# Patient Record
Sex: FEMALE | Race: BLACK OR AFRICAN AMERICAN | Employment: UNEMPLOYED | URBAN - METROPOLITAN AREA
[De-identification: names, ages, dates, MRNs, and addresses within clinical notes are randomized per-mention and may not be internally consistent; named-entity substitution may affect disease eponyms.]

---

## 2019-11-01 ENCOUNTER — HOSPITAL ENCOUNTER (EMERGENCY)
Age: 24
Discharge: HOME | End: 2019-11-01
Attending: EMERGENCY MEDICINE
Payer: SELF-PAY

## 2019-11-01 VITALS
OXYGEN SATURATION: 97 % | HEIGHT: 69 IN | SYSTOLIC BLOOD PRESSURE: 183 MMHG | HEART RATE: 97 BPM | TEMPERATURE: 98 F | DIASTOLIC BLOOD PRESSURE: 87 MMHG | RESPIRATION RATE: 16 BRPM

## 2019-11-01 DIAGNOSIS — N30.00 ACUTE CYSTITIS WITHOUT HEMATURIA: Primary | ICD-10-CM

## 2019-11-01 LAB
BACTERIA URNS QL MICRO: NORMAL /HPF
CASTS URNS QL MICRO: 0 /LPF
CRYSTALS URNS QL MICRO: 0 /LPF
EPI CELLS #/AREA URNS HPF: NORMAL /HPF
MUCOUS THREADS URNS QL MICRO: 0 /LPF
OTHER OBSERVATIONS,UCOM: NORMAL
RBC #/AREA URNS HPF: NORMAL /HPF
WBC URNS QL MICRO: >100 /HPF

## 2019-11-01 PROCEDURE — 99284 EMERGENCY DEPT VISIT MOD MDM: CPT | Performed by: NURSE PRACTITIONER

## 2019-11-01 PROCEDURE — 81015 MICROSCOPIC EXAM OF URINE: CPT

## 2019-11-01 PROCEDURE — 81003 URINALYSIS AUTO W/O SCOPE: CPT | Performed by: NURSE PRACTITIONER

## 2019-11-01 PROCEDURE — 99283 EMERGENCY DEPT VISIT LOW MDM: CPT | Performed by: NURSE PRACTITIONER

## 2019-11-01 RX ORDER — CIPROFLOXACIN 500 MG/1
500 TABLET ORAL 2 TIMES DAILY
Qty: 14 TAB | Refills: 0 | Status: SHIPPED | OUTPATIENT
Start: 2019-11-01 | End: 2019-11-08

## 2019-11-01 NOTE — ED TRIAGE NOTES
Pt reports UTI symptoms for a week. Pt states she tried to treat herself but didn't work. Burning with urination and frequency, denies blood in urine.  NAD

## 2019-11-01 NOTE — DISCHARGE INSTRUCTIONS
Cipro as prescribed. Follow up with your primary care provider for a recheck if symptoms fail to improve or worsen. Return to the emergency department as needed.

## 2019-11-01 NOTE — ED PROVIDER NOTES
Patient presents with dysuria and urinary frequency. She states symptoms have been present for the past week. She states lower back pain. She denies fever, nausea, and vomiting. The history is provided by the patient. Bladder Infection    This is a new problem. The current episode started more than 1 week ago. The problem occurs every urination. The problem has not changed since onset. The quality of the pain is described as burning. The pain is at a severity of 9/10. The pain is mild. There has been no fever. She is sexually active. Associated symptoms include frequency and urgency. Pertinent negatives include no chills, no sweats, no nausea, no vomiting, no discharge, no hematuria, no hesitancy and no possible pregnancy. She has tried nothing for the symptoms. History reviewed. No pertinent past medical history. History reviewed. No pertinent surgical history. History reviewed. No pertinent family history.     Social History     Socioeconomic History    Marital status: SINGLE     Spouse name: Not on file    Number of children: Not on file    Years of education: Not on file    Highest education level: Not on file   Occupational History    Not on file   Social Needs    Financial resource strain: Not on file    Food insecurity:     Worry: Not on file     Inability: Not on file    Transportation needs:     Medical: Not on file     Non-medical: Not on file   Tobacco Use    Smoking status: Not on file   Substance and Sexual Activity    Alcohol use: Not on file    Drug use: Not on file    Sexual activity: Not on file   Lifestyle    Physical activity:     Days per week: Not on file     Minutes per session: Not on file    Stress: Not on file   Relationships    Social connections:     Talks on phone: Not on file     Gets together: Not on file     Attends Sikhism service: Not on file     Active member of club or organization: Not on file     Attends meetings of clubs or organizations: Not on file     Relationship status: Not on file    Intimate partner violence:     Fear of current or ex partner: Not on file     Emotionally abused: Not on file     Physically abused: Not on file     Forced sexual activity: Not on file   Other Topics Concern    Not on file   Social History Narrative    Not on file         ALLERGIES: Patient has no known allergies. Review of Systems   Constitutional: Negative for chills and fever. Gastrointestinal: Negative for abdominal pain, nausea and vomiting. Genitourinary: Positive for frequency and urgency. Negative for hematuria and hesitancy. Vitals:    11/01/19 0852   BP: 183/87   Pulse: 97   Resp: 16   Temp: 98 °F (36.7 °C)   SpO2: 97%   Height: 5' 9\" (1.753 m)            Physical Exam   Constitutional: She is oriented to person, place, and time. She appears well-developed and well-nourished. No distress. HENT:   Head: Normocephalic and atraumatic. Eyes: Conjunctivae and EOM are normal.   Neck: Normal range of motion. Neck supple. Cardiovascular: Normal rate and regular rhythm. Pulmonary/Chest: Effort normal and breath sounds normal.   Abdominal: Soft. She exhibits no distension. There is no tenderness. Musculoskeletal:        Lumbar back: She exhibits tenderness and pain. Back:    Neurological: She is alert and oriented to person, place, and time. Skin: Skin is warm and dry. She is not diaphoretic. Psychiatric: She has a normal mood and affect. Her behavior is normal.   Nursing note and vitals reviewed.      Recent Results (from the past 12 hour(s))   URINE MICROSCOPIC    Collection Time: 11/01/19  9:00 AM   Result Value Ref Range    WBC >100 0 /hpf    RBC 0-3 0 /hpf    Epithelial cells 0-3 0 /hpf    Bacteria TRACE 0 /hpf    Casts 0 0 /lpf    Crystals, urine 0 0 /LPF    Mucus 0 0 /lpf    Other observations RESULTS VERIFIED MANUALLY         MDM  Number of Diagnoses or Management Options  Acute cystitis without hematuria:   Diagnosis management comments: Urine sent for culture and GC/C culture. No treatment at this time. Prescription for cipro.         Amount and/or Complexity of Data Reviewed  Clinical lab tests: ordered and reviewed    Risk of Complications, Morbidity, and/or Mortality  Presenting problems: low  Diagnostic procedures: low  Management options: low    Patient Progress  Patient progress: stable         Procedures

## 2019-11-01 NOTE — ED NOTES
I have reviewed discharge instructions with the patient. The patient verbalized understanding. Patient left ED via Discharge Method: ambulatory to Home with self. Opportunity for questions and clarification provided. Patient given 1 scripts. To continue your aftercare when you leave the hospital, you may receive an automated call from our care team to check in on how you are doing. This is a free service and part of our promise to provide the best care and service to meet your aftercare needs.  If you have questions, or wish to unsubscribe from this service please call [phone]. Thank you for Choosing our [facility] Emergency Department.

## 2019-12-06 ENCOUNTER — APPOINTMENT (OUTPATIENT)
Dept: GENERAL RADIOLOGY | Age: 24
End: 2019-12-06
Attending: EMERGENCY MEDICINE
Payer: SELF-PAY

## 2019-12-06 ENCOUNTER — HOSPITAL ENCOUNTER (EMERGENCY)
Age: 24
Discharge: HOME | End: 2019-12-06
Attending: EMERGENCY MEDICINE
Payer: SELF-PAY

## 2019-12-06 ENCOUNTER — HOSPITAL ENCOUNTER (EMERGENCY)
Age: 24
Discharge: LEFT BEFORE DISCHARGE | End: 2019-12-06
Payer: MEDICAID

## 2019-12-06 VITALS
RESPIRATION RATE: 18 BRPM | TEMPERATURE: 98.5 F | HEART RATE: 111 BPM | WEIGHT: 293 LBS | DIASTOLIC BLOOD PRESSURE: 118 MMHG | BODY MASS INDEX: 43.4 KG/M2 | OXYGEN SATURATION: 93 % | HEIGHT: 69 IN | SYSTOLIC BLOOD PRESSURE: 194 MMHG

## 2019-12-06 VITALS
DIASTOLIC BLOOD PRESSURE: 95 MMHG | TEMPERATURE: 98.5 F | HEART RATE: 92 BPM | RESPIRATION RATE: 16 BRPM | OXYGEN SATURATION: 95 % | SYSTOLIC BLOOD PRESSURE: 179 MMHG

## 2019-12-06 DIAGNOSIS — M54.50 CHRONIC MIDLINE LOW BACK PAIN WITHOUT SCIATICA: Primary | ICD-10-CM

## 2019-12-06 DIAGNOSIS — G89.29 CHRONIC MIDLINE LOW BACK PAIN WITHOUT SCIATICA: Primary | ICD-10-CM

## 2019-12-06 DIAGNOSIS — R03.0 ELEVATED BLOOD PRESSURE READING: ICD-10-CM

## 2019-12-06 DIAGNOSIS — R06.09 DYSPNEA ON EXERTION: ICD-10-CM

## 2019-12-06 LAB
ALBUMIN SERPL-MCNC: 3.5 G/DL (ref 3.5–5)
ALBUMIN/GLOB SERPL: 0.7 {RATIO} (ref 1.2–3.5)
ALP SERPL-CCNC: 103 U/L (ref 50–136)
ALT SERPL-CCNC: 22 U/L (ref 12–65)
ANION GAP SERPL CALC-SCNC: 8 MMOL/L (ref 7–16)
AST SERPL-CCNC: 20 U/L (ref 15–37)
ATRIAL RATE: 101 BPM
BASOPHILS # BLD: 0.1 K/UL (ref 0–0.2)
BASOPHILS NFR BLD: 1 % (ref 0–2)
BILIRUB SERPL-MCNC: 0.4 MG/DL (ref 0.2–1.1)
BUN SERPL-MCNC: 10 MG/DL (ref 6–23)
CALCIUM SERPL-MCNC: 9.2 MG/DL (ref 8.3–10.4)
CALCULATED P AXIS, ECG09: 44 DEGREES
CALCULATED R AXIS, ECG10: 70 DEGREES
CALCULATED T AXIS, ECG11: 51 DEGREES
CHLORIDE SERPL-SCNC: 105 MMOL/L (ref 98–107)
CO2 SERPL-SCNC: 26 MMOL/L (ref 21–32)
CREAT SERPL-MCNC: 0.89 MG/DL (ref 0.6–1)
D DIMER PPP FEU-MCNC: 0.35 UG/ML(FEU)
DIAGNOSIS, 93000: NORMAL
DIFFERENTIAL METHOD BLD: ABNORMAL
EOSINOPHIL # BLD: 0 K/UL (ref 0–0.8)
EOSINOPHIL NFR BLD: 0 % (ref 0.5–7.8)
ERYTHROCYTE [DISTWIDTH] IN BLOOD BY AUTOMATED COUNT: 22.5 % (ref 11.9–14.6)
GLOBULIN SER CALC-MCNC: 5.1 G/DL (ref 2.3–3.5)
GLUCOSE SERPL-MCNC: 107 MG/DL (ref 65–100)
HCT VFR BLD AUTO: 37.9 % (ref 35.8–46.3)
HGB BLD-MCNC: 10.3 G/DL (ref 11.7–15.4)
IMM GRANULOCYTES # BLD AUTO: 0 K/UL (ref 0–0.5)
IMM GRANULOCYTES NFR BLD AUTO: 0 % (ref 0–5)
LYMPHOCYTES # BLD: 3.6 K/UL (ref 0.5–4.6)
LYMPHOCYTES NFR BLD: 36 % (ref 13–44)
MCH RBC QN AUTO: 17.7 PG (ref 26.1–32.9)
MCHC RBC AUTO-ENTMCNC: 27.2 G/DL (ref 31.4–35)
MCV RBC AUTO: 65.2 FL (ref 79.6–97.8)
MONOCYTES # BLD: 0.6 K/UL (ref 0.1–1.3)
MONOCYTES NFR BLD: 6 % (ref 4–12)
NEUTS SEG # BLD: 5.7 K/UL (ref 1.7–8.2)
NEUTS SEG NFR BLD: 57 % (ref 43–78)
NRBC # BLD: 0 K/UL (ref 0–0.2)
P-R INTERVAL, ECG05: 172 MS
PLATELET # BLD AUTO: 444 K/UL (ref 150–450)
PMV BLD AUTO: 10.1 FL (ref 9.4–12.3)
POTASSIUM SERPL-SCNC: 4 MMOL/L (ref 3.5–5.1)
PROT SERPL-MCNC: 8.6 G/DL (ref 6.3–8.2)
Q-T INTERVAL, ECG07: 368 MS
QRS DURATION, ECG06: 90 MS
QTC CALCULATION (BEZET), ECG08: 477 MS
RBC # BLD AUTO: 5.81 M/UL (ref 4.05–5.2)
SODIUM SERPL-SCNC: 139 MMOL/L (ref 136–145)
VENTRICULAR RATE, ECG03: 101 BPM
WBC # BLD AUTO: 10 K/UL (ref 4.3–11.1)

## 2019-12-06 PROCEDURE — 85379 FIBRIN DEGRADATION QUANT: CPT

## 2019-12-06 PROCEDURE — 99284 EMERGENCY DEPT VISIT MOD MDM: CPT | Performed by: EMERGENCY MEDICINE

## 2019-12-06 PROCEDURE — 81003 URINALYSIS AUTO W/O SCOPE: CPT | Performed by: EMERGENCY MEDICINE

## 2019-12-06 PROCEDURE — 74011250636 HC RX REV CODE- 250/636: Performed by: EMERGENCY MEDICINE

## 2019-12-06 PROCEDURE — 93005 ELECTROCARDIOGRAM TRACING: CPT | Performed by: EMERGENCY MEDICINE

## 2019-12-06 PROCEDURE — 75810000275 HC EMERGENCY DEPT VISIT NO LEVEL OF CARE: Performed by: EMERGENCY MEDICINE

## 2019-12-06 PROCEDURE — 85025 COMPLETE CBC W/AUTO DIFF WBC: CPT

## 2019-12-06 PROCEDURE — 71046 X-RAY EXAM CHEST 2 VIEWS: CPT

## 2019-12-06 PROCEDURE — 96374 THER/PROPH/DIAG INJ IV PUSH: CPT | Performed by: EMERGENCY MEDICINE

## 2019-12-06 PROCEDURE — 80053 COMPREHEN METABOLIC PANEL: CPT

## 2019-12-06 RX ORDER — SODIUM CHLORIDE 0.9 % (FLUSH) 0.9 %
5-40 SYRINGE (ML) INJECTION AS NEEDED
Status: DISCONTINUED | OUTPATIENT
Start: 2019-12-06 | End: 2019-12-06 | Stop reason: HOSPADM

## 2019-12-06 RX ORDER — LISINOPRIL 10 MG/1
10 TABLET ORAL DAILY
Qty: 30 TAB | Refills: 0 | Status: SHIPPED | OUTPATIENT
Start: 2019-12-06

## 2019-12-06 RX ORDER — KETOROLAC TROMETHAMINE 30 MG/ML
30 INJECTION, SOLUTION INTRAMUSCULAR; INTRAVENOUS
Status: COMPLETED | OUTPATIENT
Start: 2019-12-06 | End: 2019-12-06

## 2019-12-06 RX ORDER — SODIUM CHLORIDE 0.9 % (FLUSH) 0.9 %
5-40 SYRINGE (ML) INJECTION EVERY 8 HOURS
Status: DISCONTINUED | OUTPATIENT
Start: 2019-12-06 | End: 2019-12-06 | Stop reason: HOSPADM

## 2019-12-06 RX ORDER — METHOCARBAMOL 750 MG/1
750 TABLET, FILM COATED ORAL
Qty: 20 TAB | Refills: 0 | Status: SHIPPED | OUTPATIENT
Start: 2019-12-06

## 2019-12-06 RX ORDER — IBUPROFEN 800 MG/1
800 TABLET ORAL
Qty: 21 TAB | Refills: 0 | Status: SHIPPED | OUTPATIENT
Start: 2019-12-06

## 2019-12-06 RX ADMIN — KETOROLAC TROMETHAMINE 30 MG: 30 INJECTION, SOLUTION INTRAMUSCULAR at 19:23

## 2019-12-06 NOTE — ED TRIAGE NOTES
Pt having lower back pain for the last couple of months. Pt states pain is so bad she is having trouble walking and breathing makes pain worse.

## 2019-12-06 NOTE — ED PROVIDER NOTES
52-year-old black female presents emergency department complaining of low back pain for about the last year, slowly progressively getting worse. Patient describes associated intermittent leg pain especially with activity bilaterally, as well as intermittent feet going numb. Patient states over the last 3 to 4 months she is noticed increased dyspnea on exertion, and will often hurt when she takes a deep breath in her lower back. She denies any history of trauma or loss of bowel or bladder function or perineal numbness. She does describe increased urinary frequency and has a family history of diabetes as well as hypertension. The history is provided by the patient. Back Pain    This is a chronic problem. The current episode started more than 1 week ago. The problem has been gradually worsening. The problem occurs daily. Patient reports not work related injury. The pain is associated with no known injury. The pain is present in the lower back. The quality of the pain is described as sharp. The pain does not radiate. The pain is at a severity of 10/10. The symptoms are aggravated by bending, twisting and certain positions. The pain is the same all the time. Stiffness is present all day. Associated symptoms include leg pain and tingling. Pertinent negatives include no chest pain, no fever, no numbness, no weight loss, no headaches, no abdominal pain, no abdominal swelling, no bowel incontinence, no perianal numbness, no bladder incontinence, no dysuria, no pelvic pain, no paresthesias, no paresis and no weakness. She has tried bed rest for the symptoms. The treatment provided moderate relief. Risk factors include obesity. The patient's surgical history non-contributory        No past medical history on file. No past surgical history on file. No family history on file.     Social History     Socioeconomic History    Marital status: SINGLE     Spouse name: Not on file    Number of children: Not on file    Years of education: Not on file    Highest education level: Not on file   Occupational History    Not on file   Social Needs    Financial resource strain: Not on file    Food insecurity:     Worry: Not on file     Inability: Not on file    Transportation needs:     Medical: Not on file     Non-medical: Not on file   Tobacco Use    Smoking status: Not on file   Substance and Sexual Activity    Alcohol use: Not on file    Drug use: Not on file    Sexual activity: Not on file   Lifestyle    Physical activity:     Days per week: Not on file     Minutes per session: Not on file    Stress: Not on file   Relationships    Social connections:     Talks on phone: Not on file     Gets together: Not on file     Attends Episcopalian service: Not on file     Active member of club or organization: Not on file     Attends meetings of clubs or organizations: Not on file     Relationship status: Not on file    Intimate partner violence:     Fear of current or ex partner: Not on file     Emotionally abused: Not on file     Physically abused: Not on file     Forced sexual activity: Not on file   Other Topics Concern    Not on file   Social History Narrative    Not on file         ALLERGIES: Patient has no known allergies. Review of Systems   Constitutional: Negative for fever and weight loss. Cardiovascular: Negative for chest pain. Gastrointestinal: Negative for abdominal pain and bowel incontinence. Genitourinary: Positive for frequency. Negative for bladder incontinence, dysuria and pelvic pain. Musculoskeletal: Positive for back pain. Neurological: Positive for tingling. Negative for weakness, numbness, headaches and paresthesias. All other systems reviewed and are negative. Vitals:    12/06/19 1559   BP: (!) 194/118   Pulse: (!) 111   Resp: 18   Temp: 98.5 °F (36.9 °C)   SpO2: 93%            Physical Exam  Vitals signs and nursing note reviewed.    Constitutional:       General: She is not in acute distress. Appearance: She is well-developed. She is obese. HENT:      Head: Normocephalic and atraumatic. Right Ear: External ear normal.      Left Ear: External ear normal.   Eyes:      Conjunctiva/sclera: Conjunctivae normal.      Pupils: Pupils are equal, round, and reactive to light. Neck:      Musculoskeletal: Normal range of motion and neck supple. Cardiovascular:      Rate and Rhythm: Normal rate and regular rhythm. Heart sounds: Normal heart sounds. Pulmonary:      Effort: Pulmonary effort is normal.      Breath sounds: Normal breath sounds. Abdominal:      General: Bowel sounds are normal.      Palpations: Abdomen is soft. Tenderness: There is no tenderness. Musculoskeletal: Normal range of motion. General: No signs of injury. Lumbar back: She exhibits tenderness (Minimal). She exhibits normal range of motion, no spasm and normal pulse. Right lower leg: Edema (2+) present. Left lower leg: Edema (2+) present. Skin:     General: Skin is warm and dry. Capillary Refill: Capillary refill takes less than 2 seconds. Neurological:      General: No focal deficit present. Mental Status: She is alert and oriented to person, place, and time. Cranial Nerves: Cranial nerves are intact. Sensory: Sensation is intact. Motor: Motor function is intact. Coordination: Coordination is intact.  Coordination normal.      Gait: Gait normal.      Comments: Negative straight leg raise bilaterally   Psychiatric:         Mood and Affect: Mood normal.         Speech: Speech normal.          MDM  Number of Diagnoses or Management Options  Chronic midline low back pain without sciatica: new and requires workup  Dyspnea on exertion: new and requires workup  Elevated blood pressure reading: new and requires workup     Amount and/or Complexity of Data Reviewed  Clinical lab tests: ordered and reviewed  Tests in the radiology section of CPT®: ordered and reviewed  Review and summarize past medical records: yes  Independent visualization of images, tracings, or specimens: yes    Risk of Complications, Morbidity, and/or Mortality  Presenting problems: moderate  Diagnostic procedures: moderate  Management options: moderate    Patient Progress  Patient progress: stable         Procedures    The patient was observed in the ED. patient was given Toradol for discomfort and will be started on lisinopril for her hypertension. D-dimer was negative and the patient has no other risk factors other than obesity for a clot, so I do not believe she needs any further work-up for possible DVT or PE. She will be referred to the family doctor for further evaluation of her hypertension as well as any help she may get with her morbid obesity. Results Reviewed:      Recent Results (from the past 24 hour(s))   CBC WITH AUTOMATED DIFF    Collection Time: 12/06/19  6:28 PM   Result Value Ref Range    WBC 10.0 4.3 - 11.1 K/uL    RBC 5.81 (H) 4.05 - 5.2 M/uL    HGB 10.3 (L) 11.7 - 15.4 g/dL    HCT 37.9 35.8 - 46.3 %    MCV 65.2 (L) 79.6 - 97.8 FL    MCH 17.7 (L) 26.1 - 32.9 PG    MCHC 27.2 (L) 31.4 - 35.0 g/dL    RDW 22.5 (H) 11.9 - 14.6 %    PLATELET 381 522 - 146 K/uL    MPV 10.1 9.4 - 12.3 FL    ABSOLUTE NRBC 0.00 0.0 - 0.2 K/uL    DF AUTOMATED      NEUTROPHILS 57 43 - 78 %    LYMPHOCYTES 36 13 - 44 %    MONOCYTES 6 4.0 - 12.0 %    EOSINOPHILS 0 (L) 0.5 - 7.8 %    BASOPHILS 1 0.0 - 2.0 %    IMMATURE GRANULOCYTES 0 0.0 - 5.0 %    ABS. NEUTROPHILS 5.7 1.7 - 8.2 K/UL    ABS. LYMPHOCYTES 3.6 0.5 - 4.6 K/UL    ABS. MONOCYTES 0.6 0.1 - 1.3 K/UL    ABS. EOSINOPHILS 0.0 0.0 - 0.8 K/UL    ABS. BASOPHILS 0.1 0.0 - 0.2 K/UL    ABS. IMM.  GRANS. 0.0 0.0 - 0.5 K/UL   METABOLIC PANEL, COMPREHENSIVE    Collection Time: 12/06/19  6:28 PM   Result Value Ref Range    Sodium 139 136 - 145 mmol/L    Potassium 4.0 3.5 - 5.1 mmol/L    Chloride 105 98 - 107 mmol/L    CO2 26 21 - 32 mmol/L    Anion gap 8 7 - 16 mmol/L    Glucose 107 (H) 65 - 100 mg/dL    BUN 10 6 - 23 MG/DL    Creatinine 0.89 0.6 - 1.0 MG/DL    GFR est AA >60 >60 ml/min/1.73m2    GFR est non-AA >60 >60 ml/min/1.73m2    Calcium 9.2 8.3 - 10.4 MG/DL    Bilirubin, total 0.4 0.2 - 1.1 MG/DL    ALT (SGPT) 22 12 - 65 U/L    AST (SGOT) 20 15 - 37 U/L    Alk. phosphatase 103 50 - 136 U/L    Protein, total 8.6 (H) 6.3 - 8.2 g/dL    Albumin 3.5 3.5 - 5.0 g/dL    Globulin 5.1 (H) 2.3 - 3.5 g/dL    A-G Ratio 0.7 (L) 1.2 - 3.5     D DIMER    Collection Time: 12/06/19  6:28 PM   Result Value Ref Range    D DIMER 0.35 <0.56 ug/ml(FEU)   EKG, 12 LEAD, INITIAL    Collection Time: 12/06/19  6:46 PM   Result Value Ref Range    Ventricular Rate 101 BPM    Atrial Rate 101 BPM    P-R Interval 172 ms    QRS Duration 90 ms    Q-T Interval 368 ms    QTC Calculation (Bezet) 477 ms    Calculated P Axis 44 degrees    Calculated R Axis 70 degrees    Calculated T Axis 51 degrees    Diagnosis       Sinus tachycardia  Otherwise normal ECG  No previous ECGs available         I discussed the results of all labs, procedures, radiographs, and treatments with the patient and available family. Treatment plan is agreed upon and the patient is ready for discharge. All voiced understanding of the discharge plan and medication instructions or changes as appropriate. Questions about treatment in the ED were answered. All were encouraged to return should symptoms worsen or new problems develop.

## 2019-12-07 NOTE — DISCHARGE INSTRUCTIONS
Patient Education        Learning About Relief for Back Pain  What is back tension and strain? Back strain happens when you overstretch, or pull, a muscle in your back. You may hurt your back in an accident or when you exercise or lift something. Most back pain will get better with rest and time. You can take care of yourself at home to help your back heal.  What can you do first to relieve back pain? When you first feel back pain, try these steps:  · Walk. Take a short walk (10 to 20 minutes) on a level surface (no slopes, hills, or stairs) every 2 to 3 hours. Walk only distances you can manage without pain, especially leg pain. · Relax. Find a comfortable position for rest. Some people are comfortable on the floor or a medium-firm bed with a small pillow under their head and another under their knees. Some people prefer to lie on their side with a pillow between their knees. Don't stay in one position for too long. · Try heat or ice. Try using a heating pad on a low or medium setting, or take a warm shower, for 15 to 20 minutes every 2 to 3 hours. Or you can buy single-use heat wraps that last up to 8 hours. You can also try an ice pack for 10 to 15 minutes every 2 to 3 hours. You can use an ice pack or a bag of frozen vegetables wrapped in a thin towel. There is not strong evidence that either heat or ice will help, but you can try them to see if they help. You may also want to try switching between heat and cold. · Take pain medicine exactly as directed. ¨ If the doctor gave you a prescription medicine for pain, take it as prescribed. ¨ If you are not taking a prescription pain medicine, ask your doctor if you can take an over-the-counter medicine. What else can you do? · Stretch and exercise. Exercises that increase flexibility may relieve your pain and make it easier for your muscles to keep your spine in a good, neutral position. And don't forget to keep walking. · Do self-massage.  You can use self-massage to unwind after work or school or to energize yourself in the morning. You can easily massage your feet, hands, or neck. Self-massage works best if you are in comfortable clothes and are sitting or lying in a comfortable position. Use oil or lotion to massage bare skin. · Reduce stress. Back pain can lead to a vicious Sun'aq: Distress about the pain tenses the muscles in your back, which in turn causes more pain. Learn how to relax your mind and your muscles to lower your stress. Where can you learn more? Go to http://kmAlgoluxjanie.info/. Enter Z596 in the search box to learn more about \"Learning About Relief for Back Pain. \"  Current as of: March 21, 2017  Content Version: 11.5  © 6484-5702 [employer]. Care instructions adapted under license by [employer] (which disclaims liability or warranty for this information). If you have questions about a medical condition or this instruction, always ask your healthcare professional. [place] [address] any warranty or liability for your use of this information. Patient Education        Back Pain, Emergency or Urgent Symptoms: Care Instructions  Your Care Instructions    Many people have back pain at one time or another. In most cases, pain gets better with self-care that includes over-the-counter pain medicine, ice, heat, and exercises. Unless you have symptoms of a severe injury or heart attack, you may be able to give yourself a few days before you call a doctor. But some back problems are very serious. Do not ignore symptoms that need to be checked right away. Follow-up care is a key part of your treatment and safety. Be sure to make and go to all appointments, and call your doctor if you are having problems. It's also a good idea to know your test results and keep a list of the medicines you take. How can you care for yourself at home?   · Sit or lie in positions that are most comfortable and that reduce your pain. Try one of these positions when you lie down:  ? Lie on your back with your knees bent and supported by large pillows. ? Lie on the floor with your legs on the seat of a sofa or chair. ? Lie on your side with your knees and hips bent and a pillow between your legs. ? Lie on your stomach if it does not make pain worse. · Do not sit up in bed, and avoid soft couches and twisted positions. Bed rest can help relieve pain at first, but it delays healing. Avoid bed rest after the first day. · Change positions every 30 minutes. If you must sit for long periods of time, take breaks from sitting. Get up and walk around, or lie flat. · Try using a heating pad on a low or medium setting, for 15 to 20 minutes every 2 or 3 hours. Try a warm shower in place of one session with the heating pad. You can also buy single-use heat wraps that last up to 8 hours. You can also try ice or cold packs on your back for 10 to 20 minutes at a time, several times a day. (Put a thin cloth between the ice pack and your skin.) This reduces pain and makes it easier to be active and exercise. · Take pain medicines exactly as directed. ? If the doctor gave you a prescription medicine for pain, take it as prescribed. ? If you are not taking a prescription pain medicine, ask your doctor if you can take an over-the-counter medicine. When should you call for help? Call 911 anytime you think you may need emergency care. For example, call if:    · You are unable to move a leg at all.     · You have back pain with severe belly pain.     · You have symptoms of a heart attack. These may include:  ? Chest pain or pressure, or a strange feeling in the chest.  ? Sweating. ? Shortness of breath. ? Nausea or vomiting. ? Pain, pressure, or a strange feeling in the back, neck, jaw, or upper belly or in one or both shoulders or arms. ? Lightheadedness or sudden weakness. ? A fast or irregular heartbeat.   After you call 911, the  may tell you to chew 1 adult-strength or 2 to 4 low-dose aspirin. Wait for an ambulance. Do not try to drive yourself.    Call your doctor now or seek immediate medical care if:    · You have new or worse symptoms in your arms, legs, chest, belly, or buttocks. Symptoms may include:  ? Numbness or tingling. ? Weakness. ? Pain.     · You lose bladder or bowel control.     · You have back pain and:  ? You have injured your back while lifting or doing some other activity. Call if the pain is severe, has not gone away after 1 or 2 days, and you cannot do your normal daily activities. ? You have had a back injury before that needed treatment. ? Your pain has lasted longer than 4 weeks. ? You have had weight loss you cannot explain. ? You have a fever. ? You are age 48 or older. ? You have cancer now or have had it before.    Watch closely for changes in your health, and be sure to contact your doctor if you are not getting better as expected. Where can you learn more? Go to http://km-janie.info/. Enter U822 in the search box to learn more about \"Back Pain, Emergency or Urgent Symptoms: Care Instructions. \"  Current as of: June 26, 2019  Content Version: 12.2  © 2646-2571 [employer]. Care instructions adapted under license by [employer] (which disclaims liability or warranty for this information). If you have questions about a medical condition or this instruction, always ask your healthcare professional. [place] [address] any warranty or liability for your use of this information. Patient Education        Shortness of Breath: Care Instructions  Your Care Instructions  Shortness of breath has many causes. Sometimes conditions such as anxiety can lead to shortness of breath. Some people get mild shortness of breath when they exercise.  Trouble breathing also can be a symptom of a serious problem, such as asthma, lung disease, emphysema, heart problems, and pneumonia. If your shortness of breath continues, you may need tests and treatment. Watch for any changes in your breathing and other symptoms. Follow-up care is a key part of your treatment and safety. Be sure to make and go to all appointments, and call your doctor if you are having problems. It's also a good idea to know your test results and keep a list of the medicines you take. How can you care for yourself at home? · Do not smoke or allow others to smoke around you. If you need help quitting, talk to your doctor about stop-smoking programs and medicines. These can increase your chances of quitting for good. · Get plenty of rest and sleep. · Take your medicines exactly as prescribed. Call your doctor if you think you are having a problem with your medicine. · Find healthy ways to deal with stress. ? Exercise daily. ? Get plenty of sleep. ? Eat regularly and well. When should you call for help? Call 911 anytime you think you may need emergency care. For example, call if:    · You have severe shortness of breath.     · You have symptoms of a heart attack. These may include:  ? Chest pain or pressure, or a strange feeling in the chest.  ? Sweating. ? Shortness of breath. ? Nausea or vomiting. ? Pain, pressure, or a strange feeling in the back, neck, jaw, or upper belly or in one or both shoulders or arms. ? Lightheadedness or sudden weakness. ? A fast or irregular heartbeat. After you call 911, the  may tell you to chew 1 adult-strength or 2 to 4 low-dose aspirin. Wait for an ambulance. Do not try to drive yourself.    Call your doctor now or seek immediate medical care if:    · Your shortness of breath gets worse or you start to wheeze.  Wheezing is a high-pitched sound when you breathe.     · You wake up at night out of breath or have to prop your head up on several pillows to breathe.     · You are short of breath after only light activity or while at rest.    Watch closely for changes in your health, and be sure to contact your doctor if:    · You do not get better over the next 1 to 2 days. Where can you learn more? Go to http://km-janie.info/. Enter S780 in the search box to learn more about \"Shortness of Breath: Care Instructions. \"  Current as of: June 9, 2019  Content Version: 12.2  © 6275-5683 [employer]. Care instructions adapted under license by [employer] (which disclaims liability or warranty for this information). If you have questions about a medical condition or this instruction, always ask your healthcare professional. [address] any warranty or liability for your use of this information.

## 2019-12-07 NOTE — ED NOTES
I have reviewed discharge instructions with the patient. The patient verbalized understanding. Patient left ED via Discharge Method: ambulatory to Home with self. Opportunity for questions and clarification provided. Patient given 3 scripts. To continue your aftercare when you leave the hospital, you may receive an automated call from our care team to check in on how you are doing. This is a free service and part of our promise to provide the best care and service to meet your aftercare needs.  If you have questions, or wish to unsubscribe from this service please call [phone]. Thank you for Choosing our [facility] Emergency Department.

## 2020-05-06 ENCOUNTER — HOSPITAL ENCOUNTER (EMERGENCY)
Age: 25
Discharge: HOME | End: 2020-05-06
Attending: EMERGENCY MEDICINE
Payer: SELF-PAY

## 2020-05-06 VITALS
SYSTOLIC BLOOD PRESSURE: 161 MMHG | BODY MASS INDEX: 39.68 KG/M2 | TEMPERATURE: 98.2 F | HEIGHT: 72 IN | WEIGHT: 293 LBS | RESPIRATION RATE: 20 BRPM | HEART RATE: 103 BPM | DIASTOLIC BLOOD PRESSURE: 97 MMHG | OXYGEN SATURATION: 95 %

## 2020-05-06 DIAGNOSIS — B37.31 VAGINAL CANDIDIASIS: Primary | ICD-10-CM

## 2020-05-06 LAB
BACTERIA URNS QL MICRO: 0 /HPF
CASTS URNS QL MICRO: ABNORMAL /LPF
EPI CELLS #/AREA URNS HPF: ABNORMAL /HPF
HCG UR QL: NEGATIVE
RBC #/AREA URNS HPF: ABNORMAL /HPF
WBC URNS QL MICRO: >100 /HPF

## 2020-05-06 PROCEDURE — 81015 MICROSCOPIC EXAM OF URINE: CPT

## 2020-05-06 PROCEDURE — 74011250637 HC RX REV CODE- 250/637: Performed by: EMERGENCY MEDICINE

## 2020-05-06 PROCEDURE — 87491 CHLMYD TRACH DNA AMP PROBE: CPT

## 2020-05-06 PROCEDURE — 81003 URINALYSIS AUTO W/O SCOPE: CPT

## 2020-05-06 PROCEDURE — 81025 URINE PREGNANCY TEST: CPT

## 2020-05-06 PROCEDURE — 99284 EMERGENCY DEPT VISIT MOD MDM: CPT

## 2020-05-06 RX ORDER — FLUCONAZOLE 100 MG/1
200 TABLET ORAL
Status: COMPLETED | OUTPATIENT
Start: 2020-05-06 | End: 2020-05-06

## 2020-05-06 RX ORDER — FLUCONAZOLE 150 MG/1
150 TABLET ORAL
Qty: 1 TAB | Refills: 0 | Status: SHIPPED | OUTPATIENT
Start: 2020-05-06 | End: 2020-05-06

## 2020-05-06 RX ORDER — IBUPROFEN 800 MG/1
800 TABLET ORAL ONCE
Status: COMPLETED | OUTPATIENT
Start: 2020-05-06 | End: 2020-05-06

## 2020-05-06 RX ADMIN — FLUCONAZOLE 200 MG: 100 TABLET ORAL at 01:18

## 2020-05-06 RX ADMIN — IBUPROFEN 800 MG: 800 TABLET, FILM COATED ORAL at 01:18

## 2020-05-06 NOTE — ED PROVIDER NOTES
Patient is a 27-year-old female with morbid obesity and hypertension who comes to the ER [facility] complaining of some vaginal discharge and vaginal irritation for the past 2 days. She thought it was a yeast infection and used over-the-counter Monistat treatment x2 with no relief. Also has had some dysuria. She denies any sexual activity and has had no concern for any sexually transmitted infections. The history is provided by the patient. Vaginal Discharge    This is a new problem. The current episode started 2 days ago. The problem occurs constantly. The problem has not changed since onset. The discharge was white. Associated symptoms include dysuria and genital itching. Pertinent negatives include no fever, no abdominal pain, no diarrhea, no nausea, no vomiting and no frequency. Treatments tried: Monistat. The treatment provided no relief. Her past medical history does not include irregular periods or STD. No past medical history on file. No past surgical history on file. No family history on file.     Social History     Socioeconomic History    Marital status: SINGLE     Spouse name: Not on file    Number of children: Not on file    Years of education: Not on file    Highest education level: Not on file   Occupational History    Not on file   Social Needs    Financial resource strain: Not on file    Food insecurity     Worry: Not on file     Inability: Not on file    Transportation needs     Medical: Not on file     Non-medical: Not on file   Tobacco Use    Smoking status: Not on file   Substance and Sexual Activity    Alcohol use: Not on file    Drug use: Not on file    Sexual activity: Not on file   Lifestyle    Physical activity     Days per week: Not on file     Minutes per session: Not on file    Stress: Not on file   Relationships    Social connections     Talks on phone: Not on file     Gets together: Not on file     Attends Mosque service: Not on file     Active member of club or organization: Not on file     Attends meetings of clubs or organizations: Not on file     Relationship status: Not on file    Intimate partner violence     Fear of current or ex partner: Not on file     Emotionally abused: Not on file     Physically abused: Not on file     Forced sexual activity: Not on file   Other Topics Concern    Not on file   Social History Narrative    Not on file         ALLERGIES: Shellfish derived    Review of Systems   Constitutional: Negative for chills, fatigue and fever. HENT: Negative for congestion, rhinorrhea and sore throat. Eyes: Negative for pain, discharge and visual disturbance. Respiratory: Negative for cough and shortness of breath. Cardiovascular: Negative for chest pain and palpitations. Gastrointestinal: Negative for abdominal pain, diarrhea, nausea and vomiting. Endocrine: Negative for polydipsia and polyuria. Genitourinary: Positive for dysuria and vaginal discharge. Negative for frequency, urgency and vaginal bleeding. Musculoskeletal: Negative for back pain and neck pain. Skin: Negative for rash. Neurological: Negative for seizures, syncope and weakness. Hematological: Negative. Vitals:    05/06/20 0035   BP: (!) 179/94   Pulse: (!) 108   Resp: 20   Temp: 98.5 °F (36.9 °C)   SpO2: 95%   Weight: (!) 205.9 kg (454 lb)   Height: 6' (1.829 m)            Physical Exam  Vitals signs and nursing note reviewed. Exam conducted with a chaperone present. Constitutional:       Appearance: Normal appearance. She is obese. HENT:      Head: Normocephalic and atraumatic. Cardiovascular:      Rate and Rhythm: Tachycardia present. Abdominal:      Palpations: Abdomen is soft. Tenderness: There is no abdominal tenderness. Genitourinary:     Labia:         Right: Rash and tenderness present. Left: Rash and tenderness present.        Comments: Skin irritation and a yeast rash in the groin creases and on the labia  Lymphadenopathy:      Lower Body: No right inguinal adenopathy. No left inguinal adenopathy. Neurological:      Mental Status: She is alert and oriented to person, place, and time. Cranial Nerves: No cranial nerve deficit. Sensory: No sensory deficit. Motor: No weakness. MDM  Number of Diagnoses or Management Options  Diagnosis management comments: hCG negative  Urinalysis had white blood cells but no bacteria I think this is all from the vaginal candidiasis. Will treat with Diflucan. Motrin for pain. Voice dictation software was used during the making of this note. This software is not perfect and grammatical and other typographical errors may be present. This note has been proofread, but may still contain errors.   Chucky Guillen MD; 5/6/2020 @1:07 AM   ===================================================================         Amount and/or Complexity of Data Reviewed  Clinical lab tests: ordered and reviewed    Risk of Complications, Morbidity, and/or Mortality  Presenting problems: low  Diagnostic procedures: low  Management options: low    Patient Progress  Patient progress: stable         Procedures

## 2020-05-06 NOTE — PROGRESS NOTES
GC probe added on the urine. Await final culture results. No bacteria in the urine to indicate need for urine culture at this time.

## 2020-05-06 NOTE — ED NOTES
I have reviewed discharge instructions with the patient. The patient verbalized understanding. Patient left ED via Discharge Method: ambulatory to Home with self. Opportunity for questions and clarification provided. Patient given 1 scripts. Work note provided per pt request    To continue your aftercare when you leave the hospital, you may receive an automated call from our care team to check in on how you are doing. This is a free service and part of our promise to provide the best care and service to meet your aftercare needs.  If you have questions, or wish to unsubscribe from this service please call [phone]. Thank you for Choosing our [facility] Emergency Department.

## 2020-05-06 NOTE — LETTER
[address] [facility] EMERGENCY DEPT 
[address] [facility] RAY[address] Ramirez 89 
[phone] Work/School Note Date: 5/6/2020 To Whom It May concern: 
 
Kay Handler was seen and treated today in the emergency room by the following provider(s): 
Attending Provider: Thiago Oliver MD.   
 
Kay Handler may return to work on 5/7/2020. Sincerely, Nick Rodriguez RN

## 2020-05-06 NOTE — ED TRIAGE NOTES
Arrives with face mask in place. Reports vaginal discharge, urinary pain, and vaginal irritation. Onset couple days pta. Denies possible exposure to STDs. Denies recent antibiotic use. Attempted monistat without relief.

## 2020-05-12 LAB
C TRACH RRNA SPEC QL NAA+PROBE: NEGATIVE
N GONORRHOEA RRNA SPEC QL NAA+PROBE: NEGATIVE
SPECIMEN SOURCE: NORMAL

## 2020-07-07 ENCOUNTER — APPOINTMENT (OUTPATIENT)
Dept: CT IMAGING | Age: 25
End: 2020-07-07
Payer: MEDICAID

## 2020-07-07 ENCOUNTER — APPOINTMENT (OUTPATIENT)
Dept: ULTRASOUND IMAGING | Age: 25
End: 2020-07-07
Payer: MEDICAID

## 2020-07-07 ENCOUNTER — HOSPITAL ENCOUNTER (EMERGENCY)
Age: 25
Discharge: HOME | End: 2020-07-07
Attending: EMERGENCY MEDICINE
Payer: MEDICAID

## 2020-07-07 VITALS
BODY MASS INDEX: 43.4 KG/M2 | RESPIRATION RATE: 18 BRPM | TEMPERATURE: 98.3 F | OXYGEN SATURATION: 95 % | HEART RATE: 113 BPM | HEIGHT: 69 IN | WEIGHT: 293 LBS | SYSTOLIC BLOOD PRESSURE: 131 MMHG | DIASTOLIC BLOOD PRESSURE: 74 MMHG

## 2020-07-07 DIAGNOSIS — E66.01 MORBID OBESITY WITH BODY MASS INDEX (BMI) OF 60.0 TO 69.9 IN ADULT (HCC): ICD-10-CM

## 2020-07-07 DIAGNOSIS — N39.0 URINARY TRACT INFECTION WITH HEMATURIA, SITE UNSPECIFIED: Primary | ICD-10-CM

## 2020-07-07 DIAGNOSIS — R10.32 ABDOMINAL PAIN, LLQ (LEFT LOWER QUADRANT): ICD-10-CM

## 2020-07-07 DIAGNOSIS — R31.9 URINARY TRACT INFECTION WITH HEMATURIA, SITE UNSPECIFIED: Primary | ICD-10-CM

## 2020-07-07 LAB
ALBUMIN SERPL-MCNC: 2.8 G/DL (ref 3.5–5)
ALBUMIN/GLOB SERPL: 0.5 {RATIO} (ref 1.2–3.5)
ALP SERPL-CCNC: 101 U/L (ref 50–136)
ALT SERPL-CCNC: 19 U/L (ref 12–65)
ANION GAP SERPL CALC-SCNC: 8 MMOL/L (ref 7–16)
AST SERPL-CCNC: 9 U/L (ref 15–37)
BACTERIA URNS QL MICRO: ABNORMAL /HPF
BASOPHILS # BLD: 0 K/UL (ref 0–0.2)
BASOPHILS NFR BLD: 0 % (ref 0–2)
BILIRUB SERPL-MCNC: 0.9 MG/DL (ref 0.2–1.1)
BUN SERPL-MCNC: 6 MG/DL (ref 6–23)
CALCIUM SERPL-MCNC: 8.5 MG/DL (ref 8.3–10.4)
CASTS URNS QL MICRO: 0 /LPF
CHLORIDE SERPL-SCNC: 102 MMOL/L (ref 98–107)
CO2 SERPL-SCNC: 26 MMOL/L (ref 21–32)
CREAT SERPL-MCNC: 0.9 MG/DL (ref 0.6–1)
CRYSTALS URNS QL MICRO: 0 /LPF
DIFFERENTIAL METHOD BLD: ABNORMAL
EOSINOPHIL # BLD: 0 K/UL (ref 0–0.8)
EOSINOPHIL NFR BLD: 0 % (ref 0.5–7.8)
EPI CELLS #/AREA URNS HPF: ABNORMAL /HPF
ERYTHROCYTE [DISTWIDTH] IN BLOOD BY AUTOMATED COUNT: 20.6 % (ref 11.9–14.6)
GLOBULIN SER CALC-MCNC: 5.3 G/DL (ref 2.3–3.5)
GLUCOSE SERPL-MCNC: 185 MG/DL (ref 65–100)
HCG UR QL: NEGATIVE
HCT VFR BLD AUTO: 38 % (ref 35.8–46.3)
HGB BLD-MCNC: 10.3 G/DL (ref 11.7–15.4)
IMM GRANULOCYTES # BLD AUTO: 0.1 K/UL (ref 0–0.5)
IMM GRANULOCYTES NFR BLD AUTO: 1 % (ref 0–5)
LIPASE SERPL-CCNC: 35 U/L (ref 73–393)
LYMPHOCYTES # BLD: 2 K/UL (ref 0.5–4.6)
LYMPHOCYTES NFR BLD: 11 % (ref 13–44)
MAGNESIUM SERPL-MCNC: 1.7 MG/DL (ref 1.8–2.4)
MCH RBC QN AUTO: 18 PG (ref 26.1–32.9)
MCHC RBC AUTO-ENTMCNC: 27.1 G/DL (ref 31.4–35)
MCV RBC AUTO: 66.4 FL (ref 79.6–97.8)
MONOCYTES # BLD: 1.5 K/UL (ref 0.1–1.3)
MONOCYTES NFR BLD: 9 % (ref 4–12)
MUCOUS THREADS URNS QL MICRO: 0 /LPF
NEUTS SEG # BLD: 13.9 K/UL (ref 1.7–8.2)
NEUTS SEG NFR BLD: 80 % (ref 43–78)
NRBC # BLD: 0 K/UL (ref 0–0.2)
PLATELET # BLD AUTO: 315 K/UL (ref 150–450)
PMV BLD AUTO: 9.9 FL (ref 9.4–12.3)
POTASSIUM SERPL-SCNC: 3.4 MMOL/L (ref 3.5–5.1)
PROT SERPL-MCNC: 8.1 G/DL (ref 6.3–8.2)
RBC # BLD AUTO: 5.72 M/UL (ref 4.05–5.2)
RBC #/AREA URNS HPF: ABNORMAL /HPF
SODIUM SERPL-SCNC: 136 MMOL/L (ref 136–145)
WBC # BLD AUTO: 17.5 K/UL (ref 4.3–11.1)
WBC URNS QL MICRO: ABNORMAL /HPF

## 2020-07-07 PROCEDURE — 80053 COMPREHEN METABOLIC PANEL: CPT

## 2020-07-07 PROCEDURE — 81003 URINALYSIS AUTO W/O SCOPE: CPT

## 2020-07-07 PROCEDURE — 81025 URINE PREGNANCY TEST: CPT

## 2020-07-07 PROCEDURE — 71260 CT THORAX DX C+: CPT

## 2020-07-07 PROCEDURE — 74011000258 HC RX REV CODE- 258

## 2020-07-07 PROCEDURE — 74011250636 HC RX REV CODE- 250/636: Performed by: EMERGENCY MEDICINE

## 2020-07-07 PROCEDURE — 74011636320 HC RX REV CODE- 636/320

## 2020-07-07 PROCEDURE — 83735 ASSAY OF MAGNESIUM: CPT

## 2020-07-07 PROCEDURE — 81015 MICROSCOPIC EXAM OF URINE: CPT

## 2020-07-07 PROCEDURE — 96366 THER/PROPH/DIAG IV INF ADDON: CPT

## 2020-07-07 PROCEDURE — 83690 ASSAY OF LIPASE: CPT

## 2020-07-07 PROCEDURE — 74011250636 HC RX REV CODE- 250/636

## 2020-07-07 PROCEDURE — 85025 COMPLETE CBC W/AUTO DIFF WBC: CPT

## 2020-07-07 PROCEDURE — 99284 EMERGENCY DEPT VISIT MOD MDM: CPT

## 2020-07-07 PROCEDURE — 74011250637 HC RX REV CODE- 250/637: Performed by: EMERGENCY MEDICINE

## 2020-07-07 PROCEDURE — 96365 THER/PROPH/DIAG IV INF INIT: CPT

## 2020-07-07 RX ORDER — SULFAMETHOXAZOLE AND TRIMETHOPRIM 800; 160 MG/1; MG/1
1 TABLET ORAL 2 TIMES DAILY
Qty: 14 TAB | Refills: 0 | Status: SHIPPED | OUTPATIENT
Start: 2020-07-07 | End: 2020-07-14

## 2020-07-07 RX ORDER — ACETAMINOPHEN 500 MG
1000 TABLET ORAL
Status: COMPLETED | OUTPATIENT
Start: 2020-07-07 | End: 2020-07-07

## 2020-07-07 RX ORDER — DICYCLOMINE HYDROCHLORIDE 10 MG/1
10 CAPSULE ORAL 4 TIMES DAILY
Qty: 20 CAP | Refills: 0 | Status: SHIPPED | OUTPATIENT
Start: 2020-07-07 | End: 2020-07-12

## 2020-07-07 RX ORDER — SODIUM CHLORIDE 0.9 % (FLUSH) 0.9 %
10 SYRINGE (ML) INJECTION
Status: COMPLETED | OUTPATIENT
Start: 2020-07-07 | End: 2020-07-07

## 2020-07-07 RX ADMIN — SODIUM CHLORIDE 100 ML: 900 INJECTION, SOLUTION INTRAVENOUS at 04:15

## 2020-07-07 RX ADMIN — SODIUM CHLORIDE 1000 ML: 900 INJECTION, SOLUTION INTRAVENOUS at 03:33

## 2020-07-07 RX ADMIN — IOPAMIDOL 100 ML: 755 INJECTION, SOLUTION INTRAVENOUS at 04:15

## 2020-07-07 RX ADMIN — ACETAMINOPHEN 1000 MG: 500 TABLET ORAL at 03:30

## 2020-07-07 RX ADMIN — Medication 10 ML: at 04:15

## 2020-07-07 RX ADMIN — CEFTRIAXONE 1 G: 1 INJECTION, POWDER, FOR SOLUTION INTRAMUSCULAR; INTRAVENOUS at 04:31

## 2020-07-07 NOTE — DISCHARGE INSTRUCTIONS

## 2020-07-07 NOTE — ED NOTES
Pt placed on O2 NC by CY barry. Pt has no resp complaints, denied dyspnea, LS clear. MD Gibson aware of pts sats.

## 2020-07-07 NOTE — ED NOTES
I have reviewed discharge instructions with the patient. The patient verbalized understanding. Patient left ED via Discharge Method: ambulatory to Home     Opportunity for questions and clarification provided. Patient given 2 scripts. To continue your aftercare when you leave the hospital, you may receive an automated call from our care team to check in on how you are doing. This is a free service and part of our promise to provide the best care and service to meet your aftercare needs.  If you have questions, or wish to unsubscribe from this service please call [phone]. Thank you for Choosing our [address] [place] [address] Emergency Department.

## 2020-07-07 NOTE — ED PROVIDER NOTES
HPI     No past medical history on file. No past surgical history on file. No family history on file. Social History     Socioeconomic History    Marital status: SINGLE     Spouse name: Not on file    Number of children: Not on file    Years of education: Not on file    Highest education level: Not on file   Occupational History    Not on file   Social Needs    Financial resource strain: Not on file    Food insecurity     Worry: Not on file     Inability: Not on file    Transportation needs     Medical: Not on file     Non-medical: Not on file   Tobacco Use    Smoking status: Not on file   Substance and Sexual Activity    Alcohol use: Not on file    Drug use: Not on file    Sexual activity: Not on file   Lifestyle    Physical activity     Days per week: Not on file     Minutes per session: Not on file    Stress: Not on file   Relationships    Social connections     Talks on phone: Not on file     Gets together: Not on file     Attends Judaism service: Not on file     Active member of club or organization: Not on file     Attends meetings of clubs or organizations: Not on file     Relationship status: Not on file    Intimate partner violence     Fear of current or ex partner: Not on file     Emotionally abused: Not on file     Physically abused: Not on file     Forced sexual activity: Not on file   Other Topics Concern    Not on file   Social History Narrative    Not on file         ALLERGIES: Shellfish derived    Review of Systems   Constitutional: Negative. Negative for activity change. HENT: Negative. Eyes: Negative. Respiratory: Negative. Cardiovascular: Negative. Gastrointestinal: Negative. Genitourinary: Negative. Musculoskeletal: Negative. Skin: Negative. Neurological: Negative. Psychiatric/Behavioral: Negative. All other systems reviewed and are negative.       Vitals:    07/07/20 0610 07/07/20 0612 07/07/20 0630 07/07/20 0631   BP:    131/74 Pulse: (!) 115 (!) 112 (!) 112 (!) 113   Resp:    18   Temp:    98.3 °F (36.8 °C)   SpO2: (!) 86% 98% 95% 95%   Weight:       Height:                Physical Exam  Vitals signs and nursing note reviewed. Constitutional:       General: She is not in acute distress. Appearance: Normal appearance. She is well-developed. She is not ill-appearing, toxic-appearing or diaphoretic. HENT:      Head: Normocephalic and atraumatic. No right periorbital erythema or left periorbital erythema. Jaw: There is normal jaw occlusion. Salivary Glands: Right salivary gland is not diffusely enlarged. Left salivary gland is not diffusely enlarged. Right Ear: External ear normal.      Left Ear: External ear normal.      Nose: Nose normal. No congestion or rhinorrhea. Mouth/Throat:      Mouth: Mucous membranes are moist.      Pharynx: No oropharyngeal exudate or posterior oropharyngeal erythema. Eyes:      General: Lids are normal. No scleral icterus. Right eye: No discharge. Left eye: No discharge. Extraocular Movements: Extraocular movements intact. Conjunctiva/sclera: Conjunctivae normal.      Right eye: Right conjunctiva is not injected. Left eye: Left conjunctiva is not injected. Pupils: Pupils are equal, round, and reactive to light. Neck:      Musculoskeletal: Full passive range of motion without pain, normal range of motion and neck supple. Normal range of motion. No erythema, neck rigidity, injury, pain with movement or muscular tenderness. Thyroid: No thyroid mass. Vascular: No JVD. Trachea: Trachea and phonation normal.   Cardiovascular:      Rate and Rhythm: Normal rate and regular rhythm. Pulses: Normal pulses. Heart sounds: Normal heart sounds. Heart sounds not distant. No murmur. No friction rub. No gallop. Pulmonary:      Effort: Pulmonary effort is normal. No tachypnea, accessory muscle usage, respiratory distress or retractions. Breath sounds: No stridor. No decreased breath sounds, wheezing, rhonchi or rales. Chest:      Chest wall: No tenderness. Abdominal:      General: Abdomen is flat. Bowel sounds are normal. There is no distension. There are no signs of injury. Palpations: Abdomen is soft. There is no fluid wave, mass or pulsatile mass. Tenderness: There is abdominal tenderness in the right lower quadrant. There is no guarding or rebound. Negative signs include McBurney's sign. Musculoskeletal: Normal range of motion. General: No swelling, tenderness, deformity or signs of injury. Right lower leg: No edema. Left lower leg: No edema. Lymphadenopathy:      Cervical: No cervical adenopathy. Skin:     General: Skin is warm and dry. Capillary Refill: Capillary refill takes less than 2 seconds. Coloration: Skin is not jaundiced or pale. Findings: No bruising, erythema or rash. Neurological:      General: No focal deficit present. Mental Status: She is alert and oriented to person, place, and time. Mental status is at baseline. GCS: GCS eye subscore is 4. GCS verbal subscore is 5. GCS motor subscore is 6. Cranial Nerves: No dysarthria or facial asymmetry. Sensory: No sensory deficit. Motor: No weakness or tremor. Coordination: Coordination normal.   Psychiatric:         Mood and Affect: Mood normal.         Behavior: Behavior normal. Behavior is cooperative. Thought Content: Thought content normal.         Judgment: Judgment normal.          MDM  Number of Diagnoses or Management Options  Abdominal pain, LLQ (left lower quadrant): new and requires workup  Morbid obesity with body mass index (BMI) of 60.0 to 69.9 in adult [facility]): new and requires workup  Urinary tract infection with hematuria, site unspecified: new and requires workup  Diagnosis management comments: No acute abdominal findings on serial examinations.   Patient has UTI which we will treat with antibiotics follow-up closely with PCP       Amount and/or Complexity of Data Reviewed  Clinical lab tests: ordered and reviewed  Tests in the radiology section of CPT®: ordered and reviewed  Tests in the medicine section of CPT®: ordered and reviewed  Decide to obtain previous medical records or to obtain history from someone other than the patient: yes  Review and summarize past medical records: yes  Independent visualization of images, tracings, or specimens: yes           Procedures

## 2020-07-07 NOTE — ED PROVIDER NOTES
22 year female c/o 3 days of lower abdominal pain. No fever before tonight. No nausea vomiting or diarrhea. No vag discharge or pain. The history is provided by the patient. Abdominal Pain This is a new problem. The current episode started 1 to 2 hours ago. The problem has not changed since onset. The pain is associated with an unknown factor. The pain is located in the suprapubic region. The quality of the pain is dull and aching. The pain is at a severity of 10/10. The pain is severe. Pertinent negatives include no diarrhea, no nausea, no vomiting, no constipation and no dysuria. No past medical history on file. No past surgical history on file. No family history on file. Social History Socioeconomic History  Marital status: SINGLE Spouse name: Not on file  Number of children: Not on file  Years of education: Not on file  Highest education level: Not on file Occupational History  Not on file Social Needs  Financial resource strain: Not on file  Food insecurity Worry: Not on file Inability: Not on file  Transportation needs Medical: Not on file Non-medical: Not on file Tobacco Use  Smoking status: Not on file Substance and Sexual Activity  Alcohol use: Not on file  Drug use: Not on file  Sexual activity: Not on file Lifestyle  Physical activity Days per week: Not on file Minutes per session: Not on file  Stress: Not on file Relationships  Social connections Talks on phone: Not on file Gets together: Not on file Attends Christian service: Not on file Active member of club or organization: Not on file Attends meetings of clubs or organizations: Not on file Relationship status: Not on file  Intimate partner violence Fear of current or ex partner: Not on file Emotionally abused: Not on file Physically abused: Not on file Forced sexual activity: Not on file Other Topics Concern  Not on file Social History Narrative  Not on file ALLERGIES: Shellfish derived Review of Systems Constitutional: Negative. Negative for activity change. HENT: Negative. Eyes: Negative. Respiratory: Negative. Cardiovascular: Negative. Gastrointestinal: Positive for abdominal pain. Negative for constipation, diarrhea, nausea and vomiting. Genitourinary: Negative. Negative for dysuria. Musculoskeletal: Negative. Skin: Negative. Neurological: Negative. Psychiatric/Behavioral: Negative. All other systems reviewed and are negative. Vitals:  
 07/07/20 0234 BP: 131/78 Pulse: (!) 125 Resp: 18 Temp: (!) 101.2 °F (38.4 °C) SpO2: 92% Weight: (!) 205.9 kg (454 lb) Height: 5' 9\" (1.753 m) Physical Exam 
Vitals signs and nursing note reviewed. Constitutional:   
   General: She is not in acute distress. Appearance: She is well-developed. She is not diaphoretic. HENT:  
   Head: Normocephalic and atraumatic. Right Ear: External ear normal.  
   Left Ear: External ear normal.  
   Nose: Nose normal.  
   Mouth/Throat:  
   Pharynx: No oropharyngeal exudate. Eyes:  
   General: No scleral icterus. Right eye: No discharge. Left eye: No discharge. Conjunctiva/sclera: Conjunctivae normal.  
   Pupils: Pupils are equal, round, and reactive to light. Neck: Musculoskeletal: Normal range of motion and neck supple. Vascular: No JVD. Trachea: No tracheal deviation. Cardiovascular:  
   Rate and Rhythm: Normal rate and regular rhythm. Pulmonary:  
   Effort: Pulmonary effort is normal. No respiratory distress. Breath sounds: Normal breath sounds. No stridor. No wheezing. Chest:  
   Chest wall: No tenderness. Abdominal:  
   General: Bowel sounds are normal. There is no distension. Palpations: Abdomen is soft. There is no mass. Tenderness: There is abdominal tenderness in the right lower quadrant, suprapubic area and left lower quadrant. There is no guarding or rebound. Musculoskeletal: Normal range of motion. General: No tenderness. Skin: 
   General: Skin is warm and dry. Coloration: Skin is not pale. Findings: No erythema or rash. Neurological:  
   Mental Status: She is alert and oriented to person, place, and time. Cranial Nerves: No cranial nerve deficit. Psychiatric:     
   Behavior: Behavior normal.     
   Thought Content: Thought content normal.  
 
  
 
MDM Number of Diagnoses or Management Options Amount and/or Complexity of Data Reviewed Clinical lab tests: ordered and reviewed Tests in the radiology section of CPT®: ordered and reviewed Tests in the medicine section of CPT®: ordered and reviewed Procedures

## 2020-07-07 NOTE — ED TRIAGE NOTES
Arrives with face mask in place. Arrives POV, pt drove to ED however called for assistance getting out of vehicle, security parked pt car. Reports lower abdominal/pelvic pain. Onset couple days pta. Reports constant since onset. Denies n/v/d, urinary symptoms, vaginal bleeding/discharge. Denies hx of same. Denies sick exposure. Denies sore throat or cough. Reports loss of appetite. Last normal BM yesterday. Reports irregular periods. Dr Alejandrina Hagan in to triage to assess, sepsis orders cancelled per md orders.

## 2020-07-07 NOTE — LETTER
[address] [facility] EMERGENCY DEPT 
[address] [facility] 
[phone] Work/School Note Date: 7/7/2020 To Whom It May concern: 
 
Aure Shultz was seen and treated today in the emergency room by the following provider(s): 
No providers found. Aure Shultz Return to work:07/09/20 Sincerely, 
 
 
 
 
Chinedu Roberts RN

## 2021-01-01 ENCOUNTER — APPOINTMENT (OUTPATIENT)
Dept: GENERAL RADIOLOGY | Age: 26
DRG: 951 | End: 2021-01-01
Attending: INTERNAL MEDICINE
Payer: MEDICAID

## 2021-01-01 ENCOUNTER — APPOINTMENT (OUTPATIENT)
Dept: GENERAL RADIOLOGY | Age: 26
DRG: 951 | End: 2021-01-01
Attending: EMERGENCY MEDICINE
Payer: MEDICAID

## 2021-01-01 ENCOUNTER — APPOINTMENT (OUTPATIENT)
Dept: ULTRASOUND IMAGING | Age: 26
DRG: 951 | End: 2021-01-01
Attending: RADIOLOGY
Payer: MEDICAID

## 2021-01-01 ENCOUNTER — APPOINTMENT (OUTPATIENT)
Dept: CT IMAGING | Age: 26
DRG: 951 | End: 2021-01-01
Payer: MEDICAID

## 2021-01-01 ENCOUNTER — HOSPITAL ENCOUNTER (INPATIENT)
Age: 26
LOS: 33 days | DRG: 951 | End: 2021-10-17
Admitting: FAMILY MEDICINE
Payer: MEDICAID

## 2021-01-01 ENCOUNTER — APPOINTMENT (OUTPATIENT)
Dept: GENERAL RADIOLOGY | Age: 26
DRG: 951 | End: 2021-01-01
Attending: NURSE PRACTITIONER
Payer: MEDICAID

## 2021-01-01 ENCOUNTER — APPOINTMENT (OUTPATIENT)
Dept: ULTRASOUND IMAGING | Age: 26
DRG: 951 | End: 2021-01-01
Attending: NURSE PRACTITIONER
Payer: MEDICAID

## 2021-01-01 ENCOUNTER — APPOINTMENT (OUTPATIENT)
Dept: NON INVASIVE DIAGNOSTICS | Age: 26
DRG: 951 | End: 2021-01-01
Attending: INTERNAL MEDICINE
Payer: MEDICAID

## 2021-01-01 ENCOUNTER — APPOINTMENT (OUTPATIENT)
Dept: NON INVASIVE DIAGNOSTICS | Age: 26
DRG: 951 | End: 2021-01-01
Attending: PHYSICIAN ASSISTANT
Payer: MEDICAID

## 2021-01-01 ENCOUNTER — APPOINTMENT (OUTPATIENT)
Dept: ULTRASOUND IMAGING | Age: 26
DRG: 951 | End: 2021-01-01
Attending: INTERNAL MEDICINE
Payer: MEDICAID

## 2021-01-01 ENCOUNTER — APPOINTMENT (OUTPATIENT)
Dept: CT IMAGING | Age: 26
DRG: 951 | End: 2021-01-01
Attending: INTERNAL MEDICINE
Payer: MEDICAID

## 2021-01-01 ENCOUNTER — APPOINTMENT (OUTPATIENT)
Dept: NUCLEAR MEDICINE | Age: 26
DRG: 951 | End: 2021-01-01
Attending: NURSE PRACTITIONER
Payer: MEDICAID

## 2021-01-01 VITALS
BODY MASS INDEX: 43.4 KG/M2 | OXYGEN SATURATION: 67 % | WEIGHT: 293 LBS | DIASTOLIC BLOOD PRESSURE: 25 MMHG | HEIGHT: 69 IN | RESPIRATION RATE: 23 BRPM | TEMPERATURE: 99.4 F | SYSTOLIC BLOOD PRESSURE: 44 MMHG

## 2021-01-01 DIAGNOSIS — J96.01 ACUTE RESPIRATORY FAILURE WITH HYPOXIA AND HYPERCAPNIA (HCC): ICD-10-CM

## 2021-01-01 DIAGNOSIS — I67.82 SEVERE ANOXIC-ISCHEMIC ENCEPHALOPATHY (HCC): ICD-10-CM

## 2021-01-01 DIAGNOSIS — R91.8 BILATERAL PULMONARY INFILTRATES ON CHEST X-RAY: ICD-10-CM

## 2021-01-01 DIAGNOSIS — J96.02 ACUTE RESPIRATORY FAILURE WITH HYPOXIA AND HYPERCAPNIA (HCC): ICD-10-CM

## 2021-01-01 DIAGNOSIS — G93.1 SEVERE ANOXIC-ISCHEMIC ENCEPHALOPATHY (HCC): ICD-10-CM

## 2021-01-01 DIAGNOSIS — Z71.89 ACP (ADVANCE CARE PLANNING): ICD-10-CM

## 2021-01-01 DIAGNOSIS — E66.01 MORBID OBESITY (HCC): ICD-10-CM

## 2021-01-01 DIAGNOSIS — E11.9 TYPE 2 DIABETES MELLITUS WITHOUT COMPLICATION, UNSPECIFIED WHETHER LONG TERM INSULIN USE (HCC): Chronic | ICD-10-CM

## 2021-01-01 DIAGNOSIS — R09.02 HYPOXIA: ICD-10-CM

## 2021-01-01 DIAGNOSIS — Z66 DNR (DO NOT RESUSCITATE): ICD-10-CM

## 2021-01-01 DIAGNOSIS — J80 ACUTE RESPIRATORY DISTRESS SYNDROME (ARDS) DUE TO COVID-19 VIRUS (HCC): ICD-10-CM

## 2021-01-01 DIAGNOSIS — I82.4Y9 ACUTE DEEP VEIN THROMBOSIS (DVT) OF PROXIMAL VEIN OF LOWER EXTREMITY, UNSPECIFIED LATERALITY (HCC): ICD-10-CM

## 2021-01-01 DIAGNOSIS — G93.40 ENCEPHALOPATHY ACUTE: ICD-10-CM

## 2021-01-01 DIAGNOSIS — I15.9 SECONDARY HYPERTENSION: Chronic | ICD-10-CM

## 2021-01-01 DIAGNOSIS — R06.02 SHORTNESS OF BREATH: ICD-10-CM

## 2021-01-01 DIAGNOSIS — R29.818 SUSPECTED SLEEP APNEA: ICD-10-CM

## 2021-01-01 DIAGNOSIS — D69.6 THROMBOCYTOPENIA (HCC): ICD-10-CM

## 2021-01-01 DIAGNOSIS — R50.9 FEVER, UNSPECIFIED FEVER CAUSE: ICD-10-CM

## 2021-01-01 DIAGNOSIS — J12.82 PNEUMONIA DUE TO COVID-19 VIRUS: Primary | ICD-10-CM

## 2021-01-01 DIAGNOSIS — N93.9 VAGINAL BLEEDING: ICD-10-CM

## 2021-01-01 DIAGNOSIS — R53.81 PHYSICAL DEBILITY: ICD-10-CM

## 2021-01-01 DIAGNOSIS — N17.9 AKI (ACUTE KIDNEY INJURY) (HCC): ICD-10-CM

## 2021-01-01 DIAGNOSIS — U07.1 COVID-19: ICD-10-CM

## 2021-01-01 DIAGNOSIS — G93.40 ACUTE ENCEPHALOPATHY: ICD-10-CM

## 2021-01-01 DIAGNOSIS — I46.9 CARDIAC ARREST (HCC): ICD-10-CM

## 2021-01-01 DIAGNOSIS — Z51.5 ENCOUNTER FOR PALLIATIVE CARE: ICD-10-CM

## 2021-01-01 DIAGNOSIS — U07.1 PNEUMONIA DUE TO COVID-19 VIRUS: Primary | ICD-10-CM

## 2021-01-01 DIAGNOSIS — U07.1 ACUTE RESPIRATORY DISTRESS SYNDROME (ARDS) DUE TO COVID-19 VIRUS (HCC): ICD-10-CM

## 2021-01-01 DIAGNOSIS — J96.01 ACUTE RESPIRATORY FAILURE WITH HYPOXIA (HCC): ICD-10-CM

## 2021-01-01 LAB
ABO + RH BLD: NORMAL
ABO + RH BLD: NORMAL
ALBUMIN SERPL-MCNC: 2.2 G/DL (ref 3.5–5)
ALBUMIN SERPL-MCNC: 2.5 G/DL (ref 3.5–5)
ALBUMIN SERPL-MCNC: 2.6 G/DL (ref 3.5–5)
ALBUMIN SERPL-MCNC: 2.7 G/DL (ref 3.5–5)
ALBUMIN SERPL-MCNC: 2.7 G/DL (ref 3.5–5)
ALBUMIN SERPL-MCNC: 2.8 G/DL (ref 3.5–5)
ALBUMIN SERPL-MCNC: 2.8 G/DL (ref 3.5–5)
ALBUMIN SERPL-MCNC: 2.9 G/DL (ref 3.5–5)
ALBUMIN/GLOB SERPL: 0.5 {RATIO} (ref 1.2–3.5)
ALBUMIN/GLOB SERPL: 0.6 {RATIO} (ref 1.2–3.5)
ALBUMIN/GLOB SERPL: 0.7 {RATIO} (ref 1.2–3.5)
ALP SERPL-CCNC: 161 U/L (ref 50–136)
ALP SERPL-CCNC: 171 U/L (ref 50–136)
ALP SERPL-CCNC: 198 U/L (ref 50–136)
ALP SERPL-CCNC: 77 U/L (ref 50–136)
ALP SERPL-CCNC: 80 U/L (ref 50–136)
ALP SERPL-CCNC: 84 U/L (ref 50–136)
ALP SERPL-CCNC: 85 U/L (ref 50–136)
ALP SERPL-CCNC: 85 U/L (ref 50–136)
ALT SERPL-CCNC: 29 U/L (ref 12–65)
ALT SERPL-CCNC: 35 U/L (ref 12–65)
ALT SERPL-CCNC: 37 U/L (ref 12–65)
ALT SERPL-CCNC: 39 U/L (ref 12–65)
ALT SERPL-CCNC: 47 U/L (ref 12–65)
ALT SERPL-CCNC: 52 U/L (ref 12–65)
ALT SERPL-CCNC: 55 U/L (ref 12–65)
ALT SERPL-CCNC: 6120 U/L (ref 12–65)
ANION GAP SERPL CALC-SCNC: 10 MMOL/L (ref 7–16)
ANION GAP SERPL CALC-SCNC: 10 MMOL/L (ref 7–16)
ANION GAP SERPL CALC-SCNC: 11 MMOL/L (ref 7–16)
ANION GAP SERPL CALC-SCNC: 11 MMOL/L (ref 7–16)
ANION GAP SERPL CALC-SCNC: 13 MMOL/L (ref 7–16)
ANION GAP SERPL CALC-SCNC: 23 MMOL/L (ref 7–16)
ANION GAP SERPL CALC-SCNC: 24 MMOL/L (ref 7–16)
ANION GAP SERPL CALC-SCNC: 4 MMOL/L (ref 7–16)
ANION GAP SERPL CALC-SCNC: 5 MMOL/L (ref 7–16)
ANION GAP SERPL CALC-SCNC: 6 MMOL/L (ref 7–16)
ANION GAP SERPL CALC-SCNC: 7 MMOL/L (ref 7–16)
ANION GAP SERPL CALC-SCNC: 8 MMOL/L (ref 7–16)
ANION GAP SERPL CALC-SCNC: 8 MMOL/L (ref 7–16)
ANION GAP SERPL CALC-SCNC: 9 MMOL/L (ref 7–16)
APTT PPP: 167.6 SEC (ref 24.1–35.1)
APTT PPP: 28.9 SEC (ref 24.1–35.1)
APTT PPP: 58.1 SEC (ref 24.1–35.1)
APTT PPP: 60.6 SEC (ref 24.1–35.1)
APTT PPP: 93.2 SEC (ref 24.1–35.1)
ARTERIAL PATENCY WRIST A: ABNORMAL
ARTERIAL PATENCY WRIST A: POSITIVE
AST SERPL-CCNC: 10 U/L (ref 15–37)
AST SERPL-CCNC: 46 U/L (ref 15–37)
AST SERPL-CCNC: 50 U/L (ref 15–37)
AST SERPL-CCNC: 53 U/L (ref 15–37)
AST SERPL-CCNC: 54 U/L (ref 15–37)
AST SERPL-CCNC: 56 U/L (ref 15–37)
AST SERPL-CCNC: 73 U/L (ref 15–37)
AST SERPL-CCNC: 9994 U/L (ref 15–37)
ATRIAL RATE: 121 BPM
BACTERIA SPEC CULT: NORMAL
BACTERIA SPEC CULT: NORMAL
BASE DEFICIT BLD-SCNC: 1.3 MMOL/L
BASE DEFICIT BLD-SCNC: 12.8 MMOL/L
BASE DEFICIT BLD-SCNC: 16.8 MMOL/L
BASE DEFICIT BLD-SCNC: 17.2 MMOL/L
BASE DEFICIT BLD-SCNC: 17.7 MMOL/L
BASE DEFICIT BLD-SCNC: 21.6 MMOL/L
BASE EXCESS BLD CALC-SCNC: 1.9 MMOL/L
BASE EXCESS BLD CALC-SCNC: 1.9 MMOL/L
BASE EXCESS BLDV CALC-SCNC: 2.5 MMOL/L
BASOPHILS # BLD: 0 K/UL (ref 0–0.2)
BASOPHILS # BLD: 0.1 K/UL (ref 0–0.2)
BASOPHILS # BLD: 0.2 K/UL (ref 0–0.2)
BASOPHILS NFR BLD: 0 % (ref 0–2)
BASOPHILS NFR BLD: 1 % (ref 0–2)
BDY SITE: ABNORMAL
BILIRUB DIRECT SERPL-MCNC: 0.1 MG/DL
BILIRUB DIRECT SERPL-MCNC: 0.2 MG/DL
BILIRUB DIRECT SERPL-MCNC: <0.1 MG/DL
BILIRUB SERPL-MCNC: 0.3 MG/DL (ref 0.2–1.1)
BILIRUB SERPL-MCNC: 0.5 MG/DL (ref 0.2–1.1)
BILIRUB SERPL-MCNC: 0.6 MG/DL (ref 0.2–1.1)
BILIRUB SERPL-MCNC: 2.1 MG/DL (ref 0.2–1.1)
BLASTS NFR BLD MANUAL: 5 %
BLD PROD TYP BPU: NORMAL
BLOOD BANK CMNT PATIENT-IMP: NORMAL
BLOOD GROUP ANTIBODIES SERPL: NORMAL
BLOOD GROUP ANTIBODIES SERPL: NORMAL
BNP SERPL-MCNC: 204 PG/ML (ref 5–125)
BNP SERPL-MCNC: 544 PG/ML (ref 5–125)
BPU ID: NORMAL
BUN SERPL-MCNC: 10 MG/DL (ref 6–23)
BUN SERPL-MCNC: 10 MG/DL (ref 6–23)
BUN SERPL-MCNC: 12 MG/DL (ref 6–23)
BUN SERPL-MCNC: 12 MG/DL (ref 6–23)
BUN SERPL-MCNC: 13 MG/DL (ref 6–23)
BUN SERPL-MCNC: 14 MG/DL (ref 6–23)
BUN SERPL-MCNC: 14 MG/DL (ref 6–23)
BUN SERPL-MCNC: 15 MG/DL (ref 6–23)
BUN SERPL-MCNC: 17 MG/DL (ref 6–23)
BUN SERPL-MCNC: 17 MG/DL (ref 6–23)
BUN SERPL-MCNC: 18 MG/DL (ref 6–23)
BUN SERPL-MCNC: 19 MG/DL (ref 6–23)
BUN SERPL-MCNC: 20 MG/DL (ref 6–23)
BUN SERPL-MCNC: 20 MG/DL (ref 6–23)
BUN SERPL-MCNC: 25 MG/DL (ref 6–23)
BUN SERPL-MCNC: 26 MG/DL (ref 6–23)
BUN SERPL-MCNC: 29 MG/DL (ref 6–23)
BUN SERPL-MCNC: 30 MG/DL (ref 6–23)
BUN SERPL-MCNC: 30 MG/DL (ref 6–23)
BUN SERPL-MCNC: 31 MG/DL (ref 6–23)
BUN SERPL-MCNC: 34 MG/DL (ref 6–23)
BUN SERPL-MCNC: 44 MG/DL (ref 6–23)
BUN SERPL-MCNC: 47 MG/DL (ref 6–23)
BUN SERPL-MCNC: 5 MG/DL (ref 6–23)
BUN SERPL-MCNC: 54 MG/DL (ref 6–23)
BUN SERPL-MCNC: 6 MG/DL (ref 6–23)
BUN SERPL-MCNC: 8 MG/DL (ref 6–23)
BUN SERPL-MCNC: 9 MG/DL (ref 6–23)
BUN SERPL-MCNC: 9 MG/DL (ref 6–23)
CA-I BLD-MCNC: 0.87 MMOL/L (ref 1.12–1.32)
CALCIUM SERPL-MCNC: 7.3 MG/DL (ref 8.3–10.4)
CALCIUM SERPL-MCNC: 7.7 MG/DL (ref 8.3–10.4)
CALCIUM SERPL-MCNC: 7.7 MG/DL (ref 8.3–10.4)
CALCIUM SERPL-MCNC: 7.8 MG/DL (ref 8.3–10.4)
CALCIUM SERPL-MCNC: 8 MG/DL (ref 8.3–10.4)
CALCIUM SERPL-MCNC: 8.2 MG/DL (ref 8.3–10.4)
CALCIUM SERPL-MCNC: 8.3 MG/DL (ref 8.3–10.4)
CALCIUM SERPL-MCNC: 8.3 MG/DL (ref 8.3–10.4)
CALCIUM SERPL-MCNC: 8.4 MG/DL (ref 8.3–10.4)
CALCIUM SERPL-MCNC: 8.5 MG/DL (ref 8.3–10.4)
CALCIUM SERPL-MCNC: 8.6 MG/DL (ref 8.3–10.4)
CALCIUM SERPL-MCNC: 8.7 MG/DL (ref 8.3–10.4)
CALCIUM SERPL-MCNC: 8.8 MG/DL (ref 8.3–10.4)
CALCIUM SERPL-MCNC: 9 MG/DL (ref 8.3–10.4)
CALCIUM SERPL-MCNC: 9.1 MG/DL (ref 8.3–10.4)
CALCIUM SERPL-MCNC: 9.3 MG/DL (ref 8.3–10.4)
CALCIUM SERPL-MCNC: 9.4 MG/DL (ref 8.3–10.4)
CALCIUM SERPL-MCNC: 9.5 MG/DL (ref 8.3–10.4)
CALCIUM SERPL-MCNC: 9.5 MG/DL (ref 8.3–10.4)
CALCIUM SERPL-MCNC: 9.9 MG/DL (ref 8.3–10.4)
CALCIUM SERPL-MCNC: 9.9 MG/DL (ref 8.3–10.4)
CALCULATED P AXIS, ECG09: 55 DEGREES
CALCULATED R AXIS, ECG10: 143 DEGREES
CALCULATED T AXIS, ECG11: 16 DEGREES
CHLORIDE SERPL-SCNC: 100 MMOL/L (ref 98–107)
CHLORIDE SERPL-SCNC: 101 MMOL/L (ref 98–107)
CHLORIDE SERPL-SCNC: 102 MMOL/L (ref 98–107)
CHLORIDE SERPL-SCNC: 103 MMOL/L (ref 98–107)
CHLORIDE SERPL-SCNC: 104 MMOL/L (ref 98–107)
CHLORIDE SERPL-SCNC: 105 MMOL/L (ref 98–107)
CHLORIDE SERPL-SCNC: 106 MMOL/L (ref 98–107)
CHLORIDE SERPL-SCNC: 107 MMOL/L (ref 98–107)
CHLORIDE SERPL-SCNC: 98 MMOL/L (ref 98–107)
CHLORIDE SERPL-SCNC: 99 MMOL/L (ref 98–107)
CO2 BLD-SCNC: 17 MMOL/L (ref 13–23)
CO2 SERPL-SCNC: 16 MMOL/L (ref 21–32)
CO2 SERPL-SCNC: 19 MMOL/L (ref 21–32)
CO2 SERPL-SCNC: 20 MMOL/L (ref 21–32)
CO2 SERPL-SCNC: 24 MMOL/L (ref 21–32)
CO2 SERPL-SCNC: 24 MMOL/L (ref 21–32)
CO2 SERPL-SCNC: 25 MMOL/L (ref 21–32)
CO2 SERPL-SCNC: 26 MMOL/L (ref 21–32)
CO2 SERPL-SCNC: 27 MMOL/L (ref 21–32)
CO2 SERPL-SCNC: 28 MMOL/L (ref 21–32)
CO2 SERPL-SCNC: 29 MMOL/L (ref 21–32)
CO2 SERPL-SCNC: 30 MMOL/L (ref 21–32)
CO2 SERPL-SCNC: 31 MMOL/L (ref 21–32)
COVID-19 RAPID TEST, COVR: DETECTED
CREAT SERPL-MCNC: 0.49 MG/DL (ref 0.6–1)
CREAT SERPL-MCNC: 0.53 MG/DL (ref 0.6–1)
CREAT SERPL-MCNC: 0.53 MG/DL (ref 0.6–1)
CREAT SERPL-MCNC: 0.55 MG/DL (ref 0.6–1)
CREAT SERPL-MCNC: 0.55 MG/DL (ref 0.6–1)
CREAT SERPL-MCNC: 0.56 MG/DL (ref 0.6–1)
CREAT SERPL-MCNC: 0.57 MG/DL (ref 0.6–1)
CREAT SERPL-MCNC: 0.57 MG/DL (ref 0.6–1)
CREAT SERPL-MCNC: 0.58 MG/DL (ref 0.6–1)
CREAT SERPL-MCNC: 0.61 MG/DL (ref 0.6–1)
CREAT SERPL-MCNC: 0.63 MG/DL (ref 0.6–1)
CREAT SERPL-MCNC: 0.63 MG/DL (ref 0.6–1)
CREAT SERPL-MCNC: 0.64 MG/DL (ref 0.6–1)
CREAT SERPL-MCNC: 0.65 MG/DL (ref 0.6–1)
CREAT SERPL-MCNC: 0.65 MG/DL (ref 0.6–1)
CREAT SERPL-MCNC: 0.67 MG/DL (ref 0.6–1)
CREAT SERPL-MCNC: 0.69 MG/DL (ref 0.6–1)
CREAT SERPL-MCNC: 0.7 MG/DL (ref 0.6–1)
CREAT SERPL-MCNC: 0.71 MG/DL (ref 0.6–1)
CREAT SERPL-MCNC: 0.73 MG/DL (ref 0.6–1)
CREAT SERPL-MCNC: 0.73 MG/DL (ref 0.6–1)
CREAT SERPL-MCNC: 0.74 MG/DL (ref 0.6–1)
CREAT SERPL-MCNC: 0.76 MG/DL (ref 0.6–1)
CREAT SERPL-MCNC: 0.76 MG/DL (ref 0.6–1)
CREAT SERPL-MCNC: 0.78 MG/DL (ref 0.6–1)
CREAT SERPL-MCNC: 0.81 MG/DL (ref 0.6–1)
CREAT SERPL-MCNC: 0.83 MG/DL (ref 0.6–1)
CREAT SERPL-MCNC: 0.91 MG/DL (ref 0.6–1)
CREAT SERPL-MCNC: 1.2 MG/DL (ref 0.6–1)
CREAT SERPL-MCNC: 1.24 MG/DL (ref 0.6–1)
CREAT SERPL-MCNC: 1.48 MG/DL (ref 0.6–1)
CREAT SERPL-MCNC: 2.64 MG/DL (ref 0.6–1)
CREAT SERPL-MCNC: 2.98 MG/DL (ref 0.6–1)
CROSSMATCH RESULT,%XM: NORMAL
CRP SERPL HS-MCNC: 8 MG/L
CRP SERPL-MCNC: 1.4 MG/DL (ref 0–0.9)
CRP SERPL-MCNC: 10.3 MG/DL (ref 0–0.9)
CRP SERPL-MCNC: 21.5 MG/DL (ref 0–0.9)
CRP SERPL-MCNC: 9.4 MG/DL (ref 0–0.9)
D DIMER PPP FEU-MCNC: 1.98 UG/ML(FEU)
D DIMER PPP FEU-MCNC: 2.42 UG/ML(FEU)
D DIMER PPP FEU-MCNC: 9.81 UG/ML(FEU)
DIAGNOSIS, 93000: NORMAL
DIFFERENTIAL METHOD BLD: ABNORMAL
ECHO AO ASC DIAM: 2.3 CM
ECHO AO ROOT DIAM: 2.5 CM
ECHO AO ROOT DIAM: 2.9 CM
ECHO AV AREA PEAK VELOCITY: 2.63 CM2
ECHO AV AREA PEAK VELOCITY: 2.67 CM2
ECHO AV AREA VTI: 3.1 CM2
ECHO AV AREA/BSA PEAK VELOCITY: 0.9 CM2/M2
ECHO AV AREA/BSA PEAK VELOCITY: 0.9 CM2/M2
ECHO AV AREA/BSA VTI: 1.1 CM2/M2
ECHO AV MEAN GRADIENT: 5 MMHG
ECHO AV PEAK GRADIENT: 12 MMHG
ECHO AV PEAK GRADIENT: 7 MMHG
ECHO AV PEAK VELOCITY: 131 CM/S
ECHO AV PEAK VELOCITY: 175 CM/S
ECHO AV VTI: 23.2 CM
ECHO EST RA PRESSURE: 8 MMHG
ECHO LA AREA 2C: 16.4 CM2
ECHO LA AREA 4C: 18.8 CM2
ECHO LA AREA 4C: 19.1 CM2
ECHO LA MAJOR AXIS: 6.29 CM
ECHO LA MAJOR AXIS: 6.7 CM
ECHO LA MINOR AXIS: 2.17 CM
ECHO LA MINOR AXIS: 2.32 CM
ECHO LV E' LATERAL VELOCITY: 11.2 CM/S
ECHO LV E' LATERAL VELOCITY: 8.8 CM/S
ECHO LV E' SEPTAL VELOCITY: 10.3 CM/S
ECHO LV E' SEPTAL VELOCITY: 5.42 CM/S
ECHO LV EDV A2C: 124 CM3
ECHO LV EDV A2C: 74.9 CM3
ECHO LV EDV A4C: 110 CM3
ECHO LV EDV A4C: 71.7 CM3
ECHO LV ESV A2C: 17.6 CM3
ECHO LV ESV A2C: 52.5 CM3
ECHO LV ESV A4C: 21.7 CM3
ECHO LV ESV A4C: 40.8 CM3
ECHO LV INTERNAL DIMENSION DIASTOLIC: 4.75 CM (ref 3.9–5.3)
ECHO LV INTERNAL DIMENSION DIASTOLIC: 4.79 CM (ref 3.9–5.3)
ECHO LV INTERNAL DIMENSION SYSTOLIC: 2.98 CM
ECHO LV INTERNAL DIMENSION SYSTOLIC: 3.38 CM
ECHO LV IVSD: 1.22 CM (ref 0.6–0.9)
ECHO LV IVSD: 1.34 CM (ref 0.6–0.9)
ECHO LV MASS 2D: 223.6 G (ref 67–162)
ECHO LV MASS 2D: 272.4 G (ref 67–162)
ECHO LV MASS INDEX 2D: 77.1 G/M2 (ref 43–95)
ECHO LV MASS INDEX 2D: 94.2 G/M2 (ref 43–95)
ECHO LV POSTERIOR WALL DIASTOLIC: 1.22 CM (ref 0.6–0.9)
ECHO LV POSTERIOR WALL DIASTOLIC: 1.48 CM (ref 0.6–0.9)
ECHO LVOT DIAM: 1.9 CM
ECHO LVOT DIAM: 2.1 CM
ECHO LVOT PEAK GRADIENT: 6 MMHG
ECHO LVOT PEAK GRADIENT: 7 MMHG
ECHO LVOT VTI: 20.8 CM
ECHO MV A VELOCITY: 105 CM/S
ECHO MV A VELOCITY: 75.2 CM/S
ECHO MV E DECELERATION TIME (DT): 187 MS
ECHO MV E VELOCITY: 108 CM/S
ECHO MV E VELOCITY: 84.6 CM/S
ECHO MV E/A RATIO: 0.81
ECHO MV E/A RATIO: 1.44
ECHO MV E/E' LATERAL: 12.27
ECHO MV E/E' LATERAL: 7.55
ECHO MV E/E' RATIO (AVERAGED): 16.1
ECHO MV E/E' RATIO (AVERAGED): 7.88
ECHO MV E/E' SEPTAL: 19.93
ECHO MV E/E' SEPTAL: 8.21
ECHO RIGHT VENTRICULAR SYSTOLIC PRESSURE (RVSP): 50 MMHG
ECHO RV INTERNAL DIMENSION: 2.09 CM
ECHO RV TAPSE: 2.43 CM (ref 1.5–2)
ECHO TV REGURGITANT MAX VELOCITY: 323 CM/S
ECHO TV REGURGITANT PEAK GRADIENT: 42 MMHG
EOSINOPHIL # BLD: 0 K/UL (ref 0–0.8)
EOSINOPHIL # BLD: 0.1 K/UL (ref 0–0.8)
EOSINOPHIL # BLD: 0.2 K/UL (ref 0–0.8)
EOSINOPHIL # BLD: 0.2 K/UL (ref 0–0.8)
EOSINOPHIL # BLD: 0.3 K/UL (ref 0–0.8)
EOSINOPHIL # BLD: 0.4 K/UL (ref 0–0.8)
EOSINOPHIL # BLD: 0.6 K/UL (ref 0–0.8)
EOSINOPHIL # BLD: 0.7 K/UL (ref 0–0.8)
EOSINOPHIL # BLD: 0.7 K/UL (ref 0–0.8)
EOSINOPHIL NFR BLD: 0 % (ref 0.5–7.8)
EOSINOPHIL NFR BLD: 1 % (ref 0.5–7.8)
EOSINOPHIL NFR BLD: 2 % (ref 0.5–7.8)
EOSINOPHIL NFR BLD: 3 % (ref 0.5–7.8)
EOSINOPHIL NFR BLD: 4 % (ref 0.5–7.8)
EOSINOPHIL NFR BLD: 6 % (ref 0.5–7.8)
ERYTHROCYTE [DISTWIDTH] IN BLOOD BY AUTOMATED COUNT: 20.3 % (ref 11.9–14.6)
ERYTHROCYTE [DISTWIDTH] IN BLOOD BY AUTOMATED COUNT: 20.4 % (ref 11.9–14.6)
ERYTHROCYTE [DISTWIDTH] IN BLOOD BY AUTOMATED COUNT: 20.6 % (ref 11.9–14.6)
ERYTHROCYTE [DISTWIDTH] IN BLOOD BY AUTOMATED COUNT: 20.7 % (ref 11.9–14.6)
ERYTHROCYTE [DISTWIDTH] IN BLOOD BY AUTOMATED COUNT: 20.7 % (ref 11.9–14.6)
ERYTHROCYTE [DISTWIDTH] IN BLOOD BY AUTOMATED COUNT: 20.9 % (ref 11.9–14.6)
ERYTHROCYTE [DISTWIDTH] IN BLOOD BY AUTOMATED COUNT: 21 % (ref 11.9–14.6)
ERYTHROCYTE [DISTWIDTH] IN BLOOD BY AUTOMATED COUNT: 21.1 % (ref 11.9–14.6)
ERYTHROCYTE [DISTWIDTH] IN BLOOD BY AUTOMATED COUNT: 21.1 % (ref 11.9–14.6)
ERYTHROCYTE [DISTWIDTH] IN BLOOD BY AUTOMATED COUNT: 21.2 % (ref 11.9–14.6)
ERYTHROCYTE [DISTWIDTH] IN BLOOD BY AUTOMATED COUNT: 21.4 % (ref 11.9–14.6)
ERYTHROCYTE [DISTWIDTH] IN BLOOD BY AUTOMATED COUNT: 21.4 % (ref 11.9–14.6)
ERYTHROCYTE [DISTWIDTH] IN BLOOD BY AUTOMATED COUNT: 21.7 % (ref 11.9–14.6)
ERYTHROCYTE [DISTWIDTH] IN BLOOD BY AUTOMATED COUNT: 21.8 % (ref 11.9–14.6)
ERYTHROCYTE [DISTWIDTH] IN BLOOD BY AUTOMATED COUNT: 21.9 % (ref 11.9–14.6)
ERYTHROCYTE [DISTWIDTH] IN BLOOD BY AUTOMATED COUNT: 22 % (ref 11.9–14.6)
ERYTHROCYTE [DISTWIDTH] IN BLOOD BY AUTOMATED COUNT: 22.2 % (ref 11.9–14.6)
ERYTHROCYTE [DISTWIDTH] IN BLOOD BY AUTOMATED COUNT: 22.4 % (ref 11.9–14.6)
ERYTHROCYTE [DISTWIDTH] IN BLOOD BY AUTOMATED COUNT: 22.9 % (ref 11.9–14.6)
ERYTHROCYTE [DISTWIDTH] IN BLOOD BY AUTOMATED COUNT: 23 % (ref 11.9–14.6)
ERYTHROCYTE [DISTWIDTH] IN BLOOD BY AUTOMATED COUNT: 23.8 % (ref 11.9–14.6)
ERYTHROCYTE [DISTWIDTH] IN BLOOD BY AUTOMATED COUNT: 23.8 % (ref 11.9–14.6)
EST. AVERAGE GLUCOSE BLD GHB EST-MCNC: 321 MG/DL
FERRITIN SERPL-MCNC: 36 NG/ML (ref 8–388)
FIO2 ON VENT: 100 %
GAS FLOW.O2 O2 DELIVERY SYS: ABNORMAL L/MIN
GLOBULIN SER CALC-MCNC: 3.5 G/DL (ref 2.3–3.5)
GLOBULIN SER CALC-MCNC: 4.1 G/DL (ref 2.3–3.5)
GLOBULIN SER CALC-MCNC: 4.4 G/DL (ref 2.3–3.5)
GLOBULIN SER CALC-MCNC: 4.5 G/DL (ref 2.3–3.5)
GLOBULIN SER CALC-MCNC: 4.6 G/DL (ref 2.3–3.5)
GLOBULIN SER CALC-MCNC: 4.7 G/DL (ref 2.3–3.5)
GLOBULIN SER CALC-MCNC: 5.3 G/DL (ref 2.3–3.5)
GLOBULIN SER CALC-MCNC: 5.4 G/DL (ref 2.3–3.5)
GLUCOSE BLD STRIP.AUTO-MCNC: 117 MG/DL (ref 65–100)
GLUCOSE BLD STRIP.AUTO-MCNC: 124 MG/DL (ref 65–100)
GLUCOSE BLD STRIP.AUTO-MCNC: 126 MG/DL (ref 65–100)
GLUCOSE BLD STRIP.AUTO-MCNC: 126 MG/DL (ref 65–100)
GLUCOSE BLD STRIP.AUTO-MCNC: 136 MG/DL (ref 65–100)
GLUCOSE BLD STRIP.AUTO-MCNC: 138 MG/DL (ref 65–100)
GLUCOSE BLD STRIP.AUTO-MCNC: 141 MG/DL (ref 65–100)
GLUCOSE BLD STRIP.AUTO-MCNC: 141 MG/DL (ref 65–100)
GLUCOSE BLD STRIP.AUTO-MCNC: 142 MG/DL (ref 65–100)
GLUCOSE BLD STRIP.AUTO-MCNC: 142 MG/DL (ref 65–100)
GLUCOSE BLD STRIP.AUTO-MCNC: 145 MG/DL (ref 65–100)
GLUCOSE BLD STRIP.AUTO-MCNC: 148 MG/DL (ref 65–100)
GLUCOSE BLD STRIP.AUTO-MCNC: 148 MG/DL (ref 65–100)
GLUCOSE BLD STRIP.AUTO-MCNC: 151 MG/DL (ref 65–100)
GLUCOSE BLD STRIP.AUTO-MCNC: 151 MG/DL (ref 65–100)
GLUCOSE BLD STRIP.AUTO-MCNC: 154 MG/DL (ref 65–100)
GLUCOSE BLD STRIP.AUTO-MCNC: 154 MG/DL (ref 65–100)
GLUCOSE BLD STRIP.AUTO-MCNC: 155 MG/DL (ref 65–100)
GLUCOSE BLD STRIP.AUTO-MCNC: 155 MG/DL (ref 65–100)
GLUCOSE BLD STRIP.AUTO-MCNC: 158 MG/DL (ref 65–100)
GLUCOSE BLD STRIP.AUTO-MCNC: 159 MG/DL (ref 65–100)
GLUCOSE BLD STRIP.AUTO-MCNC: 159 MG/DL (ref 65–100)
GLUCOSE BLD STRIP.AUTO-MCNC: 161 MG/DL (ref 65–100)
GLUCOSE BLD STRIP.AUTO-MCNC: 163 MG/DL (ref 65–100)
GLUCOSE BLD STRIP.AUTO-MCNC: 164 MG/DL (ref 65–100)
GLUCOSE BLD STRIP.AUTO-MCNC: 164 MG/DL (ref 65–100)
GLUCOSE BLD STRIP.AUTO-MCNC: 169 MG/DL (ref 65–100)
GLUCOSE BLD STRIP.AUTO-MCNC: 172 MG/DL (ref 65–100)
GLUCOSE BLD STRIP.AUTO-MCNC: 174 MG/DL (ref 65–100)
GLUCOSE BLD STRIP.AUTO-MCNC: 174 MG/DL (ref 65–100)
GLUCOSE BLD STRIP.AUTO-MCNC: 175 MG/DL (ref 65–100)
GLUCOSE BLD STRIP.AUTO-MCNC: 178 MG/DL (ref 65–100)
GLUCOSE BLD STRIP.AUTO-MCNC: 179 MG/DL (ref 65–100)
GLUCOSE BLD STRIP.AUTO-MCNC: 179 MG/DL (ref 65–100)
GLUCOSE BLD STRIP.AUTO-MCNC: 181 MG/DL (ref 65–100)
GLUCOSE BLD STRIP.AUTO-MCNC: 182 MG/DL (ref 65–100)
GLUCOSE BLD STRIP.AUTO-MCNC: 183 MG/DL (ref 65–100)
GLUCOSE BLD STRIP.AUTO-MCNC: 184 MG/DL (ref 65–100)
GLUCOSE BLD STRIP.AUTO-MCNC: 188 MG/DL (ref 65–100)
GLUCOSE BLD STRIP.AUTO-MCNC: 189 MG/DL (ref 65–100)
GLUCOSE BLD STRIP.AUTO-MCNC: 190 MG/DL (ref 65–100)
GLUCOSE BLD STRIP.AUTO-MCNC: 190 MG/DL (ref 65–100)
GLUCOSE BLD STRIP.AUTO-MCNC: 191 MG/DL (ref 65–100)
GLUCOSE BLD STRIP.AUTO-MCNC: 192 MG/DL (ref 65–100)
GLUCOSE BLD STRIP.AUTO-MCNC: 192 MG/DL (ref 65–100)
GLUCOSE BLD STRIP.AUTO-MCNC: 193 MG/DL (ref 65–100)
GLUCOSE BLD STRIP.AUTO-MCNC: 194 MG/DL (ref 65–100)
GLUCOSE BLD STRIP.AUTO-MCNC: 195 MG/DL (ref 65–100)
GLUCOSE BLD STRIP.AUTO-MCNC: 196 MG/DL (ref 65–100)
GLUCOSE BLD STRIP.AUTO-MCNC: 198 MG/DL (ref 65–100)
GLUCOSE BLD STRIP.AUTO-MCNC: 199 MG/DL (ref 65–100)
GLUCOSE BLD STRIP.AUTO-MCNC: 200 MG/DL (ref 65–100)
GLUCOSE BLD STRIP.AUTO-MCNC: 202 MG/DL (ref 65–100)
GLUCOSE BLD STRIP.AUTO-MCNC: 203 MG/DL (ref 65–100)
GLUCOSE BLD STRIP.AUTO-MCNC: 204 MG/DL (ref 65–100)
GLUCOSE BLD STRIP.AUTO-MCNC: 205 MG/DL (ref 65–100)
GLUCOSE BLD STRIP.AUTO-MCNC: 205 MG/DL (ref 65–100)
GLUCOSE BLD STRIP.AUTO-MCNC: 206 MG/DL (ref 65–100)
GLUCOSE BLD STRIP.AUTO-MCNC: 206 MG/DL (ref 65–100)
GLUCOSE BLD STRIP.AUTO-MCNC: 207 MG/DL (ref 65–100)
GLUCOSE BLD STRIP.AUTO-MCNC: 208 MG/DL (ref 65–100)
GLUCOSE BLD STRIP.AUTO-MCNC: 212 MG/DL (ref 65–100)
GLUCOSE BLD STRIP.AUTO-MCNC: 212 MG/DL (ref 65–100)
GLUCOSE BLD STRIP.AUTO-MCNC: 214 MG/DL (ref 65–100)
GLUCOSE BLD STRIP.AUTO-MCNC: 215 MG/DL (ref 65–100)
GLUCOSE BLD STRIP.AUTO-MCNC: 216 MG/DL (ref 65–100)
GLUCOSE BLD STRIP.AUTO-MCNC: 217 MG/DL (ref 65–100)
GLUCOSE BLD STRIP.AUTO-MCNC: 218 MG/DL (ref 65–100)
GLUCOSE BLD STRIP.AUTO-MCNC: 219 MG/DL (ref 65–100)
GLUCOSE BLD STRIP.AUTO-MCNC: 220 MG/DL (ref 65–100)
GLUCOSE BLD STRIP.AUTO-MCNC: 221 MG/DL (ref 65–100)
GLUCOSE BLD STRIP.AUTO-MCNC: 221 MG/DL (ref 65–100)
GLUCOSE BLD STRIP.AUTO-MCNC: 224 MG/DL (ref 65–100)
GLUCOSE BLD STRIP.AUTO-MCNC: 226 MG/DL (ref 65–100)
GLUCOSE BLD STRIP.AUTO-MCNC: 226 MG/DL (ref 65–100)
GLUCOSE BLD STRIP.AUTO-MCNC: 228 MG/DL (ref 65–100)
GLUCOSE BLD STRIP.AUTO-MCNC: 228 MG/DL (ref 65–100)
GLUCOSE BLD STRIP.AUTO-MCNC: 232 MG/DL (ref 65–100)
GLUCOSE BLD STRIP.AUTO-MCNC: 232 MG/DL (ref 65–100)
GLUCOSE BLD STRIP.AUTO-MCNC: 234 MG/DL (ref 65–100)
GLUCOSE BLD STRIP.AUTO-MCNC: 237 MG/DL (ref 65–100)
GLUCOSE BLD STRIP.AUTO-MCNC: 238 MG/DL (ref 65–100)
GLUCOSE BLD STRIP.AUTO-MCNC: 239 MG/DL (ref 65–100)
GLUCOSE BLD STRIP.AUTO-MCNC: 245 MG/DL (ref 65–100)
GLUCOSE BLD STRIP.AUTO-MCNC: 250 MG/DL (ref 65–100)
GLUCOSE BLD STRIP.AUTO-MCNC: 255 MG/DL (ref 65–100)
GLUCOSE BLD STRIP.AUTO-MCNC: 262 MG/DL (ref 65–100)
GLUCOSE BLD STRIP.AUTO-MCNC: 262 MG/DL (ref 65–100)
GLUCOSE BLD STRIP.AUTO-MCNC: 266 MG/DL (ref 65–100)
GLUCOSE BLD STRIP.AUTO-MCNC: 266 MG/DL (ref 65–100)
GLUCOSE BLD STRIP.AUTO-MCNC: 267 MG/DL (ref 65–100)
GLUCOSE BLD STRIP.AUTO-MCNC: 268 MG/DL (ref 65–100)
GLUCOSE BLD STRIP.AUTO-MCNC: 269 MG/DL (ref 65–100)
GLUCOSE BLD STRIP.AUTO-MCNC: 269 MG/DL (ref 65–100)
GLUCOSE BLD STRIP.AUTO-MCNC: 271 MG/DL (ref 65–100)
GLUCOSE BLD STRIP.AUTO-MCNC: 273 MG/DL (ref 65–100)
GLUCOSE BLD STRIP.AUTO-MCNC: 273 MG/DL (ref 65–100)
GLUCOSE BLD STRIP.AUTO-MCNC: 275 MG/DL (ref 65–100)
GLUCOSE BLD STRIP.AUTO-MCNC: 284 MG/DL (ref 65–100)
GLUCOSE BLD STRIP.AUTO-MCNC: 285 MG/DL (ref 65–100)
GLUCOSE BLD STRIP.AUTO-MCNC: 290 MG/DL (ref 65–100)
GLUCOSE BLD STRIP.AUTO-MCNC: 291 MG/DL (ref 65–100)
GLUCOSE BLD STRIP.AUTO-MCNC: 294 MG/DL (ref 65–100)
GLUCOSE BLD STRIP.AUTO-MCNC: 301 MG/DL (ref 65–100)
GLUCOSE BLD STRIP.AUTO-MCNC: 303 MG/DL (ref 65–100)
GLUCOSE BLD STRIP.AUTO-MCNC: 303 MG/DL (ref 65–100)
GLUCOSE BLD STRIP.AUTO-MCNC: 305 MG/DL (ref 65–100)
GLUCOSE BLD STRIP.AUTO-MCNC: 306 MG/DL (ref 65–100)
GLUCOSE BLD STRIP.AUTO-MCNC: 306 MG/DL (ref 65–100)
GLUCOSE BLD STRIP.AUTO-MCNC: 312 MG/DL (ref 65–100)
GLUCOSE BLD STRIP.AUTO-MCNC: 313 MG/DL (ref 65–100)
GLUCOSE BLD STRIP.AUTO-MCNC: 314 MG/DL (ref 65–100)
GLUCOSE BLD STRIP.AUTO-MCNC: 321 MG/DL (ref 65–100)
GLUCOSE BLD STRIP.AUTO-MCNC: 321 MG/DL (ref 65–100)
GLUCOSE BLD STRIP.AUTO-MCNC: 322 MG/DL (ref 65–100)
GLUCOSE BLD STRIP.AUTO-MCNC: 326 MG/DL (ref 65–100)
GLUCOSE BLD STRIP.AUTO-MCNC: 331 MG/DL (ref 65–100)
GLUCOSE BLD STRIP.AUTO-MCNC: 335 MG/DL (ref 65–100)
GLUCOSE BLD STRIP.AUTO-MCNC: 342 MG/DL (ref 65–100)
GLUCOSE BLD STRIP.AUTO-MCNC: 368 MG/DL (ref 65–100)
GLUCOSE BLD STRIP.AUTO-MCNC: 407 MG/DL (ref 65–100)
GLUCOSE SERPL-MCNC: 106 MG/DL (ref 65–100)
GLUCOSE SERPL-MCNC: 122 MG/DL (ref 65–100)
GLUCOSE SERPL-MCNC: 125 MG/DL (ref 65–100)
GLUCOSE SERPL-MCNC: 128 MG/DL (ref 65–100)
GLUCOSE SERPL-MCNC: 130 MG/DL (ref 65–100)
GLUCOSE SERPL-MCNC: 130 MG/DL (ref 65–100)
GLUCOSE SERPL-MCNC: 132 MG/DL (ref 65–100)
GLUCOSE SERPL-MCNC: 138 MG/DL (ref 65–100)
GLUCOSE SERPL-MCNC: 138 MG/DL (ref 65–100)
GLUCOSE SERPL-MCNC: 141 MG/DL (ref 65–100)
GLUCOSE SERPL-MCNC: 143 MG/DL (ref 65–100)
GLUCOSE SERPL-MCNC: 146 MG/DL (ref 65–100)
GLUCOSE SERPL-MCNC: 151 MG/DL (ref 65–100)
GLUCOSE SERPL-MCNC: 151 MG/DL (ref 65–100)
GLUCOSE SERPL-MCNC: 169 MG/DL (ref 65–100)
GLUCOSE SERPL-MCNC: 170 MG/DL (ref 65–100)
GLUCOSE SERPL-MCNC: 172 MG/DL (ref 65–100)
GLUCOSE SERPL-MCNC: 175 MG/DL (ref 65–100)
GLUCOSE SERPL-MCNC: 177 MG/DL (ref 65–100)
GLUCOSE SERPL-MCNC: 178 MG/DL (ref 65–100)
GLUCOSE SERPL-MCNC: 178 MG/DL (ref 65–100)
GLUCOSE SERPL-MCNC: 181 MG/DL (ref 65–100)
GLUCOSE SERPL-MCNC: 186 MG/DL (ref 65–100)
GLUCOSE SERPL-MCNC: 187 MG/DL (ref 65–100)
GLUCOSE SERPL-MCNC: 189 MG/DL (ref 65–100)
GLUCOSE SERPL-MCNC: 191 MG/DL (ref 65–100)
GLUCOSE SERPL-MCNC: 201 MG/DL (ref 65–100)
GLUCOSE SERPL-MCNC: 203 MG/DL (ref 65–100)
GLUCOSE SERPL-MCNC: 210 MG/DL (ref 65–100)
GLUCOSE SERPL-MCNC: 211 MG/DL (ref 65–100)
GLUCOSE SERPL-MCNC: 215 MG/DL (ref 65–100)
GLUCOSE SERPL-MCNC: 223 MG/DL (ref 65–100)
GLUCOSE SERPL-MCNC: 273 MG/DL (ref 65–100)
GLUCOSE SERPL-MCNC: 279 MG/DL (ref 65–100)
GLUCOSE SERPL-MCNC: 302 MG/DL (ref 65–100)
HAPTOGLOB SERPL-MCNC: 238 MG/DL (ref 30–200)
HBA1C MFR BLD: 12.8 % (ref 4.2–6.3)
HCG UR QL: NEGATIVE
HCO3 BLD-SCNC: 12.4 MMOL/L (ref 22–26)
HCO3 BLD-SCNC: 14.3 MMOL/L (ref 22–26)
HCO3 BLD-SCNC: 15.7 MMOL/L (ref 22–26)
HCO3 BLD-SCNC: 16.8 MMOL/L (ref 22–26)
HCO3 BLD-SCNC: 19.7 MMOL/L (ref 22–26)
HCO3 BLD-SCNC: 24.1 MMOL/L (ref 22–26)
HCO3 BLD-SCNC: 27 MMOL/L (ref 22–26)
HCO3 BLD-SCNC: 28.5 MMOL/L (ref 22–26)
HCO3 BLDV-SCNC: 29.1 MMOL/L (ref 23–28)
HCT VFR BLD AUTO: 21.1 % (ref 35.8–46.3)
HCT VFR BLD AUTO: 24.1 % (ref 35.8–46.3)
HCT VFR BLD AUTO: 24.5 % (ref 35.8–46.3)
HCT VFR BLD AUTO: 25.1 % (ref 35.8–46.3)
HCT VFR BLD AUTO: 25.9 % (ref 35.8–46.3)
HCT VFR BLD AUTO: 26 % (ref 35.8–46.3)
HCT VFR BLD AUTO: 26.4 % (ref 35.8–46.3)
HCT VFR BLD AUTO: 26.7 % (ref 35.8–46.3)
HCT VFR BLD AUTO: 27.8 % (ref 35.8–46.3)
HCT VFR BLD AUTO: 27.9 % (ref 35.8–46.3)
HCT VFR BLD AUTO: 28.2 % (ref 35.8–46.3)
HCT VFR BLD AUTO: 28.3 % (ref 35.8–46.3)
HCT VFR BLD AUTO: 28.3 % (ref 35.8–46.3)
HCT VFR BLD AUTO: 28.9 % (ref 35.8–46.3)
HCT VFR BLD AUTO: 29.2 % (ref 35.8–46.3)
HCT VFR BLD AUTO: 29.2 % (ref 35.8–46.3)
HCT VFR BLD AUTO: 29.5 % (ref 35.8–46.3)
HCT VFR BLD AUTO: 29.9 % (ref 35.8–46.3)
HCT VFR BLD AUTO: 30 % (ref 35.8–46.3)
HCT VFR BLD AUTO: 30.1 % (ref 35.8–46.3)
HCT VFR BLD AUTO: 30.2 % (ref 35.8–46.3)
HCT VFR BLD AUTO: 30.3 % (ref 35.8–46.3)
HCT VFR BLD AUTO: 31.8 % (ref 35.8–46.3)
HCT VFR BLD AUTO: 32 % (ref 35.8–46.3)
HCT VFR BLD AUTO: 32.2 % (ref 35.8–46.3)
HCT VFR BLD AUTO: 33.2 % (ref 35.8–46.3)
HCT VFR BLD AUTO: 36 % (ref 35.8–46.3)
HCT VFR BLD AUTO: 36.5 % (ref 35.8–46.3)
HCT VFR BLD AUTO: 37.2 % (ref 35.8–46.3)
HCT VFR BLD AUTO: 38.3 % (ref 35.8–46.3)
HCT VFR BLD AUTO: 39.6 % (ref 35.8–46.3)
HCT VFR BLD AUTO: 41 % (ref 35.8–46.3)
HCT VFR BLD AUTO: 41.2 % (ref 35.8–46.3)
HCT VFR BLD AUTO: 41.8 % (ref 35.8–46.3)
HCT VFR BLD AUTO: 41.9 % (ref 35.8–46.3)
HCT VFR BLD AUTO: 42 % (ref 35.8–46.3)
HCT VFR BLD AUTO: 43.1 % (ref 35.8–46.3)
HEPARIN INDUCED PLT,XHIPA: NEGATIVE
HGB BLD-MCNC: 10.1 G/DL (ref 11.7–15.4)
HGB BLD-MCNC: 10.2 G/DL (ref 11.7–15.4)
HGB BLD-MCNC: 10.6 G/DL (ref 11.7–15.4)
HGB BLD-MCNC: 10.9 G/DL (ref 11.7–15.4)
HGB BLD-MCNC: 11.2 G/DL (ref 11.7–15.4)
HGB BLD-MCNC: 11.3 G/DL (ref 11.7–15.4)
HGB BLD-MCNC: 11.4 G/DL (ref 11.7–15.4)
HGB BLD-MCNC: 11.5 G/DL (ref 11.7–15.4)
HGB BLD-MCNC: 11.7 G/DL (ref 11.7–15.4)
HGB BLD-MCNC: 11.7 G/DL (ref 11.7–15.4)
HGB BLD-MCNC: 5.9 G/DL (ref 11.7–15.4)
HGB BLD-MCNC: 6.6 G/DL (ref 11.7–15.4)
HGB BLD-MCNC: 6.7 G/DL (ref 11.7–15.4)
HGB BLD-MCNC: 7 G/DL (ref 11.7–15.4)
HGB BLD-MCNC: 7.2 G/DL (ref 11.7–15.4)
HGB BLD-MCNC: 7.3 G/DL (ref 11.7–15.4)
HGB BLD-MCNC: 7.8 G/DL (ref 11.7–15.4)
HGB BLD-MCNC: 8 G/DL (ref 11.7–15.4)
HGB BLD-MCNC: 8 G/DL (ref 11.7–15.4)
HGB BLD-MCNC: 8.1 G/DL (ref 11.7–15.4)
HGB BLD-MCNC: 8.2 G/DL (ref 11.7–15.4)
HGB BLD-MCNC: 8.2 G/DL (ref 11.7–15.4)
HGB BLD-MCNC: 8.3 G/DL (ref 11.7–15.4)
HGB BLD-MCNC: 8.4 G/DL (ref 11.7–15.4)
HGB BLD-MCNC: 8.5 G/DL (ref 11.7–15.4)
HGB BLD-MCNC: 8.6 G/DL (ref 11.7–15.4)
HGB BLD-MCNC: 9 G/DL (ref 11.7–15.4)
HGB BLD-MCNC: 9 G/DL (ref 11.7–15.4)
HGB BLD-MCNC: 9.2 G/DL (ref 11.7–15.4)
HGB BLD-MCNC: 9.4 G/DL (ref 11.7–15.4)
HGB BLD-MCNC: 9.5 G/DL (ref 11.7–15.4)
HGB RETIC QN AUTO: 16 PG (ref 29–35)
HISTORY CHECKED?,CKHIST: NORMAL
HIT INTERPRETATION,XINTPR: NEGATIVE
HIT PROFILE,XHITT: NORMAL
IMM GRANULOCYTES # BLD AUTO: 0 K/UL (ref 0–0.5)
IMM GRANULOCYTES # BLD AUTO: 0.1 K/UL (ref 0–0.5)
IMM GRANULOCYTES # BLD AUTO: 0.2 K/UL (ref 0–0.5)
IMM GRANULOCYTES # BLD AUTO: 0.3 K/UL (ref 0–0.5)
IMM GRANULOCYTES # BLD AUTO: 0.3 K/UL (ref 0–0.5)
IMM GRANULOCYTES # BLD AUTO: 0.5 K/UL (ref 0–0.5)
IMM GRANULOCYTES # BLD AUTO: 0.5 K/UL (ref 0–0.5)
IMM GRANULOCYTES # BLD AUTO: 0.6 K/UL (ref 0–0.5)
IMM GRANULOCYTES # BLD AUTO: 0.7 K/UL (ref 0–0.5)
IMM GRANULOCYTES # BLD AUTO: 0.8 K/UL (ref 0–0.5)
IMM GRANULOCYTES # BLD AUTO: 1 K/UL (ref 0–0.5)
IMM GRANULOCYTES # BLD AUTO: 1 K/UL (ref 0–0.5)
IMM GRANULOCYTES # BLD AUTO: 1.2 K/UL (ref 0–0.5)
IMM GRANULOCYTES NFR BLD AUTO: 0 % (ref 0–5)
IMM GRANULOCYTES NFR BLD AUTO: 1 % (ref 0–5)
IMM GRANULOCYTES NFR BLD AUTO: 2 % (ref 0–5)
IMM GRANULOCYTES NFR BLD AUTO: 3 % (ref 0–5)
IMM GRANULOCYTES NFR BLD AUTO: 5 % (ref 0–5)
IMM GRANULOCYTES NFR BLD AUTO: 6 % (ref 0–5)
IMM GRANULOCYTES NFR BLD AUTO: 7 % (ref 0–5)
IMM RETICS NFR: 27.8 % (ref 3–15.9)
INR PPP: 1.2
IPAP/PIP, IPAPIP: 41
IRON SATN MFR SERPL: 4 %
IRON SERPL-MCNC: 6 UG/DL (ref 35–150)
LACTATE SERPL-SCNC: 0.9 MMOL/L (ref 0.4–2)
LACTATE SERPL-SCNC: 1.2 MMOL/L (ref 0.4–2)
LACTATE SERPL-SCNC: 26.1 MMOL/L (ref 0.4–2)
LDH SERPL L TO P-CCNC: 467 U/L (ref 100–190)
LYMPHOCYTES # BLD: 0.8 K/UL (ref 0.5–4.6)
LYMPHOCYTES # BLD: 0.9 K/UL (ref 0.5–4.6)
LYMPHOCYTES # BLD: 1.1 K/UL (ref 0.5–4.6)
LYMPHOCYTES # BLD: 1.2 K/UL (ref 0.5–4.6)
LYMPHOCYTES # BLD: 1.4 K/UL (ref 0.5–4.6)
LYMPHOCYTES # BLD: 1.4 K/UL (ref 0.5–4.6)
LYMPHOCYTES # BLD: 1.6 K/UL (ref 0.5–4.6)
LYMPHOCYTES # BLD: 1.7 K/UL (ref 0.5–4.6)
LYMPHOCYTES # BLD: 1.7 K/UL (ref 0.5–4.6)
LYMPHOCYTES # BLD: 1.9 K/UL (ref 0.5–4.6)
LYMPHOCYTES # BLD: 2 K/UL (ref 0.5–4.6)
LYMPHOCYTES # BLD: 2.4 K/UL (ref 0.5–4.6)
LYMPHOCYTES # BLD: 2.5 K/UL (ref 0.5–4.6)
LYMPHOCYTES # BLD: 2.6 K/UL (ref 0.5–4.6)
LYMPHOCYTES # BLD: 2.8 K/UL (ref 0.5–4.6)
LYMPHOCYTES # BLD: 2.9 K/UL (ref 0.5–4.6)
LYMPHOCYTES # BLD: 3 K/UL (ref 0.5–4.6)
LYMPHOCYTES # BLD: 3.1 K/UL (ref 0.5–4.6)
LYMPHOCYTES # BLD: 3.2 K/UL (ref 0.5–4.6)
LYMPHOCYTES # BLD: 3.3 K/UL (ref 0.5–4.6)
LYMPHOCYTES # BLD: 4 K/UL (ref 0.5–4.6)
LYMPHOCYTES # BLD: 4 K/UL (ref 0.5–4.6)
LYMPHOCYTES # BLD: 4.5 K/UL (ref 0.5–4.6)
LYMPHOCYTES # BLD: 7.1 K/UL (ref 0.5–4.6)
LYMPHOCYTES NFR BLD MANUAL: 18 % (ref 16–44)
LYMPHOCYTES NFR BLD: 10 % (ref 13–44)
LYMPHOCYTES NFR BLD: 11 % (ref 13–44)
LYMPHOCYTES NFR BLD: 12 % (ref 13–44)
LYMPHOCYTES NFR BLD: 12 % (ref 13–44)
LYMPHOCYTES NFR BLD: 14 % (ref 13–44)
LYMPHOCYTES NFR BLD: 15 % (ref 13–44)
LYMPHOCYTES NFR BLD: 16 % (ref 13–44)
LYMPHOCYTES NFR BLD: 16 % (ref 13–44)
LYMPHOCYTES NFR BLD: 17 % (ref 13–44)
LYMPHOCYTES NFR BLD: 18 % (ref 13–44)
LYMPHOCYTES NFR BLD: 19 % (ref 13–44)
LYMPHOCYTES NFR BLD: 19 % (ref 13–44)
LYMPHOCYTES NFR BLD: 20 % (ref 13–44)
LYMPHOCYTES NFR BLD: 21 % (ref 13–44)
LYMPHOCYTES NFR BLD: 22 % (ref 13–44)
LYMPHOCYTES NFR BLD: 22 % (ref 13–44)
LYMPHOCYTES NFR BLD: 23 % (ref 13–44)
LYMPHOCYTES NFR BLD: 23 % (ref 13–44)
LYMPHOCYTES NFR BLD: 24 % (ref 13–44)
LYMPHOCYTES NFR BLD: 25 % (ref 13–44)
LYMPHOCYTES NFR BLD: 30 % (ref 13–44)
LYMPHOCYTES NFR BLD: 42 % (ref 13–44)
MAGNESIUM SERPL-MCNC: 1.5 MG/DL (ref 1.8–2.4)
MAGNESIUM SERPL-MCNC: 1.8 MG/DL (ref 1.8–2.4)
MAGNESIUM SERPL-MCNC: 1.9 MG/DL (ref 1.8–2.4)
MAGNESIUM SERPL-MCNC: 2.1 MG/DL (ref 1.8–2.4)
MAGNESIUM SERPL-MCNC: 2.1 MG/DL (ref 1.8–2.4)
MAGNESIUM SERPL-MCNC: 2.2 MG/DL (ref 1.8–2.4)
MAGNESIUM SERPL-MCNC: 2.2 MG/DL (ref 1.8–2.4)
MAGNESIUM SERPL-MCNC: 2.3 MG/DL (ref 1.8–2.4)
MCH RBC QN AUTO: 18.8 PG (ref 26.1–32.9)
MCH RBC QN AUTO: 18.9 PG (ref 26.1–32.9)
MCH RBC QN AUTO: 19 PG (ref 26.1–32.9)
MCH RBC QN AUTO: 19 PG (ref 26.1–32.9)
MCH RBC QN AUTO: 19.1 PG (ref 26.1–32.9)
MCH RBC QN AUTO: 19.1 PG (ref 26.1–32.9)
MCH RBC QN AUTO: 19.2 PG (ref 26.1–32.9)
MCH RBC QN AUTO: 19.2 PG (ref 26.1–32.9)
MCH RBC QN AUTO: 19.3 PG (ref 26.1–32.9)
MCH RBC QN AUTO: 19.5 PG (ref 26.1–32.9)
MCH RBC QN AUTO: 19.6 PG (ref 26.1–32.9)
MCH RBC QN AUTO: 19.6 PG (ref 26.1–32.9)
MCH RBC QN AUTO: 19.7 PG (ref 26.1–32.9)
MCH RBC QN AUTO: 19.7 PG (ref 26.1–32.9)
MCH RBC QN AUTO: 19.8 PG (ref 26.1–32.9)
MCH RBC QN AUTO: 19.8 PG (ref 26.1–32.9)
MCH RBC QN AUTO: 19.9 PG (ref 26.1–32.9)
MCH RBC QN AUTO: 20.1 PG (ref 26.1–32.9)
MCH RBC QN AUTO: 20.1 PG (ref 26.1–32.9)
MCH RBC QN AUTO: 20.2 PG (ref 26.1–32.9)
MCH RBC QN AUTO: 20.3 PG (ref 26.1–32.9)
MCH RBC QN AUTO: 20.4 PG (ref 26.1–32.9)
MCH RBC QN AUTO: 21.1 PG (ref 26.1–32.9)
MCH RBC QN AUTO: 22.6 PG (ref 26.1–32.9)
MCH RBC QN AUTO: 22.6 PG (ref 26.1–32.9)
MCHC RBC AUTO-ENTMCNC: 25.5 G/DL (ref 31.4–35)
MCHC RBC AUTO-ENTMCNC: 27.1 G/DL (ref 31.4–35)
MCHC RBC AUTO-ENTMCNC: 27.2 G/DL (ref 31.4–35)
MCHC RBC AUTO-ENTMCNC: 27.3 G/DL (ref 31.4–35)
MCHC RBC AUTO-ENTMCNC: 27.4 G/DL (ref 31.4–35)
MCHC RBC AUTO-ENTMCNC: 27.5 G/DL (ref 31.4–35)
MCHC RBC AUTO-ENTMCNC: 27.7 G/DL (ref 31.4–35)
MCHC RBC AUTO-ENTMCNC: 27.9 G/DL (ref 31.4–35)
MCHC RBC AUTO-ENTMCNC: 28 G/DL (ref 31.4–35)
MCHC RBC AUTO-ENTMCNC: 28 G/DL (ref 31.4–35)
MCHC RBC AUTO-ENTMCNC: 28.1 G/DL (ref 31.4–35)
MCHC RBC AUTO-ENTMCNC: 28.3 G/DL (ref 31.4–35)
MCHC RBC AUTO-ENTMCNC: 28.4 G/DL (ref 31.4–35)
MCHC RBC AUTO-ENTMCNC: 28.4 G/DL (ref 31.4–35)
MCHC RBC AUTO-ENTMCNC: 28.6 G/DL (ref 31.4–35)
MCHC RBC AUTO-ENTMCNC: 29.2 G/DL (ref 31.4–35)
MCHC RBC AUTO-ENTMCNC: 29.2 G/DL (ref 31.4–35)
MCV RBC AUTO: 67.4 FL (ref 79.6–97.8)
MCV RBC AUTO: 68.3 FL (ref 79.6–97.8)
MCV RBC AUTO: 68.7 FL (ref 79.6–97.8)
MCV RBC AUTO: 68.8 FL (ref 79.6–97.8)
MCV RBC AUTO: 68.8 FL (ref 79.6–97.8)
MCV RBC AUTO: 68.9 FL (ref 79.6–97.8)
MCV RBC AUTO: 68.9 FL (ref 79.6–97.8)
MCV RBC AUTO: 69.1 FL (ref 79.6–97.8)
MCV RBC AUTO: 69.1 FL (ref 79.6–97.8)
MCV RBC AUTO: 69.3 FL (ref 79.6–97.8)
MCV RBC AUTO: 69.4 FL (ref 79.6–97.8)
MCV RBC AUTO: 69.6 FL (ref 79.6–97.8)
MCV RBC AUTO: 69.8 FL (ref 79.6–97.8)
MCV RBC AUTO: 70.1 FL (ref 79.6–97.8)
MCV RBC AUTO: 70.4 FL (ref 79.6–97.8)
MCV RBC AUTO: 70.9 FL (ref 79.6–97.8)
MCV RBC AUTO: 71.8 FL (ref 79.6–97.8)
MCV RBC AUTO: 71.9 FL (ref 79.6–97.8)
MCV RBC AUTO: 72 FL (ref 79.6–97.8)
MCV RBC AUTO: 72.4 FL (ref 79.6–97.8)
MCV RBC AUTO: 72.6 FL (ref 79.6–97.8)
MCV RBC AUTO: 73.2 FL (ref 79.6–97.8)
MCV RBC AUTO: 74.2 FL (ref 79.6–97.8)
MCV RBC AUTO: 77.4 FL (ref 79.6–97.8)
MCV RBC AUTO: 88.6 FL (ref 79.6–97.8)
METAMYELOCYTES NFR BLD MANUAL: 1 %
MONOCYTES # BLD: 0.2 K/UL (ref 0.1–1.3)
MONOCYTES # BLD: 0.3 K/UL (ref 0.1–1.3)
MONOCYTES # BLD: 0.3 K/UL (ref 0.1–1.3)
MONOCYTES # BLD: 0.4 K/UL (ref 0.1–1.3)
MONOCYTES # BLD: 0.5 K/UL (ref 0.1–1.3)
MONOCYTES # BLD: 0.6 K/UL (ref 0.1–1.3)
MONOCYTES # BLD: 0.7 K/UL (ref 0.1–1.3)
MONOCYTES # BLD: 0.7 K/UL (ref 0.1–1.3)
MONOCYTES # BLD: 0.8 K/UL (ref 0.1–1.3)
MONOCYTES # BLD: 1 K/UL (ref 0.1–1.3)
MONOCYTES # BLD: 1.2 K/UL (ref 0.1–1.3)
MONOCYTES # BLD: 1.3 K/UL (ref 0.1–1.3)
MONOCYTES # BLD: 1.4 K/UL (ref 0.1–1.3)
MONOCYTES # BLD: 1.5 K/UL (ref 0.1–1.3)
MONOCYTES # BLD: 1.7 K/UL (ref 0.1–1.3)
MONOCYTES # BLD: 1.9 K/UL (ref 0.1–1.3)
MONOCYTES NFR BLD MANUAL: 3 % (ref 3–9)
MONOCYTES NFR BLD: 10 % (ref 4–12)
MONOCYTES NFR BLD: 10 % (ref 4–12)
MONOCYTES NFR BLD: 11 % (ref 4–12)
MONOCYTES NFR BLD: 3 % (ref 4–12)
MONOCYTES NFR BLD: 4 % (ref 4–12)
MONOCYTES NFR BLD: 4 % (ref 4–12)
MONOCYTES NFR BLD: 5 % (ref 4–12)
MONOCYTES NFR BLD: 6 % (ref 4–12)
MONOCYTES NFR BLD: 6 % (ref 4–12)
MONOCYTES NFR BLD: 7 % (ref 4–12)
MONOCYTES NFR BLD: 8 % (ref 4–12)
MONOCYTES NFR BLD: 9 % (ref 4–12)
NEUTS BAND NFR BLD MANUAL: 1 % (ref 0–10)
NEUTS SEG # BLD: 1.4 K/UL (ref 1.7–8.2)
NEUTS SEG # BLD: 10 K/UL (ref 1.7–8.2)
NEUTS SEG # BLD: 10 K/UL (ref 1.7–8.2)
NEUTS SEG # BLD: 10.1 K/UL (ref 1.7–8.2)
NEUTS SEG # BLD: 10.2 K/UL (ref 1.7–8.2)
NEUTS SEG # BLD: 10.6 K/UL (ref 1.7–8.2)
NEUTS SEG # BLD: 10.6 K/UL (ref 1.7–8.2)
NEUTS SEG # BLD: 10.7 K/UL (ref 1.7–8.2)
NEUTS SEG # BLD: 10.9 K/UL (ref 1.7–8.2)
NEUTS SEG # BLD: 11.7 K/UL (ref 1.7–8.2)
NEUTS SEG # BLD: 12.3 K/UL (ref 1.7–8.2)
NEUTS SEG # BLD: 12.5 K/UL (ref 1.7–8.2)
NEUTS SEG # BLD: 12.8 K/UL (ref 1.7–8.2)
NEUTS SEG # BLD: 13.4 K/UL (ref 1.7–8.2)
NEUTS SEG # BLD: 14.8 K/UL (ref 1.7–8.2)
NEUTS SEG # BLD: 29.1 K/UL (ref 1.7–8.2)
NEUTS SEG # BLD: 3.1 K/UL (ref 1.7–8.2)
NEUTS SEG # BLD: 4.1 K/UL (ref 1.7–8.2)
NEUTS SEG # BLD: 4.3 K/UL (ref 1.7–8.2)
NEUTS SEG # BLD: 6.1 K/UL (ref 1.7–8.2)
NEUTS SEG # BLD: 6.6 K/UL (ref 1.7–8.2)
NEUTS SEG # BLD: 6.7 K/UL (ref 1.7–8.2)
NEUTS SEG # BLD: 6.7 K/UL (ref 1.7–8.2)
NEUTS SEG # BLD: 7.8 K/UL (ref 1.7–8.2)
NEUTS SEG # BLD: 8.8 K/UL (ref 1.7–8.2)
NEUTS SEG # BLD: 9 K/UL (ref 1.7–8.2)
NEUTS SEG # BLD: 9.4 K/UL (ref 1.7–8.2)
NEUTS SEG # BLD: 9.7 K/UL (ref 1.7–8.2)
NEUTS SEG # BLD: 9.9 K/UL (ref 1.7–8.2)
NEUTS SEG NFR BLD MANUAL: 70 % (ref 47–75)
NEUTS SEG NFR BLD: 49 % (ref 43–78)
NEUTS SEG NFR BLD: 62 % (ref 43–78)
NEUTS SEG NFR BLD: 62 % (ref 43–78)
NEUTS SEG NFR BLD: 63 % (ref 43–78)
NEUTS SEG NFR BLD: 63 % (ref 43–78)
NEUTS SEG NFR BLD: 64 % (ref 43–78)
NEUTS SEG NFR BLD: 64 % (ref 43–78)
NEUTS SEG NFR BLD: 66 % (ref 43–78)
NEUTS SEG NFR BLD: 67 % (ref 43–78)
NEUTS SEG NFR BLD: 68 % (ref 43–78)
NEUTS SEG NFR BLD: 68 % (ref 43–78)
NEUTS SEG NFR BLD: 69 % (ref 43–78)
NEUTS SEG NFR BLD: 69 % (ref 43–78)
NEUTS SEG NFR BLD: 70 % (ref 43–78)
NEUTS SEG NFR BLD: 71 % (ref 43–78)
NEUTS SEG NFR BLD: 72 % (ref 43–78)
NEUTS SEG NFR BLD: 74 % (ref 43–78)
NEUTS SEG NFR BLD: 75 % (ref 43–78)
NEUTS SEG NFR BLD: 76 % (ref 43–78)
NEUTS SEG NFR BLD: 77 % (ref 43–78)
NEUTS SEG NFR BLD: 78 % (ref 43–78)
NEUTS SEG NFR BLD: 79 % (ref 43–78)
NEUTS SEG NFR BLD: 81 % (ref 43–78)
NEUTS SEG NFR BLD: 84 % (ref 43–78)
NRBC # BLD: 0 K/UL (ref 0–0.2)
NRBC # BLD: 0.02 K/UL (ref 0–0.2)
NRBC # BLD: 0.03 K/UL (ref 0–0.2)
NRBC # BLD: 0.04 K/UL (ref 0–0.2)
NRBC # BLD: 0.05 K/UL (ref 0–0.2)
NRBC # BLD: 0.06 K/UL (ref 0–0.2)
NRBC # BLD: 0.06 K/UL (ref 0–0.2)
NRBC # BLD: 0.07 K/UL (ref 0–0.2)
NRBC # BLD: 0.07 K/UL (ref 0–0.2)
NRBC # BLD: 0.1 K/UL (ref 0–0.2)
NRBC # BLD: 0.83 K/UL (ref 0–0.2)
O2/TOTAL GAS SETTING VFR VENT: 100 %
O2/TOTAL GAS SETTING VFR VENT: 75 %
OPTICAL DENSITY READ,XHITAO: 0.06 ABS
P-R INTERVAL, ECG05: 168 MS
PATH REV BLD -IMP: NORMAL
PAW @ MEAN EXP FLOW ON VENT: 23 CMH2O
PAW @ MEAN EXP FLOW ON VENT: 24 CMH2O
PAW @ MEAN EXP FLOW ON VENT: 25 CMH2O
PCO2 BLD: 41.9 MMHG (ref 35–45)
PCO2 BLD: 43.4 MMHG (ref 35–45)
PCO2 BLD: 51.1 MMHG (ref 35–45)
PCO2 BLD: 70.8 MMHG (ref 35–45)
PCO2 BLD: 72.3 MMHG (ref 35–45)
PCO2 BLD: 75.6 MMHG (ref 35–45)
PCO2 BLD: 85.6 MMHG (ref 35–45)
PCO2 BLD: 86.2 MMHG (ref 35–45)
PCO2 BLDV: 53.8 MMHG (ref 41–51)
PEEP RESPIRATORY: 14 CMH2O
PEEP RESPIRATORY: 15 CMH2O
PEEP RESPIRATORY: 15 CM[H2O]
PEEP RESPIRATORY: 17 CMH2O
PH BLD: 6.84 [PH] (ref 7.35–7.45)
PH BLD: 6.9 [PH] (ref 7.35–7.45)
PH BLD: 6.91 [PH] (ref 7.35–7.45)
PH BLD: 6.93 [PH] (ref 7.35–7.45)
PH BLD: 6.97 [PH] (ref 7.35–7.45)
PH BLD: 7.36 [PH] (ref 7.35–7.45)
PH BLD: 7.37 [PH] (ref 7.35–7.45)
PH BLD: 7.4 [PH] (ref 7.35–7.45)
PH BLDV: 7.34 [PH] (ref 7.32–7.42)
PHOSPHATE SERPL-MCNC: 3 MG/DL (ref 2.5–4.5)
PHOSPHATE SERPL-MCNC: 3.2 MG/DL (ref 2.5–4.5)
PHOSPHATE SERPL-MCNC: 3.2 MG/DL (ref 2.5–4.5)
PHOSPHATE SERPL-MCNC: 3.5 MG/DL (ref 2.5–4.5)
PHOSPHATE SERPL-MCNC: 3.6 MG/DL (ref 2.5–4.5)
PHOSPHATE SERPL-MCNC: 3.7 MG/DL (ref 2.5–4.5)
PHOSPHATE SERPL-MCNC: 3.8 MG/DL (ref 2.5–4.5)
PHOSPHATE SERPL-MCNC: 4.2 MG/DL (ref 2.5–4.5)
PHOSPHATE SERPL-MCNC: 4.2 MG/DL (ref 2.5–4.5)
PHOSPHATE SERPL-MCNC: 4.6 MG/DL (ref 2.5–4.5)
PHOSPHATE SERPL-MCNC: 4.6 MG/DL (ref 2.5–4.5)
PHOSPHATE SERPL-MCNC: 4.9 MG/DL (ref 2.5–4.5)
PHOSPHATE SERPL-MCNC: 5.9 MG/DL (ref 2.5–4.5)
PHOSPHATE SERPL-MCNC: 6 MG/DL (ref 2.5–4.5)
PIP ISTAT,IPIP: 45
PIP ISTAT,IPIP: 47
PLATELET # BLD AUTO: 102 K/UL (ref 150–450)
PLATELET # BLD AUTO: 105 K/UL (ref 150–450)
PLATELET # BLD AUTO: 109 K/UL (ref 150–450)
PLATELET # BLD AUTO: 109 K/UL (ref 150–450)
PLATELET # BLD AUTO: 127 K/UL (ref 150–450)
PLATELET # BLD AUTO: 135 K/UL (ref 150–450)
PLATELET # BLD AUTO: 164 K/UL (ref 150–450)
PLATELET # BLD AUTO: 179 K/UL (ref 150–450)
PLATELET # BLD AUTO: 188 K/UL (ref 150–450)
PLATELET # BLD AUTO: 195 K/UL (ref 150–450)
PLATELET # BLD AUTO: 226 K/UL (ref 150–450)
PLATELET # BLD AUTO: 233 K/UL (ref 150–450)
PLATELET # BLD AUTO: 249 K/UL (ref 150–450)
PLATELET # BLD AUTO: 254 K/UL (ref 150–450)
PLATELET # BLD AUTO: 261 K/UL (ref 150–450)
PLATELET # BLD AUTO: 267 K/UL (ref 150–450)
PLATELET # BLD AUTO: 273 K/UL (ref 150–450)
PLATELET # BLD AUTO: 275 K/UL (ref 150–450)
PLATELET # BLD AUTO: 277 K/UL (ref 150–450)
PLATELET # BLD AUTO: 279 K/UL (ref 150–450)
PLATELET # BLD AUTO: 279 K/UL (ref 150–450)
PLATELET # BLD AUTO: 282 K/UL (ref 150–450)
PLATELET # BLD AUTO: 284 K/UL (ref 150–450)
PLATELET # BLD AUTO: 293 K/UL (ref 150–450)
PLATELET # BLD AUTO: 297 K/UL (ref 150–450)
PLATELET # BLD AUTO: 301 K/UL (ref 150–450)
PLATELET # BLD AUTO: 357 K/UL (ref 150–450)
PLATELET # BLD AUTO: 402 K/UL (ref 150–450)
PLATELET # BLD AUTO: 413 K/UL (ref 150–450)
PLATELET # BLD AUTO: 433 K/UL (ref 150–450)
PLATELET # BLD AUTO: 78 K/UL (ref 150–450)
PLATELET # BLD AUTO: 95 K/UL (ref 150–450)
PLATELET COMMENTS,PCOM: ABNORMAL
PLATELET COMMENTS,PCOM: ADEQUATE
PLATELET COMMENTS,PCOM: ADEQUATE
PLATELET COMMENTS,PCOM: SLIGHT
PMV BLD AUTO: 10 FL (ref 9.4–12.3)
PMV BLD AUTO: 10.3 FL (ref 9.4–12.3)
PMV BLD AUTO: 10.4 FL (ref 9.4–12.3)
PMV BLD AUTO: 10.5 FL (ref 9.4–12.3)
PMV BLD AUTO: 10.6 FL (ref 9.4–12.3)
PMV BLD AUTO: 10.6 FL (ref 9.4–12.3)
PMV BLD AUTO: 10.7 FL (ref 9.4–12.3)
PMV BLD AUTO: 10.7 FL (ref 9.4–12.3)
PMV BLD AUTO: 10.8 FL (ref 9.4–12.3)
PMV BLD AUTO: 10.8 FL (ref 9.4–12.3)
PMV BLD AUTO: 10.9 FL (ref 9.4–12.3)
PMV BLD AUTO: 11.3 FL (ref 9.4–12.3)
PMV BLD AUTO: 9.7 FL (ref 9.4–12.3)
PMV BLD AUTO: 9.8 FL (ref 9.4–12.3)
PMV BLD AUTO: 9.9 FL (ref 9.4–12.3)
PMV BLD AUTO: ABNORMAL FL (ref 9.4–12.3)
PO2 BLD: 35 MMHG (ref 75–100)
PO2 BLD: 46 MMHG (ref 75–100)
PO2 BLD: 48 MMHG (ref 75–100)
PO2 BLD: 54 MMHG (ref 75–100)
PO2 BLD: 55 MMHG (ref 75–100)
PO2 BLD: 61 MMHG (ref 75–100)
PO2 BLD: 61 MMHG (ref 75–100)
PO2 BLD: 74 MMHG (ref 75–100)
PO2 BLDV: 52 MMHG
POTASSIUM BLD-SCNC: 6.2 MMOL/L (ref 3.5–5.1)
POTASSIUM SERPL-SCNC: 3.1 MMOL/L (ref 3.5–5.1)
POTASSIUM SERPL-SCNC: 3.7 MMOL/L (ref 3.5–5.1)
POTASSIUM SERPL-SCNC: 3.8 MMOL/L (ref 3.5–5.1)
POTASSIUM SERPL-SCNC: 3.9 MMOL/L (ref 3.5–5.1)
POTASSIUM SERPL-SCNC: 4 MMOL/L (ref 3.5–5.1)
POTASSIUM SERPL-SCNC: 4 MMOL/L (ref 3.5–5.1)
POTASSIUM SERPL-SCNC: 4.1 MMOL/L (ref 3.5–5.1)
POTASSIUM SERPL-SCNC: 4.1 MMOL/L (ref 3.5–5.1)
POTASSIUM SERPL-SCNC: 4.2 MMOL/L (ref 3.5–5.1)
POTASSIUM SERPL-SCNC: 4.3 MMOL/L (ref 3.5–5.1)
POTASSIUM SERPL-SCNC: 4.4 MMOL/L (ref 3.5–5.1)
POTASSIUM SERPL-SCNC: 4.5 MMOL/L (ref 3.5–5.1)
POTASSIUM SERPL-SCNC: 4.7 MMOL/L (ref 3.5–5.1)
POTASSIUM SERPL-SCNC: 4.7 MMOL/L (ref 3.5–5.1)
POTASSIUM SERPL-SCNC: 4.8 MMOL/L (ref 3.5–5.1)
POTASSIUM SERPL-SCNC: 4.9 MMOL/L (ref 3.5–5.1)
POTASSIUM SERPL-SCNC: 5.3 MMOL/L (ref 3.5–5.1)
POTASSIUM SERPL-SCNC: 5.4 MMOL/L (ref 3.5–5.1)
POTASSIUM SERPL-SCNC: 5.9 MMOL/L (ref 3.5–5.1)
PRESSURE SUPPORT SETTING VENT: 20 CMH2O
PROCALCITONIN SERPL-MCNC: 0.06 NG/ML
PROCALCITONIN SERPL-MCNC: 0.14 NG/ML
PROMYELOCYTES NFR BLD MANUAL: 2 %
PROT SERPL-MCNC: 6 G/DL (ref 6.3–8.2)
PROT SERPL-MCNC: 6.3 G/DL (ref 6.3–8.2)
PROT SERPL-MCNC: 7.1 G/DL (ref 6.3–8.2)
PROT SERPL-MCNC: 7.2 G/DL (ref 6.3–8.2)
PROT SERPL-MCNC: 7.3 G/DL (ref 6.3–8.2)
PROT SERPL-MCNC: 7.5 G/DL (ref 6.3–8.2)
PROT SERPL-MCNC: 8.1 G/DL (ref 6.3–8.2)
PROT SERPL-MCNC: 8.2 G/DL (ref 6.3–8.2)
PROTHROMBIN TIME: 15.2 SEC (ref 12.6–14.5)
Q-T INTERVAL, ECG07: 324 MS
QRS DURATION, ECG06: 128 MS
QTC CALCULATION (BEZET), ECG08: 460 MS
RBC # BLD AUTO: 2.93 M/UL (ref 4.05–5.2)
RBC # BLD AUTO: 3.33 M/UL (ref 4.05–5.2)
RBC # BLD AUTO: 3.41 M/UL (ref 4.05–5.2)
RBC # BLD AUTO: 3.54 M/UL (ref 4.05–5.2)
RBC # BLD AUTO: 3.58 M/UL (ref 4.05–5.2)
RBC # BLD AUTO: 3.67 M/UL (ref 4.05–5.2)
RBC # BLD AUTO: 3.71 M/UL (ref 4.05–5.2)
RBC # BLD AUTO: 3.86 M/UL (ref 4.05–5.2)
RBC # BLD AUTO: 4.02 M/UL (ref 4.05–5.2)
RBC # BLD AUTO: 4.02 M/UL (ref 4.05–5.2)
RBC # BLD AUTO: 4.03 M/UL (ref 4.05–5.2)
RBC # BLD AUTO: 4.14 M/UL (ref 4.05–5.2)
RBC # BLD AUTO: 4.16 M/UL (ref 4.05–5.2)
RBC # BLD AUTO: 4.2 M/UL (ref 4.05–5.2)
RBC # BLD AUTO: 4.25 M/UL (ref 4.05–5.2)
RBC # BLD AUTO: 4.28 M/UL (ref 4.05–5.2)
RBC # BLD AUTO: 4.36 M/UL (ref 4.05–5.2)
RBC # BLD AUTO: 4.37 M/UL (ref 4.05–5.2)
RBC # BLD AUTO: 4.59 M/UL (ref 4.05–5.2)
RBC # BLD AUTO: 4.62 M/UL (ref 4.05–5.2)
RBC # BLD AUTO: 4.86 M/UL (ref 4.05–5.2)
RBC # BLD AUTO: 5.23 M/UL (ref 4.05–5.2)
RBC # BLD AUTO: 5.34 M/UL (ref 4.05–5.2)
RBC # BLD AUTO: 5.5 M/UL (ref 4.05–5.2)
RBC # BLD AUTO: 5.71 M/UL (ref 4.05–5.2)
RBC # BLD AUTO: 5.88 M/UL (ref 4.05–5.2)
RBC # BLD AUTO: 5.93 M/UL (ref 4.05–5.2)
RBC # BLD AUTO: 6.05 M/UL (ref 4.05–5.2)
RBC # BLD AUTO: 6.08 M/UL (ref 4.05–5.2)
RBC # BLD AUTO: 6.08 M/UL (ref 4.05–5.2)
RBC # BLD AUTO: 6.15 M/UL (ref 4.05–5.2)
RBC MORPH BLD: ABNORMAL
RESPIRATORY RATE: 36 (ref 5–40)
RETICS # AUTO: 0.13 M/UL (ref 0.03–0.1)
RETICS/RBC NFR AUTO: 3.7 % (ref 0.3–2)
SAO2 % BLD: 35 %
SAO2 % BLD: 58 % (ref 95–98)
SAO2 % BLD: 61.1 % (ref 95–98)
SAO2 % BLD: 63.9 % (ref 95–98)
SAO2 % BLD: 65.5 % (ref 95–98)
SAO2 % BLD: 81.6 % (ref 95–98)
SAO2 % BLD: 89.6 % (ref 95–98)
SAO2 % BLD: 94.2 % (ref 95–98)
SAO2 % BLDV: 83.6 % (ref 65–88)
SARS-COV-2, COV2: NORMAL
SERVICE CMNT-IMP: ABNORMAL
SERVICE CMNT-IMP: NORMAL
SERVICE CMNT-IMP: NORMAL
SODIUM BLD-SCNC: 137 MMOL/L (ref 136–145)
SODIUM SERPL-SCNC: 133 MMOL/L (ref 136–145)
SODIUM SERPL-SCNC: 134 MMOL/L (ref 136–145)
SODIUM SERPL-SCNC: 135 MMOL/L (ref 136–145)
SODIUM SERPL-SCNC: 136 MMOL/L (ref 136–145)
SODIUM SERPL-SCNC: 137 MMOL/L (ref 136–145)
SODIUM SERPL-SCNC: 138 MMOL/L (ref 136–145)
SODIUM SERPL-SCNC: 139 MMOL/L (ref 136–145)
SODIUM SERPL-SCNC: 140 MMOL/L (ref 136–145)
SODIUM SERPL-SCNC: 141 MMOL/L (ref 136–145)
SODIUM SERPL-SCNC: 141 MMOL/L (ref 136–145)
SODIUM SERPL-SCNC: 142 MMOL/L (ref 136–145)
SODIUM SERPL-SCNC: 142 MMOL/L (ref 136–145)
SOURCE, COVRS: ABNORMAL
SPECIMEN EXP DATE BLD: NORMAL
SPECIMEN EXP DATE BLD: NORMAL
SPECIMEN SITE: ABNORMAL
SPECIMEN TYPE: ABNORMAL
STATUS OF UNIT,%ST: NORMAL
TIBC SERPL-MCNC: 150 UG/DL (ref 250–450)
TOTAL RESP. RATE, ITRR: 16
TOTAL RESP. RATE, ITRR: 22
TRIGL SERPL-MCNC: 229 MG/DL (ref 35–150)
TRIGL SERPL-MCNC: 233 MG/DL (ref 35–150)
UFH PPP CHRO-ACNC: 0.22 IU/ML (ref 0.3–0.7)
UFH PPP CHRO-ACNC: 0.25 IU/ML (ref 0.3–0.7)
UFH PPP CHRO-ACNC: 0.37 IU/ML (ref 0.3–0.7)
UFH PPP CHRO-ACNC: 0.42 IU/ML (ref 0.3–0.7)
UFH PPP CHRO-ACNC: 0.45 IU/ML (ref 0.3–0.7)
UFH PPP CHRO-ACNC: 0.51 IU/ML (ref 0.3–0.7)
UFH PPP CHRO-ACNC: 0.52 IU/ML (ref 0.3–0.7)
UFH PPP CHRO-ACNC: 0.54 IU/ML (ref 0.3–0.7)
UFH PPP CHRO-ACNC: 0.55 IU/ML (ref 0.3–0.7)
UFH PPP CHRO-ACNC: 0.57 IU/ML (ref 0.3–0.7)
UFH PPP CHRO-ACNC: 0.66 IU/ML (ref 0.3–0.7)
UFH PPP CHRO-ACNC: 0.66 IU/ML (ref 0.3–0.7)
UFH PPP CHRO-ACNC: 0.67 IU/ML (ref 0.3–0.7)
UFH PPP CHRO-ACNC: 0.72 IU/ML (ref 0.3–0.7)
UFH PPP CHRO-ACNC: 0.77 IU/ML (ref 0.3–0.7)
UFH PPP CHRO-ACNC: 0.82 IU/ML (ref 0.3–0.7)
UFH PPP CHRO-ACNC: 0.86 IU/ML (ref 0.3–0.7)
UFH PPP CHRO-ACNC: <0.1 IU/ML (ref 0.3–0.7)
UFH PPP CHRO-ACNC: <0.1 IU/ML (ref 0.3–0.7)
UFH PPP CHRO-ACNC: >1.1 IU/ML (ref 0.3–0.7)
UNIT DIVISION, %UDIV: 0
VANCOMYCIN SERPL-MCNC: 45.9 UG/ML
VENTILATION MODE VENT: ABNORMAL
VENTRICULAR RATE, ECG03: 121 BPM
VT SETTING VENT: 480 ML
WBC # BLD AUTO: 10.8 K/UL (ref 4.3–11.1)
WBC # BLD AUTO: 10.9 K/UL (ref 4.3–11.1)
WBC # BLD AUTO: 11.6 K/UL (ref 4.3–11.1)
WBC # BLD AUTO: 12.1 K/UL (ref 4.3–11.1)
WBC # BLD AUTO: 12.3 K/UL (ref 4.3–11.1)
WBC # BLD AUTO: 13.3 K/UL (ref 4.3–11.1)
WBC # BLD AUTO: 13.3 K/UL (ref 4.3–11.1)
WBC # BLD AUTO: 13.9 K/UL (ref 4.3–11.1)
WBC # BLD AUTO: 14.2 K/UL (ref 4.3–11.1)
WBC # BLD AUTO: 14.6 K/UL (ref 4.3–11.1)
WBC # BLD AUTO: 14.7 K/UL (ref 4.3–11.1)
WBC # BLD AUTO: 14.8 K/UL (ref 4.3–11.1)
WBC # BLD AUTO: 14.8 K/UL (ref 4.3–11.1)
WBC # BLD AUTO: 15.5 K/UL (ref 4.3–11.1)
WBC # BLD AUTO: 15.9 K/UL (ref 4.3–11.1)
WBC # BLD AUTO: 15.9 K/UL (ref 4.3–11.1)
WBC # BLD AUTO: 16.2 K/UL (ref 4.3–11.1)
WBC # BLD AUTO: 16.7 K/UL (ref 4.3–11.1)
WBC # BLD AUTO: 17.6 K/UL (ref 4.3–11.1)
WBC # BLD AUTO: 17.8 K/UL (ref 4.3–11.1)
WBC # BLD AUTO: 17.8 K/UL (ref 4.3–11.1)
WBC # BLD AUTO: 19.7 K/UL (ref 4.3–11.1)
WBC # BLD AUTO: 2.9 K/UL (ref 4.3–11.1)
WBC # BLD AUTO: 20.7 K/UL (ref 4.3–11.1)
WBC # BLD AUTO: 39.3 K/UL (ref 4.3–11.1)
WBC # BLD AUTO: 4.8 K/UL (ref 4.3–11.1)
WBC # BLD AUTO: 5.3 K/UL (ref 4.3–11.1)
WBC # BLD AUTO: 6.2 K/UL (ref 4.3–11.1)
WBC # BLD AUTO: 8 K/UL (ref 4.3–11.1)
WBC # BLD AUTO: 8.9 K/UL (ref 4.3–11.1)
WBC # BLD AUTO: 9.8 K/UL (ref 4.3–11.1)
WBC MORPH BLD: ABNORMAL

## 2021-01-01 PROCEDURE — 74011250636 HC RX REV CODE- 250/636: Performed by: INTERNAL MEDICINE

## 2021-01-01 PROCEDURE — 94762 N-INVAS EAR/PLS OXIMTRY CONT: CPT

## 2021-01-01 PROCEDURE — 86022 PLATELET ANTIBODIES: CPT

## 2021-01-01 PROCEDURE — 94640 AIRWAY INHALATION TREATMENT: CPT

## 2021-01-01 PROCEDURE — 74011250637 HC RX REV CODE- 250/637: Performed by: PHYSICIAN ASSISTANT

## 2021-01-01 PROCEDURE — 74011250637 HC RX REV CODE- 250/637: Performed by: FAMILY MEDICINE

## 2021-01-01 PROCEDURE — 85025 COMPLETE CBC W/AUTO DIFF WBC: CPT

## 2021-01-01 PROCEDURE — 82962 GLUCOSE BLOOD TEST: CPT

## 2021-01-01 PROCEDURE — XW033H5 INTRODUCTION OF TOCILIZUMAB INTO PERIPHERAL VEIN, PERCUTANEOUS APPROACH, NEW TECHNOLOGY GROUP 5: ICD-10-PCS | Performed by: FAMILY MEDICINE

## 2021-01-01 PROCEDURE — 74011000250 HC RX REV CODE- 250: Performed by: EMERGENCY MEDICINE

## 2021-01-01 PROCEDURE — 99252 IP/OBS CONSLTJ NEW/EST SF 35: CPT | Performed by: OBSTETRICS & GYNECOLOGY

## 2021-01-01 PROCEDURE — 97112 NEUROMUSCULAR REEDUCATION: CPT

## 2021-01-01 PROCEDURE — 77010033678 HC OXYGEN DAILY

## 2021-01-01 PROCEDURE — 74011250637 HC RX REV CODE- 250/637: Performed by: NURSE PRACTITIONER

## 2021-01-01 PROCEDURE — 80076 HEPATIC FUNCTION PANEL: CPT

## 2021-01-01 PROCEDURE — 74011250637 HC RX REV CODE- 250/637: Performed by: STUDENT IN AN ORGANIZED HEALTH CARE EDUCATION/TRAINING PROGRAM

## 2021-01-01 PROCEDURE — 36592 COLLECT BLOOD FROM PICC: CPT

## 2021-01-01 PROCEDURE — 65270000029 HC RM PRIVATE

## 2021-01-01 PROCEDURE — 74011250636 HC RX REV CODE- 250/636: Performed by: NURSE PRACTITIONER

## 2021-01-01 PROCEDURE — 83735 ASSAY OF MAGNESIUM: CPT

## 2021-01-01 PROCEDURE — 85730 THROMBOPLASTIN TIME PARTIAL: CPT

## 2021-01-01 PROCEDURE — 74011250636 HC RX REV CODE- 250/636

## 2021-01-01 PROCEDURE — 77010033711 HC HIGH FLOW OXYGEN

## 2021-01-01 PROCEDURE — 74011636637 HC RX REV CODE- 636/637: Performed by: NURSE PRACTITIONER

## 2021-01-01 PROCEDURE — 94761 N-INVAS EAR/PLS OXIMETRY MLT: CPT

## 2021-01-01 PROCEDURE — 65620000000 HC RM CCU GENERAL

## 2021-01-01 PROCEDURE — 84100 ASSAY OF PHOSPHORUS: CPT

## 2021-01-01 PROCEDURE — 74011636637 HC RX REV CODE- 636/637: Performed by: INTERNAL MEDICINE

## 2021-01-01 PROCEDURE — 71046 X-RAY EXAM CHEST 2 VIEWS: CPT

## 2021-01-01 PROCEDURE — 74011250637 HC RX REV CODE- 250/637: Performed by: INTERNAL MEDICINE

## 2021-01-01 PROCEDURE — 74011250636 HC RX REV CODE- 250/636: Performed by: FAMILY MEDICINE

## 2021-01-01 PROCEDURE — 94660 CPAP INITIATION&MGMT: CPT

## 2021-01-01 PROCEDURE — 65610000006 HC RM INTENSIVE CARE

## 2021-01-01 PROCEDURE — 36430 TRANSFUSION BLD/BLD COMPNT: CPT

## 2021-01-01 PROCEDURE — 36415 COLL VENOUS BLD VENIPUNCTURE: CPT

## 2021-01-01 PROCEDURE — P9045 ALBUMIN (HUMAN), 5%, 250 ML: HCPCS | Performed by: INTERNAL MEDICINE

## 2021-01-01 PROCEDURE — 74011250636 HC RX REV CODE- 250/636: Performed by: EMERGENCY MEDICINE

## 2021-01-01 PROCEDURE — 36600 WITHDRAWAL OF ARTERIAL BLOOD: CPT

## 2021-01-01 PROCEDURE — 82803 BLOOD GASES ANY COMBINATION: CPT

## 2021-01-01 PROCEDURE — 97110 THERAPEUTIC EXERCISES: CPT

## 2021-01-01 PROCEDURE — 74011000250 HC RX REV CODE- 250: Performed by: INTERNAL MEDICINE

## 2021-01-01 PROCEDURE — 74011000250 HC RX REV CODE- 250: Performed by: FAMILY MEDICINE

## 2021-01-01 PROCEDURE — 80048 BASIC METABOLIC PNL TOTAL CA: CPT

## 2021-01-01 PROCEDURE — 77030013794 HC KT TRNSDUC BLD EDWD -B

## 2021-01-01 PROCEDURE — 74011636637 HC RX REV CODE- 636/637: Performed by: FAMILY MEDICINE

## 2021-01-01 PROCEDURE — 92950 HEART/LUNG RESUSCITATION CPR: CPT

## 2021-01-01 PROCEDURE — 94002 VENT MGMT INPAT INIT DAY: CPT

## 2021-01-01 PROCEDURE — 93970 EXTREMITY STUDY: CPT

## 2021-01-01 PROCEDURE — 74011636637 HC RX REV CODE- 636/637: Performed by: EMERGENCY MEDICINE

## 2021-01-01 PROCEDURE — 97530 THERAPEUTIC ACTIVITIES: CPT

## 2021-01-01 PROCEDURE — 85049 AUTOMATED PLATELET COUNT: CPT

## 2021-01-01 PROCEDURE — 86923 COMPATIBILITY TEST ELECTRIC: CPT

## 2021-01-01 PROCEDURE — C1751 CATH, INF, PER/CENT/MIDLINE: HCPCS

## 2021-01-01 PROCEDURE — 74011250636 HC RX REV CODE- 250/636: Performed by: STUDENT IN AN ORGANIZED HEALTH CARE EDUCATION/TRAINING PROGRAM

## 2021-01-01 PROCEDURE — 97535 SELF CARE MNGMENT TRAINING: CPT

## 2021-01-01 PROCEDURE — 94760 N-INVAS EAR/PLS OXIMETRY 1: CPT

## 2021-01-01 PROCEDURE — 74011000258 HC RX REV CODE- 258: Performed by: FAMILY MEDICINE

## 2021-01-01 PROCEDURE — 84132 ASSAY OF SERUM POTASSIUM: CPT

## 2021-01-01 PROCEDURE — 85520 HEPARIN ASSAY: CPT

## 2021-01-01 PROCEDURE — 80202 ASSAY OF VANCOMYCIN: CPT

## 2021-01-01 PROCEDURE — 85018 HEMOGLOBIN: CPT

## 2021-01-01 PROCEDURE — 83605 ASSAY OF LACTIC ACID: CPT

## 2021-01-01 PROCEDURE — 74011636637 HC RX REV CODE- 636/637: Performed by: HOSPITALIST

## 2021-01-01 PROCEDURE — 99232 SBSQ HOSP IP/OBS MODERATE 35: CPT | Performed by: INTERNAL MEDICINE

## 2021-01-01 PROCEDURE — 74011000636 HC RX REV CODE- 636

## 2021-01-01 PROCEDURE — 2709999900 HC NON-CHARGEABLE SUPPLY

## 2021-01-01 PROCEDURE — 99233 SBSQ HOSP IP/OBS HIGH 50: CPT | Performed by: INTERNAL MEDICINE

## 2021-01-01 PROCEDURE — 74011000258 HC RX REV CODE- 258: Performed by: INTERNAL MEDICINE

## 2021-01-01 PROCEDURE — 80053 COMPREHEN METABOLIC PANEL: CPT

## 2021-01-01 PROCEDURE — 30233N1 TRANSFUSION OF NONAUTOLOGOUS RED BLOOD CELLS INTO PERIPHERAL VEIN, PERCUTANEOUS APPROACH: ICD-10-PCS | Performed by: INTERNAL MEDICINE

## 2021-01-01 PROCEDURE — 84478 ASSAY OF TRIGLYCERIDES: CPT

## 2021-01-01 PROCEDURE — 97161 PT EVAL LOW COMPLEX 20 MIN: CPT | Performed by: PHYSICAL THERAPIST

## 2021-01-01 PROCEDURE — 86140 C-REACTIVE PROTEIN: CPT

## 2021-01-01 PROCEDURE — 5A1945Z RESPIRATORY VENTILATION, 24-96 CONSECUTIVE HOURS: ICD-10-PCS | Performed by: INTERNAL MEDICINE

## 2021-01-01 PROCEDURE — 76450000000

## 2021-01-01 PROCEDURE — 74018 RADEX ABDOMEN 1 VIEW: CPT

## 2021-01-01 PROCEDURE — 99291 CRITICAL CARE FIRST HOUR: CPT | Performed by: INTERNAL MEDICINE

## 2021-01-01 PROCEDURE — 74011000258 HC RX REV CODE- 258

## 2021-01-01 PROCEDURE — 71045 X-RAY EXAM CHEST 1 VIEW: CPT

## 2021-01-01 PROCEDURE — 99253 IP/OBS CNSLTJ NEW/EST LOW 45: CPT | Performed by: OBSTETRICS & GYNECOLOGY

## 2021-01-01 PROCEDURE — P9016 RBC LEUKOCYTES REDUCED: HCPCS

## 2021-01-01 PROCEDURE — XW033E5 INTRODUCTION OF REMDESIVIR ANTI-INFECTIVE INTO PERIPHERAL VEIN, PERCUTANEOUS APPROACH, NEW TECHNOLOGY GROUP 5: ICD-10-PCS | Performed by: FAMILY MEDICINE

## 2021-01-01 PROCEDURE — 77030021668 HC NEB PREFIL KT VYRM -A

## 2021-01-01 PROCEDURE — 94664 DEMO&/EVAL PT USE INHALER: CPT

## 2021-01-01 PROCEDURE — 97165 OT EVAL LOW COMPLEX 30 MIN: CPT

## 2021-01-01 PROCEDURE — 87040 BLOOD CULTURE FOR BACTERIA: CPT

## 2021-01-01 PROCEDURE — 85046 RETICYTE/HGB CONCENTRATE: CPT

## 2021-01-01 PROCEDURE — 82947 ASSAY GLUCOSE BLOOD QUANT: CPT

## 2021-01-01 PROCEDURE — 84145 PROCALCITONIN (PCT): CPT

## 2021-01-01 PROCEDURE — 97530 THERAPEUTIC ACTIVITIES: CPT | Performed by: PHYSICAL THERAPIST

## 2021-01-01 PROCEDURE — 92950 HEART/LUNG RESUSCITATION CPR: CPT | Performed by: INTERNAL MEDICINE

## 2021-01-01 PROCEDURE — 74011000250 HC RX REV CODE- 250: Performed by: STUDENT IN AN ORGANIZED HEALTH CARE EDUCATION/TRAINING PROGRAM

## 2021-01-01 PROCEDURE — 81025 URINE PREGNANCY TEST: CPT

## 2021-01-01 PROCEDURE — 86900 BLOOD TYPING SEROLOGIC ABO: CPT

## 2021-01-01 PROCEDURE — 36573 INSJ PICC RS&I 5 YR+: CPT | Performed by: FAMILY MEDICINE

## 2021-01-01 PROCEDURE — 87449 NOS EACH ORGANISM AG IA: CPT

## 2021-01-01 PROCEDURE — 76856 US EXAM PELVIC COMPLETE: CPT

## 2021-01-01 PROCEDURE — 83880 ASSAY OF NATRIURETIC PEPTIDE: CPT

## 2021-01-01 PROCEDURE — 36556 INSERT NON-TUNNEL CV CATH: CPT

## 2021-01-01 PROCEDURE — 02HV33Z INSERTION OF INFUSION DEVICE INTO SUPERIOR VENA CAVA, PERCUTANEOUS APPROACH: ICD-10-PCS | Performed by: EMERGENCY MEDICINE

## 2021-01-01 PROCEDURE — 74011000250 HC RX REV CODE- 250

## 2021-01-01 PROCEDURE — 85379 FIBRIN DEGRADATION QUANT: CPT

## 2021-01-01 PROCEDURE — 99498 ADVNCD CARE PLAN ADDL 30 MIN: CPT | Performed by: NURSE PRACTITIONER

## 2021-01-01 PROCEDURE — 77030040096 HC SYS PT REPOS PREVALON AIRTRAP SAGE -C

## 2021-01-01 PROCEDURE — 83540 ASSAY OF IRON: CPT

## 2021-01-01 PROCEDURE — 87635 SARS-COV-2 COVID-19 AMP PRB: CPT

## 2021-01-01 PROCEDURE — 74011000258 HC RX REV CODE- 258: Performed by: NURSE PRACTITIONER

## 2021-01-01 PROCEDURE — C8929 TTE W OR WO FOL WCON,DOPPLER: HCPCS

## 2021-01-01 PROCEDURE — A9540 TC99M MAA: HCPCS

## 2021-01-01 PROCEDURE — 03HY32Z INSERTION OF MONITORING DEVICE INTO UPPER ARTERY, PERCUTANEOUS APPROACH: ICD-10-PCS | Performed by: EMERGENCY MEDICINE

## 2021-01-01 PROCEDURE — 87086 URINE CULTURE/COLONY COUNT: CPT

## 2021-01-01 PROCEDURE — 85610 PROTHROMBIN TIME: CPT

## 2021-01-01 PROCEDURE — 85027 COMPLETE CBC AUTOMATED: CPT

## 2021-01-01 PROCEDURE — 95822 EEG COMA OR SLEEP ONLY: CPT | Performed by: PSYCHIATRY & NEUROLOGY

## 2021-01-01 PROCEDURE — 77030005402 HC CATH RAD ART LN KT TELE -B

## 2021-01-01 PROCEDURE — 0BH17EZ INSERTION OF ENDOTRACHEAL AIRWAY INTO TRACHEA, VIA NATURAL OR ARTIFICIAL OPENING: ICD-10-PCS | Performed by: INTERNAL MEDICINE

## 2021-01-01 PROCEDURE — 36620 INSERTION CATHETER ARTERY: CPT

## 2021-01-01 PROCEDURE — 97168 OT RE-EVAL EST PLAN CARE: CPT

## 2021-01-01 PROCEDURE — 93886 INTRACRANIAL COMPLETE STUDY: CPT

## 2021-01-01 PROCEDURE — 3E0436Z INTRODUCTION OF NUTRITIONAL SUBSTANCE INTO CENTRAL VEIN, PERCUTANEOUS APPROACH: ICD-10-PCS | Performed by: FAMILY MEDICINE

## 2021-01-01 PROCEDURE — 99223 1ST HOSP IP/OBS HIGH 75: CPT | Performed by: INTERNAL MEDICINE

## 2021-01-01 PROCEDURE — 82728 ASSAY OF FERRITIN: CPT

## 2021-01-01 PROCEDURE — 97164 PT RE-EVAL EST PLAN CARE: CPT | Performed by: PHYSICAL THERAPIST

## 2021-01-01 PROCEDURE — 83010 ASSAY OF HAPTOGLOBIN QUANT: CPT

## 2021-01-01 PROCEDURE — 94003 VENT MGMT INPAT SUBQ DAY: CPT

## 2021-01-01 PROCEDURE — 5A09557 ASSISTANCE WITH RESPIRATORY VENTILATION, GREATER THAN 96 CONSECUTIVE HOURS, CONTINUOUS POSITIVE AIRWAY PRESSURE: ICD-10-PCS | Performed by: INTERNAL MEDICINE

## 2021-01-01 PROCEDURE — 86141 C-REACTIVE PROTEIN HS: CPT

## 2021-01-01 PROCEDURE — 99292 CRITICAL CARE ADDL 30 MIN: CPT | Performed by: INTERNAL MEDICINE

## 2021-01-01 PROCEDURE — 71260 CT THORAX DX C+: CPT

## 2021-01-01 PROCEDURE — 93005 ELECTROCARDIOGRAM TRACING: CPT | Performed by: INTERNAL MEDICINE

## 2021-01-01 PROCEDURE — 31500 INSERT EMERGENCY AIRWAY: CPT | Performed by: INTERNAL MEDICINE

## 2021-01-01 PROCEDURE — 99497 ADVNCD CARE PLAN 30 MIN: CPT | Performed by: NURSE PRACTITIONER

## 2021-01-01 PROCEDURE — 74011000636 HC RX REV CODE- 636: Performed by: INTERNAL MEDICINE

## 2021-01-01 PROCEDURE — 83615 LACTATE (LD) (LDH) ENZYME: CPT

## 2021-01-01 PROCEDURE — B548ZZA ULTRASONOGRAPHY OF SUPERIOR VENA CAVA, GUIDANCE: ICD-10-PCS | Performed by: EMERGENCY MEDICINE

## 2021-01-01 PROCEDURE — 99222 1ST HOSP IP/OBS MODERATE 55: CPT | Performed by: NURSE PRACTITIONER

## 2021-01-01 PROCEDURE — 83036 HEMOGLOBIN GLYCOSYLATED A1C: CPT

## 2021-01-01 PROCEDURE — 99285 EMERGENCY DEPT VISIT HI MDM: CPT

## 2021-01-01 PROCEDURE — 97164 PT RE-EVAL EST PLAN CARE: CPT

## 2021-01-01 RX ORDER — NOREPINEPHRINE BITARTRATE/D5W 4MG/250ML
PLASTIC BAG, INJECTION (ML) INTRAVENOUS
Status: COMPLETED
Start: 2021-01-01 | End: 2021-01-01

## 2021-01-01 RX ORDER — INSULIN LISPRO 100 [IU]/ML
7 INJECTION, SOLUTION INTRAVENOUS; SUBCUTANEOUS
Status: DISCONTINUED | OUTPATIENT
Start: 2021-01-01 | End: 2021-01-01

## 2021-01-01 RX ORDER — HYDROCHLOROTHIAZIDE 12.5 MG/1
12.5 CAPSULE ORAL DAILY
COMMUNITY

## 2021-01-01 RX ORDER — SODIUM CHLORIDE 9 MG/ML
1000 INJECTION, SOLUTION INTRAVENOUS ONCE
Status: COMPLETED | OUTPATIENT
Start: 2021-01-01 | End: 2021-01-01

## 2021-01-01 RX ORDER — HEPARIN SODIUM 5000 [USP'U]/ML
40 INJECTION, SOLUTION INTRAVENOUS; SUBCUTANEOUS ONCE
Status: COMPLETED | OUTPATIENT
Start: 2021-01-01 | End: 2021-01-01

## 2021-01-01 RX ORDER — DEXMEDETOMIDINE HYDROCHLORIDE 4 UG/ML
.1-1.5 INJECTION, SOLUTION INTRAVENOUS
Status: DISCONTINUED | OUTPATIENT
Start: 2021-01-01 | End: 2021-01-01

## 2021-01-01 RX ORDER — INSULIN GLARGINE 100 [IU]/ML
15 INJECTION, SOLUTION SUBCUTANEOUS DAILY
Status: DISCONTINUED | OUTPATIENT
Start: 2021-01-01 | End: 2021-10-18 | Stop reason: HOSPADM

## 2021-01-01 RX ORDER — DEXTROSE 50 % IN WATER (D50W) INTRAVENOUS SYRINGE
12.5 ONCE
Status: COMPLETED | OUTPATIENT
Start: 2021-01-01 | End: 2021-01-01

## 2021-01-01 RX ORDER — PANTOPRAZOLE SODIUM 40 MG/1
40 TABLET, DELAYED RELEASE ORAL
Status: DISCONTINUED | OUTPATIENT
Start: 2021-01-01 | End: 2021-01-01

## 2021-01-01 RX ORDER — SODIUM BICARBONATE 84 MG/ML
INJECTION, SOLUTION INTRAVENOUS
Status: DISCONTINUED
Start: 2021-01-01 | End: 2021-10-18 | Stop reason: HOSPADM

## 2021-01-01 RX ORDER — NOREPINEPHRINE BITARTRATE/D5W 4MG/250ML
.5-3 PLASTIC BAG, INJECTION (ML) INTRAVENOUS
Status: DISCONTINUED | OUTPATIENT
Start: 2021-01-01 | End: 2021-01-01

## 2021-01-01 RX ORDER — HEPARIN SODIUM 5000 [USP'U]/100ML
18-36 INJECTION, SOLUTION INTRAVENOUS
Status: DISCONTINUED | OUTPATIENT
Start: 2021-01-01 | End: 2021-01-01

## 2021-01-01 RX ORDER — INSULIN GLARGINE 100 [IU]/ML
20 INJECTION, SOLUTION SUBCUTANEOUS DAILY
Status: DISCONTINUED | OUTPATIENT
Start: 2021-01-01 | End: 2021-01-01

## 2021-01-01 RX ORDER — POLYETHYLENE GLYCOL 3350 17 G/17G
17 POWDER, FOR SOLUTION ORAL DAILY PRN
Status: DISCONTINUED | OUTPATIENT
Start: 2021-01-01 | End: 2021-10-18 | Stop reason: HOSPADM

## 2021-01-01 RX ORDER — FENTANYL CITRATE-0.9 % NACL/PF 25 MCG/ML
0-200 PLASTIC BAG, INJECTION (ML) INJECTION
Status: DISCONTINUED | OUTPATIENT
Start: 2021-01-01 | End: 2021-01-01

## 2021-01-01 RX ORDER — METFORMIN HYDROCHLORIDE 1000 MG/1
1000 TABLET ORAL DAILY
COMMUNITY

## 2021-01-01 RX ORDER — FUROSEMIDE 20 MG/1
20 TABLET ORAL DAILY
COMMUNITY

## 2021-01-01 RX ORDER — INSULIN GLARGINE 100 [IU]/ML
20 INJECTION, SOLUTION SUBCUTANEOUS 2 TIMES DAILY
Status: DISCONTINUED | OUTPATIENT
Start: 2021-01-01 | End: 2021-01-01

## 2021-01-01 RX ORDER — METOPROLOL TARTRATE 25 MG/1
12.5 TABLET, FILM COATED ORAL EVERY 12 HOURS
Status: DISPENSED | OUTPATIENT
Start: 2021-01-01 | End: 2021-01-01

## 2021-01-01 RX ORDER — FUROSEMIDE 10 MG/ML
20 INJECTION INTRAMUSCULAR; INTRAVENOUS ONCE
Status: COMPLETED | OUTPATIENT
Start: 2021-01-01 | End: 2021-01-01

## 2021-01-01 RX ORDER — ENOXAPARIN SODIUM 100 MG/ML
40 INJECTION SUBCUTANEOUS EVERY 12 HOURS
Status: DISCONTINUED | OUTPATIENT
Start: 2021-01-01 | End: 2021-01-01

## 2021-01-01 RX ORDER — LORAZEPAM 2 MG/ML
2 INJECTION INTRAMUSCULAR
Status: DISCONTINUED | OUTPATIENT
Start: 2021-01-01 | End: 2021-10-18 | Stop reason: HOSPADM

## 2021-01-01 RX ORDER — BUDESONIDE AND FORMOTEROL FUMARATE DIHYDRATE 80; 4.5 UG/1; UG/1
2 AEROSOL RESPIRATORY (INHALATION)
Status: DISCONTINUED | OUTPATIENT
Start: 2021-01-01 | End: 2021-10-18 | Stop reason: HOSPADM

## 2021-01-01 RX ORDER — LORAZEPAM 2 MG/ML
0.5 INJECTION INTRAMUSCULAR
Status: DISCONTINUED | OUTPATIENT
Start: 2021-01-01 | End: 2021-10-18 | Stop reason: HOSPADM

## 2021-01-01 RX ORDER — SODIUM CHLORIDE 9 MG/ML
8 INJECTION, SOLUTION INTRAVENOUS
Status: DISCONTINUED | OUTPATIENT
Start: 2021-01-01 | End: 2021-10-18 | Stop reason: HOSPADM

## 2021-01-01 RX ORDER — ALBUTEROL SULFATE 90 UG/1
2 AEROSOL, METERED RESPIRATORY (INHALATION)
Status: DISCONTINUED | OUTPATIENT
Start: 2021-01-01 | End: 2021-01-01

## 2021-01-01 RX ORDER — LORAZEPAM 2 MG/ML
2 INJECTION INTRAMUSCULAR
Status: DISCONTINUED | OUTPATIENT
Start: 2021-01-01 | End: 2021-01-01

## 2021-01-01 RX ORDER — SODIUM CHLORIDE 9 MG/ML
250 INJECTION, SOLUTION INTRAVENOUS AS NEEDED
Status: DISCONTINUED | OUTPATIENT
Start: 2021-01-01 | End: 2021-01-01

## 2021-01-01 RX ORDER — HEPARIN SODIUM 5000 [USP'U]/ML
20 INJECTION, SOLUTION INTRAVENOUS; SUBCUTANEOUS ONCE
Status: COMPLETED | OUTPATIENT
Start: 2021-01-01 | End: 2021-01-01

## 2021-01-01 RX ORDER — SODIUM BICARBONATE 84 MG/ML
INJECTION, SOLUTION INTRAVENOUS
Status: COMPLETED
Start: 2021-01-01 | End: 2021-01-01

## 2021-01-01 RX ORDER — ATROPINE SULFATE 0.1 MG/ML
INJECTION INTRAVENOUS
Status: COMPLETED | OUTPATIENT
Start: 2021-01-01 | End: 2021-01-01

## 2021-01-01 RX ORDER — SODIUM BICARBONATE 84 MG/ML
150 INJECTION, SOLUTION INTRAVENOUS ONCE
Status: COMPLETED | OUTPATIENT
Start: 2021-01-01 | End: 2021-01-01

## 2021-01-01 RX ORDER — CISATRACURIUM BESYLATE 2 MG/ML
0.15 INJECTION, SOLUTION INTRAVENOUS ONCE
Status: COMPLETED | OUTPATIENT
Start: 2021-01-01 | End: 2021-01-01

## 2021-01-01 RX ORDER — LORAZEPAM 2 MG/ML
1 INJECTION INTRAMUSCULAR
Status: DISCONTINUED | OUTPATIENT
Start: 2021-01-01 | End: 2021-01-01

## 2021-01-01 RX ORDER — ASCORBIC ACID 500 MG
500 TABLET ORAL 2 TIMES DAILY
Status: DISCONTINUED | OUTPATIENT
Start: 2021-01-01 | End: 2021-01-01

## 2021-01-01 RX ORDER — MORPHINE SULFATE 2 MG/ML
2 INJECTION, SOLUTION INTRAMUSCULAR; INTRAVENOUS
Status: DISCONTINUED | OUTPATIENT
Start: 2021-01-01 | End: 2021-01-01

## 2021-01-01 RX ORDER — MEDROXYPROGESTERONE ACETATE 10 MG/1
20 TABLET ORAL DAILY
Status: DISCONTINUED | OUTPATIENT
Start: 2021-01-01 | End: 2021-10-18 | Stop reason: HOSPADM

## 2021-01-01 RX ORDER — ALBUMIN HUMAN 50 G/1000ML
25 SOLUTION INTRAVENOUS
Status: COMPLETED | OUTPATIENT
Start: 2021-01-01 | End: 2021-01-01

## 2021-01-01 RX ORDER — LEVALBUTEROL INHALATION SOLUTION 1.25 MG/3ML
1.25 SOLUTION RESPIRATORY (INHALATION)
Status: DISCONTINUED | OUTPATIENT
Start: 2021-01-01 | End: 2021-01-01 | Stop reason: CLARIF

## 2021-01-01 RX ORDER — FUROSEMIDE 10 MG/ML
40 INJECTION INTRAMUSCULAR; INTRAVENOUS ONCE
Status: DISCONTINUED | OUTPATIENT
Start: 2021-01-01 | End: 2021-01-01

## 2021-01-01 RX ORDER — FENTANYL CITRATE 50 UG/ML
INJECTION, SOLUTION INTRAMUSCULAR; INTRAVENOUS
Status: COMPLETED
Start: 2021-01-01 | End: 2021-01-01

## 2021-01-01 RX ORDER — INSULIN LISPRO 100 [IU]/ML
5 INJECTION, SOLUTION INTRAVENOUS; SUBCUTANEOUS
Status: DISCONTINUED | OUTPATIENT
Start: 2021-01-01 | End: 2021-01-01

## 2021-01-01 RX ORDER — LISINOPRIL AND HYDROCHLOROTHIAZIDE 12.5; 2 MG/1; MG/1
1 TABLET ORAL DAILY
Status: DISCONTINUED | OUTPATIENT
Start: 2021-01-01 | End: 2021-01-01

## 2021-01-01 RX ORDER — ONDANSETRON 2 MG/ML
4 INJECTION INTRAMUSCULAR; INTRAVENOUS
Status: DISCONTINUED | OUTPATIENT
Start: 2021-01-01 | End: 2021-10-18 | Stop reason: HOSPADM

## 2021-01-01 RX ORDER — SODIUM CHLORIDE 0.9 % (FLUSH) 0.9 %
20 SYRINGE (ML) INJECTION
Status: COMPLETED | OUTPATIENT
Start: 2021-01-01 | End: 2021-01-01

## 2021-01-01 RX ORDER — ZINC SULFATE 50(220)MG
50 CAPSULE ORAL DAILY
Status: DISCONTINUED | OUTPATIENT
Start: 2021-01-01 | End: 2021-01-01

## 2021-01-01 RX ORDER — SODIUM CHLORIDE 0.9 % (FLUSH) 0.9 %
30 SYRINGE (ML) INJECTION AS NEEDED
Status: DISCONTINUED | OUTPATIENT
Start: 2021-01-01 | End: 2021-10-18 | Stop reason: HOSPADM

## 2021-01-01 RX ORDER — FENTANYL CITRATE 50 UG/ML
100 INJECTION, SOLUTION INTRAMUSCULAR; INTRAVENOUS ONCE
Status: COMPLETED | OUTPATIENT
Start: 2021-01-01 | End: 2021-01-01

## 2021-01-01 RX ORDER — FUROSEMIDE 10 MG/ML
40 INJECTION INTRAMUSCULAR; INTRAVENOUS ONCE
Status: COMPLETED | OUTPATIENT
Start: 2021-01-01 | End: 2021-01-01

## 2021-01-01 RX ORDER — FUROSEMIDE 10 MG/ML
60 INJECTION INTRAMUSCULAR; INTRAVENOUS ONCE
Status: COMPLETED | OUTPATIENT
Start: 2021-01-01 | End: 2021-01-01

## 2021-01-01 RX ORDER — BUDESONIDE 0.5 MG/2ML
500 INHALANT ORAL
Status: DISCONTINUED | OUTPATIENT
Start: 2021-01-01 | End: 2021-10-18 | Stop reason: HOSPADM

## 2021-01-01 RX ORDER — CALCIUM CHLORIDE INJECTION 100 MG/ML
INJECTION, SOLUTION INTRAVENOUS
Status: DISCONTINUED | OUTPATIENT
Start: 2021-01-01 | End: 2021-10-18 | Stop reason: HOSPADM

## 2021-01-01 RX ORDER — SODIUM CHLORIDE 0.9 % (FLUSH) 0.9 %
10 SYRINGE (ML) INJECTION
Status: COMPLETED | OUTPATIENT
Start: 2021-01-01 | End: 2021-01-01

## 2021-01-01 RX ORDER — INSULIN LISPRO 100 [IU]/ML
INJECTION, SOLUTION INTRAVENOUS; SUBCUTANEOUS
Status: DISCONTINUED | OUTPATIENT
Start: 2021-01-01 | End: 2021-10-18 | Stop reason: HOSPADM

## 2021-01-01 RX ORDER — DEXAMETHASONE SODIUM PHOSPHATE 100 MG/10ML
10 INJECTION INTRAMUSCULAR; INTRAVENOUS EVERY 24 HOURS
Status: DISCONTINUED | OUTPATIENT
Start: 2021-01-01 | End: 2021-01-01

## 2021-01-01 RX ORDER — ONDANSETRON 4 MG/1
4 TABLET, ORALLY DISINTEGRATING ORAL
Status: DISCONTINUED | OUTPATIENT
Start: 2021-01-01 | End: 2021-10-18 | Stop reason: HOSPADM

## 2021-01-01 RX ORDER — CISATRACURIUM BESYLATE 2 MG/ML
INJECTION, SOLUTION INTRAVENOUS
Status: COMPLETED
Start: 2021-01-01 | End: 2021-01-01

## 2021-01-01 RX ORDER — CALCIUM CHLORIDE INJECTION 100 MG/ML
INJECTION, SOLUTION INTRAVENOUS
Status: COMPLETED | OUTPATIENT
Start: 2021-01-01 | End: 2021-01-01

## 2021-01-01 RX ORDER — DEXTROSE 50 % IN WATER (D50W) INTRAVENOUS SYRINGE
Status: DISCONTINUED
Start: 2021-01-01 | End: 2021-10-18 | Stop reason: HOSPADM

## 2021-01-01 RX ORDER — SODIUM CHLORIDE 0.9 % (FLUSH) 0.9 %
30 SYRINGE (ML) INJECTION EVERY 8 HOURS
Status: DISCONTINUED | OUTPATIENT
Start: 2021-01-01 | End: 2021-10-18 | Stop reason: HOSPADM

## 2021-01-01 RX ORDER — MIDAZOLAM HYDROCHLORIDE 1 MG/ML
4 INJECTION, SOLUTION INTRAMUSCULAR; INTRAVENOUS ONCE
Status: COMPLETED | OUTPATIENT
Start: 2021-01-01 | End: 2021-01-01

## 2021-01-01 RX ORDER — INSULIN LISPRO 100 [IU]/ML
INJECTION, SOLUTION INTRAVENOUS; SUBCUTANEOUS
Status: DISCONTINUED | OUTPATIENT
Start: 2021-01-01 | End: 2021-01-01

## 2021-01-01 RX ORDER — SODIUM BICARBONATE 1 MEQ/ML
150 SYRINGE (ML) INTRAVENOUS ONCE
Status: COMPLETED | OUTPATIENT
Start: 2021-01-01 | End: 2021-01-01

## 2021-01-01 RX ORDER — DEXAMETHASONE SODIUM PHOSPHATE 100 MG/10ML
6 INJECTION INTRAMUSCULAR; INTRAVENOUS EVERY 24 HOURS
Status: DISCONTINUED | OUTPATIENT
Start: 2021-01-01 | End: 2021-10-18 | Stop reason: HOSPADM

## 2021-01-01 RX ORDER — VANCOMYCIN/0.9 % SOD CHLORIDE 1.5G/250ML
1500 PLASTIC BAG, INJECTION (ML) INTRAVENOUS EVERY 8 HOURS
Status: DISCONTINUED | OUTPATIENT
Start: 2021-01-01 | End: 2021-01-01

## 2021-01-01 RX ORDER — SUCCINYLCHOLINE CHLORIDE 20 MG/ML INJECTION SOLUTION
SOLUTION
Status: DISCONTINUED
Start: 2021-01-01 | End: 2021-01-01 | Stop reason: WASHOUT

## 2021-01-01 RX ORDER — MORPHINE SULFATE 2 MG/ML
1 INJECTION, SOLUTION INTRAMUSCULAR; INTRAVENOUS
Status: DISCONTINUED | OUTPATIENT
Start: 2021-01-01 | End: 2021-01-01

## 2021-01-01 RX ORDER — AMIODARONE HYDROCHLORIDE 150 MG/3ML
INJECTION, SOLUTION INTRAVENOUS
Status: COMPLETED | OUTPATIENT
Start: 2021-01-01 | End: 2021-01-01

## 2021-01-01 RX ORDER — ACETAMINOPHEN 650 MG/1
650 SUPPOSITORY RECTAL
Status: DISCONTINUED | OUTPATIENT
Start: 2021-01-01 | End: 2021-10-18 | Stop reason: HOSPADM

## 2021-01-01 RX ORDER — INSULIN GLARGINE 100 [IU]/ML
40 INJECTION, SOLUTION SUBCUTANEOUS DAILY
Status: DISCONTINUED | OUTPATIENT
Start: 2021-01-01 | End: 2021-01-01

## 2021-01-01 RX ORDER — SODIUM BICARBONATE 1 MEQ/ML
SYRINGE (ML) INTRAVENOUS
Status: DISCONTINUED | OUTPATIENT
Start: 2021-01-01 | End: 2021-10-18 | Stop reason: HOSPADM

## 2021-01-01 RX ORDER — FENTANYL CITRATE 50 UG/ML
50 INJECTION, SOLUTION INTRAMUSCULAR; INTRAVENOUS
Status: DISCONTINUED | OUTPATIENT
Start: 2021-01-01 | End: 2021-10-18 | Stop reason: HOSPADM

## 2021-01-01 RX ORDER — PROPOFOL 10 MG/ML
INJECTION, EMULSION INTRAVENOUS
Status: COMPLETED
Start: 2021-01-01 | End: 2021-01-01

## 2021-01-01 RX ORDER — ACETAMINOPHEN 325 MG/1
650 TABLET ORAL
Status: DISCONTINUED | OUTPATIENT
Start: 2021-01-01 | End: 2021-01-01 | Stop reason: SDUPTHER

## 2021-01-01 RX ORDER — ETOMIDATE 2 MG/ML
INJECTION INTRAVENOUS
Status: COMPLETED
Start: 2021-01-01 | End: 2021-01-01

## 2021-01-01 RX ORDER — ALBUTEROL SULFATE 0.83 MG/ML
2.5 SOLUTION RESPIRATORY (INHALATION)
Status: DISCONTINUED | OUTPATIENT
Start: 2021-01-01 | End: 2021-01-01

## 2021-01-01 RX ORDER — INSULIN LISPRO 100 [IU]/ML
INJECTION, SOLUTION INTRAVENOUS; SUBCUTANEOUS EVERY 6 HOURS
Status: DISCONTINUED | OUTPATIENT
Start: 2021-01-01 | End: 2021-01-01

## 2021-01-01 RX ORDER — METOPROLOL TARTRATE 5 MG/5ML
5 INJECTION INTRAVENOUS ONCE
Status: COMPLETED | OUTPATIENT
Start: 2021-01-01 | End: 2021-01-01

## 2021-01-01 RX ORDER — HYDRALAZINE HYDROCHLORIDE 20 MG/ML
10 INJECTION INTRAMUSCULAR; INTRAVENOUS
Status: DISCONTINUED | OUTPATIENT
Start: 2021-01-01 | End: 2021-10-18 | Stop reason: HOSPADM

## 2021-01-01 RX ORDER — ENOXAPARIN SODIUM 100 MG/ML
1 INJECTION SUBCUTANEOUS EVERY 12 HOURS
Status: DISCONTINUED | OUTPATIENT
Start: 2021-01-01 | End: 2021-01-01

## 2021-01-01 RX ORDER — ALBUTEROL SULFATE 0.83 MG/ML
2.5 SOLUTION RESPIRATORY (INHALATION)
Status: DISCONTINUED | OUTPATIENT
Start: 2021-01-01 | End: 2021-10-18 | Stop reason: HOSPADM

## 2021-01-01 RX ORDER — DEXAMETHASONE SODIUM PHOSPHATE 100 MG/10ML
6 INJECTION INTRAMUSCULAR; INTRAVENOUS EVERY 24 HOURS
Status: DISCONTINUED | OUTPATIENT
Start: 2021-01-01 | End: 2021-01-01

## 2021-01-01 RX ORDER — ETOMIDATE 2 MG/ML
40 INJECTION INTRAVENOUS ONCE
Status: COMPLETED | OUTPATIENT
Start: 2021-01-01 | End: 2021-01-01

## 2021-01-01 RX ORDER — SODIUM BICARBONATE 1 MEQ/ML
SYRINGE (ML) INTRAVENOUS
Status: COMPLETED | OUTPATIENT
Start: 2021-01-01 | End: 2021-01-01

## 2021-01-01 RX ORDER — DEXTROSE 50 % IN WATER (D50W) INTRAVENOUS SYRINGE
25 ONCE
Status: COMPLETED | OUTPATIENT
Start: 2021-01-01 | End: 2021-01-01

## 2021-01-01 RX ORDER — INSULIN LISPRO 100 [IU]/ML
8 INJECTION, SOLUTION INTRAVENOUS; SUBCUTANEOUS
Status: DISCONTINUED | OUTPATIENT
Start: 2021-01-01 | End: 2021-10-18 | Stop reason: HOSPADM

## 2021-01-01 RX ORDER — EPINEPHRINE 0.1 MG/ML
INJECTION INTRACARDIAC; INTRAVENOUS
Status: DISCONTINUED | OUTPATIENT
Start: 2021-01-01 | End: 2021-10-18 | Stop reason: HOSPADM

## 2021-01-01 RX ORDER — ALBUTEROL SULFATE 90 UG/1
2 AEROSOL, METERED RESPIRATORY (INHALATION)
Status: DISCONTINUED | OUTPATIENT
Start: 2021-01-01 | End: 2021-10-18 | Stop reason: HOSPADM

## 2021-01-01 RX ORDER — LEVALBUTEROL INHALATION SOLUTION 1.25 MG/3ML
1.25 SOLUTION RESPIRATORY (INHALATION)
Status: DISCONTINUED | OUTPATIENT
Start: 2021-01-01 | End: 2021-01-01

## 2021-01-01 RX ORDER — MIDAZOLAM HYDROCHLORIDE 1 MG/ML
INJECTION, SOLUTION INTRAMUSCULAR; INTRAVENOUS
Status: COMPLETED
Start: 2021-01-01 | End: 2021-01-01

## 2021-01-01 RX ORDER — MEDROXYPROGESTERONE ACETATE 10 MG/1
10 TABLET ORAL DAILY
Status: DISCONTINUED | OUTPATIENT
Start: 2021-01-01 | End: 2021-01-01

## 2021-01-01 RX ORDER — PROPOFOL 10 MG/ML
0-50 VIAL (ML) INTRAVENOUS
Status: DISCONTINUED | OUTPATIENT
Start: 2021-01-01 | End: 2021-01-01

## 2021-01-01 RX ORDER — CALCIUM GLUCONATE 20 MG/ML
1 INJECTION, SOLUTION INTRAVENOUS ONCE
Status: COMPLETED | OUTPATIENT
Start: 2021-01-01 | End: 2021-01-01

## 2021-01-01 RX ORDER — ROSUVASTATIN CALCIUM 10 MG/1
10 TABLET, COATED ORAL
COMMUNITY

## 2021-01-01 RX ORDER — HEPARIN SODIUM 5000 [USP'U]/100ML
18-36 INJECTION, SOLUTION INTRAVENOUS
Status: DISCONTINUED | OUTPATIENT
Start: 2021-01-01 | End: 2021-10-18 | Stop reason: HOSPADM

## 2021-01-01 RX ORDER — EPINEPHRINE 0.1 MG/ML
INJECTION INTRACARDIAC; INTRAVENOUS
Status: COMPLETED | OUTPATIENT
Start: 2021-01-01 | End: 2021-01-01

## 2021-01-01 RX ORDER — SODIUM BICARBONATE 84 MG/ML
INJECTION, SOLUTION INTRAVENOUS
Status: ACTIVE
Start: 2021-01-01 | End: 2021-01-01

## 2021-01-01 RX ORDER — INSULIN GLARGINE 100 [IU]/ML
20 INJECTION, SOLUTION SUBCUTANEOUS ONCE
Status: COMPLETED | OUTPATIENT
Start: 2021-01-01 | End: 2021-01-01

## 2021-01-01 RX ORDER — DEXAMETHASONE SODIUM PHOSPHATE 100 MG/10ML
6 INJECTION INTRAMUSCULAR; INTRAVENOUS
Status: COMPLETED | OUTPATIENT
Start: 2021-01-01 | End: 2021-01-01

## 2021-01-01 RX ORDER — METOPROLOL TARTRATE 25 MG/1
25 TABLET, FILM COATED ORAL ONCE
Status: COMPLETED | OUTPATIENT
Start: 2021-01-01 | End: 2021-01-01

## 2021-01-01 RX ORDER — FUROSEMIDE 10 MG/ML
80 INJECTION INTRAMUSCULAR; INTRAVENOUS ONCE
Status: COMPLETED | OUTPATIENT
Start: 2021-01-01 | End: 2021-01-01

## 2021-01-01 RX ORDER — POTASSIUM CHLORIDE 29.8 MG/ML
40 INJECTION INTRAVENOUS ONCE
Status: COMPLETED | OUTPATIENT
Start: 2021-01-01 | End: 2021-01-01

## 2021-01-01 RX ORDER — FUROSEMIDE 10 MG/ML
20 INJECTION INTRAMUSCULAR; INTRAVENOUS ONCE
Status: DISCONTINUED | OUTPATIENT
Start: 2021-01-01 | End: 2021-01-01

## 2021-01-01 RX ORDER — INSULIN LISPRO 100 [IU]/ML
INJECTION, SOLUTION INTRAVENOUS; SUBCUTANEOUS EVERY 4 HOURS
Status: DISCONTINUED | OUTPATIENT
Start: 2021-01-01 | End: 2021-01-01

## 2021-01-01 RX ORDER — MAGNESIUM SULFATE HEPTAHYDRATE 40 MG/ML
INJECTION, SOLUTION INTRAVENOUS
Status: COMPLETED | OUTPATIENT
Start: 2021-01-01 | End: 2021-01-01

## 2021-01-01 RX ORDER — SODIUM CHLORIDE 9 MG/ML
50 INJECTION, SOLUTION INTRAVENOUS CONTINUOUS
Status: DISCONTINUED | OUTPATIENT
Start: 2021-01-01 | End: 2021-01-01

## 2021-01-01 RX ORDER — AZITHROMYCIN 250 MG/1
500 TABLET, FILM COATED ORAL DAILY
Status: DISCONTINUED | OUTPATIENT
Start: 2021-01-01 | End: 2021-01-01

## 2021-01-01 RX ORDER — HEPARIN SODIUM 5000 [USP'U]/ML
10000 INJECTION, SOLUTION INTRAVENOUS; SUBCUTANEOUS ONCE
Status: COMPLETED | OUTPATIENT
Start: 2021-01-01 | End: 2021-01-01

## 2021-01-01 RX ADMIN — CEFTRIAXONE 1 G: 1 INJECTION, POWDER, FOR SOLUTION INTRAMUSCULAR; INTRAVENOUS at 09:31

## 2021-01-01 RX ADMIN — INSULIN LISPRO 6 UNITS: 100 INJECTION, SOLUTION INTRAVENOUS; SUBCUTANEOUS at 21:23

## 2021-01-01 RX ADMIN — DEXTROSE MONOHYDRATE 25 G: 25 INJECTION, SOLUTION INTRAVENOUS at 21:31

## 2021-01-01 RX ADMIN — INSULIN LISPRO 7 UNITS: 100 INJECTION, SOLUTION INTRAVENOUS; SUBCUTANEOUS at 09:00

## 2021-01-01 RX ADMIN — MORPHINE SULFATE 1 MG: 2 INJECTION, SOLUTION INTRAMUSCULAR; INTRAVENOUS at 12:17

## 2021-01-01 RX ADMIN — INSULIN LISPRO 15 UNITS: 100 INJECTION, SOLUTION INTRAVENOUS; SUBCUTANEOUS at 18:15

## 2021-01-01 RX ADMIN — OXYCODONE HYDROCHLORIDE AND ACETAMINOPHEN 500 MG: 500 TABLET ORAL at 09:08

## 2021-01-01 RX ADMIN — ALBUTEROL SULFATE 2 PUFF: 90 AEROSOL, METERED RESPIRATORY (INHALATION) at 07:23

## 2021-01-01 RX ADMIN — AZITHROMYCIN 500 MG: 250 TABLET, FILM COATED ORAL at 11:35

## 2021-01-01 RX ADMIN — INSULIN LISPRO 5 UNITS: 100 INJECTION, SOLUTION INTRAVENOUS; SUBCUTANEOUS at 11:28

## 2021-01-01 RX ADMIN — SODIUM CHLORIDE 1.4 ML/HR: 9 INJECTION, SOLUTION INTRAVENOUS at 18:29

## 2021-01-01 RX ADMIN — SODIUM CHLORIDE 30 ML: 9 INJECTION, SOLUTION INTRAMUSCULAR; INTRAVENOUS; SUBCUTANEOUS at 05:37

## 2021-01-01 RX ADMIN — INSULIN LISPRO 3 UNITS: 100 INJECTION, SOLUTION INTRAVENOUS; SUBCUTANEOUS at 18:02

## 2021-01-01 RX ADMIN — INSULIN LISPRO 5 UNITS: 100 INJECTION, SOLUTION INTRAVENOUS; SUBCUTANEOUS at 16:43

## 2021-01-01 RX ADMIN — BUDESONIDE AND FORMOTEROL FUMARATE DIHYDRATE 2 PUFF: 80; 4.5 AEROSOL RESPIRATORY (INHALATION) at 08:01

## 2021-01-01 RX ADMIN — ALBUTEROL SULFATE 2 PUFF: 90 AEROSOL, METERED RESPIRATORY (INHALATION) at 13:03

## 2021-01-01 RX ADMIN — INSULIN GLARGINE 20 UNITS: 100 INJECTION, SOLUTION SUBCUTANEOUS at 17:19

## 2021-01-01 RX ADMIN — ENOXAPARIN SODIUM 40 MG: 40 INJECTION SUBCUTANEOUS at 11:36

## 2021-01-01 RX ADMIN — MORPHINE SULFATE 1 MG: 2 INJECTION, SOLUTION INTRAMUSCULAR; INTRAVENOUS at 16:15

## 2021-01-01 RX ADMIN — INSULIN GLARGINE 20 UNITS: 100 INJECTION, SOLUTION SUBCUTANEOUS at 09:08

## 2021-01-01 RX ADMIN — SODIUM CHLORIDE 30 ML: 9 INJECTION, SOLUTION INTRAMUSCULAR; INTRAVENOUS; SUBCUTANEOUS at 21:42

## 2021-01-01 RX ADMIN — INSULIN LISPRO 3 UNITS: 100 INJECTION, SOLUTION INTRAVENOUS; SUBCUTANEOUS at 08:36

## 2021-01-01 RX ADMIN — FUROSEMIDE 40 MG: 10 INJECTION, SOLUTION INTRAMUSCULAR; INTRAVENOUS at 13:54

## 2021-01-01 RX ADMIN — EPINEPHRINE 1 MG: 0.1 INJECTION, SOLUTION ENDOTRACHEAL; INTRACARDIAC; INTRAVENOUS at 19:50

## 2021-01-01 RX ADMIN — ALBUTEROL SULFATE 2 PUFF: 90 AEROSOL, METERED RESPIRATORY (INHALATION) at 02:50

## 2021-01-01 RX ADMIN — INSULIN LISPRO 9 UNITS: 100 INJECTION, SOLUTION INTRAVENOUS; SUBCUTANEOUS at 16:40

## 2021-01-01 RX ADMIN — DEXAMETHASONE SODIUM PHOSPHATE 6 MG: 10 INJECTION, SOLUTION INTRAMUSCULAR; INTRAVENOUS at 09:08

## 2021-01-01 RX ADMIN — LORAZEPAM 0.5 MG: 2 INJECTION INTRAMUSCULAR; INTRAVENOUS at 13:32

## 2021-01-01 RX ADMIN — INSULIN LISPRO 9 UNITS: 100 INJECTION, SOLUTION INTRAVENOUS; SUBCUTANEOUS at 12:27

## 2021-01-01 RX ADMIN — INSULIN LISPRO 3 UNITS: 100 INJECTION, SOLUTION INTRAVENOUS; SUBCUTANEOUS at 18:52

## 2021-01-01 RX ADMIN — ALBUTEROL SULFATE 2 PUFF: 90 AEROSOL, METERED RESPIRATORY (INHALATION) at 02:45

## 2021-01-01 RX ADMIN — INSULIN LISPRO 3 UNITS: 100 INJECTION, SOLUTION INTRAVENOUS; SUBCUTANEOUS at 23:39

## 2021-01-01 RX ADMIN — ALTEPLASE 1 MG: 2.2 INJECTION, POWDER, LYOPHILIZED, FOR SOLUTION INTRAVENOUS at 02:03

## 2021-01-01 RX ADMIN — SODIUM CHLORIDE 30 ML: 9 INJECTION, SOLUTION INTRAMUSCULAR; INTRAVENOUS; SUBCUTANEOUS at 22:09

## 2021-01-01 RX ADMIN — SODIUM CHLORIDE 30 ML: 9 INJECTION, SOLUTION INTRAMUSCULAR; INTRAVENOUS; SUBCUTANEOUS at 05:32

## 2021-01-01 RX ADMIN — SODIUM CHLORIDE 30 ML: 9 INJECTION, SOLUTION INTRAMUSCULAR; INTRAVENOUS; SUBCUTANEOUS at 06:31

## 2021-01-01 RX ADMIN — AZITHROMYCIN MONOHYDRATE 500 MG: 500 INJECTION, POWDER, LYOPHILIZED, FOR SOLUTION INTRAVENOUS at 08:58

## 2021-01-01 RX ADMIN — CISATRACURIUM BESYLATE 3 MCG/KG/MIN: 2 INJECTION, SOLUTION INTRAVENOUS at 00:25

## 2021-01-01 RX ADMIN — INSULIN GLARGINE 15 UNITS: 100 INJECTION, SOLUTION SUBCUTANEOUS at 09:09

## 2021-01-01 RX ADMIN — ALBUTEROL SULFATE 2 PUFF: 90 AEROSOL, METERED RESPIRATORY (INHALATION) at 15:27

## 2021-01-01 RX ADMIN — SODIUM CHLORIDE 30 ML: 9 INJECTION, SOLUTION INTRAMUSCULAR; INTRAVENOUS; SUBCUTANEOUS at 13:08

## 2021-01-01 RX ADMIN — ALBUTEROL SULFATE 2.5 MG: 2.5 SOLUTION RESPIRATORY (INHALATION) at 07:40

## 2021-01-01 RX ADMIN — ALBUTEROL SULFATE 2 PUFF: 90 AEROSOL, METERED RESPIRATORY (INHALATION) at 07:57

## 2021-01-01 RX ADMIN — ENOXAPARIN SODIUM 40 MG: 40 INJECTION SUBCUTANEOUS at 12:22

## 2021-01-01 RX ADMIN — ACETAMINOPHEN 650 MG: 325 TABLET ORAL at 01:42

## 2021-01-01 RX ADMIN — INSULIN LISPRO 6 UNITS: 100 INJECTION, SOLUTION INTRAVENOUS; SUBCUTANEOUS at 06:22

## 2021-01-01 RX ADMIN — INSULIN GLARGINE 15 UNITS: 100 INJECTION, SOLUTION SUBCUTANEOUS at 08:32

## 2021-01-01 RX ADMIN — INSULIN LISPRO 12 UNITS: 100 INJECTION, SOLUTION INTRAVENOUS; SUBCUTANEOUS at 21:58

## 2021-01-01 RX ADMIN — ENOXAPARIN SODIUM 40 MG: 40 INJECTION SUBCUTANEOUS at 12:26

## 2021-01-01 RX ADMIN — PHENYLEPHRINE HYDROCHLORIDE 300 MCG/MIN: 10 INJECTION INTRAVENOUS at 20:24

## 2021-01-01 RX ADMIN — EPINEPHRINE 1 MG: 0.1 INJECTION, SOLUTION ENDOTRACHEAL; INTRACARDIAC; INTRAVENOUS at 16:45

## 2021-01-01 RX ADMIN — ALTEPLASE 1 MG: 2.2 INJECTION, POWDER, LYOPHILIZED, FOR SOLUTION INTRAVENOUS at 16:48

## 2021-01-01 RX ADMIN — APIXABAN 10 MG: 5 TABLET, FILM COATED ORAL at 08:24

## 2021-01-01 RX ADMIN — Medication 1 EACH: at 22:28

## 2021-01-01 RX ADMIN — SODIUM CHLORIDE 30 ML: 9 INJECTION, SOLUTION INTRAMUSCULAR; INTRAVENOUS; SUBCUTANEOUS at 20:08

## 2021-01-01 RX ADMIN — ALBUTEROL SULFATE 2 PUFF: 90 AEROSOL, METERED RESPIRATORY (INHALATION) at 15:31

## 2021-01-01 RX ADMIN — INSULIN GLARGINE 15 UNITS: 100 INJECTION, SOLUTION SUBCUTANEOUS at 09:20

## 2021-01-01 RX ADMIN — CALCIUM CHLORIDE 1 G: 100 INJECTION, SOLUTION INTRAVENOUS; INTRAVENTRICULAR at 17:02

## 2021-01-01 RX ADMIN — SODIUM CHLORIDE 30 ML: 9 INJECTION, SOLUTION INTRAMUSCULAR; INTRAVENOUS; SUBCUTANEOUS at 06:26

## 2021-01-01 RX ADMIN — CEFTRIAXONE 1 G: 1 INJECTION, POWDER, FOR SOLUTION INTRAMUSCULAR; INTRAVENOUS at 08:30

## 2021-01-01 RX ADMIN — SODIUM CHLORIDE 30 ML: 9 INJECTION, SOLUTION INTRAMUSCULAR; INTRAVENOUS; SUBCUTANEOUS at 21:05

## 2021-01-01 RX ADMIN — SODIUM CHLORIDE 30 ML: 9 INJECTION, SOLUTION INTRAMUSCULAR; INTRAVENOUS; SUBCUTANEOUS at 22:36

## 2021-01-01 RX ADMIN — PANTOPRAZOLE SODIUM 40 MG: 40 TABLET, DELAYED RELEASE ORAL at 06:34

## 2021-01-01 RX ADMIN — INSULIN LISPRO 5 UNITS: 100 INJECTION, SOLUTION INTRAVENOUS; SUBCUTANEOUS at 08:31

## 2021-01-01 RX ADMIN — ALBUTEROL SULFATE 2 PUFF: 90 AEROSOL, METERED RESPIRATORY (INHALATION) at 13:16

## 2021-01-01 RX ADMIN — CEFTRIAXONE 1 G: 1 INJECTION, POWDER, FOR SOLUTION INTRAMUSCULAR; INTRAVENOUS at 08:20

## 2021-01-01 RX ADMIN — SODIUM CHLORIDE 500 ML: 900 INJECTION, SOLUTION INTRAVENOUS at 08:51

## 2021-01-01 RX ADMIN — APIXABAN 10 MG: 5 TABLET, FILM COATED ORAL at 08:21

## 2021-01-01 RX ADMIN — ONDANSETRON 4 MG: 4 TABLET, ORALLY DISINTEGRATING ORAL at 08:50

## 2021-01-01 RX ADMIN — SODIUM CHLORIDE 30 ML: 9 INJECTION, SOLUTION INTRAMUSCULAR; INTRAVENOUS; SUBCUTANEOUS at 05:06

## 2021-01-01 RX ADMIN — PIPERACILLIN SODIUM AND TAZOBACTAM SODIUM 3.38 G: 3; .375 INJECTION, POWDER, LYOPHILIZED, FOR SOLUTION INTRAVENOUS at 09:02

## 2021-01-01 RX ADMIN — EPINEPHRINE 10 MCG/MIN: 1 INJECTION INTRAMUSCULAR; INTRAVENOUS; SUBCUTANEOUS at 20:17

## 2021-01-01 RX ADMIN — LISINOPRIL AND HYDROCHLOROTHIAZIDE 1 TABLET: 12.5; 2 TABLET ORAL at 08:14

## 2021-01-01 RX ADMIN — SODIUM CHLORIDE 250 MG: 9 INJECTION, SOLUTION INTRAVENOUS at 09:31

## 2021-01-01 RX ADMIN — HEPARIN SODIUM 3950 UNITS: 5000 INJECTION INTRAVENOUS; SUBCUTANEOUS at 12:22

## 2021-01-01 RX ADMIN — INSULIN LISPRO 9 UNITS: 100 INJECTION, SOLUTION INTRAVENOUS; SUBCUTANEOUS at 16:05

## 2021-01-01 RX ADMIN — INSULIN LISPRO 6 UNITS: 100 INJECTION, SOLUTION INTRAVENOUS; SUBCUTANEOUS at 11:30

## 2021-01-01 RX ADMIN — DEXAMETHASONE SODIUM PHOSPHATE 6 MG: 10 INJECTION INTRAMUSCULAR; INTRAVENOUS at 08:00

## 2021-01-01 RX ADMIN — SODIUM BICARBONATE: 84 INJECTION, SOLUTION INTRAVENOUS at 12:12

## 2021-01-01 RX ADMIN — SODIUM CHLORIDE 30 ML: 9 INJECTION, SOLUTION INTRAMUSCULAR; INTRAVENOUS; SUBCUTANEOUS at 05:23

## 2021-01-01 RX ADMIN — SODIUM CHLORIDE 30 ML: 9 INJECTION, SOLUTION INTRAMUSCULAR; INTRAVENOUS; SUBCUTANEOUS at 22:03

## 2021-01-01 RX ADMIN — INSULIN GLARGINE 20 UNITS: 100 INJECTION, SOLUTION SUBCUTANEOUS at 08:21

## 2021-01-01 RX ADMIN — FENTANYL CITRATE 100 MCG: 50 INJECTION INTRAMUSCULAR; INTRAVENOUS at 15:34

## 2021-01-01 RX ADMIN — INSULIN LISPRO 3 UNITS: 100 INJECTION, SOLUTION INTRAVENOUS; SUBCUTANEOUS at 11:30

## 2021-01-01 RX ADMIN — SODIUM CHLORIDE 30 ML: 9 INJECTION, SOLUTION INTRAMUSCULAR; INTRAVENOUS; SUBCUTANEOUS at 22:58

## 2021-01-01 RX ADMIN — DEXAMETHASONE SODIUM PHOSPHATE 6 MG: 10 INJECTION, SOLUTION INTRAMUSCULAR; INTRAVENOUS at 08:43

## 2021-01-01 RX ADMIN — HEPARIN SODIUM 15 UNITS/KG/HR: 5000 INJECTION, SOLUTION INTRAVENOUS at 12:22

## 2021-01-01 RX ADMIN — Medication 25 MCG/KG/MIN: at 15:46

## 2021-01-01 RX ADMIN — DEXAMETHASONE SODIUM PHOSPHATE 6 MG: 10 INJECTION INTRAMUSCULAR; INTRAVENOUS at 08:43

## 2021-01-01 RX ADMIN — SODIUM CHLORIDE 30 ML: 9 INJECTION, SOLUTION INTRAMUSCULAR; INTRAVENOUS; SUBCUTANEOUS at 06:25

## 2021-01-01 RX ADMIN — NOREPINEPHRINE BITARTRATE 30 MCG/MIN: 1 INJECTION, SOLUTION INTRAVENOUS at 15:18

## 2021-01-01 RX ADMIN — SODIUM CHLORIDE 30 ML: 9 INJECTION, SOLUTION INTRAMUSCULAR; INTRAVENOUS; SUBCUTANEOUS at 21:17

## 2021-01-01 RX ADMIN — HEPARIN SODIUM 13 UNITS/KG/HR: 5000 INJECTION, SOLUTION INTRAVENOUS at 12:17

## 2021-01-01 RX ADMIN — VANCOMYCIN HYDROCHLORIDE 1500 MG: 10 INJECTION, POWDER, LYOPHILIZED, FOR SOLUTION INTRAVENOUS at 01:07

## 2021-01-01 RX ADMIN — WATER 50 NG/KG/MIN: 1 SOLUTION INTRAVENOUS at 14:39

## 2021-01-01 RX ADMIN — INSULIN LISPRO 6 UNITS: 100 INJECTION, SOLUTION INTRAVENOUS; SUBCUTANEOUS at 17:50

## 2021-01-01 RX ADMIN — SODIUM CHLORIDE 30 ML: 9 INJECTION, SOLUTION INTRAMUSCULAR; INTRAVENOUS; SUBCUTANEOUS at 15:28

## 2021-01-01 RX ADMIN — VASOPRESSIN 0.1 UNITS/MIN: 20 INJECTION INTRAVENOUS at 18:01

## 2021-01-01 RX ADMIN — ALBUTEROL SULFATE 2 PUFF: 90 AEROSOL, METERED RESPIRATORY (INHALATION) at 20:00

## 2021-01-01 RX ADMIN — INSULIN LISPRO 12 UNITS: 100 INJECTION, SOLUTION INTRAVENOUS; SUBCUTANEOUS at 12:56

## 2021-01-01 RX ADMIN — SODIUM CHLORIDE 50 ML/HR: 900 INJECTION, SOLUTION INTRAVENOUS at 11:24

## 2021-01-01 RX ADMIN — PHENYLEPHRINE HYDROCHLORIDE 300 MCG/MIN: 10 INJECTION INTRAVENOUS at 18:48

## 2021-01-01 RX ADMIN — INSULIN LISPRO 5 UNITS: 100 INJECTION, SOLUTION INTRAVENOUS; SUBCUTANEOUS at 12:58

## 2021-01-01 RX ADMIN — MEDROXYPROGESTERONE ACETATE 20 MG: 10 TABLET ORAL at 08:26

## 2021-01-01 RX ADMIN — SODIUM CHLORIDE 50 ML/HR: 900 INJECTION, SOLUTION INTRAVENOUS at 01:45

## 2021-01-01 RX ADMIN — CEFTRIAXONE 1 G: 1 INJECTION, POWDER, FOR SOLUTION INTRAMUSCULAR; INTRAVENOUS at 08:52

## 2021-01-01 RX ADMIN — SODIUM CHLORIDE 30 ML: 9 INJECTION, SOLUTION INTRAMUSCULAR; INTRAVENOUS; SUBCUTANEOUS at 13:16

## 2021-01-01 RX ADMIN — HEPARIN SODIUM 4800 UNITS: 5000 INJECTION INTRAVENOUS; SUBCUTANEOUS at 23:33

## 2021-01-01 RX ADMIN — AMIODARONE HYDROCHLORIDE 300 MG: 50 INJECTION, SOLUTION INTRAVENOUS at 16:58

## 2021-01-01 RX ADMIN — INSULIN LISPRO 5 UNITS: 100 INJECTION, SOLUTION INTRAVENOUS; SUBCUTANEOUS at 16:03

## 2021-01-01 RX ADMIN — INSULIN LISPRO 3 UNITS: 100 INJECTION, SOLUTION INTRAVENOUS; SUBCUTANEOUS at 21:33

## 2021-01-01 RX ADMIN — SODIUM CHLORIDE 30 ML: 9 INJECTION, SOLUTION INTRAMUSCULAR; INTRAVENOUS; SUBCUTANEOUS at 22:14

## 2021-01-01 RX ADMIN — WATER 50 NG/KG/MIN: 1 SOLUTION INTRAVENOUS at 18:28

## 2021-01-01 RX ADMIN — INSULIN LISPRO 8 UNITS: 100 INJECTION, SOLUTION INTRAVENOUS; SUBCUTANEOUS at 17:05

## 2021-01-01 RX ADMIN — PANTOPRAZOLE SODIUM 40 MG: 40 TABLET, DELAYED RELEASE ORAL at 05:08

## 2021-01-01 RX ADMIN — AZITHROMYCIN 500 MG: 250 TABLET, FILM COATED ORAL at 09:08

## 2021-01-01 RX ADMIN — DEXMEDETOMIDINE HYDROCHLORIDE 0.4 MCG/KG/HR: 400 INJECTION INTRAVENOUS at 18:08

## 2021-01-01 RX ADMIN — BUDESONIDE AND FORMOTEROL FUMARATE DIHYDRATE 2 PUFF: 80; 4.5 AEROSOL RESPIRATORY (INHALATION) at 20:00

## 2021-01-01 RX ADMIN — INSULIN LISPRO 3 UNITS: 100 INJECTION, SOLUTION INTRAVENOUS; SUBCUTANEOUS at 17:52

## 2021-01-01 RX ADMIN — SODIUM CHLORIDE 2 MCG/KG/MIN: 9 INJECTION, SOLUTION INTRAVENOUS at 22:33

## 2021-01-01 RX ADMIN — INSULIN LISPRO 9 UNITS: 100 INJECTION, SOLUTION INTRAVENOUS; SUBCUTANEOUS at 05:47

## 2021-01-01 RX ADMIN — REMDESIVIR 200 MG: 100 INJECTION, POWDER, LYOPHILIZED, FOR SOLUTION INTRAVENOUS at 13:13

## 2021-01-01 RX ADMIN — SODIUM BICARBONATE: 84 INJECTION, SOLUTION INTRAVENOUS at 19:00

## 2021-01-01 RX ADMIN — LORAZEPAM 2 MG: 2 INJECTION INTRAMUSCULAR; INTRAVENOUS at 08:31

## 2021-01-01 RX ADMIN — DEXMEDETOMIDINE HYDROCHLORIDE 0.4 MCG/KG/HR: 400 INJECTION INTRAVENOUS at 01:41

## 2021-01-01 RX ADMIN — Medication 30 ML: at 14:16

## 2021-01-01 RX ADMIN — INSULIN LISPRO 7 UNITS: 100 INJECTION, SOLUTION INTRAVENOUS; SUBCUTANEOUS at 12:00

## 2021-01-01 RX ADMIN — ALBUTEROL SULFATE 2 PUFF: 90 AEROSOL, METERED RESPIRATORY (INHALATION) at 20:55

## 2021-01-01 RX ADMIN — INSULIN GLARGINE 15 UNITS: 100 INJECTION, SOLUTION SUBCUTANEOUS at 08:25

## 2021-01-01 RX ADMIN — INSULIN LISPRO 2 UNITS: 100 INJECTION, SOLUTION INTRAVENOUS; SUBCUTANEOUS at 08:08

## 2021-01-01 RX ADMIN — ATROPINE SULFATE 1 MG: 0.1 INJECTION, SOLUTION INTRAVENOUS at 16:45

## 2021-01-01 RX ADMIN — ALBUTEROL SULFATE 2 PUFF: 90 AEROSOL, METERED RESPIRATORY (INHALATION) at 01:25

## 2021-01-01 RX ADMIN — PHENYLEPHRINE HYDROCHLORIDE 300 MCG/MIN: 10 INJECTION INTRAVENOUS at 01:21

## 2021-01-01 RX ADMIN — PANTOPRAZOLE SODIUM 40 MG: 40 TABLET, DELAYED RELEASE ORAL at 08:34

## 2021-01-01 RX ADMIN — LISINOPRIL AND HYDROCHLOROTHIAZIDE 1 TABLET: 12.5; 2 TABLET ORAL at 09:12

## 2021-01-01 RX ADMIN — ENOXAPARIN SODIUM 40 MG: 40 INJECTION SUBCUTANEOUS at 22:35

## 2021-01-01 RX ADMIN — SODIUM BICARBONATE 50 MEQ: 84 INJECTION, SOLUTION INTRAVENOUS at 20:00

## 2021-01-01 RX ADMIN — INSULIN GLARGINE 15 UNITS: 100 INJECTION, SOLUTION SUBCUTANEOUS at 08:06

## 2021-01-01 RX ADMIN — SODIUM CHLORIDE 30 ML: 9 INJECTION, SOLUTION INTRAMUSCULAR; INTRAVENOUS; SUBCUTANEOUS at 14:42

## 2021-01-01 RX ADMIN — SODIUM CHLORIDE 30 ML: 9 INJECTION, SOLUTION INTRAMUSCULAR; INTRAVENOUS; SUBCUTANEOUS at 13:56

## 2021-01-01 RX ADMIN — BUDESONIDE AND FORMOTEROL FUMARATE DIHYDRATE 2 PUFF: 80; 4.5 AEROSOL RESPIRATORY (INHALATION) at 07:26

## 2021-01-01 RX ADMIN — DEXMEDETOMIDINE HYDROCHLORIDE 0.4 MCG/KG/HR: 400 INJECTION INTRAVENOUS at 10:39

## 2021-01-01 RX ADMIN — INSULIN LISPRO 3 UNITS: 100 INJECTION, SOLUTION INTRAVENOUS; SUBCUTANEOUS at 11:03

## 2021-01-01 RX ADMIN — VASOPRESSIN 0.1 UNITS/MIN: 20 INJECTION INTRAVENOUS at 23:37

## 2021-01-01 RX ADMIN — OXYCODONE HYDROCHLORIDE AND ACETAMINOPHEN 500 MG: 500 TABLET ORAL at 17:17

## 2021-01-01 RX ADMIN — ALBUTEROL SULFATE 2 PUFF: 90 AEROSOL, METERED RESPIRATORY (INHALATION) at 02:40

## 2021-01-01 RX ADMIN — APIXABAN 5 MG: 5 TABLET, FILM COATED ORAL at 08:31

## 2021-01-01 RX ADMIN — ENOXAPARIN SODIUM 40 MG: 40 INJECTION SUBCUTANEOUS at 22:52

## 2021-01-01 RX ADMIN — SODIUM CHLORIDE 30 ML: 9 INJECTION, SOLUTION INTRAMUSCULAR; INTRAVENOUS; SUBCUTANEOUS at 14:00

## 2021-01-01 RX ADMIN — WATER 50 NG/KG/MIN: 1 SOLUTION INTRAVENOUS at 06:09

## 2021-01-01 RX ADMIN — SODIUM BICARBONATE 150 MEQ: 84 INJECTION, SOLUTION INTRAVENOUS at 06:30

## 2021-01-01 RX ADMIN — POTASSIUM PHOSPHATE, MONOBASIC POTASSIUM PHOSPHATE, DIBASIC: 224; 236 INJECTION, SOLUTION, CONCENTRATE INTRAVENOUS at 18:16

## 2021-01-01 RX ADMIN — LORAZEPAM 0.5 MG: 2 INJECTION INTRAMUSCULAR; INTRAVENOUS at 16:15

## 2021-01-01 RX ADMIN — EPINEPHRINE 1 MG: 0.1 INJECTION, SOLUTION ENDOTRACHEAL; INTRACARDIAC; INTRAVENOUS at 20:04

## 2021-01-01 RX ADMIN — SODIUM BICARBONATE: 84 INJECTION, SOLUTION INTRAVENOUS at 19:34

## 2021-01-01 RX ADMIN — APIXABAN 10 MG: 5 TABLET, FILM COATED ORAL at 09:08

## 2021-01-01 RX ADMIN — ALBUMIN (HUMAN) 25 G: 12.5 INJECTION, SOLUTION INTRAVENOUS at 17:15

## 2021-01-01 RX ADMIN — DEXMEDETOMIDINE HYDROCHLORIDE 0.4 MCG/KG/HR: 400 INJECTION INTRAVENOUS at 04:20

## 2021-01-01 RX ADMIN — POTASSIUM PHOSPHATE, MONOBASIC POTASSIUM PHOSPHATE, DIBASIC: 224; 236 INJECTION, SOLUTION, CONCENTRATE INTRAVENOUS at 18:02

## 2021-01-01 RX ADMIN — INSULIN LISPRO 3 UNITS: 100 INJECTION, SOLUTION INTRAVENOUS; SUBCUTANEOUS at 16:13

## 2021-01-01 RX ADMIN — SODIUM BICARBONATE 50 MEQ: 84 INJECTION, SOLUTION INTRAVENOUS at 19:53

## 2021-01-01 RX ADMIN — BUDESONIDE AND FORMOTEROL FUMARATE DIHYDRATE 2 PUFF: 80; 4.5 AEROSOL RESPIRATORY (INHALATION) at 20:55

## 2021-01-01 RX ADMIN — BUDESONIDE AND FORMOTEROL FUMARATE DIHYDRATE 2 PUFF: 80; 4.5 AEROSOL RESPIRATORY (INHALATION) at 08:48

## 2021-01-01 RX ADMIN — INSULIN LISPRO 7 UNITS: 100 INJECTION, SOLUTION INTRAVENOUS; SUBCUTANEOUS at 17:04

## 2021-01-01 RX ADMIN — ALBUTEROL SULFATE 2 PUFF: 90 AEROSOL, METERED RESPIRATORY (INHALATION) at 21:00

## 2021-01-01 RX ADMIN — CISATRACURIUM BESYLATE 3 MCG/KG/MIN: 2 INJECTION, SOLUTION INTRAVENOUS at 21:35

## 2021-01-01 RX ADMIN — SODIUM CHLORIDE 30 ML: 9 INJECTION, SOLUTION INTRAMUSCULAR; INTRAVENOUS; SUBCUTANEOUS at 21:06

## 2021-01-01 RX ADMIN — DEXAMETHASONE SODIUM PHOSPHATE 6 MG: 10 INJECTION INTRAMUSCULAR; INTRAVENOUS at 08:34

## 2021-01-01 RX ADMIN — DEXTROSE MONOHYDRATE 12.5 G: 25 INJECTION, SOLUTION INTRAVENOUS at 19:39

## 2021-01-01 RX ADMIN — INSULIN LISPRO 9 UNITS: 100 INJECTION, SOLUTION INTRAVENOUS; SUBCUTANEOUS at 17:29

## 2021-01-01 RX ADMIN — SODIUM BICARBONATE 50 MEQ: 84 INJECTION, SOLUTION INTRAVENOUS at 19:50

## 2021-01-01 RX ADMIN — INSULIN LISPRO 5 UNITS: 100 INJECTION, SOLUTION INTRAVENOUS; SUBCUTANEOUS at 08:06

## 2021-01-01 RX ADMIN — LISINOPRIL AND HYDROCHLOROTHIAZIDE 1 TABLET: 12.5; 2 TABLET ORAL at 08:20

## 2021-01-01 RX ADMIN — INSULIN LISPRO 5 UNITS: 100 INJECTION, SOLUTION INTRAVENOUS; SUBCUTANEOUS at 16:39

## 2021-01-01 RX ADMIN — DEXMEDETOMIDINE HYDROCHLORIDE 0.4 MCG/KG/HR: 400 INJECTION INTRAVENOUS at 17:20

## 2021-01-01 RX ADMIN — HEPARIN SODIUM 2400 UNITS: 5000 INJECTION INTRAVENOUS; SUBCUTANEOUS at 06:06

## 2021-01-01 RX ADMIN — INSULIN LISPRO 6 UNITS: 100 INJECTION, SOLUTION INTRAVENOUS; SUBCUTANEOUS at 02:01

## 2021-01-01 RX ADMIN — INSULIN LISPRO 12 UNITS: 100 INJECTION, SOLUTION INTRAVENOUS; SUBCUTANEOUS at 17:37

## 2021-01-01 RX ADMIN — HEPARIN SODIUM 15 UNITS/KG/HR: 5000 INJECTION, SOLUTION INTRAVENOUS at 05:27

## 2021-01-01 RX ADMIN — EPINEPHRINE 1 MG: 0.1 INJECTION, SOLUTION ENDOTRACHEAL; INTRACARDIAC; INTRAVENOUS at 19:52

## 2021-01-01 RX ADMIN — DEXMEDETOMIDINE HYDROCHLORIDE 0.4 MCG/KG/HR: 400 INJECTION INTRAVENOUS at 07:14

## 2021-01-01 RX ADMIN — INSULIN LISPRO 5 UNITS: 100 INJECTION, SOLUTION INTRAVENOUS; SUBCUTANEOUS at 11:34

## 2021-01-01 RX ADMIN — ALTEPLASE 1 MG: 2.2 INJECTION, POWDER, LYOPHILIZED, FOR SOLUTION INTRAVENOUS at 18:03

## 2021-01-01 RX ADMIN — INSULIN LISPRO 3 UNITS: 100 INJECTION, SOLUTION INTRAVENOUS; SUBCUTANEOUS at 21:42

## 2021-01-01 RX ADMIN — DEXMEDETOMIDINE HYDROCHLORIDE 0.4 MCG/KG/HR: 400 INJECTION INTRAVENOUS at 05:26

## 2021-01-01 RX ADMIN — PANTOPRAZOLE SODIUM 40 MG: 40 TABLET, DELAYED RELEASE ORAL at 14:25

## 2021-01-01 RX ADMIN — BUDESONIDE AND FORMOTEROL FUMARATE DIHYDRATE 2 PUFF: 80; 4.5 AEROSOL RESPIRATORY (INHALATION) at 07:20

## 2021-01-01 RX ADMIN — SODIUM CHLORIDE 30 ML: 9 INJECTION, SOLUTION INTRAMUSCULAR; INTRAVENOUS; SUBCUTANEOUS at 05:13

## 2021-01-01 RX ADMIN — ACETAMINOPHEN 650 MG: 325 TABLET ORAL at 16:55

## 2021-01-01 RX ADMIN — CISATRACURIUM BESYLATE 3 MCG/KG/MIN: 2 INJECTION, SOLUTION INTRAVENOUS at 03:18

## 2021-01-01 RX ADMIN — INSULIN LISPRO 6 UNITS: 100 INJECTION, SOLUTION INTRAVENOUS; SUBCUTANEOUS at 11:08

## 2021-01-01 RX ADMIN — ENOXAPARIN SODIUM 40 MG: 40 INJECTION SUBCUTANEOUS at 11:27

## 2021-01-01 RX ADMIN — MORPHINE SULFATE 1 MG: 2 INJECTION, SOLUTION INTRAMUSCULAR; INTRAVENOUS at 16:55

## 2021-01-01 RX ADMIN — ENOXAPARIN SODIUM 40 MG: 40 INJECTION SUBCUTANEOUS at 23:00

## 2021-01-01 RX ADMIN — SODIUM CHLORIDE 30 ML: 9 INJECTION, SOLUTION INTRAMUSCULAR; INTRAVENOUS; SUBCUTANEOUS at 05:33

## 2021-01-01 RX ADMIN — ALBUTEROL SULFATE 2 PUFF: 90 AEROSOL, METERED RESPIRATORY (INHALATION) at 07:26

## 2021-01-01 RX ADMIN — FUROSEMIDE 20 MG: 20 INJECTION, SOLUTION INTRAMUSCULAR; INTRAVENOUS at 08:58

## 2021-01-01 RX ADMIN — HEPARIN SODIUM 24 UNITS/KG/HR: 5000 INJECTION, SOLUTION INTRAVENOUS at 04:06

## 2021-01-01 RX ADMIN — INSULIN LISPRO 6 UNITS: 100 INJECTION, SOLUTION INTRAVENOUS; SUBCUTANEOUS at 09:11

## 2021-01-01 RX ADMIN — DEXAMETHASONE SODIUM PHOSPHATE 6 MG: 10 INJECTION INTRAMUSCULAR; INTRAVENOUS at 08:31

## 2021-01-01 RX ADMIN — BUDESONIDE 500 MCG: 0.5 SUSPENSION RESPIRATORY (INHALATION) at 07:40

## 2021-01-01 RX ADMIN — SODIUM BICARBONATE 50 MEQ: 84 INJECTION, SOLUTION INTRAVENOUS at 19:20

## 2021-01-01 RX ADMIN — ALBUTEROL SULFATE 2 PUFF: 90 AEROSOL, METERED RESPIRATORY (INHALATION) at 08:00

## 2021-01-01 RX ADMIN — INSULIN LISPRO 12 UNITS: 100 INJECTION, SOLUTION INTRAVENOUS; SUBCUTANEOUS at 23:29

## 2021-01-01 RX ADMIN — POTASSIUM PHOSPHATE, MONOBASIC POTASSIUM PHOSPHATE, DIBASIC: 224; 236 INJECTION, SOLUTION, CONCENTRATE INTRAVENOUS at 17:37

## 2021-01-01 RX ADMIN — INSULIN LISPRO 7 UNITS: 100 INJECTION, SOLUTION INTRAVENOUS; SUBCUTANEOUS at 16:27

## 2021-01-01 RX ADMIN — HEPARIN SODIUM 15 UNITS/KG/HR: 5000 INJECTION, SOLUTION INTRAVENOUS at 11:31

## 2021-01-01 RX ADMIN — METOPROLOL TARTRATE 5 MG: 5 INJECTION INTRAVENOUS at 16:27

## 2021-01-01 RX ADMIN — ALBUTEROL SULFATE 2 PUFF: 90 AEROSOL, METERED RESPIRATORY (INHALATION) at 08:10

## 2021-01-01 RX ADMIN — VANCOMYCIN HYDROCHLORIDE 2500 MG: 10 INJECTION, POWDER, LYOPHILIZED, FOR SOLUTION INTRAVENOUS at 10:48

## 2021-01-01 RX ADMIN — SODIUM CHLORIDE 30 ML: 9 INJECTION, SOLUTION INTRAMUSCULAR; INTRAVENOUS; SUBCUTANEOUS at 20:03

## 2021-01-01 RX ADMIN — INSULIN LISPRO 6 UNITS: 100 INJECTION, SOLUTION INTRAVENOUS; SUBCUTANEOUS at 09:19

## 2021-01-01 RX ADMIN — SODIUM CHLORIDE 30 ML: 9 INJECTION, SOLUTION INTRAMUSCULAR; INTRAVENOUS; SUBCUTANEOUS at 16:16

## 2021-01-01 RX ADMIN — INSULIN LISPRO 9 UNITS: 100 INJECTION, SOLUTION INTRAVENOUS; SUBCUTANEOUS at 22:07

## 2021-01-01 RX ADMIN — DEXAMETHASONE SODIUM PHOSPHATE 6 MG: 10 INJECTION INTRAMUSCULAR; INTRAVENOUS at 08:24

## 2021-01-01 RX ADMIN — ALBUTEROL SULFATE 2 PUFF: 90 AEROSOL, METERED RESPIRATORY (INHALATION) at 08:47

## 2021-01-01 RX ADMIN — LISINOPRIL AND HYDROCHLOROTHIAZIDE 1 TABLET: 12.5; 2 TABLET ORAL at 08:34

## 2021-01-01 RX ADMIN — SODIUM CHLORIDE 30 ML: 9 INJECTION, SOLUTION INTRAMUSCULAR; INTRAVENOUS; SUBCUTANEOUS at 15:23

## 2021-01-01 RX ADMIN — LISINOPRIL AND HYDROCHLOROTHIAZIDE 1 TABLET: 12.5; 2 TABLET ORAL at 11:35

## 2021-01-01 RX ADMIN — SODIUM CHLORIDE 30 ML: 9 INJECTION, SOLUTION INTRAMUSCULAR; INTRAVENOUS; SUBCUTANEOUS at 06:32

## 2021-01-01 RX ADMIN — ENOXAPARIN SODIUM 40 MG: 40 INJECTION SUBCUTANEOUS at 10:33

## 2021-01-01 RX ADMIN — PANTOPRAZOLE SODIUM 40 MG: 40 TABLET, DELAYED RELEASE ORAL at 06:37

## 2021-01-01 RX ADMIN — INSULIN LISPRO 5 UNITS: 100 INJECTION, SOLUTION INTRAVENOUS; SUBCUTANEOUS at 08:25

## 2021-01-01 RX ADMIN — BUDESONIDE 500 MCG: 0.5 SUSPENSION RESPIRATORY (INHALATION) at 20:00

## 2021-01-01 RX ADMIN — INSULIN LISPRO 5 UNITS: 100 INJECTION, SOLUTION INTRAVENOUS; SUBCUTANEOUS at 12:00

## 2021-01-01 RX ADMIN — INSULIN LISPRO 6 UNITS: 100 INJECTION, SOLUTION INTRAVENOUS; SUBCUTANEOUS at 11:31

## 2021-01-01 RX ADMIN — DEXMEDETOMIDINE HYDROCHLORIDE 0.4 MCG/KG/HR: 400 INJECTION INTRAVENOUS at 10:47

## 2021-01-01 RX ADMIN — MEDROXYPROGESTERONE ACETATE 10 MG: 10 TABLET ORAL at 08:46

## 2021-01-01 RX ADMIN — EPINEPHRINE 1 MG: 0.1 INJECTION, SOLUTION ENDOTRACHEAL; INTRACARDIAC; INTRAVENOUS at 20:01

## 2021-01-01 RX ADMIN — FUROSEMIDE 60 MG: 10 INJECTION, SOLUTION INTRAMUSCULAR; INTRAVENOUS at 00:02

## 2021-01-01 RX ADMIN — SODIUM CHLORIDE 50 ML/HR: 900 INJECTION, SOLUTION INTRAVENOUS at 21:35

## 2021-01-01 RX ADMIN — HYDRALAZINE HYDROCHLORIDE 10 MG: 20 INJECTION INTRAMUSCULAR; INTRAVENOUS at 04:55

## 2021-01-01 RX ADMIN — INSULIN LISPRO 15 UNITS: 100 INJECTION, SOLUTION INTRAVENOUS; SUBCUTANEOUS at 21:58

## 2021-01-01 RX ADMIN — ALBUTEROL SULFATE 2 PUFF: 90 AEROSOL, METERED RESPIRATORY (INHALATION) at 14:57

## 2021-01-01 RX ADMIN — INSULIN LISPRO 8 UNITS: 100 INJECTION, SOLUTION INTRAVENOUS; SUBCUTANEOUS at 21:10

## 2021-01-01 RX ADMIN — INSULIN LISPRO 15 UNITS: 100 INJECTION, SOLUTION INTRAVENOUS; SUBCUTANEOUS at 14:04

## 2021-01-01 RX ADMIN — BUDESONIDE AND FORMOTEROL FUMARATE DIHYDRATE 2 PUFF: 80; 4.5 AEROSOL RESPIRATORY (INHALATION) at 08:36

## 2021-01-01 RX ADMIN — APIXABAN 10 MG: 5 TABLET, FILM COATED ORAL at 13:39

## 2021-01-01 RX ADMIN — ALBUTEROL SULFATE 2 PUFF: 90 AEROSOL, METERED RESPIRATORY (INHALATION) at 07:46

## 2021-01-01 RX ADMIN — FAMOTIDINE 20 MG: 10 INJECTION INTRAVENOUS at 21:54

## 2021-01-01 RX ADMIN — SODIUM CHLORIDE 30 ML: 9 INJECTION, SOLUTION INTRAMUSCULAR; INTRAVENOUS; SUBCUTANEOUS at 21:41

## 2021-01-01 RX ADMIN — INSULIN LISPRO 6 UNITS: 100 INJECTION, SOLUTION INTRAVENOUS; SUBCUTANEOUS at 17:05

## 2021-01-01 RX ADMIN — SODIUM CHLORIDE 1000 ML/HR: 900 INJECTION, SOLUTION INTRAVENOUS at 22:29

## 2021-01-01 RX ADMIN — DEXMEDETOMIDINE HYDROCHLORIDE 0.2 MCG/KG/HR: 400 INJECTION INTRAVENOUS at 06:06

## 2021-01-01 RX ADMIN — Medication 50 MG: at 09:08

## 2021-01-01 RX ADMIN — ALBUTEROL SULFATE 2 PUFF: 90 AEROSOL, METERED RESPIRATORY (INHALATION) at 07:20

## 2021-01-01 RX ADMIN — ALBUTEROL SULFATE 2 PUFF: 90 AEROSOL, METERED RESPIRATORY (INHALATION) at 02:15

## 2021-01-01 RX ADMIN — CEFTRIAXONE 1 G: 1 INJECTION, POWDER, FOR SOLUTION INTRAMUSCULAR; INTRAVENOUS at 08:43

## 2021-01-01 RX ADMIN — SODIUM CHLORIDE 30 ML: 9 INJECTION, SOLUTION INTRAMUSCULAR; INTRAVENOUS; SUBCUTANEOUS at 13:13

## 2021-01-01 RX ADMIN — PANTOPRAZOLE SODIUM 40 MG: 40 TABLET, DELAYED RELEASE ORAL at 06:40

## 2021-01-01 RX ADMIN — SODIUM BICARBONATE 150 MEQ: 84 INJECTION, SOLUTION INTRAVENOUS at 19:36

## 2021-01-01 RX ADMIN — INSULIN LISPRO 6 UNITS: 100 INJECTION, SOLUTION INTRAVENOUS; SUBCUTANEOUS at 23:55

## 2021-01-01 RX ADMIN — SODIUM CHLORIDE 30 ML: 9 INJECTION, SOLUTION INTRAMUSCULAR; INTRAVENOUS; SUBCUTANEOUS at 05:12

## 2021-01-01 RX ADMIN — BUDESONIDE AND FORMOTEROL FUMARATE DIHYDRATE 2 PUFF: 80; 4.5 AEROSOL RESPIRATORY (INHALATION) at 21:10

## 2021-01-01 RX ADMIN — MORPHINE SULFATE 1 MG: 2 INJECTION, SOLUTION INTRAMUSCULAR; INTRAVENOUS at 12:27

## 2021-01-01 RX ADMIN — MEDROXYPROGESTERONE ACETATE 20 MG: 10 TABLET ORAL at 09:31

## 2021-01-01 RX ADMIN — INSULIN GLARGINE 20 UNITS: 100 INJECTION, SOLUTION SUBCUTANEOUS at 09:00

## 2021-01-01 RX ADMIN — DEXAMETHASONE SODIUM PHOSPHATE 6 MG: 10 INJECTION, SOLUTION INTRAMUSCULAR; INTRAVENOUS at 08:31

## 2021-01-01 RX ADMIN — PANTOPRAZOLE SODIUM 40 MG: 40 TABLET, DELAYED RELEASE ORAL at 08:36

## 2021-01-01 RX ADMIN — INSULIN LISPRO 8 UNITS: 100 INJECTION, SOLUTION INTRAVENOUS; SUBCUTANEOUS at 16:13

## 2021-01-01 RX ADMIN — AZITHROMYCIN 500 MG: 250 TABLET, FILM COATED ORAL at 09:12

## 2021-01-01 RX ADMIN — AMIODARONE HYDROCHLORIDE 0.5 MG/MIN: 50 INJECTION, SOLUTION INTRAVENOUS at 02:32

## 2021-01-01 RX ADMIN — SODIUM CHLORIDE 30 ML: 9 INJECTION, SOLUTION INTRAMUSCULAR; INTRAVENOUS; SUBCUTANEOUS at 05:28

## 2021-01-01 RX ADMIN — VASOPRESSIN 0.1 UNITS/MIN: 20 INJECTION INTRAVENOUS at 06:29

## 2021-01-01 RX ADMIN — HEPARIN SODIUM 13 UNITS/KG/HR: 5000 INJECTION, SOLUTION INTRAVENOUS at 21:50

## 2021-01-01 RX ADMIN — HEPARIN SODIUM 10000 UNITS: 5000 INJECTION INTRAVENOUS; SUBCUTANEOUS at 19:36

## 2021-01-01 RX ADMIN — ALBUTEROL SULFATE 2 PUFF: 90 AEROSOL, METERED RESPIRATORY (INHALATION) at 14:00

## 2021-01-01 RX ADMIN — INSULIN LISPRO 8 UNITS: 100 INJECTION, SOLUTION INTRAVENOUS; SUBCUTANEOUS at 09:18

## 2021-01-01 RX ADMIN — ALBUTEROL SULFATE 2 PUFF: 90 AEROSOL, METERED RESPIRATORY (INHALATION) at 19:38

## 2021-01-01 RX ADMIN — INSULIN LISPRO 8 UNITS: 100 INJECTION, SOLUTION INTRAVENOUS; SUBCUTANEOUS at 12:32

## 2021-01-01 RX ADMIN — POTASSIUM PHOSPHATE, MONOBASIC POTASSIUM PHOSPHATE, DIBASIC: 224; 236 INJECTION, SOLUTION, CONCENTRATE INTRAVENOUS at 17:17

## 2021-01-01 RX ADMIN — INSULIN GLARGINE 15 UNITS: 100 INJECTION, SOLUTION SUBCUTANEOUS at 08:43

## 2021-01-01 RX ADMIN — DEXAMETHASONE SODIUM PHOSPHATE 6 MG: 10 INJECTION INTRAMUSCULAR; INTRAVENOUS at 08:59

## 2021-01-01 RX ADMIN — SODIUM CHLORIDE 200 MG: 9 INJECTION, SOLUTION INTRAVENOUS at 12:00

## 2021-01-01 RX ADMIN — INSULIN LISPRO 3 UNITS: 100 INJECTION, SOLUTION INTRAVENOUS; SUBCUTANEOUS at 09:31

## 2021-01-01 RX ADMIN — CALCIUM GLUCONATE: 98 INJECTION, SOLUTION INTRAVENOUS at 18:21

## 2021-01-01 RX ADMIN — ALTEPLASE 1 MG: 2.2 INJECTION, POWDER, LYOPHILIZED, FOR SOLUTION INTRAVENOUS at 16:47

## 2021-01-01 RX ADMIN — INSULIN LISPRO 3 UNITS: 100 INJECTION, SOLUTION INTRAVENOUS; SUBCUTANEOUS at 21:16

## 2021-01-01 RX ADMIN — BUDESONIDE AND FORMOTEROL FUMARATE DIHYDRATE 2 PUFF: 80; 4.5 AEROSOL RESPIRATORY (INHALATION) at 20:10

## 2021-01-01 RX ADMIN — MORPHINE SULFATE 1 MG: 2 INJECTION, SOLUTION INTRAMUSCULAR; INTRAVENOUS at 14:04

## 2021-01-01 RX ADMIN — SODIUM CHLORIDE 30 ML: 9 INJECTION, SOLUTION INTRAMUSCULAR; INTRAVENOUS; SUBCUTANEOUS at 05:45

## 2021-01-01 RX ADMIN — ETOMIDATE 40 MG: 40 INJECTION, SOLUTION INTRAVENOUS at 15:33

## 2021-01-01 RX ADMIN — BUDESONIDE AND FORMOTEROL FUMARATE DIHYDRATE 2 PUFF: 80; 4.5 AEROSOL RESPIRATORY (INHALATION) at 20:05

## 2021-01-01 RX ADMIN — ALBUTEROL SULFATE 2 PUFF: 90 AEROSOL, METERED RESPIRATORY (INHALATION) at 07:41

## 2021-01-01 RX ADMIN — SODIUM CHLORIDE 30 ML: 9 INJECTION, SOLUTION INTRAMUSCULAR; INTRAVENOUS; SUBCUTANEOUS at 15:51

## 2021-01-01 RX ADMIN — INSULIN LISPRO 6 UNITS: 100 INJECTION, SOLUTION INTRAVENOUS; SUBCUTANEOUS at 17:00

## 2021-01-01 RX ADMIN — INSULIN GLARGINE 15 UNITS: 100 INJECTION, SOLUTION SUBCUTANEOUS at 08:01

## 2021-01-01 RX ADMIN — TOCILIZUMAB 810 MG: 180 INJECTION, SOLUTION SUBCUTANEOUS at 14:25

## 2021-01-01 RX ADMIN — EPINEPHRINE 10 MCG/MIN: 1 INJECTION INTRAMUSCULAR; INTRAVENOUS; SUBCUTANEOUS at 19:35

## 2021-01-01 RX ADMIN — ENOXAPARIN SODIUM 40 MG: 40 INJECTION SUBCUTANEOUS at 22:12

## 2021-01-01 RX ADMIN — ENOXAPARIN SODIUM 40 MG: 40 INJECTION SUBCUTANEOUS at 22:14

## 2021-01-01 RX ADMIN — INSULIN LISPRO 9 UNITS: 100 INJECTION, SOLUTION INTRAVENOUS; SUBCUTANEOUS at 20:07

## 2021-01-01 RX ADMIN — PANTOPRAZOLE SODIUM 40 MG: 40 TABLET, DELAYED RELEASE ORAL at 08:06

## 2021-01-01 RX ADMIN — DEXMEDETOMIDINE HYDROCHLORIDE 0.4 MCG/KG/HR: 400 INJECTION INTRAVENOUS at 14:46

## 2021-01-01 RX ADMIN — HEPARIN SODIUM 13 UNITS/KG/HR: 5000 INJECTION, SOLUTION INTRAVENOUS at 07:18

## 2021-01-01 RX ADMIN — HEPARIN SODIUM 18 UNITS/KG/HR: 5000 INJECTION, SOLUTION INTRAVENOUS at 19:33

## 2021-01-01 RX ADMIN — DEXAMETHASONE SODIUM PHOSPHATE 6 MG: 10 INJECTION INTRAMUSCULAR; INTRAVENOUS at 09:14

## 2021-01-01 RX ADMIN — METOPROLOL TARTRATE 12.5 MG: 25 TABLET, FILM COATED ORAL at 08:59

## 2021-01-01 RX ADMIN — INSULIN LISPRO 6 UNITS: 100 INJECTION, SOLUTION INTRAVENOUS; SUBCUTANEOUS at 21:40

## 2021-01-01 RX ADMIN — INSULIN LISPRO 6 UNITS: 100 INJECTION, SOLUTION INTRAVENOUS; SUBCUTANEOUS at 11:51

## 2021-01-01 RX ADMIN — ALBUTEROL SULFATE 2 PUFF: 90 AEROSOL, METERED RESPIRATORY (INHALATION) at 20:05

## 2021-01-01 RX ADMIN — SODIUM CHLORIDE 30 ML: 9 INJECTION, SOLUTION INTRAMUSCULAR; INTRAVENOUS; SUBCUTANEOUS at 23:06

## 2021-01-01 RX ADMIN — INSULIN LISPRO 5 UNITS: 100 INJECTION, SOLUTION INTRAVENOUS; SUBCUTANEOUS at 08:26

## 2021-01-01 RX ADMIN — SODIUM CHLORIDE 30 ML: 9 INJECTION, SOLUTION INTRAMUSCULAR; INTRAVENOUS; SUBCUTANEOUS at 22:26

## 2021-01-01 RX ADMIN — HEPARIN SODIUM 13 UNITS/KG/HR: 5000 INJECTION, SOLUTION INTRAVENOUS at 01:53

## 2021-01-01 RX ADMIN — OXYCODONE HYDROCHLORIDE AND ACETAMINOPHEN 500 MG: 500 TABLET ORAL at 21:10

## 2021-01-01 RX ADMIN — SODIUM CHLORIDE 30 ML: 9 INJECTION, SOLUTION INTRAMUSCULAR; INTRAVENOUS; SUBCUTANEOUS at 21:07

## 2021-01-01 RX ADMIN — HEPARIN SODIUM 15 UNITS/KG/HR: 5000 INJECTION, SOLUTION INTRAVENOUS at 17:25

## 2021-01-01 RX ADMIN — PIPERACILLIN SODIUM AND TAZOBACTAM SODIUM 3.38 G: 3; .375 INJECTION, POWDER, LYOPHILIZED, FOR SOLUTION INTRAVENOUS at 17:28

## 2021-01-01 RX ADMIN — SODIUM CHLORIDE 30 ML: 9 INJECTION, SOLUTION INTRAMUSCULAR; INTRAVENOUS; SUBCUTANEOUS at 21:40

## 2021-01-01 RX ADMIN — INSULIN LISPRO 3 UNITS: 100 INJECTION, SOLUTION INTRAVENOUS; SUBCUTANEOUS at 22:12

## 2021-01-01 RX ADMIN — SODIUM CHLORIDE 30 ML: 9 INJECTION, SOLUTION INTRAMUSCULAR; INTRAVENOUS; SUBCUTANEOUS at 06:07

## 2021-01-01 RX ADMIN — DEXMEDETOMIDINE HYDROCHLORIDE 0.4 MCG/KG/HR: 400 INJECTION INTRAVENOUS at 08:20

## 2021-01-01 RX ADMIN — INSULIN LISPRO 8 UNITS: 100 INJECTION, SOLUTION INTRAVENOUS; SUBCUTANEOUS at 17:08

## 2021-01-01 RX ADMIN — ALBUTEROL SULFATE 2 PUFF: 90 AEROSOL, METERED RESPIRATORY (INHALATION) at 21:10

## 2021-01-01 RX ADMIN — INSULIN LISPRO 3 UNITS: 100 INJECTION, SOLUTION INTRAVENOUS; SUBCUTANEOUS at 11:48

## 2021-01-01 RX ADMIN — ALBUTEROL SULFATE 2 PUFF: 90 AEROSOL, METERED RESPIRATORY (INHALATION) at 02:10

## 2021-01-01 RX ADMIN — INSULIN LISPRO 4 UNITS: 100 INJECTION, SOLUTION INTRAVENOUS; SUBCUTANEOUS at 11:36

## 2021-01-01 RX ADMIN — SODIUM CHLORIDE 30 ML: 9 INJECTION, SOLUTION INTRAMUSCULAR; INTRAVENOUS; SUBCUTANEOUS at 15:21

## 2021-01-01 RX ADMIN — MORPHINE SULFATE 1 MG: 2 INJECTION, SOLUTION INTRAMUSCULAR; INTRAVENOUS at 22:45

## 2021-01-01 RX ADMIN — BUDESONIDE AND FORMOTEROL FUMARATE DIHYDRATE 2 PUFF: 80; 4.5 AEROSOL RESPIRATORY (INHALATION) at 21:00

## 2021-01-01 RX ADMIN — PROPOFOL 25 MCG/KG/MIN: 10 INJECTION, EMULSION INTRAVENOUS at 15:46

## 2021-01-01 RX ADMIN — FENTANYL CITRATE 100 MCG: 50 INJECTION, SOLUTION INTRAMUSCULAR; INTRAVENOUS at 15:34

## 2021-01-01 RX ADMIN — INSULIN HUMAN 10 UNITS: 100 INJECTION, SOLUTION PARENTERAL at 21:32

## 2021-01-01 RX ADMIN — MEDROXYPROGESTERONE ACETATE 20 MG: 10 TABLET ORAL at 08:31

## 2021-01-01 RX ADMIN — ALBUTEROL SULFATE 2 PUFF: 90 AEROSOL, METERED RESPIRATORY (INHALATION) at 07:54

## 2021-01-01 RX ADMIN — ALBUTEROL SULFATE 2 PUFF: 90 AEROSOL, METERED RESPIRATORY (INHALATION) at 15:37

## 2021-01-01 RX ADMIN — INSULIN LISPRO 8 UNITS: 100 INJECTION, SOLUTION INTRAVENOUS; SUBCUTANEOUS at 17:27

## 2021-01-01 RX ADMIN — MIDAZOLAM 4 MG: 1 INJECTION INTRAMUSCULAR; INTRAVENOUS at 15:34

## 2021-01-01 RX ADMIN — CEFTRIAXONE 1 G: 1 INJECTION, POWDER, FOR SOLUTION INTRAMUSCULAR; INTRAVENOUS at 09:25

## 2021-01-01 RX ADMIN — PANTOPRAZOLE SODIUM 40 MG: 40 TABLET, DELAYED RELEASE ORAL at 09:00

## 2021-01-01 RX ADMIN — INSULIN LISPRO 8 UNITS: 100 INJECTION, SOLUTION INTRAVENOUS; SUBCUTANEOUS at 12:06

## 2021-01-01 RX ADMIN — INSULIN LISPRO 12 UNITS: 100 INJECTION, SOLUTION INTRAVENOUS; SUBCUTANEOUS at 16:30

## 2021-01-01 RX ADMIN — SODIUM CHLORIDE 100 ML: 900 INJECTION, SOLUTION INTRAVENOUS at 11:31

## 2021-01-01 RX ADMIN — INSULIN LISPRO 7 UNITS: 100 INJECTION, SOLUTION INTRAVENOUS; SUBCUTANEOUS at 08:04

## 2021-01-01 RX ADMIN — PANTOPRAZOLE SODIUM 40 MG: 40 TABLET, DELAYED RELEASE ORAL at 08:21

## 2021-01-01 RX ADMIN — ACETAMINOPHEN 650 MG: 325 TABLET ORAL at 00:04

## 2021-01-01 RX ADMIN — MEDROXYPROGESTERONE ACETATE 20 MG: 10 TABLET ORAL at 08:46

## 2021-01-01 RX ADMIN — BUDESONIDE AND FORMOTEROL FUMARATE DIHYDRATE 2 PUFF: 80; 4.5 AEROSOL RESPIRATORY (INHALATION) at 19:38

## 2021-01-01 RX ADMIN — INSULIN LISPRO 9 UNITS: 100 INJECTION, SOLUTION INTRAVENOUS; SUBCUTANEOUS at 10:00

## 2021-01-01 RX ADMIN — SODIUM CHLORIDE 30 ML: 9 INJECTION, SOLUTION INTRAMUSCULAR; INTRAVENOUS; SUBCUTANEOUS at 05:19

## 2021-01-01 RX ADMIN — INSULIN LISPRO 3 UNITS: 100 INJECTION, SOLUTION INTRAVENOUS; SUBCUTANEOUS at 08:32

## 2021-01-01 RX ADMIN — ENOXAPARIN SODIUM 40 MG: 40 INJECTION SUBCUTANEOUS at 12:42

## 2021-01-01 RX ADMIN — INSULIN LISPRO 7 UNITS: 100 INJECTION, SOLUTION INTRAVENOUS; SUBCUTANEOUS at 11:03

## 2021-01-01 RX ADMIN — NOREPINEPHRINE BITARTRATE 30 MCG/MIN: 1 INJECTION, SOLUTION INTRAVENOUS at 18:16

## 2021-01-01 RX ADMIN — ETOMIDATE 40 MG: 2 INJECTION INTRAVENOUS at 15:33

## 2021-01-01 RX ADMIN — INSULIN LISPRO 9 UNITS: 100 INJECTION, SOLUTION INTRAVENOUS; SUBCUTANEOUS at 12:10

## 2021-01-01 RX ADMIN — SODIUM CHLORIDE 30 ML: 9 INJECTION, SOLUTION INTRAMUSCULAR; INTRAVENOUS; SUBCUTANEOUS at 22:00

## 2021-01-01 RX ADMIN — NOREPINEPHRINE BITARTRATE 30 MCG/MIN: 1 INJECTION, SOLUTION INTRAVENOUS at 05:30

## 2021-01-01 RX ADMIN — POTASSIUM PHOSPHATE, MONOBASIC POTASSIUM PHOSPHATE, DIBASIC: 224; 236 INJECTION, SOLUTION, CONCENTRATE INTRAVENOUS at 17:19

## 2021-01-01 RX ADMIN — INSULIN LISPRO 6 UNITS: 100 INJECTION, SOLUTION INTRAVENOUS; SUBCUTANEOUS at 09:09

## 2021-01-01 RX ADMIN — INSULIN GLARGINE 40 UNITS: 100 INJECTION, SOLUTION SUBCUTANEOUS at 09:14

## 2021-01-01 RX ADMIN — SODIUM CHLORIDE 30 ML: 9 INJECTION, SOLUTION INTRAMUSCULAR; INTRAVENOUS; SUBCUTANEOUS at 14:47

## 2021-01-01 RX ADMIN — REMDESIVIR 100 MG: 100 INJECTION, POWDER, LYOPHILIZED, FOR SOLUTION INTRAVENOUS at 12:27

## 2021-01-01 RX ADMIN — INSULIN LISPRO 3 UNITS: 100 INJECTION, SOLUTION INTRAVENOUS; SUBCUTANEOUS at 09:02

## 2021-01-01 RX ADMIN — INSULIN LISPRO 5 UNITS: 100 INJECTION, SOLUTION INTRAVENOUS; SUBCUTANEOUS at 09:08

## 2021-01-01 RX ADMIN — Medication 10 ML: at 11:31

## 2021-01-01 RX ADMIN — CALCIUM CHLORIDE 1 G: 100 INJECTION, SOLUTION INTRAVENOUS; INTRAVENTRICULAR at 19:57

## 2021-01-01 RX ADMIN — PIPERACILLIN SODIUM AND TAZOBACTAM SODIUM 3.38 G: 3; .375 INJECTION, POWDER, LYOPHILIZED, FOR SOLUTION INTRAVENOUS at 20:44

## 2021-01-01 RX ADMIN — INSULIN GLARGINE 15 UNITS: 100 INJECTION, SOLUTION SUBCUTANEOUS at 08:26

## 2021-01-01 RX ADMIN — SODIUM CHLORIDE 30 ML: 9 INJECTION, SOLUTION INTRAMUSCULAR; INTRAVENOUS; SUBCUTANEOUS at 21:21

## 2021-01-01 RX ADMIN — HEPARIN SODIUM 15 UNITS/KG/HR: 5000 INJECTION, SOLUTION INTRAVENOUS at 19:05

## 2021-01-01 RX ADMIN — SODIUM BICARBONATE 50 MEQ: 84 INJECTION, SOLUTION INTRAVENOUS at 19:55

## 2021-01-01 RX ADMIN — ALBUTEROL SULFATE 2 PUFF: 90 AEROSOL, METERED RESPIRATORY (INHALATION) at 15:13

## 2021-01-01 RX ADMIN — AZITHROMYCIN MONOHYDRATE 500 MG: 500 INJECTION, POWDER, LYOPHILIZED, FOR SOLUTION INTRAVENOUS at 08:43

## 2021-01-01 RX ADMIN — ACETAMINOPHEN 650 MG: 325 TABLET ORAL at 09:29

## 2021-01-01 RX ADMIN — ACETAMINOPHEN 650 MG: 325 TABLET ORAL at 01:02

## 2021-01-01 RX ADMIN — BUDESONIDE AND FORMOTEROL FUMARATE DIHYDRATE 2 PUFF: 80; 4.5 AEROSOL RESPIRATORY (INHALATION) at 07:54

## 2021-01-01 RX ADMIN — INSULIN LISPRO 6 UNITS: 100 INJECTION, SOLUTION INTRAVENOUS; SUBCUTANEOUS at 21:35

## 2021-01-01 RX ADMIN — INSULIN LISPRO 5 UNITS: 100 INJECTION, SOLUTION INTRAVENOUS; SUBCUTANEOUS at 18:12

## 2021-01-01 RX ADMIN — SODIUM CHLORIDE 30 ML: 9 INJECTION, SOLUTION INTRAMUSCULAR; INTRAVENOUS; SUBCUTANEOUS at 22:07

## 2021-01-01 RX ADMIN — INSULIN LISPRO 6 UNITS: 100 INJECTION, SOLUTION INTRAVENOUS; SUBCUTANEOUS at 21:45

## 2021-01-01 RX ADMIN — ALBUTEROL SULFATE 2 PUFF: 90 AEROSOL, METERED RESPIRATORY (INHALATION) at 14:17

## 2021-01-01 RX ADMIN — INSULIN LISPRO 9 UNITS: 100 INJECTION, SOLUTION INTRAVENOUS; SUBCUTANEOUS at 21:16

## 2021-01-01 RX ADMIN — ALBUTEROL SULFATE 2 PUFF: 90 AEROSOL, METERED RESPIRATORY (INHALATION) at 02:30

## 2021-01-01 RX ADMIN — ALBUTEROL SULFATE 2.5 MG: 2.5 SOLUTION RESPIRATORY (INHALATION) at 02:00

## 2021-01-01 RX ADMIN — INSULIN LISPRO 3 UNITS: 100 INJECTION, SOLUTION INTRAVENOUS; SUBCUTANEOUS at 21:53

## 2021-01-01 RX ADMIN — AMIODARONE HYDROCHLORIDE 0.5 MG/MIN: 50 INJECTION, SOLUTION INTRAVENOUS at 18:18

## 2021-01-01 RX ADMIN — BUDESONIDE AND FORMOTEROL FUMARATE DIHYDRATE 2 PUFF: 80; 4.5 AEROSOL RESPIRATORY (INHALATION) at 08:10

## 2021-01-01 RX ADMIN — MORPHINE SULFATE 2 MG: 2 INJECTION, SOLUTION INTRAMUSCULAR; INTRAVENOUS at 00:56

## 2021-01-01 RX ADMIN — SODIUM CHLORIDE 30 ML: 9 INJECTION, SOLUTION INTRAMUSCULAR; INTRAVENOUS; SUBCUTANEOUS at 14:17

## 2021-01-01 RX ADMIN — DEXAMETHASONE SODIUM PHOSPHATE 6 MG: 10 INJECTION INTRAMUSCULAR; INTRAVENOUS at 08:15

## 2021-01-01 RX ADMIN — NOREPINEPHRINE BITARTRATE 30 MCG/MIN: 1 INJECTION, SOLUTION INTRAVENOUS at 20:25

## 2021-01-01 RX ADMIN — APIXABAN 10 MG: 5 TABLET, FILM COATED ORAL at 08:36

## 2021-01-01 RX ADMIN — POTASSIUM PHOSPHATE, MONOBASIC POTASSIUM PHOSPHATE, DIBASIC: 224; 236 INJECTION, SOLUTION, CONCENTRATE INTRAVENOUS at 17:04

## 2021-01-01 RX ADMIN — SODIUM CHLORIDE 100 ML: 900 INJECTION, SOLUTION INTRAVENOUS at 06:24

## 2021-01-01 RX ADMIN — INSULIN LISPRO 8 UNITS: 100 INJECTION, SOLUTION INTRAVENOUS; SUBCUTANEOUS at 12:05

## 2021-01-01 RX ADMIN — INSULIN LISPRO 9 UNITS: 100 INJECTION, SOLUTION INTRAVENOUS; SUBCUTANEOUS at 13:14

## 2021-01-01 RX ADMIN — ALTEPLASE 1 MG: 2.2 INJECTION, POWDER, LYOPHILIZED, FOR SOLUTION INTRAVENOUS at 18:02

## 2021-01-01 RX ADMIN — DEXMEDETOMIDINE HYDROCHLORIDE 0.4 MCG/KG/HR: 400 INJECTION INTRAVENOUS at 19:53

## 2021-01-01 RX ADMIN — HEPARIN SODIUM 18 UNITS/KG/HR: 5000 INJECTION, SOLUTION INTRAVENOUS at 20:35

## 2021-01-01 RX ADMIN — SODIUM CHLORIDE 30 ML: 9 INJECTION, SOLUTION INTRAMUSCULAR; INTRAVENOUS; SUBCUTANEOUS at 06:10

## 2021-01-01 RX ADMIN — INSULIN LISPRO 8 UNITS: 100 INJECTION, SOLUTION INTRAVENOUS; SUBCUTANEOUS at 08:27

## 2021-01-01 RX ADMIN — INSULIN LISPRO 9 UNITS: 100 INJECTION, SOLUTION INTRAVENOUS; SUBCUTANEOUS at 21:40

## 2021-01-01 RX ADMIN — LISINOPRIL AND HYDROCHLOROTHIAZIDE 1 TABLET: 12.5; 2 TABLET ORAL at 08:31

## 2021-01-01 RX ADMIN — INSULIN LISPRO 3 UNITS: 100 INJECTION, SOLUTION INTRAVENOUS; SUBCUTANEOUS at 22:01

## 2021-01-01 RX ADMIN — SODIUM CHLORIDE 30 ML: 9 INJECTION, SOLUTION INTRAMUSCULAR; INTRAVENOUS; SUBCUTANEOUS at 16:07

## 2021-01-01 RX ADMIN — PANTOPRAZOLE SODIUM 40 MG: 40 TABLET, DELAYED RELEASE ORAL at 05:28

## 2021-01-01 RX ADMIN — HEPARIN SODIUM 20 UNITS/KG/HR: 5000 INJECTION, SOLUTION INTRAVENOUS at 06:59

## 2021-01-01 RX ADMIN — PANTOPRAZOLE SODIUM 40 MG: 40 TABLET, DELAYED RELEASE ORAL at 06:28

## 2021-01-01 RX ADMIN — ALBUTEROL SULFATE 2 PUFF: 90 AEROSOL, METERED RESPIRATORY (INHALATION) at 20:10

## 2021-01-01 RX ADMIN — FAMOTIDINE 20 MG: 10 INJECTION INTRAVENOUS at 21:51

## 2021-01-01 RX ADMIN — ALBUTEROL SULFATE 2 PUFF: 90 AEROSOL, METERED RESPIRATORY (INHALATION) at 20:24

## 2021-01-01 RX ADMIN — ALBUTEROL SULFATE 2 PUFF: 90 AEROSOL, METERED RESPIRATORY (INHALATION) at 02:35

## 2021-01-01 RX ADMIN — OXYCODONE HYDROCHLORIDE AND ACETAMINOPHEN 500 MG: 500 TABLET ORAL at 11:35

## 2021-01-01 RX ADMIN — HEPARIN SODIUM 20 UNITS/KG/HR: 5000 INJECTION, SOLUTION INTRAVENOUS at 18:22

## 2021-01-01 RX ADMIN — DEXAMETHASONE SODIUM PHOSPHATE 6 MG: 10 INJECTION INTRAMUSCULAR; INTRAVENOUS at 17:24

## 2021-01-01 RX ADMIN — DEXAMETHASONE SODIUM PHOSPHATE 6 MG: 10 INJECTION INTRAMUSCULAR; INTRAVENOUS at 18:33

## 2021-01-01 RX ADMIN — SODIUM CHLORIDE 30 ML: 9 INJECTION, SOLUTION INTRAMUSCULAR; INTRAVENOUS; SUBCUTANEOUS at 05:11

## 2021-01-01 RX ADMIN — PANTOPRAZOLE SODIUM 40 MG: 40 TABLET, DELAYED RELEASE ORAL at 07:45

## 2021-01-01 RX ADMIN — OXYCODONE HYDROCHLORIDE AND ACETAMINOPHEN 500 MG: 500 TABLET ORAL at 09:12

## 2021-01-01 RX ADMIN — SODIUM CHLORIDE 30 ML: 9 INJECTION, SOLUTION INTRAMUSCULAR; INTRAVENOUS; SUBCUTANEOUS at 21:46

## 2021-01-01 RX ADMIN — SODIUM BICARBONATE: 84 INJECTION, SOLUTION INTRAVENOUS at 03:58

## 2021-01-01 RX ADMIN — CALCIUM CHLORIDE 1 G: 100 INJECTION, SOLUTION INTRAVENOUS; INTRAVENTRICULAR at 19:22

## 2021-01-01 RX ADMIN — PIPERACILLIN SODIUM AND TAZOBACTAM SODIUM 3.38 G: 3; .375 INJECTION, POWDER, LYOPHILIZED, FOR SOLUTION INTRAVENOUS at 01:03

## 2021-01-01 RX ADMIN — DEXAMETHASONE SODIUM PHOSPHATE 6 MG: 10 INJECTION INTRAMUSCULAR; INTRAVENOUS at 08:21

## 2021-01-01 RX ADMIN — INSULIN LISPRO 9 UNITS: 100 INJECTION, SOLUTION INTRAVENOUS; SUBCUTANEOUS at 11:46

## 2021-01-01 RX ADMIN — INSULIN LISPRO 3 UNITS: 100 INJECTION, SOLUTION INTRAVENOUS; SUBCUTANEOUS at 21:11

## 2021-01-01 RX ADMIN — INSULIN LISPRO 12 UNITS: 100 INJECTION, SOLUTION INTRAVENOUS; SUBCUTANEOUS at 12:58

## 2021-01-01 RX ADMIN — SODIUM CHLORIDE 30 ML: 9 INJECTION, SOLUTION INTRAMUSCULAR; INTRAVENOUS; SUBCUTANEOUS at 14:35

## 2021-01-01 RX ADMIN — HEPARIN SODIUM 16 UNITS/KG/HR: 5000 INJECTION, SOLUTION INTRAVENOUS at 03:24

## 2021-01-01 RX ADMIN — SODIUM CHLORIDE 30 ML: 9 INJECTION, SOLUTION INTRAMUSCULAR; INTRAVENOUS; SUBCUTANEOUS at 06:38

## 2021-01-01 RX ADMIN — HEPARIN SODIUM 15 UNITS/KG/HR: 5000 INJECTION, SOLUTION INTRAVENOUS at 03:44

## 2021-01-01 RX ADMIN — INSULIN LISPRO 6 UNITS: 100 INJECTION, SOLUTION INTRAVENOUS; SUBCUTANEOUS at 12:00

## 2021-01-01 RX ADMIN — PANTOPRAZOLE SODIUM 40 MG: 40 TABLET, DELAYED RELEASE ORAL at 08:43

## 2021-01-01 RX ADMIN — CALCIUM GLUCONATE: 98 INJECTION, SOLUTION INTRAVENOUS at 18:51

## 2021-01-01 RX ADMIN — INSULIN LISPRO 7 UNITS: 100 INJECTION, SOLUTION INTRAVENOUS; SUBCUTANEOUS at 16:38

## 2021-01-01 RX ADMIN — CEFTRIAXONE 1 G: 1 INJECTION, POWDER, FOR SOLUTION INTRAMUSCULAR; INTRAVENOUS at 08:24

## 2021-01-01 RX ADMIN — ATROPINE SULFATE 1 MG: 0.1 INJECTION, SOLUTION INTRAVENOUS at 19:17

## 2021-01-01 RX ADMIN — INSULIN LISPRO 6 UNITS: 100 INJECTION, SOLUTION INTRAVENOUS; SUBCUTANEOUS at 16:39

## 2021-01-01 RX ADMIN — INSULIN LISPRO 3 UNITS: 100 INJECTION, SOLUTION INTRAVENOUS; SUBCUTANEOUS at 06:22

## 2021-01-01 RX ADMIN — PANTOPRAZOLE SODIUM 40 MG: 40 TABLET, DELAYED RELEASE ORAL at 08:22

## 2021-01-01 RX ADMIN — APIXABAN 10 MG: 5 TABLET, FILM COATED ORAL at 20:48

## 2021-01-01 RX ADMIN — SODIUM BICARBONATE 50 MEQ: 84 INJECTION, SOLUTION INTRAVENOUS at 19:51

## 2021-01-01 RX ADMIN — INSULIN LISPRO 9 UNITS: 100 INJECTION, SOLUTION INTRAVENOUS; SUBCUTANEOUS at 18:10

## 2021-01-01 RX ADMIN — PANTOPRAZOLE SODIUM 40 MG: 40 TABLET, DELAYED RELEASE ORAL at 05:15

## 2021-01-01 RX ADMIN — HEPARIN SODIUM 15 UNITS/KG/HR: 5000 INJECTION, SOLUTION INTRAVENOUS at 10:27

## 2021-01-01 RX ADMIN — INSULIN LISPRO 6 UNITS: 100 INJECTION, SOLUTION INTRAVENOUS; SUBCUTANEOUS at 12:35

## 2021-01-01 RX ADMIN — Medication 40 ML: at 13:45

## 2021-01-01 RX ADMIN — HEPARIN SODIUM 15 UNITS/KG/HR: 5000 INJECTION, SOLUTION INTRAVENOUS at 11:13

## 2021-01-01 RX ADMIN — APIXABAN 5 MG: 5 TABLET, FILM COATED ORAL at 17:04

## 2021-01-01 RX ADMIN — INSULIN LISPRO 3 UNITS: 100 INJECTION, SOLUTION INTRAVENOUS; SUBCUTANEOUS at 16:43

## 2021-01-01 RX ADMIN — SODIUM CHLORIDE 30 ML: 9 INJECTION, SOLUTION INTRAMUSCULAR; INTRAVENOUS; SUBCUTANEOUS at 21:16

## 2021-01-01 RX ADMIN — SODIUM BICARBONATE 150 MEQ: 84 INJECTION, SOLUTION INTRAVENOUS at 06:55

## 2021-01-01 RX ADMIN — INSULIN LISPRO 5 UNITS: 100 INJECTION, SOLUTION INTRAVENOUS; SUBCUTANEOUS at 11:47

## 2021-01-01 RX ADMIN — INSULIN GLARGINE 15 UNITS: 100 INJECTION, SOLUTION SUBCUTANEOUS at 08:05

## 2021-01-01 RX ADMIN — INSULIN LISPRO 6 UNITS: 100 INJECTION, SOLUTION INTRAVENOUS; SUBCUTANEOUS at 08:18

## 2021-01-01 RX ADMIN — HEPARIN SODIUM 15 UNITS/KG/HR: 5000 INJECTION, SOLUTION INTRAVENOUS at 15:40

## 2021-01-01 RX ADMIN — MEDROXYPROGESTERONE ACETATE 20 MG: 10 TABLET ORAL at 08:04

## 2021-01-01 RX ADMIN — ALTEPLASE 1 MG: 2.2 INJECTION, POWDER, LYOPHILIZED, FOR SOLUTION INTRAVENOUS at 03:27

## 2021-01-01 RX ADMIN — INSULIN LISPRO 7 UNITS: 100 INJECTION, SOLUTION INTRAVENOUS; SUBCUTANEOUS at 13:07

## 2021-01-01 RX ADMIN — INSULIN LISPRO 6 UNITS: 100 INJECTION, SOLUTION INTRAVENOUS; SUBCUTANEOUS at 08:05

## 2021-01-01 RX ADMIN — SODIUM CHLORIDE 30 ML: 9 INJECTION, SOLUTION INTRAMUSCULAR; INTRAVENOUS; SUBCUTANEOUS at 22:22

## 2021-01-01 RX ADMIN — INSULIN LISPRO 6 UNITS: 100 INJECTION, SOLUTION INTRAVENOUS; SUBCUTANEOUS at 12:29

## 2021-01-01 RX ADMIN — INSULIN LISPRO 8 UNITS: 100 INJECTION, SOLUTION INTRAVENOUS; SUBCUTANEOUS at 16:43

## 2021-01-01 RX ADMIN — SODIUM CHLORIDE 30 ML: 9 INJECTION, SOLUTION INTRAMUSCULAR; INTRAVENOUS; SUBCUTANEOUS at 06:18

## 2021-01-01 RX ADMIN — INSULIN GLARGINE 15 UNITS: 100 INJECTION, SOLUTION SUBCUTANEOUS at 09:31

## 2021-01-01 RX ADMIN — INSULIN LISPRO 3 UNITS: 100 INJECTION, SOLUTION INTRAVENOUS; SUBCUTANEOUS at 22:14

## 2021-01-01 RX ADMIN — SODIUM CHLORIDE 30 ML: 9 INJECTION, SOLUTION INTRAMUSCULAR; INTRAVENOUS; SUBCUTANEOUS at 22:13

## 2021-01-01 RX ADMIN — FENTANYL CITRATE 50 MCG/HR: 50 INJECTION, SOLUTION INTRAMUSCULAR; INTRAVENOUS at 15:48

## 2021-01-01 RX ADMIN — INSULIN LISPRO 7 UNITS: 100 INJECTION, SOLUTION INTRAVENOUS; SUBCUTANEOUS at 08:44

## 2021-01-01 RX ADMIN — AZITHROMYCIN MONOHYDRATE 500 MG: 500 INJECTION, POWDER, LYOPHILIZED, FOR SOLUTION INTRAVENOUS at 08:14

## 2021-01-01 RX ADMIN — INSULIN GLARGINE 15 UNITS: 100 INJECTION, SOLUTION SUBCUTANEOUS at 08:59

## 2021-01-01 RX ADMIN — POTASSIUM PHOSPHATE, MONOBASIC POTASSIUM PHOSPHATE, DIBASIC: 224; 236 INJECTION, SOLUTION, CONCENTRATE INTRAVENOUS at 18:03

## 2021-01-01 RX ADMIN — INSULIN LISPRO 5 UNITS: 100 INJECTION, SOLUTION INTRAVENOUS; SUBCUTANEOUS at 11:30

## 2021-01-01 RX ADMIN — DEXAMETHASONE SODIUM PHOSPHATE 6 MG: 10 INJECTION INTRAMUSCULAR; INTRAVENOUS at 08:22

## 2021-01-01 RX ADMIN — SODIUM BICARBONATE 100 MEQ: 84 INJECTION, SOLUTION INTRAVENOUS at 19:26

## 2021-01-01 RX ADMIN — SODIUM CHLORIDE 30 ML: 9 INJECTION, SOLUTION INTRAMUSCULAR; INTRAVENOUS; SUBCUTANEOUS at 14:23

## 2021-01-01 RX ADMIN — ENOXAPARIN SODIUM 40 MG: 40 INJECTION SUBCUTANEOUS at 23:08

## 2021-01-01 RX ADMIN — INSULIN LISPRO 6 UNITS: 100 INJECTION, SOLUTION INTRAVENOUS; SUBCUTANEOUS at 15:36

## 2021-01-01 RX ADMIN — DEXAMETHASONE SODIUM PHOSPHATE 6 MG: 10 INJECTION INTRAMUSCULAR; INTRAVENOUS at 08:37

## 2021-01-01 RX ADMIN — SODIUM CHLORIDE 30 ML: 9 INJECTION, SOLUTION INTRAMUSCULAR; INTRAVENOUS; SUBCUTANEOUS at 06:29

## 2021-01-01 RX ADMIN — ALBUTEROL SULFATE 2 PUFF: 90 AEROSOL, METERED RESPIRATORY (INHALATION) at 15:58

## 2021-01-01 RX ADMIN — INSULIN GLARGINE 15 UNITS: 100 INJECTION, SOLUTION SUBCUTANEOUS at 10:26

## 2021-01-01 RX ADMIN — INSULIN LISPRO 7 UNITS: 100 INJECTION, SOLUTION INTRAVENOUS; SUBCUTANEOUS at 07:56

## 2021-01-01 RX ADMIN — SODIUM CHLORIDE 30 ML: 9 INJECTION, SOLUTION INTRAMUSCULAR; INTRAVENOUS; SUBCUTANEOUS at 21:04

## 2021-01-01 RX ADMIN — INSULIN LISPRO 5 UNITS: 100 INJECTION, SOLUTION INTRAVENOUS; SUBCUTANEOUS at 17:13

## 2021-01-01 RX ADMIN — APIXABAN 5 MG: 5 TABLET, FILM COATED ORAL at 17:43

## 2021-01-01 RX ADMIN — DEXAMETHASONE SODIUM PHOSPHATE 10 MG: 10 INJECTION, SOLUTION INTRAMUSCULAR; INTRAVENOUS at 08:23

## 2021-01-01 RX ADMIN — INSULIN LISPRO 6 UNITS: 100 INJECTION, SOLUTION INTRAVENOUS; SUBCUTANEOUS at 22:00

## 2021-01-01 RX ADMIN — CISATRACURIUM BESYLATE 30 MG: 2 INJECTION, SOLUTION INTRAVENOUS at 11:54

## 2021-01-01 RX ADMIN — INSULIN LISPRO 6 UNITS: 100 INJECTION, SOLUTION INTRAVENOUS; SUBCUTANEOUS at 06:08

## 2021-01-01 RX ADMIN — HEPARIN SODIUM 18 UNITS/KG/HR: 5000 INJECTION, SOLUTION INTRAVENOUS at 14:41

## 2021-01-01 RX ADMIN — INSULIN LISPRO 9 UNITS: 100 INJECTION, SOLUTION INTRAVENOUS; SUBCUTANEOUS at 16:54

## 2021-01-01 RX ADMIN — MORPHINE SULFATE 1 MG: 2 INJECTION, SOLUTION INTRAMUSCULAR; INTRAVENOUS at 00:41

## 2021-01-01 RX ADMIN — INSULIN GLARGINE 15 UNITS: 100 INJECTION, SOLUTION SUBCUTANEOUS at 09:00

## 2021-01-01 RX ADMIN — INSULIN LISPRO 9 UNITS: 100 INJECTION, SOLUTION INTRAVENOUS; SUBCUTANEOUS at 06:15

## 2021-01-01 RX ADMIN — INSULIN LISPRO 5 UNITS: 100 INJECTION, SOLUTION INTRAVENOUS; SUBCUTANEOUS at 17:50

## 2021-01-01 RX ADMIN — HEPARIN SODIUM 15 UNITS/KG/HR: 5000 INJECTION, SOLUTION INTRAVENOUS at 00:33

## 2021-01-01 RX ADMIN — INSULIN LISPRO 6 UNITS: 100 INJECTION, SOLUTION INTRAVENOUS; SUBCUTANEOUS at 12:42

## 2021-01-01 RX ADMIN — SODIUM CHLORIDE 30 ML: 9 INJECTION, SOLUTION INTRAMUSCULAR; INTRAVENOUS; SUBCUTANEOUS at 22:05

## 2021-01-01 RX ADMIN — INSULIN LISPRO 5 UNITS: 100 INJECTION, SOLUTION INTRAVENOUS; SUBCUTANEOUS at 08:35

## 2021-01-01 RX ADMIN — DEXAMETHASONE SODIUM PHOSPHATE 6 MG: 10 INJECTION INTRAMUSCULAR; INTRAVENOUS at 09:08

## 2021-01-01 RX ADMIN — MEDROXYPROGESTERONE ACETATE 20 MG: 10 TABLET ORAL at 08:02

## 2021-01-01 RX ADMIN — METOPROLOL TARTRATE 25 MG: 25 TABLET, FILM COATED ORAL at 08:26

## 2021-01-01 RX ADMIN — INSULIN LISPRO 6 UNITS: 100 INJECTION, SOLUTION INTRAVENOUS; SUBCUTANEOUS at 21:00

## 2021-01-01 RX ADMIN — Medication 50 MG: at 09:12

## 2021-01-01 RX ADMIN — HEPARIN SODIUM 15 UNITS/KG/HR: 5000 INJECTION, SOLUTION INTRAVENOUS at 07:07

## 2021-01-01 RX ADMIN — INSULIN LISPRO 8 UNITS: 100 INJECTION, SOLUTION INTRAVENOUS; SUBCUTANEOUS at 09:31

## 2021-01-01 RX ADMIN — PANTOPRAZOLE SODIUM 40 MG: 40 TABLET, DELAYED RELEASE ORAL at 06:10

## 2021-01-01 RX ADMIN — BUDESONIDE AND FORMOTEROL FUMARATE DIHYDRATE 2 PUFF: 80; 4.5 AEROSOL RESPIRATORY (INHALATION) at 07:41

## 2021-01-01 RX ADMIN — LISINOPRIL AND HYDROCHLOROTHIAZIDE 1 TABLET: 12.5; 2 TABLET ORAL at 09:08

## 2021-01-01 RX ADMIN — EPINEPHRINE 1 MG: 0.1 INJECTION, SOLUTION ENDOTRACHEAL; INTRACARDIAC; INTRAVENOUS at 19:55

## 2021-01-01 RX ADMIN — ALBUTEROL SULFATE 2 PUFF: 90 AEROSOL, METERED RESPIRATORY (INHALATION) at 02:00

## 2021-01-01 RX ADMIN — INSULIN LISPRO 8 UNITS: 100 INJECTION, SOLUTION INTRAVENOUS; SUBCUTANEOUS at 16:58

## 2021-01-01 RX ADMIN — NOREPINEPHRINE BITARTRATE 10 MCG/MIN: 1 INJECTION, SOLUTION INTRAVENOUS at 15:35

## 2021-01-01 RX ADMIN — ACETAMINOPHEN 650 MG: 325 TABLET ORAL at 11:35

## 2021-01-01 RX ADMIN — PERFLUTREN 1 ML: 6.52 INJECTION, SUSPENSION INTRAVENOUS at 08:40

## 2021-01-01 RX ADMIN — LISINOPRIL AND HYDROCHLOROTHIAZIDE 1 TABLET: 12.5; 2 TABLET ORAL at 08:43

## 2021-01-01 RX ADMIN — ALBUTEROL SULFATE 2 PUFF: 90 AEROSOL, METERED RESPIRATORY (INHALATION) at 08:36

## 2021-01-01 RX ADMIN — APIXABAN 5 MG: 5 TABLET, FILM COATED ORAL at 08:43

## 2021-01-01 RX ADMIN — SODIUM CHLORIDE 30 ML: 9 INJECTION, SOLUTION INTRAMUSCULAR; INTRAVENOUS; SUBCUTANEOUS at 16:32

## 2021-01-01 RX ADMIN — CISATRACURIUM BESYLATE 30.06 MG: 2 INJECTION, SOLUTION INTRAVENOUS at 18:39

## 2021-01-01 RX ADMIN — MORPHINE SULFATE 1 MG: 2 INJECTION, SOLUTION INTRAMUSCULAR; INTRAVENOUS at 05:39

## 2021-01-01 RX ADMIN — INSULIN GLARGINE 15 UNITS: 100 INJECTION, SOLUTION SUBCUTANEOUS at 08:27

## 2021-01-01 RX ADMIN — ACETAMINOPHEN 650 MG: 325 TABLET ORAL at 13:34

## 2021-01-01 RX ADMIN — ALBUTEROL SULFATE 2 PUFF: 90 AEROSOL, METERED RESPIRATORY (INHALATION) at 01:06

## 2021-01-01 RX ADMIN — SODIUM CHLORIDE 30 ML: 9 INJECTION, SOLUTION INTRAMUSCULAR; INTRAVENOUS; SUBCUTANEOUS at 05:07

## 2021-01-01 RX ADMIN — ALBUTEROL SULFATE 2 PUFF: 90 AEROSOL, METERED RESPIRATORY (INHALATION) at 02:20

## 2021-01-01 RX ADMIN — SODIUM BICARBONATE 50 MEQ: 84 INJECTION, SOLUTION INTRAVENOUS at 19:58

## 2021-01-01 RX ADMIN — DEXAMETHASONE SODIUM PHOSPHATE 6 MG: 10 INJECTION, SOLUTION INTRAMUSCULAR; INTRAVENOUS at 08:53

## 2021-01-01 RX ADMIN — INSULIN LISPRO 3 UNITS: 100 INJECTION, SOLUTION INTRAVENOUS; SUBCUTANEOUS at 06:06

## 2021-01-01 RX ADMIN — INSULIN LISPRO 3 UNITS: 100 INJECTION, SOLUTION INTRAVENOUS; SUBCUTANEOUS at 11:28

## 2021-01-01 RX ADMIN — DEXAMETHASONE SODIUM PHOSPHATE 6 MG: 10 INJECTION INTRAMUSCULAR; INTRAVENOUS at 08:56

## 2021-01-01 RX ADMIN — INSULIN LISPRO 3 UNITS: 100 INJECTION, SOLUTION INTRAVENOUS; SUBCUTANEOUS at 09:15

## 2021-01-01 RX ADMIN — INSULIN LISPRO 8 UNITS: 100 INJECTION, SOLUTION INTRAVENOUS; SUBCUTANEOUS at 11:30

## 2021-01-01 RX ADMIN — REMDESIVIR 100 MG: 100 INJECTION, POWDER, LYOPHILIZED, FOR SOLUTION INTRAVENOUS at 11:46

## 2021-01-01 RX ADMIN — IOPAMIDOL 100 ML: 755 INJECTION, SOLUTION INTRAVENOUS at 06:24

## 2021-01-01 RX ADMIN — SODIUM CHLORIDE 30 ML: 9 INJECTION, SOLUTION INTRAMUSCULAR; INTRAVENOUS; SUBCUTANEOUS at 15:10

## 2021-01-01 RX ADMIN — INSULIN GLARGINE 15 UNITS: 100 INJECTION, SOLUTION SUBCUTANEOUS at 08:36

## 2021-01-01 RX ADMIN — SODIUM CHLORIDE 30 ML: 9 INJECTION, SOLUTION INTRAMUSCULAR; INTRAVENOUS; SUBCUTANEOUS at 13:03

## 2021-01-01 RX ADMIN — ALBUTEROL SULFATE 2 PUFF: 90 AEROSOL, METERED RESPIRATORY (INHALATION) at 20:19

## 2021-01-01 RX ADMIN — IOPAMIDOL 100 ML: 755 INJECTION, SOLUTION INTRAVENOUS at 11:30

## 2021-01-01 RX ADMIN — INSULIN LISPRO 3 UNITS: 100 INJECTION, SOLUTION INTRAVENOUS; SUBCUTANEOUS at 08:43

## 2021-01-01 RX ADMIN — SODIUM CHLORIDE 30 ML: 9 INJECTION, SOLUTION INTRAMUSCULAR; INTRAVENOUS; SUBCUTANEOUS at 22:33

## 2021-01-01 RX ADMIN — FAMOTIDINE 20 MG: 10 INJECTION INTRAVENOUS at 10:10

## 2021-01-01 RX ADMIN — INSULIN LISPRO 6 UNITS: 100 INJECTION, SOLUTION INTRAVENOUS; SUBCUTANEOUS at 22:31

## 2021-01-01 RX ADMIN — SOYBEAN OIL 250 ML: 20 INJECTION, SOLUTION INTRAVENOUS at 18:16

## 2021-01-01 RX ADMIN — VASOPRESSIN 0.1 UNITS/MIN: 20 INJECTION INTRAVENOUS at 20:12

## 2021-01-01 RX ADMIN — PHENYLEPHRINE HYDROCHLORIDE 300 MCG/MIN: 10 INJECTION INTRAVENOUS at 06:24

## 2021-01-01 RX ADMIN — INSULIN LISPRO 6 UNITS: 100 INJECTION, SOLUTION INTRAVENOUS; SUBCUTANEOUS at 08:06

## 2021-01-01 RX ADMIN — MORPHINE SULFATE 1 MG: 2 INJECTION, SOLUTION INTRAMUSCULAR; INTRAVENOUS at 13:15

## 2021-01-01 RX ADMIN — DEXAMETHASONE SODIUM PHOSPHATE 6 MG: 10 INJECTION INTRAMUSCULAR; INTRAVENOUS at 08:36

## 2021-01-01 RX ADMIN — LISINOPRIL AND HYDROCHLOROTHIAZIDE 1 TABLET: 12.5; 2 TABLET ORAL at 09:15

## 2021-01-01 RX ADMIN — INSULIN LISPRO 3 UNITS: 100 INJECTION, SOLUTION INTRAVENOUS; SUBCUTANEOUS at 08:59

## 2021-01-01 RX ADMIN — INSULIN LISPRO 12 UNITS: 100 INJECTION, SOLUTION INTRAVENOUS; SUBCUTANEOUS at 21:40

## 2021-01-01 RX ADMIN — SODIUM CHLORIDE 30 ML: 9 INJECTION, SOLUTION INTRAMUSCULAR; INTRAVENOUS; SUBCUTANEOUS at 21:08

## 2021-01-01 RX ADMIN — INSULIN LISPRO 9 UNITS: 100 INJECTION, SOLUTION INTRAVENOUS; SUBCUTANEOUS at 12:05

## 2021-01-01 RX ADMIN — ACETAMINOPHEN 650 MG: 325 TABLET ORAL at 20:40

## 2021-01-01 RX ADMIN — INSULIN LISPRO 6 UNITS: 100 INJECTION, SOLUTION INTRAVENOUS; SUBCUTANEOUS at 12:05

## 2021-01-01 RX ADMIN — SODIUM CHLORIDE 30 ML: 9 INJECTION, SOLUTION INTRAMUSCULAR; INTRAVENOUS; SUBCUTANEOUS at 05:00

## 2021-01-01 RX ADMIN — HEPARIN SODIUM 15 UNITS/KG/HR: 5000 INJECTION, SOLUTION INTRAVENOUS at 10:34

## 2021-01-01 RX ADMIN — AMIODARONE HYDROCHLORIDE 1 MG/MIN: 50 INJECTION, SOLUTION INTRAVENOUS at 19:15

## 2021-01-01 RX ADMIN — INSULIN LISPRO 5 UNITS: 100 INJECTION, SOLUTION INTRAVENOUS; SUBCUTANEOUS at 12:57

## 2021-01-01 RX ADMIN — PHENYLEPHRINE HYDROCHLORIDE 300 MCG/MIN: 10 INJECTION INTRAVENOUS at 17:00

## 2021-01-01 RX ADMIN — SODIUM CHLORIDE 30 ML: 9 INJECTION, SOLUTION INTRAMUSCULAR; INTRAVENOUS; SUBCUTANEOUS at 05:49

## 2021-01-01 RX ADMIN — DEXAMETHASONE SODIUM PHOSPHATE 6 MG: 10 INJECTION INTRAMUSCULAR; INTRAVENOUS at 08:46

## 2021-01-01 RX ADMIN — INSULIN LISPRO 6 UNITS: 100 INJECTION, SOLUTION INTRAVENOUS; SUBCUTANEOUS at 17:26

## 2021-01-01 RX ADMIN — INSULIN LISPRO 12 UNITS: 100 INJECTION, SOLUTION INTRAVENOUS; SUBCUTANEOUS at 16:03

## 2021-01-01 RX ADMIN — INSULIN LISPRO 12 UNITS: 100 INJECTION, SOLUTION INTRAVENOUS; SUBCUTANEOUS at 18:00

## 2021-01-01 RX ADMIN — DEXMEDETOMIDINE HYDROCHLORIDE 0.4 MCG/KG/HR: 400 INJECTION INTRAVENOUS at 03:50

## 2021-01-01 RX ADMIN — INSULIN LISPRO 12 UNITS: 100 INJECTION, SOLUTION INTRAVENOUS; SUBCUTANEOUS at 18:03

## 2021-01-01 RX ADMIN — HYDRALAZINE HYDROCHLORIDE 10 MG: 20 INJECTION INTRAMUSCULAR; INTRAVENOUS at 00:35

## 2021-01-01 RX ADMIN — SODIUM CHLORIDE 30 ML: 9 INJECTION, SOLUTION INTRAMUSCULAR; INTRAVENOUS; SUBCUTANEOUS at 13:45

## 2021-01-01 RX ADMIN — DEXAMETHASONE SODIUM PHOSPHATE 6 MG: 10 INJECTION INTRAMUSCULAR; INTRAVENOUS at 08:06

## 2021-01-01 RX ADMIN — PANTOPRAZOLE SODIUM 40 MG: 40 TABLET, DELAYED RELEASE ORAL at 05:17

## 2021-01-01 RX ADMIN — INSULIN LISPRO 5 UNITS: 100 INJECTION, SOLUTION INTRAVENOUS; SUBCUTANEOUS at 11:09

## 2021-01-01 RX ADMIN — INSULIN LISPRO 3 UNITS: 100 INJECTION, SOLUTION INTRAVENOUS; SUBCUTANEOUS at 16:26

## 2021-01-01 RX ADMIN — INSULIN LISPRO 12 UNITS: 100 INJECTION, SOLUTION INTRAVENOUS; SUBCUTANEOUS at 17:14

## 2021-01-01 RX ADMIN — MINERAL OIL, PETROLATUM: 425; 568 OINTMENT OPHTHALMIC at 17:44

## 2021-01-01 RX ADMIN — BUDESONIDE AND FORMOTEROL FUMARATE DIHYDRATE 2 PUFF: 80; 4.5 AEROSOL RESPIRATORY (INHALATION) at 08:47

## 2021-01-01 RX ADMIN — ALTEPLASE 1 MG: 2.2 INJECTION, POWDER, LYOPHILIZED, FOR SOLUTION INTRAVENOUS at 18:00

## 2021-01-01 RX ADMIN — FUROSEMIDE 80 MG: 10 INJECTION, SOLUTION INTRAMUSCULAR; INTRAVENOUS at 10:10

## 2021-01-01 RX ADMIN — Medication 50 MG: at 11:35

## 2021-01-01 RX ADMIN — DEXAMETHASONE SODIUM PHOSPHATE 6 MG: 10 INJECTION INTRAMUSCULAR; INTRAVENOUS at 16:28

## 2021-01-01 RX ADMIN — INSULIN LISPRO 3 UNITS: 100 INJECTION, SOLUTION INTRAVENOUS; SUBCUTANEOUS at 12:22

## 2021-01-01 RX ADMIN — BUDESONIDE AND FORMOTEROL FUMARATE DIHYDRATE 2 PUFF: 80; 4.5 AEROSOL RESPIRATORY (INHALATION) at 21:28

## 2021-01-01 RX ADMIN — ENOXAPARIN SODIUM 40 MG: 40 INJECTION SUBCUTANEOUS at 11:46

## 2021-01-01 RX ADMIN — PANTOPRAZOLE SODIUM 40 MG: 40 TABLET, DELAYED RELEASE ORAL at 06:33

## 2021-01-01 RX ADMIN — SODIUM CHLORIDE 30 ML: 9 INJECTION, SOLUTION INTRAMUSCULAR; INTRAVENOUS; SUBCUTANEOUS at 05:18

## 2021-01-01 RX ADMIN — INSULIN LISPRO 6 UNITS: 100 INJECTION, SOLUTION INTRAVENOUS; SUBCUTANEOUS at 16:15

## 2021-01-01 RX ADMIN — INSULIN LISPRO 5 UNITS: 100 INJECTION, SOLUTION INTRAVENOUS; SUBCUTANEOUS at 12:10

## 2021-01-01 RX ADMIN — INSULIN GLARGINE 20 UNITS: 100 INJECTION, SOLUTION SUBCUTANEOUS at 17:00

## 2021-01-01 RX ADMIN — INSULIN LISPRO 5 UNITS: 100 INJECTION, SOLUTION INTRAVENOUS; SUBCUTANEOUS at 17:29

## 2021-01-01 RX ADMIN — HEPARIN SODIUM 15 UNITS/KG/HR: 5000 INJECTION, SOLUTION INTRAVENOUS at 06:01

## 2021-01-01 RX ADMIN — LORAZEPAM 0.5 MG: 2 INJECTION INTRAMUSCULAR; INTRAVENOUS at 02:47

## 2021-01-01 RX ADMIN — FUROSEMIDE 20 MG: 10 INJECTION, SOLUTION INTRAMUSCULAR; INTRAVENOUS at 16:13

## 2021-01-01 RX ADMIN — SODIUM CHLORIDE 30 ML: 9 INJECTION, SOLUTION INTRAMUSCULAR; INTRAVENOUS; SUBCUTANEOUS at 15:09

## 2021-01-01 RX ADMIN — ENOXAPARIN SODIUM 40 MG: 40 INJECTION SUBCUTANEOUS at 22:58

## 2021-01-01 RX ADMIN — APIXABAN 10 MG: 5 TABLET, FILM COATED ORAL at 21:23

## 2021-01-01 RX ADMIN — PANTOPRAZOLE SODIUM 40 MG: 40 TABLET, DELAYED RELEASE ORAL at 05:31

## 2021-01-01 RX ADMIN — APIXABAN 10 MG: 5 TABLET, FILM COATED ORAL at 20:57

## 2021-01-01 RX ADMIN — HEPARIN SODIUM 15 UNITS/KG/HR: 5000 INJECTION, SOLUTION INTRAVENOUS at 07:06

## 2021-01-01 RX ADMIN — DEXAMETHASONE SODIUM PHOSPHATE 6 MG: 10 INJECTION, SOLUTION INTRAMUSCULAR; INTRAVENOUS at 09:13

## 2021-01-01 RX ADMIN — SODIUM CHLORIDE 30 ML: 9 INJECTION, SOLUTION INTRAMUSCULAR; INTRAVENOUS; SUBCUTANEOUS at 05:14

## 2021-01-01 RX ADMIN — PERFLUTREN 1 ML: 6.52 INJECTION, SUSPENSION INTRAVENOUS at 14:06

## 2021-01-01 RX ADMIN — VASOPRESSIN 0.1 UNITS/MIN: 20 INJECTION INTRAVENOUS at 13:42

## 2021-01-01 RX ADMIN — INSULIN LISPRO 9 UNITS: 100 INJECTION, SOLUTION INTRAVENOUS; SUBCUTANEOUS at 17:04

## 2021-01-01 RX ADMIN — PHENYLEPHRINE HYDROCHLORIDE 300 MCG/MIN: 10 INJECTION INTRAVENOUS at 17:24

## 2021-01-01 RX ADMIN — ALBUTEROL SULFATE 2 PUFF: 90 AEROSOL, METERED RESPIRATORY (INHALATION) at 14:24

## 2021-01-01 RX ADMIN — CEFTRIAXONE 1 G: 1 INJECTION, POWDER, FOR SOLUTION INTRAMUSCULAR; INTRAVENOUS at 09:14

## 2021-01-01 RX ADMIN — INSULIN LISPRO 3 UNITS: 100 INJECTION, SOLUTION INTRAVENOUS; SUBCUTANEOUS at 12:00

## 2021-01-01 RX ADMIN — INSULIN HUMAN 15 UNITS: 100 INJECTION, SOLUTION PARENTERAL at 19:40

## 2021-01-01 RX ADMIN — ALBUTEROL SULFATE 2 PUFF: 90 AEROSOL, METERED RESPIRATORY (INHALATION) at 02:31

## 2021-01-01 RX ADMIN — HEPARIN SODIUM 15 UNITS/KG/HR: 5000 INJECTION, SOLUTION INTRAVENOUS at 01:51

## 2021-01-01 RX ADMIN — INSULIN LISPRO 6 UNITS: 100 INJECTION, SOLUTION INTRAVENOUS; SUBCUTANEOUS at 22:26

## 2021-01-01 RX ADMIN — ALBUTEROL SULFATE 2 PUFF: 90 AEROSOL, METERED RESPIRATORY (INHALATION) at 07:42

## 2021-01-01 RX ADMIN — EPINEPHRINE 1 MG: 0.1 INJECTION, SOLUTION ENDOTRACHEAL; INTRACARDIAC; INTRAVENOUS at 19:58

## 2021-01-01 RX ADMIN — HEPARIN SODIUM 13 UNITS/KG/HR: 5000 INJECTION, SOLUTION INTRAVENOUS at 15:56

## 2021-01-01 RX ADMIN — Medication 10 ML: at 06:24

## 2021-01-01 RX ADMIN — EPINEPHRINE 3 MCG/MIN: 1 INJECTION INTRAMUSCULAR; INTRAVENOUS; SUBCUTANEOUS at 17:05

## 2021-01-01 RX ADMIN — CISATRACURIUM BESYLATE 3 MCG/KG/MIN: 2 INJECTION, SOLUTION INTRAVENOUS at 18:44

## 2021-01-01 RX ADMIN — APIXABAN 10 MG: 5 TABLET, FILM COATED ORAL at 12:10

## 2021-01-01 RX ADMIN — INSULIN LISPRO 6 UNITS: 100 INJECTION, SOLUTION INTRAVENOUS; SUBCUTANEOUS at 16:43

## 2021-01-01 RX ADMIN — INSULIN LISPRO 3 UNITS: 100 INJECTION, SOLUTION INTRAVENOUS; SUBCUTANEOUS at 10:51

## 2021-01-01 RX ADMIN — ALBUTEROL SULFATE 2 PUFF: 90 AEROSOL, METERED RESPIRATORY (INHALATION) at 20:31

## 2021-01-01 RX ADMIN — POTASSIUM CHLORIDE 40 MEQ: 400 INJECTION, SOLUTION INTRAVENOUS at 10:28

## 2021-01-01 RX ADMIN — DEXAMETHASONE SODIUM PHOSPHATE 6 MG: 10 INJECTION INTRAMUSCULAR; INTRAVENOUS at 09:30

## 2021-01-01 RX ADMIN — LORAZEPAM 0.5 MG: 2 INJECTION INTRAMUSCULAR; INTRAVENOUS at 12:50

## 2021-01-01 RX ADMIN — DEXMEDETOMIDINE HYDROCHLORIDE 0.4 MCG/KG/HR: 400 INJECTION INTRAVENOUS at 12:28

## 2021-01-01 RX ADMIN — INSULIN LISPRO 3 UNITS: 100 INJECTION, SOLUTION INTRAVENOUS; SUBCUTANEOUS at 11:12

## 2021-01-01 RX ADMIN — INSULIN LISPRO 5 UNITS: 100 INJECTION, SOLUTION INTRAVENOUS; SUBCUTANEOUS at 17:07

## 2021-01-01 RX ADMIN — INSULIN LISPRO 3 UNITS: 100 INJECTION, SOLUTION INTRAVENOUS; SUBCUTANEOUS at 12:33

## 2021-01-01 RX ADMIN — PANTOPRAZOLE SODIUM 40 MG: 40 TABLET, DELAYED RELEASE ORAL at 05:45

## 2021-01-01 RX ADMIN — INSULIN LISPRO 5 UNITS: 100 INJECTION, SOLUTION INTRAVENOUS; SUBCUTANEOUS at 08:58

## 2021-01-01 RX ADMIN — HEPARIN SODIUM 15 UNITS/KG/HR: 5000 INJECTION, SOLUTION INTRAVENOUS at 20:07

## 2021-01-01 RX ADMIN — FAMOTIDINE 20 MG: 10 INJECTION INTRAVENOUS at 09:01

## 2021-01-01 RX ADMIN — MORPHINE SULFATE 2 MG: 2 INJECTION, SOLUTION INTRAMUSCULAR; INTRAVENOUS at 08:49

## 2021-01-01 RX ADMIN — ACETAMINOPHEN 650 MG: 325 TABLET ORAL at 23:19

## 2021-01-01 RX ADMIN — ALBUTEROL SULFATE 2 PUFF: 90 AEROSOL, METERED RESPIRATORY (INHALATION) at 08:01

## 2021-01-01 RX ADMIN — ENOXAPARIN SODIUM 40 MG: 40 INJECTION SUBCUTANEOUS at 11:55

## 2021-01-01 RX ADMIN — INSULIN LISPRO 3 UNITS: 100 INJECTION, SOLUTION INTRAVENOUS; SUBCUTANEOUS at 21:13

## 2021-01-01 RX ADMIN — MEDROXYPROGESTERONE ACETATE 20 MG: 10 TABLET ORAL at 10:49

## 2021-01-01 RX ADMIN — INSULIN LISPRO 3 UNITS: 100 INJECTION, SOLUTION INTRAVENOUS; SUBCUTANEOUS at 22:06

## 2021-01-01 RX ADMIN — ALBUTEROL SULFATE 2 PUFF: 90 AEROSOL, METERED RESPIRATORY (INHALATION) at 08:12

## 2021-01-01 RX ADMIN — MIDAZOLAM HYDROCHLORIDE 4 MG: 1 INJECTION, SOLUTION INTRAMUSCULAR; INTRAVENOUS at 15:34

## 2021-01-01 RX ADMIN — PIPERACILLIN SODIUM AND TAZOBACTAM SODIUM 3.38 G: 3; .375 INJECTION, POWDER, LYOPHILIZED, FOR SOLUTION INTRAVENOUS at 10:10

## 2021-01-01 RX ADMIN — INSULIN LISPRO 9 UNITS: 100 INJECTION, SOLUTION INTRAVENOUS; SUBCUTANEOUS at 21:20

## 2021-01-01 RX ADMIN — HEPARIN SODIUM 15 UNITS/KG/HR: 5000 INJECTION, SOLUTION INTRAVENOUS at 20:20

## 2021-01-01 RX ADMIN — SODIUM BICARBONATE 50 MEQ: 84 INJECTION, SOLUTION INTRAVENOUS at 19:19

## 2021-01-01 RX ADMIN — SODIUM CHLORIDE 30 ML: 9 INJECTION, SOLUTION INTRAMUSCULAR; INTRAVENOUS; SUBCUTANEOUS at 13:19

## 2021-01-01 RX ADMIN — PANTOPRAZOLE SODIUM 40 MG: 40 TABLET, DELAYED RELEASE ORAL at 07:49

## 2021-01-01 RX ADMIN — INSULIN LISPRO 5 UNITS: 100 INJECTION, SOLUTION INTRAVENOUS; SUBCUTANEOUS at 16:07

## 2021-01-01 RX ADMIN — INSULIN LISPRO 6 UNITS: 100 INJECTION, SOLUTION INTRAVENOUS; SUBCUTANEOUS at 12:57

## 2021-01-01 RX ADMIN — ACETAMINOPHEN 650 MG: 325 TABLET ORAL at 18:02

## 2021-01-01 RX ADMIN — INSULIN LISPRO 8 UNITS: 100 INJECTION, SOLUTION INTRAVENOUS; SUBCUTANEOUS at 10:52

## 2021-01-01 RX ADMIN — SODIUM CHLORIDE 30 ML: 9 INJECTION, SOLUTION INTRAMUSCULAR; INTRAVENOUS; SUBCUTANEOUS at 06:22

## 2021-01-01 RX ADMIN — INSULIN LISPRO 5 UNITS: 100 INJECTION, SOLUTION INTRAVENOUS; SUBCUTANEOUS at 17:00

## 2021-01-01 RX ADMIN — CEFTRIAXONE 1 G: 1 INJECTION, POWDER, FOR SOLUTION INTRAMUSCULAR; INTRAVENOUS at 08:58

## 2021-01-01 RX ADMIN — INSULIN LISPRO 12 UNITS: 100 INJECTION, SOLUTION INTRAVENOUS; SUBCUTANEOUS at 03:20

## 2021-01-01 RX ADMIN — INSULIN LISPRO 9 UNITS: 100 INJECTION, SOLUTION INTRAVENOUS; SUBCUTANEOUS at 21:51

## 2021-01-01 RX ADMIN — PHENYLEPHRINE HYDROCHLORIDE 300 MCG/MIN: 10 INJECTION INTRAVENOUS at 11:51

## 2021-01-01 RX ADMIN — SODIUM CHLORIDE 30 ML: 9 INJECTION, SOLUTION INTRAMUSCULAR; INTRAVENOUS; SUBCUTANEOUS at 14:16

## 2021-01-01 RX ADMIN — SODIUM CHLORIDE 30 ML: 9 INJECTION, SOLUTION INTRAMUSCULAR; INTRAVENOUS; SUBCUTANEOUS at 06:06

## 2021-01-01 RX ADMIN — DEXMEDETOMIDINE HYDROCHLORIDE 0.3 MCG/KG/HR: 400 INJECTION INTRAVENOUS at 12:56

## 2021-01-01 RX ADMIN — APIXABAN 10 MG: 5 TABLET, FILM COATED ORAL at 20:13

## 2021-01-01 RX ADMIN — SODIUM CHLORIDE 250 ML: 900 INJECTION, SOLUTION INTRAVENOUS at 16:47

## 2021-01-01 RX ADMIN — MAGNESIUM SULFATE IN WATER 2 G: 40 INJECTION, SOLUTION INTRAVENOUS at 16:58

## 2021-01-01 RX ADMIN — ALBUTEROL SULFATE 2 PUFF: 90 AEROSOL, METERED RESPIRATORY (INHALATION) at 21:28

## 2021-01-01 RX ADMIN — APIXABAN 10 MG: 5 TABLET, FILM COATED ORAL at 21:16

## 2021-01-01 RX ADMIN — SODIUM CHLORIDE 30 ML: 9 INJECTION, SOLUTION INTRAMUSCULAR; INTRAVENOUS; SUBCUTANEOUS at 13:29

## 2021-01-01 RX ADMIN — SODIUM CHLORIDE 30 ML: 9 INJECTION, SOLUTION INTRAMUSCULAR; INTRAVENOUS; SUBCUTANEOUS at 21:54

## 2021-01-01 RX ADMIN — INSULIN LISPRO 6 UNITS: 100 INJECTION, SOLUTION INTRAVENOUS; SUBCUTANEOUS at 17:19

## 2021-01-01 RX ADMIN — INSULIN LISPRO 7 UNITS: 100 INJECTION, SOLUTION INTRAVENOUS; SUBCUTANEOUS at 11:31

## 2021-01-01 RX ADMIN — INSULIN LISPRO 6 UNITS: 100 INJECTION, SOLUTION INTRAVENOUS; SUBCUTANEOUS at 11:35

## 2021-01-01 RX ADMIN — SODIUM CHLORIDE 30 ML: 9 INJECTION, SOLUTION INTRAMUSCULAR; INTRAVENOUS; SUBCUTANEOUS at 06:12

## 2021-01-01 RX ADMIN — DEXAMETHASONE SODIUM PHOSPHATE 6 MG: 10 INJECTION INTRAMUSCULAR; INTRAVENOUS at 08:20

## 2021-01-01 RX ADMIN — INSULIN GLARGINE 15 UNITS: 100 INJECTION, SOLUTION SUBCUTANEOUS at 08:19

## 2021-01-01 RX ADMIN — DEXMEDETOMIDINE HYDROCHLORIDE 0.4 MCG/KG/HR: 400 INJECTION INTRAVENOUS at 23:15

## 2021-01-01 RX ADMIN — LISINOPRIL AND HYDROCHLOROTHIAZIDE 1 TABLET: 12.5; 2 TABLET ORAL at 08:22

## 2021-01-01 RX ADMIN — SODIUM CHLORIDE 500 ML: 900 INJECTION, SOLUTION INTRAVENOUS at 16:06

## 2021-01-01 RX ADMIN — CALCIUM GLUCONATE 1000 MG: 20 INJECTION, SOLUTION INTRAVENOUS at 23:04

## 2021-01-01 RX ADMIN — SODIUM BICARBONATE 50 MEQ: 84 INJECTION, SOLUTION INTRAVENOUS at 16:50

## 2021-01-01 RX ADMIN — SODIUM CHLORIDE 30 ML: 9 INJECTION, SOLUTION INTRAMUSCULAR; INTRAVENOUS; SUBCUTANEOUS at 21:26

## 2021-01-01 RX ADMIN — ENOXAPARIN SODIUM 40 MG: 40 INJECTION SUBCUTANEOUS at 10:10

## 2021-01-01 RX ADMIN — DEXAMETHASONE SODIUM PHOSPHATE 6 MG: 10 INJECTION, SOLUTION INTRAMUSCULAR; INTRAVENOUS at 08:20

## 2021-01-01 RX ADMIN — WATER 50 NG/KG/MIN: 1 SOLUTION INTRAVENOUS at 21:55

## 2021-01-01 RX ADMIN — INSULIN LISPRO 3 UNITS: 100 INJECTION, SOLUTION INTRAVENOUS; SUBCUTANEOUS at 08:21

## 2021-01-01 RX ADMIN — INSULIN LISPRO 9 UNITS: 100 INJECTION, SOLUTION INTRAVENOUS; SUBCUTANEOUS at 14:00

## 2021-01-01 RX ADMIN — INSULIN GLARGINE 40 UNITS: 100 INJECTION, SOLUTION SUBCUTANEOUS at 13:03

## 2021-01-01 RX ADMIN — SODIUM CHLORIDE 30 ML: 9 INJECTION, SOLUTION INTRAMUSCULAR; INTRAVENOUS; SUBCUTANEOUS at 13:44

## 2021-01-01 RX ADMIN — CEFTRIAXONE 1 G: 1 INJECTION, POWDER, FOR SOLUTION INTRAMUSCULAR; INTRAVENOUS at 09:08

## 2021-01-01 RX ADMIN — SODIUM CHLORIDE 2 MCG/KG/MIN: 9 INJECTION, SOLUTION INTRAVENOUS at 09:25

## 2021-01-01 RX ADMIN — AZITHROMYCIN MONOHYDRATE 500 MG: 500 INJECTION, POWDER, LYOPHILIZED, FOR SOLUTION INTRAVENOUS at 08:23

## 2021-01-01 RX ADMIN — INSULIN LISPRO 3 UNITS: 100 INJECTION, SOLUTION INTRAVENOUS; SUBCUTANEOUS at 23:00

## 2021-01-01 RX ADMIN — INSULIN LISPRO 12 UNITS: 100 INJECTION, SOLUTION INTRAVENOUS; SUBCUTANEOUS at 17:06

## 2021-09-14 PROBLEM — E11.9 DM W/O COMPLICATION TYPE II (HCC): Status: ACTIVE | Noted: 2021-01-01

## 2021-09-14 PROBLEM — J96.00 ACUTE RESPIRATORY FAILURE (HCC): Status: ACTIVE | Noted: 2021-01-01

## 2021-09-14 PROBLEM — U07.1 COVID-19: Status: ACTIVE | Noted: 2021-01-01

## 2021-09-14 PROBLEM — I10 HTN (HYPERTENSION): Status: ACTIVE | Noted: 2021-01-01

## 2021-09-14 NOTE — ACP (ADVANCE CARE PLANNING)
Advance Care Planning     General Advance Care Planning (ACP) Conversation      Date of Conversation: 9/14/2021  Conducted with: Patient with Decision Making Capacity    Healthcare Decision Maker:     Primary Decision Maker: Zena Campoverde - Other Relative - [phone]    Secondary Decision Maker: Aguila Davies - Mother - [phone]  Click here to complete [address] including selection of the Healthcare Decision Maker Relationship (ie \"Primary\")      Today we documented desired Decision Maker(s), who is (are) NOT Legal [facility]. ACP documents are required for decision maker authority.     Content/Action Overview:   Has NO ACP documents/care preferences - requested patient complete ACP documents  Reviewed DNR/DNI and patient elects Full Code (Attempt Resuscitation)  Topics discussed: ventilation preferences and resuscitation preferences       Length of Voluntary ACP Conversation in minutes:  <16 minutes (Non-Billable)    Amber Quinteros RN

## 2021-09-14 NOTE — PROGRESS NOTES
BSR received from [facility] patient up ad anshul in bathroom RR with NAD noted at this time

## 2021-09-14 NOTE — PROGRESS NOTES
MSN, CM:  Spoke with patient this AM about discharge planning. Patient lives alone in a 3rd floor apartment with no elevator which is 30 steps for entrance. Patient's grandparents \"Ginger and Landon\" live locally. Patient is independent with all ADL's and requires no equipment for ambulation. Patient denies any home oxygen, HH or rehab in the past.  Patient currently draws unemployment and has been since July. Patient confirms PCP is Dr. Irene Olivia and she drives herself to all appointments. Patient appears to have no discharge needs at this time. Case Management will continue to follow for any discharge needs that may arise. Care Management Interventions  PCP Verified by CM: Yes (Dr. Irene Olivia)  Mode of Transport at Discharge:  Other (see comment) (family to transport)  Health Maintenance Reviewed: Yes  Support Systems: Other Family Member(s)  Confirm Follow Up Transport: Family  Freedom of Choice List was Provided with Basic Dialogue that Supports the Patient's Individualized Plan of Care/Goals, Treatment Preferences and Shares the Quality Data Associated with the Providers?: Yes  Discharge Location  Discharge Placement: Home

## 2021-09-14 NOTE — ED TRIAGE NOTES
Pt to ED via [facility] for shortness of breath. Pt'sfamily members at home have covid, pt has not yet been tested. Pt was 72% on RA, pt placed on 6L NC and went up to 96%. Pt got 1g rocephin and 4mg zofran and 600cc of normal saline. Pt A&Ox4.

## 2021-09-14 NOTE — ED NOTES
TRANSFER - OUT REPORT:    Verbal report given to Elvira (name) on Stella Ignacio  being transferred to 8th floor (unit) for routine progression of care       Report consisted of patients Situation, Background, Assessment and   Recommendations(SBAR). Information from the following report(s) SBAR, ED Summary and MAR was reviewed with the receiving nurse. Lines:   Peripheral IV 09/14/21 Left Antecubital (Active)       Peripheral IV 09/14/21 Right Antecubital (Active)       Peripheral IV 09/14/21 Left Hand (Active)        Opportunity for questions and clarification was provided.       Patient transported with:   [employer]

## 2021-09-14 NOTE — ACP (ADVANCE CARE PLANNING)
Advance Care Planning   Advance Care Planning Inpatient Note  Gudelia Olivera [facility] Care Department    Today's Date: 9/14/2021  Unit: [facility] 8 INTERMEDIATE CARE UNIT    Received request from IDT member. Upon review of chart and communication with care team, patient does not have a HCPOA.  talked to unit staff and did not enter patient's room. Goals of ACP Conversation:  Discuss Advance Care planning documents    Health Care Decision Makers:    PER CONVERSATION WITH IDT MEMBER:    Primary Decision Maker: Agus Hollie - Other Relative - [phone]    Secondary Decision Maker: Alto Anson - Mother - [phone]      Summary:  Provided HCPOA form to unit staff along with instructions for reaching the . Chaplains remain available for assistance. Advance Care Planning Documents (Patient Wishes) on file:  Healthcare Power of /Advance Directive appointment of Health care agent     Assessment:    None at this time. Interventions:  Provided HCPOA form to unit staff along with instructions for reaching the . Chaplains remain available for assistance.      Care Preferences Communicated:  No    Outcomes/Plan:  ACP Discussion Postponed    Amaury Garcia [facility] on 9/14/2021 at 3:53 PM

## 2021-09-14 NOTE — PROGRESS NOTES
Patient up ad anshul in bathroom at this time. Respirations present. On 5 L NC. No signs of distress. AxO x4. No needs expressed. Bed low and locked. Call light within reach. Bedside report given to oncoming RNMaegan.

## 2021-09-14 NOTE — ED PROVIDER NOTES
71-year-old female cough cold flulike symptoms fever and chills subjective. Onset of symptoms Sunday, 12 September. Patient is unvaccinated. Patient has family members who have tested positive recently for COVID-19. EMS found her to have a sat of 74% on scene but came up quickly with supplemental oxygen. Patient has a BMI greater than 50 weighs 454 pounds    The history is provided by the patient and the EMS personnel. Shortness of Breath  This is a new problem. The problem occurs continuously. The current episode started 2 days ago. The problem has been gradually worsening. Associated symptoms include a fever, cough, sputum production and orthopnea. It is unknown what precipitated the problem. She has tried nothing for the symptoms. Associated medical issues do not include recent surgery. Associated medical issues comments: Morbid obesity. No past medical history on file. No past surgical history on file. No family history on file. Social History     Socioeconomic History    Marital status: SINGLE     Spouse name: Not on file    Number of children: Not on file    Years of education: Not on file    Highest education level: Not on file   Occupational History    Not on file   Tobacco Use    Smoking status: Not on file   Substance and Sexual Activity    Alcohol use: Not on file    Drug use: Not on file    Sexual activity: Not on file   Other Topics Concern    Not on file   Social History Narrative    Not on file     Social Determinants of Health     Financial Resource Strain:     Difficulty of Paying Living Expenses:    Food Insecurity:     Worried About Running Out of Food in the Last Year:     [address] in the Last Year:    Transportation Needs:     Lack of Transportation (Medical):      Lack of Transportation (Non-Medical):    Physical Activity:     Days of Exercise per Week:     Minutes of Exercise per Session:    Stress:     Feeling of Stress :    Social Connections:  Frequency of Communication with Friends and Family:     Frequency of Social Gatherings with Friends and Family:     Attends Mormonism Services:     Active Member of Clubs or Organizations:     Attends Club or Organization Meetings:     Marital Status:    Intimate Partner Violence:     Fear of Current or Ex-Partner:     Emotionally Abused:     Physically Abused:     Sexually Abused: ALLERGIES: Shellfish derived    Review of Systems   Constitutional: Positive for activity change, chills, fatigue and fever. HENT: Positive for congestion. Eyes: Negative. Respiratory: Positive for cough, sputum production and shortness of breath. Cardiovascular: Positive for orthopnea. Gastrointestinal: Negative. Genitourinary: Negative. Musculoskeletal: Negative. Skin: Negative. Neurological: Negative. Psychiatric/Behavioral: Negative. All other systems reviewed and are negative. There were no vitals filed for this visit. Physical Exam  Vitals and nursing note reviewed. Constitutional:       General: She is not in acute distress. Appearance: She is well-developed. She is obese. She is ill-appearing and toxic-appearing. HENT:      Head: Normocephalic and atraumatic. Right Ear: External ear normal.      Left Ear: External ear normal.      Nose: Nose normal.   Eyes:      General: No scleral icterus. Right eye: No discharge. Left eye: No discharge. Conjunctiva/sclera: Conjunctivae normal.      Pupils: Pupils are equal, round, and reactive to light. Cardiovascular:      Rate and Rhythm: Regular rhythm. Pulmonary:      Effort: Accessory muscle usage present. No respiratory distress. Breath sounds: No stridor. Examination of the left-upper field reveals rhonchi. Examination of the right-lower field reveals decreased breath sounds. Examination of the left-lower field reveals decreased breath sounds.  Decreased breath sounds, wheezing and rhonchi present. No rales. Abdominal:      General: Bowel sounds are normal. There is no distension. Palpations: Abdomen is soft. Tenderness: There is no abdominal tenderness. Musculoskeletal:         General: Normal range of motion. Cervical back: Normal range of motion. Skin:     General: Skin is warm and dry. Findings: No rash. Neurological:      Mental Status: She is alert and oriented to person, place, and time. Motor: No abnormal muscle tone. Coordination: Coordination normal.   Psychiatric:         Behavior: Behavior normal.          MDM  Number of Diagnoses or Management Options  Hypoxia: new and requires workup  Pneumonia due to COVID-19 virus: new and requires workup  Diagnosis management comments: Patient was hypoxic she has morbid obesity/adiposity stage III (adiposity permagna)    She is unvaccinated and has family members that are Covid positive. Her CT of chest shows multiple areas of pneumonia indicative  atypical viral pneumonia. CT could not rule out PE will have hospitalist admit and decide on heparinization.        Amount and/or Complexity of Data Reviewed  Clinical lab tests: ordered and reviewed  Tests in the radiology section of CPT®: ordered and reviewed  Tests in the medicine section of CPT®: ordered and reviewed  Decide to obtain previous medical records or to obtain history from someone other than the patient: yes  Review and summarize past medical records: yes  Discuss the patient with other providers: yes  Independent visualization of images, tracings, or specimens: yes    Risk of Complications, Morbidity, and/or Mortality  Presenting problems: high  Diagnostic procedures: high  Management options: high    Patient Progress  Patient progress: stable         Procedures

## 2021-09-14 NOTE — PROGRESS NOTES
Patient received from the ER via stretcher. Patient ambulated with no gait disturbances. Patient is now resting in bed. Respirations present. On 5 L NC. No signs of distress. AxO x4. No needs expressed. S1 and S2 noted. Radial and pedal pulses palpable bilaterally. Lung sounds diminished. Bowel sounds active. Patient states last BM was yesterday, 9/13/2021. Dual skin assessment completed with Maryellen Serna RN. No skin abnormalities noted. No pressure injuries noted. Patient provided with bariatric bed. Bed low and locked. Call light within reach. Will continue to monitor.

## 2021-09-14 NOTE — H&P
Hospitalist Admission History and Physical     NAME:  Samantha Vickers   Age:  23 y.o.  :   1995   MRN:   [id number]  PCP: None  Consulting MD:  Treatment Team: Attending Provider: Federica Campbell DO; Primary Nurse: Mar Guerrero RN    Chief Complaint   Patient presents with    Shortness of Breath         HPI:   Patient is a 23 y.o. female who presented to the ED for cc fevers and chills. Symptoms started . Has came in contact with family members who had Ricardo. Was found 74% on RA. She is 454 pounds. Unvaccinated. Hx of DM type II, HTN, severe morbid obesity. CRP 10.3. No hx of surgery  Family hx of DM     Vitals - satting well on 6L NC    CT PE protocol showed no PE. Viral pneumonia along with peripheral consolidations in the basal segment of RUL and RLL seen. Social History     Social History Narrative    Not on file        Social History     Tobacco Use    Smoking status: Not on file   Substance Use Topics    Alcohol use: Not on file        Social History     Substance and Sexual Activity   Drug Use Not on file         Allergies   Allergen Reactions    Shellfish Derived Anaphylaxis       Prior to Admission medications    Medication Sig Start Date End Date Taking? Authorizing Provider   methocarbamol (ROBAXIN-750) 750 mg tablet Take 1 Tab by mouth four (4) times daily as needed (for spasm/pain). 19   Kathi Holman MD   ibuprofen (MOTRIN) 800 mg tablet Take 1 Tab by mouth every eight (8) hours as needed for Pain. 19   Kathi Holman MD   lisinopril (PRINIVIL) 10 mg tablet Take 1 Tab by mouth daily. 19   Kathi Holman MD           Review of Systems    Constitutional: tired, weak  Eyes:  no change in visual acuity, no photophobia  Ears, nose, mouth, throat, and face: no  Odynphagia, dysphagia, no thrush or exudate, negative for chronic sinus congestion, recurrent headaches  Respiratory: SOB even at rest.   Cardiovascular: chest wall pain from coughing. Gastrointestinal: negative for abdominal pain, no hematemesis, hematochezia or BRBPR  Genitourinary: no urgency, frequency, or dysuria, no nocturia  Integument/breast: negative for skin rash or skin lesions  Hematologic/lymphatic: negative for known bleeding disorder  Musculoskeletal:negative for joint pain or joint tenderness  Neurological: generalized weakness. Behavioral/Psych: negative for depression or chronic anxiety,   Endocrine: negative for polydyspia, polyuria or intolerance to heat or cold  Allergic/Immunologic: negative for chronic allergic rhinitis, or known connective tissue disorder      Objective:     Patient Vitals for the past 24 hrs:   Temp Pulse Resp BP SpO2   09/14/21 0820 -- 84 14 -- 99 %   09/14/21 0807 -- 92 16 -- 97 %   09/14/21 0800 -- 90 -- (!) 148/103 97 %   09/14/21 0535 -- 100 20 (!) 105/54 100 %   09/14/21 0530 100.2 °F (37.9 °C) (!) 107 22 122/79 99 %        No intake/output data recorded. No intake/output data recorded. Data Review:   Recent Results (from the past 24 hour(s))   CBC WITH AUTOMATED DIFF    Collection Time: 09/14/21  5:35 AM   Result Value Ref Range    WBC 5.3 4.3 - 11.1 K/uL    RBC 5.34 (H) 4.05 - 5.2 M/uL    HGB 10.2 (L) 11.7 - 15.4 g/dL    HCT 36.0 35.8 - 46.3 %    MCV 67.4 (L) 79.6 - 97.8 FL    MCH 19.1 (L) 26.1 - 32.9 PG    MCHC 28.3 (L) 31.4 - 35.0 g/dL    RDW 20.4 (H) 11.9 - 14.6 %    PLATELET 831 417 - 439 K/uL    MPV 9.7 9.4 - 12.3 FL    ABSOLUTE NRBC 0.00 0.0 - 0.2 K/uL    DF AUTOMATED      NEUTROPHILS 77 43 - 78 %    LYMPHOCYTES 16 13 - 44 %    MONOCYTES 7 4.0 - 12.0 %    EOSINOPHILS 0 (L) 0.5 - 7.8 %    BASOPHILS 0 0.0 - 2.0 %    IMMATURE GRANULOCYTES 1 0.0 - 5.0 %    ABS. NEUTROPHILS 4.1 1.7 - 8.2 K/UL    ABS. LYMPHOCYTES 0.8 0.5 - 4.6 K/UL    ABS. MONOCYTES 0.4 0.1 - 1.3 K/UL    ABS. EOSINOPHILS 0.0 0.0 - 0.8 K/UL    ABS. BASOPHILS 0.0 0.0 - 0.2 K/UL    ABS. IMM.  GRANS. 0.0 0.0 - 0.5 K/UL   METABOLIC PANEL, COMPREHENSIVE    Collection Time: 09/14/21  5:35 AM   Result Value Ref Range    Sodium 136 136 - 145 mmol/L    Potassium 3.8 3.5 - 5.1 mmol/L    Chloride 102 98 - 107 mmol/L    CO2 25 21 - 32 mmol/L    Anion gap 9 7 - 16 mmol/L    Glucose 211 (H) 65 - 100 mg/dL    BUN 8 6 - 23 MG/DL    Creatinine 0.83 0.6 - 1.0 MG/DL    GFR est AA >60 >60 ml/min/1.73m2    GFR est non-AA >60 >60 ml/min/1.73m2    Calcium 8.2 (L) 8.3 - 10.4 MG/DL    Bilirubin, total 0.3 0.2 - 1.1 MG/DL    ALT (SGPT) 37 12 - 65 U/L    AST (SGOT) 56 (H) 15 - 37 U/L    Alk. phosphatase 85 50 - 136 U/L    Protein, total 7.2 6.3 - 8.2 g/dL    Albumin 2.7 (L) 3.5 - 5.0 g/dL    Globulin 4.5 (H) 2.3 - 3.5 g/dL    A-G Ratio 0.6 (L) 1.2 - 3.5     C REACTIVE PROTEIN, QT    Collection Time: 09/14/21  5:35 AM   Result Value Ref Range    C-Reactive protein 10.3 (H) 0.0 - 0.9 mg/dL   COVID-19 RAPID TEST    Collection Time: 09/14/21  5:35 AM   Result Value Ref Range    Specimen source NASAL      COVID-19 rapid test Detected (AA) NOTD     SARS-COV-2    Collection Time: 09/14/21  5:35 AM   Result Value Ref Range    SARS-CoV-2 Please find results under separate order     PROCALCITONIN    Collection Time: 09/14/21  7:57 AM   Result Value Ref Range    Procalcitonin 0.06 ng/mL   LACTIC ACID    Collection Time: 09/14/21  7:57 AM   Result Value Ref Range    Lactic acid 0.9 0.4 - 2.0 MMOL/L       Physical Exam:     General:  Alert, cooperative, tired appearing. Eyes:  Conjunctivae/corneas clear. Ears:  Normal TMs and external ear canals both ears. Nose: Wearing mask   Mouth/Throat: Wearing mask   Neck:  no JVD. Back:   deferred   Lungs:   Limited breathe sounds throughout    Heart:  Regular rate and rhythm, S1, S2 normal   Abdomen:   Soft, non-tender. Bowel sounds normal. Morbidly obese. Extremities: Extremities normal, atraumatic, no cyanosis or edema. Pulses: 2+ and symmetric all extremities.    Skin: Skin color, texture, turgor normal. No rashes or lesions   Lymph nodes: Cervical, supraclavicular, and axillary nodes normal.   Neurologic: CNII-XII intact. Limited ROM in bed. Assessment and Plan     Principal Problem:    COVID-19 (9/14/2021)    Active Problems:    Acute respiratory failure ([address] (9/14/2021)      DM w/o complication type II ([address] (9/14/2021)      HTN (hypertension) (9/14/2021)    Acute respiratory failure secondary to COVID and possible CAP - albuterol q 6hr, decadron, vitamin C, Zinc, start remdesivir. She is aware of potential side effects from remdesivir such as ALLAN and liver damage. Also start Ceftriaxone, azithromycin. Order sputum culture. If oxygenations worsens and requires >10LNC in next 48 hours, will give actemra. She is agreeable to this treatment. DM type II - SS. Check A1C.      HTN- ACE/HCTZ    DVT prophylaxis - Lovenox    Signed By: Chris Huerta DO   September 14, 2021

## 2021-09-14 NOTE — PROGRESS NOTES
Advance Care Planning   Advance Care Planning Inpatient Note  [facility] Department    Today's Date: 9/14/2021  Unit: [facility] 8 INTERMEDIATE CARE UNIT    Received request from IDT member. Upon review of chart and communication with care team, patient does not have a HCPOA.  talked to unit staff and did not enter patient's room. Goals of ACP Conversation:  Discuss Advance Care planning documents    Health Care Decision Makers:    PER CONVERSATION WITH IDT MEMBER:    Primary Decision Maker: Leah Idjanis - Other Relative - [phone]    Secondary Decision Maker: Dina Wilsonr - Mother - [phone]      Summary:  Provided HCPOA form to unit staff along with instructions for reaching the . Chaplains remain available for assistance. Advance Care Planning Documents (Patient Wishes) on file:  Healthcare Power of /Advance Directive appointment of Health care agent     Assessment:    None at this time. Interventions:  Provided HCPOA form to unit staff along with instructions for reaching the . Chaplains remain available for assistance.      Care Preferences Communicated:  No    Outcomes/Plan:  ACP Discussion Postponed    [facility] on 9/14/2021 at 3:53 PM

## 2021-09-15 NOTE — PROGRESS NOTES
Report rec'd from offgoing nurse. Care resumed. Pt sitting up in bed. No s/s of distress.    Ice water per pt request

## 2021-09-15 NOTE — PROGRESS NOTES
[facility] Hospitalist Progress Note     Name:  Radha Catherine  Age:26 y.o. Sex:female   :  1995    MRN:  [id number]     Admit Date:  2021    Reason for Admission:  Acute respiratory failure ([facility] [address] [J96.00]    Assessment & Plan     Acute respiratory failure secondary to COVID and possible CAP - albuterol q 6hr, decadron, vitamin C, Zinc, start remdesivir. She is aware of potential side effects from remdesivir such as ALLAN and liver damage. Also start Ceftriaxone, azithromycin. Order sputum culture. If oxygenations worsens and requires >10LNC in next 48 hours, will give actemra. She is agreeable to this treatment. 9/15/2021  FiO2 45%, flow at 6 L/min     DM type II - SS. Check A1C.      HTN- ACE/HCTZ     BMI 68.08. [facility]            [facility] Course/Subjective:   Patient is a 23 y.o. female who presented to the ED for cc fevers and chills. Symptoms started . Has came in contact with family members who had Ricardo. Was found 74% on RA. She is 454 pounds. Unvaccinated. Hx of DM type II, HTN, severe morbid obesity. CRP 10.3.      No hx of surgery  Family hx of DM      Vitals - satting well on 6L NC     CT PE protocol showed no PE. Viral pneumonia along with peripheral consolidations in the basal segment of RUL and RLL seen. Subjective/24 hr Events (09/15/21): Says breathing okay still mild short of breath    Review of Systems: 14 point review of systems is otherwise negative with the exception of the elements mentioned above.     Objective:     Patient Vitals for the past 24 hrs:   Temp Pulse Resp BP SpO2   09/15/21 1400 98.4 °F (36.9 °C) (!) 105 18 122/84 90 %   09/15/21 1111 97.8 °F (36.6 °C) (!) 109 18 125/87 93 %   09/15/21 0810 98 °F (36.7 °C) (!) 104 18 120/76 95 %   09/15/21 0723 -- -- -- -- 95 %   09/15/21 0314 98.7 °F (37.1 °C) 85 19 128/68 95 %   09/15/21 0240 -- -- -- -- 95 %   212 98.4 °F (36.9 °C) 87 20 (!) 146/94 95 %   21 -- -- -- -- 95 % 09/14/21 1919 97.4 °F (36.3 °C) 91 20 130/82 95 %   09/14/21 1726 -- -- -- -- 94 %   09/14/21 1506 99.7 °F (37.6 °C) 90 22 125/81 96 %     Oxygen Therapy  O2 Sat (%): 90 % (09/15/21 1400)  Pulse via Oximetry: 90 beats per minute (09/15/21 0723)  O2 Device: Nasal cannula (09/15/21 0723)  O2 Flow Rate (L/min): 6 l/min (09/15/21 0723)  FIO2 (%): 44 % (09/15/21 0240)    Body mass index is 68.08 kg/m². Physical Exam:   General:     alert, awake, mild respiratory distress . On FiO2 44%  HENT:   normocephalic, atraumatic. Oropharynx clear with moist mucous membranes and normal hard/soft palate  Eyes:   anicteric sclerae, moist conjunctiva, PERRL, EOMI  Neck:    supple, non-tender. Trachea midline. Lungs:   Bilateral coarse breath sounds   cardiac:   RRR, Normal S1 and S2. No S3, S4 or murmurs. Abdomen:   Soft, non distended, nontender, +BS, no guarding/rebound. Obese  Extremities:   Warm, dry. No edema , pedal pulses present, no digital cyanosis  Skin:   Warn, dry, normnal turgor and texture; no rash, ulcers or nodules appreciated  Neuro:   AAOx3.  No gross focal neurological deficit,  Cranial nerves II-XII grossly intact  Psychiatric:  No anxiety, calm, cooperative      Data Review:  I have reviewed all labs, meds, and studies from the last 24 hours:    Labs:  Recent Results (from the past 24 hour(s))   GLUCOSE, POC    Collection Time: 09/14/21  4:40 PM   Result Value Ref Range    Glucose (POC) 303 (H) 65 - 100 mg/dL    Performed by ToneyRitaPCT    GLUCOSE, POC    Collection Time: 09/14/21  8:35 PM   Result Value Ref Range    Glucose (POC) 322 (H) 65 - 100 mg/dL    Performed by [facility]    METABOLIC PANEL, COMPREHENSIVE    Collection Time: 09/15/21  7:08 AM   Result Value Ref Range    Sodium 138 136 - 145 mmol/L    Potassium 4.3 3.5 - 5.1 mmol/L    Chloride 101 98 - 107 mmol/L    CO2 27 21 - 32 mmol/L    Anion gap 10 7 - 16 mmol/L    Glucose 223 (H) 65 - 100 mg/dL    BUN 9 6 - 23 MG/DL    Creatinine 0.73 0.6 - 1.0 MG/DL    GFR est AA >60 >60 ml/min/1.73m2    GFR est non-AA >60 >60 ml/min/1.73m2    Calcium 8.7 8.3 - 10.4 MG/DL    Bilirubin, total 0.3 0.2 - 1.1 MG/DL    ALT (SGPT) 39 12 - 65 U/L    AST (SGOT) 50 (H) 15 - 37 U/L    Alk. phosphatase 85 50 - 136 U/L    Protein, total 8.1 6.3 - 8.2 g/dL    Albumin 2.8 (L) 3.5 - 5.0 g/dL    Globulin 5.3 (H) 2.3 - 3.5 g/dL    A-G Ratio 0.5 (L) 1.2 - 3.5     GLUCOSE, POC    Collection Time: 09/15/21 11:36 AM   Result Value Ref Range    Glucose (POC) 269 (H) 65 - 100 mg/dL    Performed by Annel        All Micro Results     Procedure Component Value Units Date/Time    CULTURE, BLOOD [[id number]] Collected: 09/14/21 0757    Order Status: Completed Specimen: Blood Updated: 09/15/21 0746     Special Requests: --        RIGHT  Antecubital       Culture result: NO GROWTH AFTER 23 HOURS       CULTURE, BLOOD [[id number]] Collected: 09/14/21 0759    Order Status: Completed Specimen: Blood Updated: 09/15/21 0746     Special Requests: --        LEFT  HAND       Culture result: NO GROWTH AFTER 23 HOURS       CULTURE, RESPIRATORY/SPUTUM/BRONCH Jennye Coombe STAIN [[id number]]     Order Status: Sent Specimen: Sputum     COVID-19 RAPID TEST [[id number]]  (Abnormal) Collected: 09/14/21 0535    Order Status: Completed Specimen: Nasopharyngeal Updated: 09/14/21 0558     Specimen source NASAL        COVID-19 rapid test Detected        Comment:      The specimen is POSITIVE for SARS-CoV-2, the novel coronavirus associated with COVID-19. This test has been authorized by the FDA under an Emergency Use Authorization (EUA) for use by authorized laboratories.         Fact sheet for Healthcare Providers: ConventionUpdate.co.nz  Fact sheet for Patients: ConventionUpdate.co.nz       Methodology: Isothermal Nucleic Acid Amplification  RESULTS VERIFIED, PHONED TO AND READ BACK BY   FERDINAND BENOIT AT 0174 ON 9/14/21 ACL               EKG Results     None          Other Studies:  No results found.     Current Meds:   Current Facility-Administered Medications   Medication Dose Route Frequency    insulin glargine (LANTUS) injection 20 Units  20 Units SubCUTAneous DAILY    dexamethasone (DECADRON) 10 mg/mL injection 6 mg  6 mg IntraVENous Q24H    remdesivir 100 mg in 0.9% sodium chloride 250 mL IVPB  100 mg IntraVENous Q24H    albuterol (PROVENTIL HFA, VENTOLIN HFA, PROAIR HFA) inhaler 2 Puff  2 Puff Inhalation Q6H RT    ascorbic acid (vitamin C) (VITAMIN C) tablet 500 mg  500 mg Oral BID    zinc sulfate (ZINCATE) 50 mg zinc (220 mg) capsule 50 mg  50 mg Oral DAILY    insulin lispro (HUMALOG) injection   SubCUTAneous AC&HS    lisinopril-hydroCHLOROthiazide (PRINZIDE, ZESTORETIC) 20-12.5 mg per tablet 1 Tablet  1 Tablet Oral DAILY    acetaminophen (TYLENOL) tablet 650 mg  650 mg Oral Q6H PRN    Or    acetaminophen (TYLENOL) suppository 650 mg  650 mg Rectal Q6H PRN    polyethylene glycol (MIRALAX) packet 17 g  17 g Oral DAILY PRN    ondansetron (ZOFRAN ODT) tablet 4 mg  4 mg Oral Q8H PRN    Or    ondansetron (ZOFRAN) injection 4 mg  4 mg IntraVENous Q6H PRN    enoxaparin (LOVENOX) injection 40 mg  40 mg SubCUTAneous Q12H    azithromycin (ZITHROMAX) tablet 500 mg  500 mg Oral DAILY    cefTRIAXone (ROCEPHIN) 1 g in 0.9% sodium chloride (MBP/ADV) 50 mL MBP  1 g IntraVENous Q24H       Problem List:  Hospital Problems as of 9/15/2021 Never Reviewed        Codes Class Noted - Resolved POA    Acute respiratory failure ([facility] [address] ICD-10-CM: J96.00  ICD-9-CM: 518.81  9/14/2021 - Present Unknown        * (Principal) COVID-19 ICD-10-CM: U07.1  ICD-9-CM: 079.89  9/14/2021 - Present Unknown        DM w/o complication type II (HCC) ICD-10-CM: E11.9  ICD-9-CM: 250.00  9/14/2021 - Present Unknown        HTN (hypertension) ICD-10-CM: I10  ICD-9-CM: 401.9  9/14/2021 - Present Unknown                   Signed By: Tatyana Rees MD   [facility] Hospitalist Service    September 15, 2021  2:21 PM

## 2021-09-15 NOTE — PROGRESS NOTES
MSN, CM:  Patient currently on 3L NC this AM.  Patient has received Decadron and Remdesivir. Patient c/o chest pain this AM.  Patient does not have any discharge needs at this time. Case Management will continue to follow.

## 2021-09-15 NOTE — DISCHARGE INSTRUCTIONS
DISCHARGE SUMMARY from Nurse    PATIENT INSTRUCTIONS:    After general anesthesia or intravenous sedation, for 24 hours or while taking prescription Narcotics:  · Limit your activities  · Do not drive and operate hazardous machinery  · Do not make important personal or business decisions  · Do  not drink alcoholic beverages  · If you have not urinated within 8 hours after discharge, please contact your surgeon on call. What to do at Home:  Recommended activity: Activity as tolerated, Oxygen @    If you experience any of the following symptoms worsening cough or wheezing, shortness of breath or fatigue not relieved with rest, unrelieved pain, nausea, vomiting, or diarrhea please follow up with MD.    *  Please give a list of your current medications to your Primary Care Provider. *  Please update this list whenever your medications are discontinued, doses are      changed, or new medications (including over-the-counter products) are added. *  Please carry medication information at all times in case of emergency situations. These are general instructions for a healthy lifestyle:    No smoking/ No tobacco products/ Avoid exposure to second hand smoke  Surgeon General's Warning:  Quitting smoking now greatly reduces serious risk to your health. Obesity, smoking, and sedentary lifestyle greatly increases your risk for illness    A healthy diet, regular physical exercise & weight monitoring are important for maintaining a healthy lifestyle    You may be retaining fluid if you have a history of heart failure or if you experience any of the following symptoms:  Weight gain of 3 pounds or more overnight or 5 pounds in a week, increased swelling in our hands or feet or shortness of breath while lying flat in bed. Please call your doctor as soon as you notice any of these symptoms; do not wait until your next office visit. The discharge information has been reviewed with the patient.   The patient verbalized understanding. Discharge medications reviewed with the patient and appropriate educational materials and side effects teaching were provided. Advance Care Planning  People with COVID-19 may have no symptoms, mild symptoms, such as fever, cough, and shortness of breath or they may have more severe illness, developing severe and fatal pneumonia. As a result, Advance Care Planning with attention to naming a health care decision maker (someone you trust to make healthcare decisions for you if you could not speak for yourself) and sharing other health care preferences is important BEFORE a possible health crisis. Please contact your Primary Care Provider to discuss Advance Care Planning. Preventing the Spread of Coronavirus Disease 2019 in Homes and Residential Communities  For the most recent information go to [employer].    Prevention steps for People with confirmed or suspected COVID-19 (including persons under investigation) who do not need to be hospitalized  and   People with confirmed COVID-19 who were hospitalized and determined to be medically stable to go home    Your healthcare provider and public health staff will evaluate whether you can be cared for at home. If it is determined that you do not need to be hospitalized and can be isolated at home, you will be monitored by staff from your local or state health department. You should follow the prevention steps below until a healthcare provider or local or state health department says you can return to your normal activities. Stay home except to get medical care  People who are mildly ill with COVID-19 are able to isolate at home during their illness. You should restrict activities outside your home, except for getting medical care. Do not go to work, school, or public areas. Avoid using public transportation, ride-sharing, or taxis.   Separate yourself from other people and animals in your home  People: As much as possible, you should stay in a specific room and away from other people in your home. Also, you should use a separate bathroom, if available. Animals: You should restrict contact with pets and other animals while you are sick with COVID-19, just like you would around other people. Although there have not been reports of pets or other animals becoming sick with COVID-19, it is still recommended that people sick with COVID-19 limit contact with animals until more information is known about the virus. When possible, have another member of your household care for your animals while you are sick. If you are sick with COVID-19, avoid contact with your pet, including petting, snuggling, being kissed or licked, and sharing food. If you must care for your pet or be around animals while you are sick, wash your hands before and after you interact with pets and wear a facemask. Call ahead before visiting your doctor  If you have a medical appointment, call the healthcare provider and tell them that you have or may have COVID-19. This will help the healthcare providers office take steps to keep other people from getting infected or exposed. Wear a facemask  You should wear a facemask when you are around other people (e.g., sharing a room or vehicle) or pets and before you enter a healthcare providers office. If you are not able to wear a facemask (for example, because it causes trouble breathing), then people who live with you should not stay in the same room with you, or they should wear a facemask if they enter your room. Cover your coughs and sneezes  Cover your mouth and nose with a tissue when you cough or sneeze. Throw used tissues in a lined trash can. Immediately wash your hands with soap and water for at least 20 seconds or, if soap and water are not available, clean your hands with an alcohol-based hand  that contains at least 60% alcohol.   Clean your hands often  Wash your hands often with soap and water for at least 20 seconds, especially after blowing your nose, coughing, or sneezing; going to the bathroom; and before eating or preparing food. If soap and water are not readily available, use an alcohol-based hand  with at least 60% alcohol, covering all surfaces of your hands and rubbing them together until they feel dry. Soap and water are the best option if hands are visibly dirty. Avoid touching your eyes, nose, and mouth with unwashed hands. Avoid sharing personal household items  You should not share dishes, drinking glasses, cups, eating utensils, towels, or bedding with other people or pets in your home. After using these items, they should be washed thoroughly with soap and water. Clean all high-touch surfaces everyday  High touch surfaces include counters, tabletops, doorknobs, bathroom fixtures, toilets, phones, keyboards, tablets, and bedside tables. Also, clean any surfaces that may have blood, stool, or body fluids on them. Use a household cleaning spray or wipe, according to the label instructions. Labels contain instructions for safe and effective use of the cleaning product including precautions you should take when applying the product, such as wearing gloves and making sure you have good ventilation during use of the product. Monitor your symptoms  Seek prompt medical attention if your illness is worsening (e.g., difficulty breathing). Before seeking care, call your healthcare provider and tell them that you have, or are being evaluated for, COVID-19. Put on a facemask before you enter the facility. These steps will help the healthcare providers office to keep other people in the office or waiting room from getting infected or exposed. Ask your healthcare provider to call the local or state health department.  Persons who are placed under active monitoring or facilitated self-monitoring should follow instructions provided by their local health department or occupational health professionals, as appropriate. When working with your local health department check their available hours. If you have a medical emergency and need to call 911, notify the dispatch personnel that you have, or are being evaluated for COVID-19. If possible, put on a facemask before emergency medical services arrive. Discontinuing home isolation  Patients with confirmed COVID-19 should remain under home isolation precautions until the risk of secondary transmission to others is thought to be low. The decision to discontinue home isolation precautions should be made on a case-by-case basis, in consultation with healthcare providers and [facility] and local health departments. Patient Education        Pneumonia: Care Instructions  Overview     Pneumonia is an infection of the lungs. Most cases are caused by infections from bacteria or viruses. Pneumonia may be mild or very severe. If it is caused by bacteria, you will be treated with antibiotics. It may take a few weeks to a few months to recover fully from pneumonia, depending on how sick you were and whether your overall health is good. Follow-up care is a key part of your treatment and safety. Be sure to make and go to all appointments, and call your doctor if you are having problems. It's also a good idea to know your test results and keep a list of the medicines you take. How can you care for yourself at home? · Take your antibiotics exactly as directed. Do not stop taking the medicine just because you are feeling better. You need to take the full course of antibiotics. · Take your medicines exactly as prescribed. Call your doctor if you think you are having a problem with your medicine. · Get plenty of rest and sleep. You may feel weak and tired for a while, but your energy level will improve with time.   · To prevent dehydration, drink plenty of fluids, enough so that your urine is light yellow or clear like water. Choose water and other caffeine-free clear liquids until you feel better. If you have kidney, heart, or liver disease and have to limit fluids, talk with your doctor before you increase the amount of fluids you drink. · Take care of your cough so you can rest. A cough that brings up mucus from your lungs is common with pneumonia. It is one way your body gets rid of the infection. But if coughing keeps you from resting or causes severe fatigue and chest-wall pain, talk to your doctor. Your doctor may suggest that you take a medicine to reduce the cough. · Use a vaporizer or humidifier to add moisture to your bedroom. Follow the directions for cleaning the machine. · Do not smoke or allow others to smoke around you. Smoke will make your cough last longer. If you need help quitting, talk to your doctor about stop-smoking programs and medicines. These can increase your chances of quitting for good. · Take an over-the-counter pain medicine, such as acetaminophen (Tylenol), ibuprofen (Advil, Motrin), or naproxen (Aleve). Read and follow all instructions on the label. · Do not take two or more pain medicines at the same time unless the doctor told you to. Many pain medicines have acetaminophen, which is Tylenol. Too much acetaminophen (Tylenol) can be harmful. · If you were given a spirometer to measure how well your lungs are working, use it as instructed. This can help your doctor tell how your recovery is going. · To prevent pneumonia in the future, talk to your doctor about getting a flu vaccine (once a year) and a pneumococcal vaccine (one time only for most people). When should you call for help? Call 911 anytime you think you may need emergency care. For example, call if:    · You have severe trouble breathing.    Call your doctor now or seek immediate medical care if:    · You cough up dark brown or bloody mucus (sputum).     · You have new or worse trouble breathing.     · You are dizzy or lightheaded, or you feel like you may faint. Watch closely for changes in your health, and be sure to contact your doctor if:    · You have a new or higher fever.     · You are coughing more deeply or more often.     · You are not getting better after 2 days (48 hours).     · You do not get better as expected. Where can you learn more? Go to http://www.castro.com/  Enter D336 in the search box to learn more about \"Pneumonia: Care Instructions. \"  Current as of: October 26, 2020               Content Version: 12.8  © 2006-2021 [employer]. Care instructions adapted under license by [employer] (which disclaims liability or warranty for this information). If you have questions about a medical condition or this instruction, always ask your healthcare professional. [address] any warranty or liability for your use of this information. Patient Education        Learning About Hypoxemia  What is hypoxemia? Hypoxemia means that you don't have enough oxygen in your blood. It's a result of diseases that affect your heart or lungs. These include heart failure, COPD, and pulmonary fibrosis (scarring of the lungs). Being at high altitudes can also lead to hypoxemia. What happens when you have hypoxemia? Oxygen gets into your blood through your lungs. Your blood carries the oxygen to all parts of your body. When you have too little oxygen in your blood, your body doesn't get enough of it. With too little oxygen, your heart and other parts of your body don't work very well. What are the symptoms? In addition to the symptoms of whatever is causing your hypoxemia, you may:  · Get tired quickly. · Be short of breath when you are active. · Feel like your heart is pounding or racing. · Feel weak or dizzy. · Become confused. How is hypoxemia treated? Your doctor will do tests to find out how much oxygen is in your blood.  He or she will look for the cause of your hypoxemia and treat that problem. For example, if you have heart failure, you may need medicines that help your heart pump better. · If your hypoxemia is not severe, your doctor may give you oxygen through a mask or nasal cannula (say \"Stefani\"). A cannula is a thin tube with two openings that fit just inside your nose. · If your hypoxemia is severe, you may have a breathing tube put into your windpipe. The breathing tube is attached to a machine that pushes air into your lungs. This machine is called a ventilator. · If you have a long-term problem with hypoxemia, your doctor may recommend that you use oxygen regularly. Some people need it all the time. Others need it from time to time throughout the day or overnight. Your doctor will tell you how much oxygen you need and how often to use it. Follow-up care is a key part of your treatment and safety. Be sure to make and go to all appointments, and call your doctor if you are having problems. It's also a good idea to know your test results and keep a list of the medicines you take. Where can you learn more? Go to http://www.gray.com/  Enter M375 in the search box to learn more about \"Learning About Hypoxemia. \"  Current as of: October 26, 2020               Content Version: 12.8  © 2006-2021 [employer]. Care instructions adapted under license by [employer] (which disclaims liability or warranty for this information). If you have questions about a medical condition or this instruction, always ask your healthcare professional. [address] any warranty or liability for your use of this information.          ___________________________________________________________________________________________________________________________________

## 2021-09-15 NOTE — PROGRESS NOTES
Physician Progress Note      Gene Molina  [facility] #:                  [id number]  :                       1995  ADMIT DATE:       2021 5:26 AM  DISCH DATE:  RESPONDING  PROVIDER #:        Ida Kline MD          QUERY TEXT:    Patient admitted with BMI 68.08. If possible, please document in progress notes and discharge summary if you are evaluating and /or treating any of the following: The medical record reflects the following:  Risk Factors: DM 2  Clinical Indicators: BMI 68.08  Treatment: stated weight on admission  Options provided:  -- Obesity  -- Morbid obesity  -- Severe obesity  -- Overweight  -- BMI not clinically significant  -- Other - I will add my own diagnosis  -- Disagree - Not applicable / Not valid  -- Disagree - Clinically unable to determine / Unknown  -- Refer to Clinical Documentation Reviewer    PROVIDER RESPONSE TEXT:    This patient has severe obesity.     Query created by: Ann Mccoy on 9/15/2021 10:39 AM      Electronically signed by:  Ida Kline MD 9/15/2021 12:51 PM

## 2021-09-16 PROBLEM — E11.9 DM W/O COMPLICATION TYPE II (HCC): Chronic | Status: ACTIVE | Noted: 2021-01-01

## 2021-09-16 PROBLEM — J96.01 ACUTE RESPIRATORY FAILURE WITH HYPOXIA (HCC): Status: ACTIVE | Noted: 2021-01-01

## 2021-09-16 PROBLEM — I10 HTN (HYPERTENSION): Chronic | Status: ACTIVE | Noted: 2021-01-01

## 2021-09-16 PROBLEM — J96.00 ACUTE RESPIRATORY FAILURE (HCC): Status: RESOLVED | Noted: 2021-01-01 | Resolved: 2021-01-01

## 2021-09-16 NOTE — PROGRESS NOTES
Pt stating that if current treatment is not effective that she wants to go home. She says that she will wait until tomorrow to see if there is any changes and if not she will leave. Family on facetime and they were updated on pt critical condition.         Pt will not be transferred to ICU per Dr. Kim Villanueva

## 2021-09-16 NOTE — PROGRESS NOTES
PICC Placement Note    PRE-PROCEDURE VERIFICATION  Correct Procedure: yes. Time out completed with assistant Cherri Dumont rn and all persons present in agreement with time out. Correct Site:  yes  Temperature: Temp: 98.9 °F (37.2 °C), Temperature Source: Temp Source: Oral  Recent Labs     09/16/21  0618   BUN 10   CREA 0.81      WBC 6.2     Allergies: Shellfish derived  Education materials for [facility] Care given to patient or family. PROCEDURE DETAIL  A triple lumen PICC line was started for vascular access. The following documentation is in addition to the PICC properties in the lines/airways flowsheet :  Lot #: [id number]  xylocaine used: yes  Mid-Arm Circumference: 57 (cm)  Internal Catheter Length: 43 (cm)  Internal Catheter Total Length: 46 (cm)  Vein Selection for PICC:right cephalic  Central Line Bundle followed yes  Complication Related to Insertion: none  Both the insertion guidewire and ECG guidewire were removed intact all ports have positive blood return and were flush well with normal saline. The location of the tip of the PICC is verified using ECG technology. The tip is in the SVC per ECG reading. See image below.                  Line is okay to use: yes

## 2021-09-16 NOTE — PROGRESS NOTES
Assessed patient. SpO2 on 10 LHFNC was 84%. Patient slight increased WOB. Patient states she does not see a change in her SOB. Increased patient to 15 LHFNC, and SpO2 increased to 88%. Placed patient on Airvo 50L/70%, SpO2 increased to 94% on those settings. Patient is tolerating change well. Will continue to monitor. RN notified.

## 2021-09-16 NOTE — PROGRESS NOTES
Patient has significant WOB while on BiPAP. ABG performed. Dr. Obie Prakash at bedside, along with primary RN, and Respiratory supervisor, Dara Son. Dr. Obie Prakash has informed patient, and her aunt and mom, who are her emergency contacts, that she needs to be intubated. Patient states she would like to go home. Patient has agreed to stay, but states she does not want to be intubated, but would like compressions, and medications in the event her heart stops. Patient remains on BiPAP at documented settings. Will continue to monitor. 09/16/21 1709   Oxygen Therapy   O2 Sat (%) 91 %   Pulse via Oximetry 111 beats per minute   O2 Device BIPAP   FIO2 (%) 100 %   Respiratory   Respiratory (WDL) X   Breath Sounds Bilateral Diminished   CPAP/BIPAP   Mask Type and Size Full face; Large   Skin Condition intact   PIP Observed 15 cm H20   IPAP (cm H2O) 13 cm H2O   EPAP (cm H2O) 8 cm H2O   Inspiratory Time (sec) 1 seconds   Vt Spont (ml) 771 ml   Ve Observed (l/min) 31.9 l/min   Backup Rate 16   Total RR (Spontaneous) 41 breaths per minute   Insp Rise Time (sec) 3   Leak (Estimated) 1 L/min   Pt's Home Machine No   Biomedical Check Performed Yes   Settings Verified Yes   Alarm Settings   High Pressure 30   Low Pressure 5   Apnea 20   Low Ve 2   High Rate 50   Low Rate 5   Pulmonary Toilet   Pulmonary Toilet H. O.B elevated

## 2021-09-16 NOTE — PROGRESS NOTES
Pt is resting in bed at this time. Pt is alert and oriented times 4. Pt is on 6L NC. Pt has no s/sx of acute distress at this time. Pt has no requests at this time. Pt has safety measures in place. Pt has call light with reach and is encouraged to call for assistance if needed. Will continue to monitor.

## 2021-09-16 NOTE — PROGRESS NOTES
Pt is resting in bed at this time. Pt is on 10L HFNC. Pt has no s/sx of acute distress at this time. Pt has safety measures in place. Pt has call light within reach. Will prepare report for oncoming nurse.

## 2021-09-16 NOTE — PROGRESS NOTES
Pt respiratory status worsening throughout shift. Pt placed on airvo with no improvement. Pt currently on 100% bipap and sats maintaining 87-90%. Pt informed by pulmonology that she is at a high risk of being intubated. Pt states \"I do not want to be intubated. I do not want to be unconscious with a tube down my throat.''  Pt verbalizes understanding of what will happen if she refuses to be intubated by stating \"I know I will did but at least if I can go home and be around my family. \"  Pt says she still wants compressions and ACLS meds but firmly refuses intubation. Providers aware.

## 2021-09-16 NOTE — CONSULTS
CONSULT NOTE    Misbah Jadek    9/16/2021    Date of Admission:  9/14/2021    The patient's chart is reviewed and the patient is discussed with the staff. Subjective:     Patient is a 23 y.o. unvaccinated  female seen and evaluated at the request of Dr. Sheron Powers. She was admitted on 9/14 with a 2 day history of dyspnea. She tested + for COVID. She was started on Decadron and Remdesivir. She was initially on 6 liters of oxygen but has required bipap today with 100% FIO2 and her resting saturation is currently 91% and this drops into the 70s with any exertion. She was given Actemra today. She is morbidly obese weighing an estimated 455 lbs and she has underlying DM and hypertension. Review of Systems  A comprehensive review of systems was negative except for: Constitutional: positive for none  Eyes: positive for none  Ears, nose, mouth, throat, and face: positive for none  Respiratory: positive for dyspnea  Cardiovascular: positive for none  Gastrointestinal: positive for none  Genitourinary: positive for none  Integument/breast: positive for none  Hematologic/lymphatic: positive for none  Musculoskeletal: positive for none  Neurological: positive for none  Behvioral/Psych: positive for obesity  Endocrine: positive for diabetes  Allergic/Immunologic: positive for none    Patient Active Problem List   Diagnosis Code    Acute respiratory failure (HCC) J96.00    COVID-19 H28.5    DM w/o complication type II (HCC) E11.9    HTN (hypertension) I10         Prior to Admission Medications   Prescriptions Last Dose Informant Patient Reported? Taking?   furosemide (LASIX) 20 mg tablet 9/13/2021 at Unknown time  Yes Yes   Sig: Take 20 mg by mouth daily. hydroCHLOROthiazide (MICROZIDE) 12.5 mg capsule 9/13/2021 at Unknown time  Yes Yes   Sig: Take 12.5 mg by mouth daily.    ibuprofen (MOTRIN) 800 mg tablet Not Taking at Unknown time  No No   Sig: Take 1 Tab by mouth every eight (8) hours as needed for Pain. Patient not taking: Reported on 9/14/2021   lisinopril (PRINIVIL) 10 mg tablet 9/13/2021 at Unknown time  No Yes   Sig: Take 1 Tab by mouth daily. metFORMIN (GLUCOPHAGE) 1,000 mg tablet 9/13/2021 at Unknown time  Yes Yes   Sig: Take 1,000 mg by mouth daily. methocarbamol (ROBAXIN-750) 750 mg tablet 9/13/2021 at Unknown time  No Yes   Sig: Take 1 Tab by mouth four (4) times daily as needed (for spasm/pain). rosuvastatin (Crestor) 10 mg tablet 9/13/2021 at Unknown time  Yes Yes   Sig: Take 10 mg by mouth nightly. Facility-Administered Medications: None       No past medical history on file. Active Ambulatory Problems     Diagnosis Date Noted    No Active Ambulatory Problems     Resolved Ambulatory Problems     Diagnosis Date Noted    No Resolved Ambulatory Problems     No Additional Past Medical History     No past surgical history on file. Social History     Socioeconomic History    Marital status: SINGLE     Spouse name: Not on file    Number of children: Not on file    Years of education: Not on file    Highest education level: Not on file   Occupational History    Not on file   Tobacco Use    Smoking status: Not on file   Substance and Sexual Activity    Alcohol use: Not on file    Drug use: Not on file    Sexual activity: Not on file   Other Topics Concern    Not on file   Social History Narrative    Not on file     Social Determinants of Health     Financial Resource Strain:     Difficulty of Paying Living Expenses:    Food Insecurity:     Worried About Running Out of Food in the Last Year:     [address] in the Last Year:    Transportation Needs:     Lack of Transportation (Medical):      Lack of Transportation (Non-Medical):    Physical Activity:     Days of Exercise per Week:     Minutes of Exercise per Session:    Stress:     Feeling of Stress :    Social Connections:     Frequency of Communication with Friends and Family:     Frequency of Social Gatherings with Friends and Family:     Attends Judaism Services:     Active Member of Clubs or Organizations:     Attends Club or Organization Meetings:     Marital Status:    Intimate Partner Violence:     Fear of Current or Ex-Partner:     Emotionally Abused:     Physically Abused:     Sexually Abused:      No family history on file.   Allergies   Allergen Reactions    Shellfish Derived Anaphylaxis       Current Facility-Administered Medications   Medication Dose Route Frequency    morphine injection 1 mg  1 mg IntraVENous Q4H PRN    tocilizumab (ACTEMRA) 810 mg in 0.9% sodium chloride 100 mL infusion  810 mg IntraVENous ONCE    pantoprazole (PROTONIX) tablet 40 mg  40 mg Oral ACB    insulin glargine (LANTUS) injection 20 Units  20 Units SubCUTAneous DAILY    dexamethasone (DECADRON) 10 mg/mL injection 6 mg  6 mg IntraVENous Q24H    remdesivir 100 mg in 0.9% sodium chloride 250 mL IVPB  100 mg IntraVENous Q24H    albuterol (PROVENTIL HFA, VENTOLIN HFA, PROAIR HFA) inhaler 2 Puff  2 Puff Inhalation Q6H RT    ascorbic acid (vitamin C) (VITAMIN C) tablet 500 mg  500 mg Oral BID    zinc sulfate (ZINCATE) 50 mg zinc (220 mg) capsule 50 mg  50 mg Oral DAILY    insulin lispro (HUMALOG) injection   SubCUTAneous AC&HS    lisinopril-hydroCHLOROthiazide (PRINZIDE, ZESTORETIC) 20-12.5 mg per tablet 1 Tablet  1 Tablet Oral DAILY    acetaminophen (TYLENOL) tablet 650 mg  650 mg Oral Q6H PRN    Or    acetaminophen (TYLENOL) suppository 650 mg  650 mg Rectal Q6H PRN    polyethylene glycol (MIRALAX) packet 17 g  17 g Oral DAILY PRN    ondansetron (ZOFRAN ODT) tablet 4 mg  4 mg Oral Q8H PRN    Or    ondansetron (ZOFRAN) injection 4 mg  4 mg IntraVENous Q6H PRN    enoxaparin (LOVENOX) injection 40 mg  40 mg SubCUTAneous Q12H    azithromycin (ZITHROMAX) tablet 500 mg  500 mg Oral DAILY    cefTRIAXone (ROCEPHIN) 1 g in 0.9% sodium chloride (MBP/ADV) 50 mL MBP  1 g IntraVENous Q24H Objective:     Vitals:    09/16/21 1104 09/16/21 1303 09/16/21 1520 09/16/21 1535   BP: 101/66  120/72    Pulse: (!) 114  (!) 111    Resp: 18  18    Temp: 99 °F (37.2 °C)  98.9 °F (37.2 °C)    SpO2: 91% 91% (!) 85% 91%   Weight:       Height:           PHYSICAL EXAM     Constitutional:  the patient is obese, well developed and in no acute distress  HEENT:  Sclera clear, pupils equal, oral mucosa moist  Lungs: clear but overall decreased. Wearing bipap with 100% FIO2. Sat 91% at rest  Cardiovascular:  RRR without M,G,R  Abd/GI: soft and non-tender; with positive bowel sounds. Ext: warm without cyanosis. There is no lower leg edema. Skin:  no jaundice or rashes, no wounds   Neuro: no gross neuro deficits. Alert and oriented  Musculoskeletal: moves all four extremities. No deformities. Psychiatric: calm. Does not appear anxious or depressed  Chest X-ray:  Pending        Recent Labs     09/16/21  0618 09/14/21  0535   WBC 6.2 5.3   HGB 11.2* 10.2*   HCT 41.0 36.0    357     Recent Labs     09/16/21  0618 09/15/21  0708 09/14/21  0535    138 136   K 3.7 4.3 3.8   CL 99 101 102   * 223* 211*   CO2 28 27 25   BUN 10 9 8   CREA 0.81 0.73 0.83   CA 8.5 8.7 8.2*   ALB 2.8* 2.8* 2.7*       Assessment:  (Medical Decision Making)     Hospital Problems  Never Reviewed        Codes Class Noted POA    Acute respiratory failure ([facility] ICD-10-CM: J96.00  ICD-9-CM: 518.81  9/14/2021 Unknown        * (Principal) COVID-19 ICD-10-CM: U07.1  ICD-9-CM: 079.89  9/14/2021 Unknown        DM w/o complication type II ([facility] ICD-10-CM: E11.9  ICD-9-CM: 250.00  9/14/2021 Unknown        HTN (hypertension) ICD-10-CM: I10  ICD-9-CM: 401.9  9/14/2021 Unknown              Plan:  (Medical Decision Making)   1. Will need to transfer to ICU for bipap and possible intubation  2. Update CXR. 3. Continue Decadron and Remdesivir - day 3. Got Actemra today. CRP 9.4  4. Place campbell to limit activity  5.  Encouraged her to turn side to side - she can't prone due to mask  6. Prn morphine for dyspnea. May need something for anxiety  7. SSI for hyperglycemia - on oral med at home. Lantus has been added. BS in the 300s  8. Blood pressure controlled - on home meds - ACE, hctz  9. Place PICC line - limited peripheral IV access  10. Day 3 Rocephin/Zithromax in case of associated bacterial pneumonia  11. Called and updated her aunt - Erin Napoles - [phone]    Melanie Hong NP      More than 50% of time documented was spent face-to-face contact with the patient and in the care of the patient on the floor/unit where the patient is located      Lungs: b/l coarse sounds and dyspnea on 100% BIPAP and RR of 40's  Heart S1 and S2 audible, no murmers or rubs appreciated  Other     Clearly need to be intubated and she does NOT want to. She wants to go home. Told her she would not survive off BIPAP  She has her mother on Facetime and I spoke with her mother and also told her patient in respiratory distress and clearly needs to be intubated and SHE refuses as well. Told patient will mostly likely DIE without Vent support since cannot maintain a RR over 40 for long. Told nurse to document as well. Respiratory was with me getting ABG earlier and patient reported did not want to be on a Vent. Will maker her DNR and tell hospitalist.   Will tell ICU coordinator as well, since DNR may not goto ICU given limited resources. Condition is critical.     I have spoken with and examined the patient. I have reviewed the history, examination, assessment, and plan and agree with the above. Naila Chavez MD      This note was signed electronically. Errors are unfortunately her likely due to dictation software.

## 2021-09-16 NOTE — PROGRESS NOTES
[facility] Hospitalist Progress Note     Name:  Dea Hernandez  Age:26 y.o. Sex:female   :  1995    MRN:  [id number]     Admit Date:  2021    Reason for Admission:  Acute respiratory failure ([facility] [J96.00]    Assessment & Plan     Acute respiratory failure secondary to COVID and possible CAP - albuterol q 6hr, decadron, vitamin C, Zinc, start remdesivir. She is aware of potential side effects from remdesivir such as ALLAN and liver damage. Also start Ceftriaxone, azithromycin. Order sputum culture. If oxygenations worsens and requires >10LNC in next 48 hours, will give actemra. She is agreeable to this treatment. 9/15/2021  FiO2 45%, flow at 6 L/min  2021  Patient presently on 70% FiO2, later on today did had to go up to like 100%. Discussed with patient regarding Actemra. Patient willing to have Actemra. Also spoke to family member. As needed morphine     DM type II - SS. Check A1C.      HTN- ACE/HCTZ     BMI 68.08. DVT prophylaxis - [facility] Course/Subjective:   Patient is a 23 y.o. female who presented to the ED for cc fevers and chills. Symptoms started . Has came in contact with family members who had Ricardo. Was found 74% on RA. She is 454 pounds. Unvaccinated. Hx of DM type II, HTN, severe morbid obesity. CRP 10.3.      No hx of surgery  Family hx of DM      Vitals - satting well on 6L NC     CT PE protocol showed no PE. Viral pneumonia along with peripheral consolidations in the basal segment of RUL and RLL seen. Subjective/24 hr Events (21):  9/15/2021  Says breathing okay still mild short of breath    2021  Patient oxygen requirement increased to around 70% FiO2 this morning  Complaining of shortness of breath, mild chest pain on taking a deep breath. Review of Systems: 14 point review of systems is otherwise negative with the exception of the elements mentioned above.     Objective:     Patient Vitals for the past 24 hrs:   Temp Pulse Resp BP SpO2   09/16/21 1000 -- -- -- -- 90 %   09/16/21 0839 99.4 °F (37.4 °C) (!) 111 18 127/71 (!) 89 %   09/16/21 0742 -- -- -- -- 94 %   09/16/21 0735 -- -- -- -- (!) 84 %   09/16/21 0324 99.7 °F (37.6 °C) (!) 101 18 129/83 92 %   09/16/21 0250 -- -- -- -- 90 %   09/15/21 2321 99.8 °F (37.7 °C) (!) 104 19 114/75 90 %   09/15/21 2019 -- -- -- -- 90 %   09/15/21 1947 99.7 °F (37.6 °C) (!) 104 18 132/85 92 %   09/15/21 1527 -- -- -- -- 90 %   09/15/21 1400 98.4 °F (36.9 °C) (!) 105 18 122/84 90 %   09/15/21 1111 97.8 °F (36.6 °C) (!) 109 18 125/87 93 %     Oxygen Therapy  O2 Sat (%): 90 % (09/16/21 1000)  Pulse via Oximetry: 108 beats per minute (09/16/21 1000)  O2 Device: Heated; Hi flow nasal cannula;Humidifier (09/16/21 1000)  O2 Flow Rate (L/min): 60 l/min (09/16/21 1000)  O2 Temperature: 87.8 °F (31 °C) (09/16/21 1000)  FIO2 (%): 100 % (09/16/21 1000)    Body mass index is 67.28 kg/m². Physical Exam:   General:     alert, awake, mild respiratory distress . On FiO2 70%, later on today went up to 100%  HENT:   normocephalic, atraumatic. Oropharynx clear with moist mucous membranes and normal hard/soft palate  Eyes:   anicteric sclerae, moist conjunctiva, PERRL, EOMI  Neck:    supple, non-tender. Trachea midline. Lungs:   Bilateral coarse breath sounds   cardiac:   RRR, Normal S1 and S2. No S3, S4 or murmurs. Abdomen:   Soft, non distended, nontender, +BS, no guarding/rebound. Obese  Extremities:   Warm, dry. No edema , pedal pulses present, no digital cyanosis  Skin:   Warn, dry, normnal turgor and texture; no rash, ulcers or nodules appreciated  Neuro:   AAOx3.  No gross focal neurological deficit,  Cranial nerves II-XII grossly intact  Psychiatric:  No anxiety, calm, cooperative      Data Review:  I have reviewed all labs, meds, and studies from the last 24 hours:    Labs:  Recent Results (from the past 24 hour(s))   GLUCOSE, POC    Collection Time: 09/15/21 11:36 AM   Result Value Ref Range    Glucose (POC) 269 (H) 65 - 100 mg/dL    Performed by [employer]    GLUCOSE, POC    Collection Time: 09/15/21  4:17 PM   Result Value Ref Range    Glucose (POC) 326 (H) 65 - 100 mg/dL    Performed by [email or web address]    GLUCOSE, POC    Collection Time: 09/15/21  9:01 PM   Result Value Ref Range    Glucose (POC) 314 (H) 65 - 100 mg/dL    Performed by [employer]    C REACTIVE PROTEIN, QT    Collection Time: 09/16/21  6:18 AM   Result Value Ref Range    C-Reactive protein 9.4 (H) 0.0 - 0.9 mg/dL   CBC WITH AUTOMATED DIFF    Collection Time: 09/16/21  6:18 AM   Result Value Ref Range    WBC 6.2 4.3 - 11.1 K/uL    RBC 5.93 (H) 4.05 - 5.2 M/uL    HGB 11.2 (L) 11.7 - 15.4 g/dL    HCT 41.0 35.8 - 46.3 %    MCV 69.1 (L) 79.6 - 97.8 FL    MCH 18.9 (L) 26.1 - 32.9 PG    MCHC 27.3 (L) 31.4 - 35.0 g/dL    RDW 21.2 (H) 11.9 - 14.6 %    PLATELET 359 201 - 933 K/uL    MPV 10.6 9.4 - 12.3 FL    ABSOLUTE NRBC 0.00 0.0 - 0.2 K/uL    DF AUTOMATED      NEUTROPHILS 69 43 - 78 %    LYMPHOCYTES 23 13 - 44 %    MONOCYTES 8 4.0 - 12.0 %    EOSINOPHILS 0 (L) 0.5 - 7.8 %    BASOPHILS 0 0.0 - 2.0 %    IMMATURE GRANULOCYTES 1 0.0 - 5.0 %    ABS. NEUTROPHILS 4.3 1.7 - 8.2 K/UL    ABS. LYMPHOCYTES 1.4 0.5 - 4.6 K/UL    ABS. MONOCYTES 0.5 0.1 - 1.3 K/UL    ABS. EOSINOPHILS 0.0 0.0 - 0.8 K/UL    ABS. BASOPHILS 0.0 0.0 - 0.2 K/UL    ABS. IMM. GRANS. 0.0 0.0 - 0.5 K/UL   METABOLIC PANEL, COMPREHENSIVE    Collection Time: 09/16/21  6:18 AM   Result Value Ref Range    Sodium 136 136 - 145 mmol/L    Potassium 3.7 3.5 - 5.1 mmol/L    Chloride 99 98 - 107 mmol/L    CO2 28 21 - 32 mmol/L    Anion gap 9 7 - 16 mmol/L    Glucose 178 (H) 65 - 100 mg/dL    BUN 10 6 - 23 MG/DL    Creatinine 0.81 0.6 - 1.0 MG/DL    GFR est AA >60 >60 ml/min/1.73m2    GFR est non-AA >60 >60 ml/min/1.73m2    Calcium 8.5 8.3 - 10.4 MG/DL    Bilirubin, total 0.3 0.2 - 1.1 MG/DL    ALT (SGPT) 35 12 - 65 U/L    AST (SGOT) 46 (H) 15 - 37 U/L    Alk.  phosphatase 80 50 - 136 U/L Protein, total 8.2 6.3 - 8.2 g/dL    Albumin 2.8 (L) 3.5 - 5.0 g/dL    Globulin 5.4 (H) 2.3 - 3.5 g/dL    A-G Ratio 0.5 (L) 1.2 - 3.5     GLUCOSE, POC    Collection Time: 09/16/21  8:10 AM   Result Value Ref Range    Glucose (POC) 204 (H) 65 - 100 mg/dL    Performed by Annel        All Micro Results     Procedure Component Value Units Date/Time    CULTURE, BLOOD [[id number]] Collected: 09/14/21 0759    Order Status: Completed Specimen: Blood Updated: 09/16/21 0749     Special Requests: --        LEFT  HAND       Culture result: NO GROWTH 2 DAYS       CULTURE, BLOOD [[id number]] Collected: 09/14/21 0757    Order Status: Completed Specimen: Blood Updated: 09/16/21 0749     Special Requests: --        RIGHT  Antecubital       Culture result: NO GROWTH 2 DAYS       CULTURE, RESPIRATORY/SPUTUM/BRONCH [place] STAIN [[id number]]     Order Status: Sent Specimen: Sputum     COVID-19 RAPID TEST [[id number]]  (Abnormal) Collected: 09/14/21 0535    Order Status: Completed Specimen: Nasopharyngeal Updated: 09/14/21 0558     Specimen source NASAL        COVID-19 rapid test Detected        Comment:      The specimen is POSITIVE for SARS-CoV-2, the novel coronavirus associated with COVID-19. This test has been authorized by the FDA under an Emergency Use Authorization (EUA) for use by authorized laboratories. Fact sheet for Healthcare Providers: ConventionUpdate.co.nz  Fact sheet for Patients: ConventionUpdate.co.nz       Methodology: Isothermal Nucleic Acid Amplification  RESULTS VERIFIED, PHONED TO AND READ BACK BY   FERDINAND BENOIT AT 4164 ON 9/14/21 ACL               EKG Results     None          Other Studies:  No results found.     Current Meds:   Current Facility-Administered Medications   Medication Dose Route Frequency    morphine injection 1 mg  1 mg IntraVENous Q4H PRN    insulin glargine (LANTUS) injection 20 Units  20 Units SubCUTAneous DAILY    dexamethasone (DECADRON) 10 mg/mL injection 6 mg  6 mg IntraVENous Q24H    remdesivir 100 mg in 0.9% sodium chloride 250 mL IVPB  100 mg IntraVENous Q24H    albuterol (PROVENTIL HFA, VENTOLIN HFA, PROAIR HFA) inhaler 2 Puff  2 Puff Inhalation Q6H RT    ascorbic acid (vitamin C) (VITAMIN C) tablet 500 mg  500 mg Oral BID    zinc sulfate (ZINCATE) 50 mg zinc (220 mg) capsule 50 mg  50 mg Oral DAILY    insulin lispro (HUMALOG) injection   SubCUTAneous AC&HS    lisinopril-hydroCHLOROthiazide (PRINZIDE, ZESTORETIC) 20-12.5 mg per tablet 1 Tablet  1 Tablet Oral DAILY    acetaminophen (TYLENOL) tablet 650 mg  650 mg Oral Q6H PRN    Or    acetaminophen (TYLENOL) suppository 650 mg  650 mg Rectal Q6H PRN    polyethylene glycol (MIRALAX) packet 17 g  17 g Oral DAILY PRN    ondansetron (ZOFRAN ODT) tablet 4 mg  4 mg Oral Q8H PRN    Or    ondansetron (ZOFRAN) injection 4 mg  4 mg IntraVENous Q6H PRN    enoxaparin (LOVENOX) injection 40 mg  40 mg SubCUTAneous Q12H    azithromycin (ZITHROMAX) tablet 500 mg  500 mg Oral DAILY    cefTRIAXone (ROCEPHIN) 1 g in 0.9% sodium chloride (MBP/ADV) 50 mL MBP  1 g IntraVENous Q24H       Problem List:  Hospital Problems as of 9/16/2021 Never Reviewed        Codes Class Noted - Resolved POA    Acute respiratory failure ([facility] ICD-10-CM: J96.00  ICD-9-CM: 518.81  9/14/2021 - Present Unknown        * (Principal) COVID-19 ICD-10-CM: U07.1  ICD-9-CM: 079.89  9/14/2021 - Present Unknown        DM w/o complication type II (HCC) ICD-10-CM: E11.9  ICD-9-CM: 250.00  9/14/2021 - Present Unknown        HTN (hypertension) ICD-10-CM: I10  ICD-9-CM: 401.9  9/14/2021 - Present Unknown                   Signed By: Oli Deleon MD   [employer] [facility]    September 16, 2021  10:48 am

## 2021-09-16 NOTE — PROGRESS NOTES
Called to patient's room due to patient desatting. Patient SpO2=78% on 60L/100% Airvo. Placed NRB mask on top, and SpO2 increased to 85%. Patient has increased WOB, and states she just returned to bed from using bedside commode. Instructed patient to ask for assistance before getting up so that the [facility] could be pulled closer. Patient states understanding. Patient states she did not remove her oxygen to use commode. Spoke with RN, patient and patient's mom about placing patient on BiPAP to which they all agree. Patient is placed on BiPAP at documented settings. Will continue to monitor. 09/16/21 1535   Oxygen Therapy   O2 Sat (%) 91 %   Pulse via Oximetry 103 beats per minute   O2 Device BIPAP   FIO2 (%) 100 %   Respiratory   Respiratory (WDL) X   Breath Sounds Bilateral Diminished   CPAP/BIPAP   CPAP/BIPAP Start/Stop On   Device Mode BIPAP   $$ Bipap Daily Yes   Mask Type and Size Full face; Large   Skin Condition intact   PIP Observed 15 cm H20   IPAP (cm H2O) 13 cm H2O   EPAP (cm H2O) 8 cm H2O   Inspiratory Time (sec) 1 seconds   Vt Spont (ml) 897 ml   Ve Observed (l/min) 25.5 l/min   Backup Rate 16   Total RR (Spontaneous) 28 breaths per minute   Insp Rise Time (sec) 3   Leak (Estimated) 10 L/min   Pt's Home Machine No   Biomedical Check Performed Yes   Settings Verified Yes   Alarm Settings   High Pressure 30   Low Pressure 5   Apnea 20   Low Ve 2   High Rate 50   Low Rate 5   Pulmonary Toilet   Pulmonary Toilet H. O.B elevated

## 2021-09-16 NOTE — PROGRESS NOTES
Upon assessment pt alert and oriented times 4. Pt is on BiPAP at 100% at this time sating 91% on continuous pulse ox. Pt just had PICC line placed in right upper arm and dressing had come off. Dr. Sherine Rod notified via perfect serve and stated to redress dressing and have PICC team eval in the morning. Dr. Sherine Rod also gave verbal order to place campbell. Pt has safety measures in place. Pt has call light within reach and is encouraged to call for assistance if needed.

## 2021-09-17 NOTE — PROGRESS NOTES
[facility]ist Progress Note     Name:  Balta Latif  Age:26 y.o. Sex:female   :  1995    MRN:  [id number]     Admit Date:  2021    Reason for Admission:  Acute respiratory failure ([facility] [J96.00]    Assessment & Plan     Acute respiratory failure secondary to COVID and possible CAP - albuterol q 6hr, decadron, vitamin C, Zinc, start remdesivir. She is aware of potential side effects from remdesivir such as ALLAN and liver damage. Also start Ceftriaxone, azithromycin. Order sputum culture. If oxygenations worsens and requires >10LNC in next 48 hours, will give actemra. She is agreeable to this treatment. 9/15/2021  FiO2 45%, flow at 6 L/min  2021  Patient presently on 70% FiO2, later on today did had to go up to like 100%. Discussed with patient regarding Actemra. Patient willing to have Actemra. Also spoke to family member. As needed morphine  2021  Presently on BiPAP, 100% FiO2 with oxygen saturations at times in mid 80s.     DM type II - SS. Check A1C.   2021  Mild uncontrolled diabetes mellitus, change Lantus to 20 units twice daily. Continue sliding scale insulin     HTN- ACE/HCTZ     BMI 68.08. DVT prophylaxis - Lovenox    Patient was DNR, with the family discussion was made full code, later on today when mother came from [place], discussed with family(pt and mother) again regarding CODE STATUS, CODE STATUS was changed to DNR. Present CODE STATUS DNR. Hospital Course/Subjective:   Patient is a 23 y.o. female who presented to the ED for cc fevers and chills. Symptoms started . Has came in contact with family members who had Louisa Jordy. Was found 74% on RA. She is 454 pounds. Unvaccinated. Hx of DM type II, HTN, severe morbid obesity. CRP 10.3.      No hx of surgery  Family hx of DM      Vitals - satting well on 6L NC     CT PE protocol showed no PE. Viral pneumonia along with peripheral consolidations in the basal segment of RUL and RLL seen. Subjective/24 hr Events (09/17/21):  9/15/2021  Says breathing okay still mild short of breath    9/16/2021  Patient oxygen requirement increased to around 70% FiO2 this morning  Complaining of shortness of breath, mild chest pain on taking a deep breath. 9/17/2021  Says her breathing okay does not feel much short of breath, complaining of mask being uncomfortable at times  Patient requiring BiPAP, 100% FiO2, oxygen saturations at times mid 80s. Lengthy discussion regarding goals of care with the patient/grandmother/mother. Review of Systems: 14 point review of systems is otherwise negative with the exception of the elements mentioned above. Objective:     Patient Vitals for the past 24 hrs:   Temp Pulse Resp BP SpO2   09/17/21 1222 99.1 °F (37.3 °C) (!) 109 22 (!) 156/101 (!) 84 %   09/17/21 1204 -- -- -- -- 90 %   09/17/21 0811 98.2 °F (36.8 °C) 98 20 (!) 138/91 (!) 89 %   09/17/21 0723 -- -- -- -- 91 %   09/17/21 0527 -- -- -- -- (!) 89 %   09/17/21 0329 -- -- -- -- (!) 89 %   09/17/21 0326 -- -- -- -- (!) 85 %   09/17/21 0319 -- -- -- -- (!) 88 %   09/17/21 0257 98.1 °F (36.7 °C) 94 20 127/80 (!) 89 %   09/17/21 0207 -- -- -- -- 92 %   09/17/21 0128 -- -- -- -- (!) 87 %   09/16/21 2241 98.5 °F (36.9 °C) 92 20 127/88 90 %   09/16/21 2050 98.7 °F (37.1 °C) 95 19 135/78 91 %   09/16/21 1959 -- -- -- -- 90 %   09/16/21 1955 -- -- -- -- (!) 89 %   09/16/21 1709 -- -- -- -- 91 %   09/16/21 1535 -- -- -- -- 91 %   09/16/21 1520 98.9 °F (37.2 °C) (!) 111 18 120/72 (!) 85 %     Oxygen Therapy  O2 Sat (%): (!) 84 % (09/17/21 1222)  Pulse via Oximetry: 84 beats per minute (09/17/21 1204)  O2 Device: BIPAP (09/17/21 0723)  O2 Flow Rate (L/min): 60 l/min (09/16/21 1303)  O2 Temperature: 87.8 °F (31 °C) (09/16/21 1303)  FIO2 (%): 100 % (09/17/21 0326)    Body mass index is 67.28 kg/m². Physical Exam:   General:     alert, awake, mild respiratory distress .   On BiPAP, FiO2 100%   HENT:   normocephalic, atraumatic. Oropharynx clear with moist mucous membranes and normal hard/soft palate  Eyes:   anicteric sclerae, moist conjunctiva, PERRL, EOMI  Neck:    supple, non-tender. Trachea midline. Lungs:   Bilateral coarse breath sounds   cardiac:   RRR, Normal S1 and S2. No S3, S4 or murmurs. Abdomen:   Soft, non distended, nontender, +BS, no guarding/rebound. Obese  Extremities:   Warm, dry. No edema , pedal pulses present, no digital cyanosis  Skin:   Warn, dry, normnal turgor and texture; no rash, ulcers or nodules appreciated  Neuro:   AAOx3. No gross focal neurological deficit,  Cranial nerves II-XII grossly intact  Psychiatric:  No anxiety, calm, cooperative      Data Review:  I have reviewed all labs, meds, and studies from the last 24 hours:    Labs:  Recent Results (from the past 24 hour(s))   GLUCOSE, POC    Collection Time: 09/16/21  4:34 PM   Result Value Ref Range    Glucose (POC) 306 (H) 65 - 100 mg/dL    Performed by Annel    BLOOD GAS, ARTERIAL POC    Collection Time: 09/16/21  5:10 PM   Result Value Ref Range    Device: BIPAP MASK      FIO2 (POC) 100 %    pH (POC) 7.36 7.35 - 7.45      pCO2 (POC) 51.1 (H) 35 - 45 MMHG    pO2 (POC) 61 (L) 75 - 100 MMHG    HCO3 (POC) 28.5 (H) 22 - 26 MMOL/L    sO2 (POC) 89.6 (L) 95 - 98 %    Base excess (POC) 1.9 mmol/L    Allens test (POC) Positive      Total resp.  rate 16      Site RIGHT BRACHIAL      Specimen type (POC) ARTERIAL      Performed by Mily     Respiratory comment: 13/8    GLUCOSE, POC    Collection Time: 09/16/21  8:54 PM   Result Value Ref Range    Glucose (POC) 271 (H) 65 - 100 mg/dL    Performed by Ry    CBC WITH AUTOMATED DIFF    Collection Time: 09/17/21  7:32 AM   Result Value Ref Range    WBC 2.9 (L) 4.3 - 11.1 K/uL    RBC 5.71 (H) 4.05 - 5.2 M/uL    HGB 10.9 (L) 11.7 - 15.4 g/dL    HCT 39.6 35.8 - 46.3 %    MCV 69.4 (L) 79.6 - 97.8 FL    MCH 19.1 (L) 26.1 - 32.9 PG    MCHC 27.5 (L) 31.4 - 35.0 g/dL RDW 21.1 (H) 11.9 - 14.6 %    PLATELET 223 224 - 095 K/uL    MPV 10.4 9.4 - 12.3 FL    ABSOLUTE NRBC 0.00 0.0 - 0.2 K/uL    DF AUTOMATED      NEUTROPHILS 49 43 - 78 %    LYMPHOCYTES 42 13 - 44 %    MONOCYTES 8 4.0 - 12.0 %    EOSINOPHILS 0 (L) 0.5 - 7.8 %    BASOPHILS 1 0.0 - 2.0 %    IMMATURE GRANULOCYTES 0 0.0 - 5.0 %    ABS. NEUTROPHILS 1.4 (L) 1.7 - 8.2 K/UL    ABS. LYMPHOCYTES 1.2 0.5 - 4.6 K/UL    ABS. MONOCYTES 0.2 0.1 - 1.3 K/UL    ABS. EOSINOPHILS 0.0 0.0 - 0.8 K/UL    ABS. BASOPHILS 0.0 0.0 - 0.2 K/UL    ABS. IMM. GRANS. 0.0 0.0 - 0.5 K/UL   METABOLIC PANEL, COMPREHENSIVE    Collection Time: 09/17/21  7:32 AM   Result Value Ref Range    Sodium 135 (L) 136 - 145 mmol/L    Potassium 4.4 3.5 - 5.1 mmol/L    Chloride 100 98 - 107 mmol/L    CO2 30 21 - 32 mmol/L    Anion gap 5 (L) 7 - 16 mmol/L    Glucose 169 (H) 65 - 100 mg/dL    BUN 9 6 - 23 MG/DL    Creatinine 0.53 (L) 0.6 - 1.0 MG/DL    GFR est AA >60 >60 ml/min/1.73m2    GFR est non-AA >60 >60 ml/min/1.73m2    Calcium 8.5 8.3 - 10.4 MG/DL    Bilirubin, total 0.3 0.2 - 1.1 MG/DL    ALT (SGPT) 29 12 - 65 U/L    AST (SGOT) 53 (H) 15 - 37 U/L    Alk.  phosphatase 77 50 - 136 U/L    Protein, total 7.3 6.3 - 8.2 g/dL    Albumin 2.6 (L) 3.5 - 5.0 g/dL    Globulin 4.7 (H) 2.3 - 3.5 g/dL    A-G Ratio 0.6 (L) 1.2 - 3.5     GLUCOSE, POC    Collection Time: 09/17/21  7:50 AM   Result Value Ref Range    Glucose (POC) 192 (H) 65 - 100 mg/dL    Performed by Itzel    GLUCOSE, POC    Collection Time: 09/17/21 11:43 AM   Result Value Ref Range    Glucose (POC) 214 (H) 65 - 100 mg/dL    Performed by Larissa        All Micro Results     Procedure Component Value Units Date/Time    CULTURE, BLOOD [[id number]] Collected: 09/14/21 0753    Order Status: Completed Specimen: Blood Updated: 09/17/21 1045     Special Requests: --        RIGHT  Antecubital       Culture result: NO GROWTH 3 DAYS       CULTURE, BLOOD [[id number]] Collected: 09/14/21 0759    Order Status: Completed Specimen: Blood Updated: 09/17/21 1045     Special Requests: --        LEFT  HAND       Culture result: NO GROWTH 3 DAYS       CULTURE, RESPIRATORY/SPUTUM/BRONCH Favian Romeo [[id number]] Collected: 09/14/21 0915    Order Status: Canceled Specimen: Sputum     COVID-19 RAPID TEST [[id number]]  (Abnormal) Collected: 09/14/21 0535    Order Status: Completed Specimen: Nasopharyngeal Updated: 09/14/21 0558     Specimen source NASAL        COVID-19 rapid test Detected        Comment:      The specimen is POSITIVE for SARS-CoV-2, the novel coronavirus associated with COVID-19. This test has been authorized by the FDA under an Emergency Use Authorization (EUA) for use by authorized laboratories. Fact sheet for Healthcare Providers: ConventionShoutitoutdate.co.nz  Fact sheet for Patients: Bannodate.co.nz       Methodology: Isothermal Nucleic Acid Amplification  RESULTS VERIFIED, PHONED TO AND READ BACK BY   FERDINAND BENOIT AT 0557 ON 9/14/21 ACL               EKG Results     None          Other Studies:  XR CHEST SNGL V    Result Date: 9/16/2021   Portable view of the chest COMPARISON: September 14 CLINICAL HISTORY: Covid, hypoxia. FINDINGS: Extensive bilateral airspace densities, progressed when compared to prior exam. No pneumothorax. Heart is enlarged. There is a right-sided PICC with the tip in the area of right atrium. 1. Extensive bilateral airspace densities, progressed compared to prior exam. Differential diagnosis includes pneumonia and pulmonary edema.       Current Meds:   Current Facility-Administered Medications   Medication Dose Route Frequency    pantoprazole (PROTONIX) tablet 40 mg  40 mg Oral ACB    morphine injection 2 mg  2 mg IntraVENous Q4H PRN    sodium chloride (NS) flush 30 mL  30 mL InterCATHeter Q8H    sodium chloride (NS) flush 30 mL  30 mL InterCATHeter PRN    insulin glargine (LANTUS) injection 20 Units  20 Units SubCUTAneous DAILY  dexamethasone (DECADRON) 10 mg/mL injection 6 mg  6 mg IntraVENous Q24H    albuterol (PROVENTIL HFA, VENTOLIN HFA, PROAIR HFA) inhaler 2 Puff  2 Puff Inhalation Q6H RT    ascorbic acid (vitamin C) (VITAMIN C) tablet 500 mg  500 mg Oral BID    zinc sulfate (ZINCATE) 50 mg zinc (220 mg) capsule 50 mg  50 mg Oral DAILY    insulin lispro (HUMALOG) injection   SubCUTAneous AC&HS    lisinopril-hydroCHLOROthiazide (PRINZIDE, ZESTORETIC) 20-12.5 mg per tablet 1 Tablet  1 Tablet Oral DAILY    acetaminophen (TYLENOL) tablet 650 mg  650 mg Oral Q6H PRN    Or    acetaminophen (TYLENOL) suppository 650 mg  650 mg Rectal Q6H PRN    polyethylene glycol (MIRALAX) packet 17 g  17 g Oral DAILY PRN    ondansetron (ZOFRAN ODT) tablet 4 mg  4 mg Oral Q8H PRN    Or    ondansetron (ZOFRAN) injection 4 mg  4 mg IntraVENous Q6H PRN    enoxaparin (LOVENOX) injection 40 mg  40 mg SubCUTAneous Q12H    azithromycin (ZITHROMAX) tablet 500 mg  500 mg Oral DAILY    cefTRIAXone (ROCEPHIN) 1 g in 0.9% sodium chloride (MBP/ADV) 50 mL MBP  1 g IntraVENous Q24H       Problem List:  Hospital Problems as of 9/17/2021 Never Reviewed        Codes Class Noted - Resolved POA    Acute respiratory failure with hypoxia (HCC) ICD-10-CM: J96.01  ICD-9-CM: 518.81  9/16/2021 - Present Yes        * (Principal) COVID-19 ICD-10-CM: U07.1  ICD-9-CM: 079.89  9/14/2021 - Present Yes        DM w/o complication type II (HCC) (Chronic) ICD-10-CM: E11.9  ICD-9-CM: 250.00  9/14/2021 - Present Yes        HTN (hypertension) (Chronic) ICD-10-CM: I10  ICD-9-CM: 401.9  9/14/2021 - Present Yes        RESOLVED: Acute respiratory failure ([address] ICD-10-CM: J96.00  ICD-9-CM: 518.81  9/14/2021 - 9/16/2021 Yes                   Signed By: Debborah Krabbe, MD   [facility] Hospitalist Service    September 17, 2021

## 2021-09-17 NOTE — PROGRESS NOTES
ICU made aware of need for pt to be transferred. Pulmonology NP at bedside and requests that mom be at bedside before any decision be made.       Mom in route from [facility]

## 2021-09-17 NOTE — PROGRESS NOTES
Met with patient, mother,  and Dr. Brian Downing. Patient is clear that she does not want to be intubated or resuscitated (ie CPR/defibrillation). She wants to be able to communicate with her family and not be on life support/sedated. Talked about going home with hospice and inability to provide bipap or oxygen support > 10 liters. Multiple family members are hospitalized with COVID and I don't know that patient's mother could manage home care with hospice even if arranged for. Patient requesting breaks from bipap. Will accept lower sat and have RT try to alternate airvo with nonrebreather and CPAP. Will continue to reassess her needs and adjust plan of care accordingly.      Hakeem Langley NP

## 2021-09-17 NOTE — PROGRESS NOTES
MSN, CM:  Patient currently on 100% BiPap. This AM Deloris Ryan NP with pulmonary notified this CM patient needed hospice. [facility] notified and asked if they could take patient home on NC. Patient is uninsured but [facility] accepted anyway. [facility] also notified this CM they could provide 18L NC. This CM and Dr. Seble Salas spoke with patient and family which did not agree with Hospice. Patient stated she did not want to die. Patient and family explained Home Hospice vs intubation. Patient and family very adamant about no intubation. This CM spoke with patient about fears of intubation. Patient's mother came from [facility]. When mother got here this CM, Dr. Seble Salas, and Troy Severino NP (pulmonary) all had a goal discussion in room with patient and mother. Patient and mother still agree to no intubation. Patient did decide to switch from Bipap to Airvo just for breaks until Monday. Patient will make a decision on Monday regarding hospice. [facility] notified and are on standby for now. Case Management will continue to follow.

## 2021-09-17 NOTE — PROGRESS NOTES
[facility]  At the heart of better care     Advance Care Planning   Admit Date:  2021  5:26 AM   Name:  Balta Latif   Age:  23 y.o. Sex:  female  :  1995   MRN:  [id number]   Room:  802/    Balta Latif is able to make her own decisions: YES    Names of POA/surrogate decision maker when patient is altered:  Brooke    Had a lengthy discussion with pt , pts mother and grand mother. Pt is hypoxic on 100% oxygen on bipap. Family understands that pt needs intubation. Family says they are on there way to see the pt. Pt and family at this point wants to be full code but only intubate if not able to breath and do everything including intubation if pt heart stops/ codes. The same conveyed to nursing staff and case management. Patient / surrogate decision-maker directed:  FULL CODE    Time spent: 37 minutes in direct discussion (face to face and/or over phone).     Signed:  David Session, MD

## 2021-09-17 NOTE — PROGRESS NOTES
Bedside shift report received from [address] [place] [address]. Patient in bed resting. Respirations even and unlabored. On BiPAP 100%. No needs expressed at this time. Safety measures in place. Call light in reach. No signs of acute distress at this time. Will continue to monitor.

## 2021-09-17 NOTE — PROGRESS NOTES
Pt is resting in bed at this time. Pt is on BiPAP at 100%. Pt has safety measures in place. Pt has call light within reach.  Report given to [facility]

## 2021-09-17 NOTE — PROGRESS NOTES
Discussion with pt and pts mother, in presence of Robi Figures [facility] Pulmonary and Arlyn Riddle,case management. After the discussion,as per pt and pts mother decision,  Pt code status changed to DNR. Will cont pt on Bipap/Airvo, family understanding that the oxygen saturations still low on 100% fio2 and pt doesnot want to be intubated. Both pt and mother understand that there is a high risk of deterioration. Hoping to see any improvement in oxygenation.

## 2021-09-17 NOTE — PROGRESS NOTES
Pt is requesting to take off her mask at this time \"to sleep\". Pt is on BiPAP at 100% and sustains an O2 sat of 85-90%. It was explained to pt by this RN and Charge nurse that if she takes the mask off she could die. Pt response was \"why cant I just have the tube put back in my nose\". This RN and charge nurse explained that the nasal canula was not providing enough oxygen and that is why she is on the BiPAP, and that she could die without the mask. Pt stated that she understands but just wants to go home. Patient is willing to stay tonight, but continues to express her dislike for the BiPAP mask and desire to go home, even if it is with BiPAP. Encouraged patient to discuss her concerns and wishes with the physician in the morning.

## 2021-09-17 NOTE — PROGRESS NOTES
Mariah Fitch  Admission Date: 9/14/2021         Daily Progress Note: 9/17/2021    The patient's chart is reviewed and the patient is discussed with the staff. Background: 24 [facility] admitted with acute respiratory failure due to [address]. PMH includes DM, HTN, and morbid obesity. She was admitted on 9/14 with a 2 day history of dyspnea. She was positive for COVID. She was started on decadron and remdesivir. She initially required 6L NC but has since required BiPAP FiO2 @ 100% and her resting saturation was 91% but drops to the 70s with exertion. She received Actemra 9/16. She made it very clear to several providers that she does not want intubation and her mother confirmed this as well. Subjective:     Currently seen on BiPAP 100% with O2 sat 84%. She states she wants to go home. Per notes she originally was okay with compressions and medications. We had a long discussion that if she were to cardiac arrest due to her oxygen levels being low the chance of survival is minimal. We discussed that if she comes of the BiPAP her O2 levels will be dangerously low. We discussed home with hospice and she is agreeable. Dr. Qasim Javed then spoke to patient and grandmother and states she is not ready to die but still confirms no intubation. Grandmother is on her way in to discuss with patient. She denies any pain, SOB or N/V.     Current Facility-Administered Medications   Medication Dose Route Frequency    pantoprazole (PROTONIX) tablet 40 mg  40 mg Oral ACB    morphine injection 2 mg  2 mg IntraVENous Q4H PRN    sodium chloride (NS) flush 30 mL  30 mL InterCATHeter Q8H    sodium chloride (NS) flush 30 mL  30 mL InterCATHeter PRN    insulin glargine (LANTUS) injection 20 Units  20 Units SubCUTAneous DAILY    dexamethasone (DECADRON) 10 mg/mL injection 6 mg  6 mg IntraVENous Q24H    remdesivir 100 mg in 0.9% sodium chloride 250 mL IVPB  100 mg IntraVENous Q24H    albuterol (PROVENTIL HFA, VENTOLIN HFA, PROAIR HFA) inhaler 2 Puff  2 Puff Inhalation Q6H RT    ascorbic acid (vitamin C) (VITAMIN C) tablet 500 mg  500 mg Oral BID    zinc sulfate (ZINCATE) 50 mg zinc (220 mg) capsule 50 mg  50 mg Oral DAILY    insulin lispro (HUMALOG) injection   SubCUTAneous AC&HS    lisinopril-hydroCHLOROthiazide (PRINZIDE, ZESTORETIC) 20-12.5 mg per tablet 1 Tablet  1 Tablet Oral DAILY    acetaminophen (TYLENOL) tablet 650 mg  650 mg Oral Q6H PRN    Or    acetaminophen (TYLENOL) suppository 650 mg  650 mg Rectal Q6H PRN    polyethylene glycol (MIRALAX) packet 17 g  17 g Oral DAILY PRN    ondansetron (ZOFRAN ODT) tablet 4 mg  4 mg Oral Q8H PRN    Or    ondansetron (ZOFRAN) injection 4 mg  4 mg IntraVENous Q6H PRN    enoxaparin (LOVENOX) injection 40 mg  40 mg SubCUTAneous Q12H    azithromycin (ZITHROMAX) tablet 500 mg  500 mg Oral DAILY    cefTRIAXone (ROCEPHIN) 1 g in 0.9% sodium chloride (MBP/ADV) 50 mL MBP  1 g IntraVENous Q24H     Review of Systems  Constitutional: negative for fever, chills, sweats  Cardiovascular: negative for chest pain, palpitations, syncope, edema  Gastrointestinal:  negative for dysphagia, reflux, vomiting, diarrhea, abdominal pain, or melena  Neurologic:  negative for focal weakness, numbness, headache  Objective:     Vitals:    09/17/21 0329 09/17/21 0527 09/17/21 0723 09/17/21 0811   BP:    (!) 138/91   Pulse:    98   Resp:    20   Temp:    98.2 °F (36.8 °C)   SpO2: (!) 89% (!) 89% 91% (!) 89%   Weight:       Height:           Intake/Output Summary (Last 24 hours) at 9/17/2021 6234  Last data filed at 9/17/2021 0603  Gross per 24 hour   Intake 980 ml   Output --   Net 980 ml     Physical Exam:   Constitution:  the patient is well developed and in no acute distress  HEENT:  Sclera clear, pupils equal, oral mucosa moist  Respiratory: diminished throughout on BiPAP 100%  Cardiovascular:  RRR without M,G,R  Gastrointestinal: soft and non-tender; with positive bowel sounds. Musculoskeletal: warm without cyanosis. There is trace lower extremity edema. Skin:  no jaundice or rashes, no wounds   Neurologic: no gross neuro deficits     Psychiatric:  alert and oriented x 4    CXR: 9/16      CT Chest 9/14       LAB:  Recent Labs     09/17/21  0732 09/16/21  0618   WBC 2.9* 6.2   HGB 10.9* 11.2*   HCT 39.6 41.0    433     Recent Labs     09/17/21  0732 09/16/21  0618 09/15/21  0708   * 136 138   K 4.4 3.7 4.3    99 101   CO2 30 28 27   * 178* 223*   BUN 9 10 9   CREA 0.53* 0.81 0.73   CA 8.5 8.5 8.7   ALB 2.6* 2.8* 2.8*   AST 53* 46* 50*   ALT 29 35 39   AP 77 80 85     Recent Labs     09/16/21  0618   CRP 9.4*     Recent Labs     09/16/21  1710   PHI 7.36   PCO2I 51.1*   PO2I 61*   HCO3I 28.5*     No results for input(s): SDES, CULT in the last 72 hours. Assessment and Plan:  (Medical Decision Making)   Principal Problem:    COVID-19 (9/14/2021)    Confirmed DNI status. Continues on BiPAP 100% with O2 sat in low 80s. She originally agreed to going home with hospice but with further discussion with Hospitalist she states she does not want to be intubated but is not ready to die. Proning was encouraged. Continue decadron. Continue BiPAP. If patient decides she wants to go home, hospice is an option and CM is involved. Otherwise, continue current treatment. Active Problems:    DM w/o complication type II ([address] (9/14/2021)    190-300s, on SSI and lantus      HTN (hypertension) (9/14/2021)    Controlled       Acute respiratory failure with hypoxia ([address] (9/16/2021)    Remains on BiPAP 100% with O2 sat in low 80s and tachypneic. On rocephin and azithromcyin in case of bacterial component. More than 50% of the time documented was spent in face-to-face contact with the patient and in the care of the patient on the floor/unit where the patient is located. Tessie Jackson, AYAKA     Lungs: few crackles. No wheezing.    Heart S1 and S2 audible, no murmers or rubs appreciated  Other     Still wants to be DNR. Mother here and will continue to move side to side since cannot prone. Too unstable to go home. Will f/u peripherally this weekend. Call if needed. I have spoken with and examined the patient. I have reviewed the history, examination, assessment, and plan and agree with the above. Caren Arreola MD      This note was signed electronically. Errors are unfortunately her likely due to dictation software.

## 2021-09-18 PROBLEM — R29.818 SUSPECTED SLEEP APNEA: Status: ACTIVE | Noted: 2021-01-01

## 2021-09-18 PROBLEM — J80 ACUTE RESPIRATORY DISTRESS SYNDROME (ARDS) DUE TO COVID-19 VIRUS (HCC): Status: ACTIVE | Noted: 2021-01-01

## 2021-09-18 PROBLEM — E66.01 MORBID OBESITY (HCC): Status: ACTIVE | Noted: 2021-01-01

## 2021-09-18 NOTE — PROGRESS NOTES
[facility] Hospitalist Progress Note     Name:  Grisel lAvarez  Age:26 y.o. Sex:female   :  1995    MRN:  [id number]     Admit Date:  2021    Reason for Admission:  Acute respiratory failure ([address] [J96.00]    Assessment & Plan     Acute respiratory failure secondary to COVID and possible CAP - albuterol q 6hr, decadron, vitamin C, Zinc, start remdesivir. She is aware of potential side effects from remdesivir such as ALLAN and liver damage. Also start Ceftriaxone, azithromycin. Order sputum culture. If oxygenations worsens and requires >10LNC in next 48 hours, will give actemra. She is agreeable to this treatment. 9/15/2021  FiO2 45%, flow at 6 L/min  2021  Patient presently on 70% FiO2, later on today did had to go up to like 100%. Discussed with patient regarding Actemra. Patient willing to have Actemra. Also spoke to family member. As needed morphine  2021  Presently on BiPAP, 100% FiO2 with oxygen saturations at times in mid 80s. 2021  On BiPAP, 100% FiO2, oxygen saturation 70s to 80s     DM type II - SS. Check A1C.   2021  Mild uncontrolled diabetes mellitus, change Lantus to 20 units twice daily. Continue sliding scale insulin     HTN- ACE/HCTZ     BMI 68.08. DVT prophylaxis - Lovenox    Present CODE STATUS DNR. Guarded prognosis        Hospital Course/Subjective:   Patient is a 23 y.o. female who presented to the ED for cc fevers and chills. Symptoms started . Has came in contact with family members who had Ricardo. Was found 74% on RA. She is 454 pounds. Unvaccinated. Hx of DM type II, HTN, severe morbid obesity. CRP 10.3.      No hx of surgery  Family hx of DM      Vitals - satting well on 6L NC     CT PE protocol showed no PE. Viral pneumonia along with peripheral consolidations in the basal segment of RUL and RLL seen.          Subjective/24 hr Events (21):  9/15/2021  Says breathing okay still mild short of breath    2021  Patient oxygen requirement increased to around 70% FiO2 this morning  Complaining of shortness of breath, mild chest pain on taking a deep breath. 9/17/2021  Says her breathing okay does not feel much short of breath, complaining of mask being uncomfortable at times  Patient requiring BiPAP, 100% FiO2, oxygen saturations at times mid 80s. Lengthy discussion regarding goals of care with the patient/grandmother/mother. 9/18/2021  Patient sleeping, arousable, interactive appropriately, on BiPAP with 100% FiO2, oxygen saturation 70s to 80s  Later on saw the patient with her mother at the bedside, grandmother on phone and during the discussion, Dr. Nicole Tracy also came by to examine the patient and speak to the family. As per discussion for now still continue on BiPAP, family which includes patient mother and grandmother understanding that patient oxygen saturation still low, not good for the body, risk of gradual deterioration with present oxygen saturation. Patient still DNR. Does not want to be intubated/resuscitated, mother grandmother in agreement with the patient decision. Review of Systems: 14 point review of systems is otherwise negative with the exception of the elements mentioned above.     Objective:     Patient Vitals for the past 24 hrs:   Temp Pulse Resp BP SpO2   09/18/21 1147 99.4 °F (37.4 °C) (!) 111 23 (!) 143/96 (!) 80 %   09/18/21 1125 -- -- -- -- (!) 86 %   09/18/21 0847 97.9 °F (36.6 °C) (!) 107 23 119/79 (!) 77 %   09/18/21 0702 -- -- -- -- (!) 81 %   09/18/21 0507 -- -- -- -- (!) 76 %   09/18/21 0311 -- -- -- -- (!) 83 %   09/18/21 0247 -- -- -- -- (!) 81 %   09/18/21 0245 98 °F (36.7 °C) 98 24 (!) 140/96 (!) 74 %   09/18/21 0146 -- -- -- -- (!) 76 %   09/18/21 0000 -- -- -- -- (!) 86 %   09/17/21 2303 -- -- -- -- (!) 81 %   09/17/21 2242 98.1 °F (36.7 °C) 98 22 (!) 135/90 (!) 83 %   09/17/21 2050 -- -- -- -- (!) 83 %   09/17/21 1945 98 °F (36.7 °C) (!) 103 22 137/88 (!) 84 %   09/17/21 1621 -- -- -- -- (!) 89 %   09/17/21 1527 98.1 °F (36.7 °C) (!) 103 20 (!) 135/95 (!) 87 %     Oxygen Therapy  O2 Sat (%): (!) 80 % (09/18/21 1147)  Pulse via Oximetry: 105 beats per minute (09/18/21 1125)  O2 Device: BIPAP (09/18/21 1125)  Skin Assessment: Clean, dry, & intact (09/18/21 0702)  O2 Flow Rate (L/min): 60 l/min (09/16/21 1303)  O2 Temperature: 87.8 °F (31 °C) (09/16/21 1303)  FIO2 (%): 100 % (09/18/21 1125)    Body mass index is 67.28 kg/m². Physical Exam:   General:     Sleeping, arousable, when asked if she was short of breath says breathing okay. Clinically does appear in mild respiratory distress. on BiPAP, FiO2 100%   HENT:   normocephalic, atraumatic. Oropharynx clear with moist mucous membranes and normal hard/soft palate  Eyes:   anicteric sclerae, moist conjunctiva, PERRL, EOMI  Neck:    supple, non-tender. Trachea midline. Lungs:   Bilateral coarse breath sounds   cardiac:   RRR, Normal S1 and S2. No S3, S4 or murmurs. Abdomen:   Soft, non distended, nontender, +BS, no guarding/rebound. Obese  Extremities:   Warm, dry. No edema , pedal pulses present, no digital cyanosis  Skin:   Warn, dry, normnal turgor and texture; no rash, ulcers or nodules appreciated  Neuro:   AAOx3.  No gross focal neurological deficit,  Cranial nerves II-XII grossly intact  Psychiatric:  No anxiety, calm, cooperative      Data Review:  I have reviewed all labs, meds, and studies from the last 24 hours:    Labs:  Recent Results (from the past 24 hour(s))   GLUCOSE, POC    Collection Time: 09/17/21  4:34 PM   Result Value Ref Range    Glucose (POC) 232 (H) 65 - 100 mg/dL    Performed by KarinINGE    GLUCOSE, POC    Collection Time: 09/17/21  8:57 PM   Result Value Ref Range    Glucose (POC) 169 (H) 65 - 100 mg/dL    Performed by Ry    CBC WITH AUTOMATED DIFF    Collection Time: 09/18/21  4:55 AM   Result Value Ref Range    WBC 4.8 4.3 - 11.1 K/uL    RBC 6.05 (H) 4.05 - 5.2 M/uL    HGB 11.4 (L) 11.7 - 15.4 g/dL HCT 41.9 35.8 - 46.3 %    MCV 69.3 (L) 79.6 - 97.8 FL    MCH 18.8 (L) 26.1 - 32.9 PG    MCHC 27.2 (L) 31.4 - 35.0 g/dL    RDW 21.1 (H) 11.9 - 14.6 %    PLATELET 385 287 - 336 K/uL    MPV 9.9 9.4 - 12.3 FL    ABSOLUTE NRBC 0.00 0.0 - 0.2 K/uL    DF AUTOMATED      NEUTROPHILS 64 43 - 78 %    LYMPHOCYTES 30 13 - 44 %    MONOCYTES 6 4.0 - 12.0 %    EOSINOPHILS 0 (L) 0.5 - 7.8 %    BASOPHILS 0 0.0 - 2.0 %    IMMATURE GRANULOCYTES 1 0.0 - 5.0 %    ABS. NEUTROPHILS 3.1 1.7 - 8.2 K/UL    ABS. LYMPHOCYTES 1.4 0.5 - 4.6 K/UL    ABS. MONOCYTES 0.3 0.1 - 1.3 K/UL    ABS. EOSINOPHILS 0.0 0.0 - 0.8 K/UL    ABS. BASOPHILS 0.0 0.0 - 0.2 K/UL    ABS. IMM.  GRANS. 0.0 0.0 - 0.5 K/UL   METABOLIC PANEL, BASIC    Collection Time: 09/18/21  4:55 AM   Result Value Ref Range    Sodium 138 136 - 145 mmol/L    Potassium 4.2 3.5 - 5.1 mmol/L    Chloride 100 98 - 107 mmol/L    CO2 31 21 - 32 mmol/L    Anion gap 7 7 - 16 mmol/L    Glucose 132 (H) 65 - 100 mg/dL    BUN 12 6 - 23 MG/DL    Creatinine 0.61 0.6 - 1.0 MG/DL    GFR est AA >60 >60 ml/min/1.73m2    GFR est non-AA >60 >60 ml/min/1.73m2    Calcium 8.7 8.3 - 10.4 MG/DL   GLUCOSE, POC    Collection Time: 09/18/21  7:21 AM   Result Value Ref Range    Glucose (POC) 138 (H) 65 - 100 mg/dL    Performed by Nhan Hernandez    GLUCOSE, POC    Collection Time: 09/18/21 11:09 AM   Result Value Ref Range    Glucose (POC) 208 (H) 65 - 100 mg/dL    Performed by Sera(AS)        All Micro Results     Procedure Component Value Units Date/Time    CULTURE, BLOOD [[id number]] Collected: 09/14/21 0757    Order Status: Completed Specimen: Blood Updated: 09/18/21 0608     Special Requests: --        RIGHT  Antecubital       Culture result: NO GROWTH 4 DAYS       CULTURE, BLOOD [[id number]] Collected: 09/14/21 0759    Order Status: Completed Specimen: Blood Updated: 09/18/21 0608     Special Requests: --        LEFT  HAND       Culture result: NO GROWTH 4 DAYS       CULTURE, RESPIRATORY/SPUTUM/BRONCH Zoltan Guerrero STAIN [[id number]] Collected: 09/14/21 0915    Order Status: Canceled Specimen: Sputum     COVID-19 RAPID TEST [[id number]]  (Abnormal) Collected: 09/14/21 0535    Order Status: Completed Specimen: Nasopharyngeal Updated: 09/14/21 0558     Specimen source NASAL        COVID-19 rapid test Detected        Comment:      The specimen is POSITIVE for SARS-CoV-2, the novel coronavirus associated with COVID-19. This test has been authorized by the FDA under an Emergency Use Authorization (EUA) for use by authorized laboratories. Fact sheet for Healthcare Providers: [employer]  Fact sheet for Patients: [employer]       Methodology: Isothermal Nucleic Acid Amplification  RESULTS VERIFIED, PHONED TO AND READ BACK BY   FERDINAND BENOIT AT 2017 ON 9/14/21 ACL               EKG Results     None          Other Studies:  No results found.     Current Meds:   Current Facility-Administered Medications   Medication Dose Route Frequency    morphine injection 1 mg  1 mg IntraVENous Q4H PRN    LORazepam (ATIVAN) injection 0.5 mg  0.5 mg IntraVENous Q6H PRN    phenol throat spray (CHLORASEPTIC) 1 Spray  1 Spray Oral PRN    insulin glargine (LANTUS) injection 20 Units  20 Units SubCUTAneous BID    pantoprazole (PROTONIX) tablet 40 mg  40 mg Oral ACB    sodium chloride (NS) flush 30 mL  30 mL InterCATHeter Q8H    sodium chloride (NS) flush 30 mL  30 mL InterCATHeter PRN    dexamethasone (DECADRON) 10 mg/mL injection 6 mg  6 mg IntraVENous Q24H    albuterol (PROVENTIL HFA, VENTOLIN HFA, PROAIR HFA) inhaler 2 Puff  2 Puff Inhalation Q6H RT    ascorbic acid (vitamin C) (VITAMIN C) tablet 500 mg  500 mg Oral BID    zinc sulfate (ZINCATE) 50 mg zinc (220 mg) capsule 50 mg  50 mg Oral DAILY    insulin lispro (HUMALOG) injection   SubCUTAneous AC&HS    lisinopril-hydroCHLOROthiazide (PRINZIDE, ZESTORETIC) 20-12.5 mg per tablet 1 Tablet  1 Tablet Oral DAILY    acetaminophen (TYLENOL) tablet 650 mg  650 mg Oral Q6H PRN    Or    acetaminophen (TYLENOL) suppository 650 mg  650 mg Rectal Q6H PRN    polyethylene glycol (MIRALAX) packet 17 g  17 g Oral DAILY PRN    ondansetron (ZOFRAN ODT) tablet 4 mg  4 mg Oral Q8H PRN    Or    ondansetron (ZOFRAN) injection 4 mg  4 mg IntraVENous Q6H PRN    enoxaparin (LOVENOX) injection 40 mg  40 mg SubCUTAneous Q12H    azithromycin (ZITHROMAX) tablet 500 mg  500 mg Oral DAILY    cefTRIAXone (ROCEPHIN) 1 g in 0.9% sodium chloride (MBP/ADV) 50 mL MBP  1 g IntraVENous Q24H       Problem List:  Hospital Problems as of 9/18/2021 Never Reviewed        Codes Class Noted - Resolved POA    Morbid obesity ([facility] ICD-10-CM: E66.01  ICD-9-CM: 278.01  9/18/2021 - Present Unknown        Suspected sleep apnea ICD-10-CM: R29.818  ICD-9-CM: 781.99  9/18/2021 - Present Unknown        Acute respiratory failure with hypoxia (HCC) ICD-10-CM: J96.01  ICD-9-CM: 518.81  9/16/2021 - Present Yes        * (Principal) Acute respiratory distress syndrome (ARDS) due to COVID-19 virus [facility]) ICD-10-CM: U07.1, J80  ICD-9-CM: 518.82, 079.89  9/14/2021 - Present Unknown        DM w/o complication type II (HCC) (Chronic) ICD-10-CM: E11.9  ICD-9-CM: 250.00  9/14/2021 - Present Yes        HTN (hypertension) (Chronic) ICD-10-CM: I10  ICD-9-CM: 401.9  9/14/2021 - Present Yes        RESOLVED: Acute respiratory failure ([facility] ICD-10-CM: J96.00  ICD-9-CM: 518.81  9/14/2021 - 9/16/2021 Yes                   Signed By: Travis Bal MD   [email or web address]    September 18, 2021

## 2021-09-18 NOTE — PROGRESS NOTES
Bedside report received from [facility] [place]. Patient bed resting. On 100% BiPAP. No needs expressed at this time. Call light in reach. And patient instructed to call for assistance. Safety measures in place. No signs of acute distress at this time. Will continue to monitor.

## 2021-09-18 NOTE — PROGRESS NOTES
Patient in bed resting. Respirations even and unlabored. On BiPAP 100% with sats in mid to low 80's. No needs expressed at this time. Call light in reach. Safety measures in place. No signs of acute distress at this time. Preparing to give bedside shift report to oncoming RN.

## 2021-09-18 NOTE — PROGRESS NOTES
Sylvia Keys  Admission Date: 9/14/2021         Daily Progress Note: 9/18/2021    The patient's chart is reviewed and the patient is discussed with the staff. Background: 22 yo [facility] female admitted with acute respiratory failure due to [address]. PMH includes DM, HTN, and morbid obesity. She was admitted on 9/14 with a 2 day history of dyspnea. She was positive for COVID. She was started on decadron and remdesivir. She initially required 6L NC but has since required BiPAP FiO2 @ 100% and her resting saturation was 91% but drops to the 70s with exertion. She received Actemra 9/16. She made it very clear to several providers that she does not want intubation and her mother confirmed this as well. Subjective:     -Spoke to nurse, patient still remains on BiPAP and still wants to be DNR. Mother is with her at this time. Saturations have been coming down to 82%. Intermittently there went down to 70s. In the room right now, with the patient and her mother there along with PMD.  Discussed her care. Indicated that every now that would require medication such as morphine and Ativan to decrease air hunger/anxiety. Respiratory in the past was up to 46s and currently down to the 20s. Patient actually is interactive. Fingers look cyanotic, but the patient has blue hair and has been rubbing her fingers in her hair. Which she even showed us.     Current Facility-Administered Medications   Medication Dose Route Frequency    morphine injection 1 mg  1 mg IntraVENous Q4H PRN    LORazepam (ATIVAN) injection 0.5 mg  0.5 mg IntraVENous Q6H PRN    phenol throat spray (CHLORASEPTIC) 1 Spray  1 Spray Oral PRN    insulin glargine (LANTUS) injection 20 Units  20 Units SubCUTAneous BID    pantoprazole (PROTONIX) tablet 40 mg  40 mg Oral ACB    sodium chloride (NS) flush 30 mL  30 mL InterCATHeter Q8H    sodium chloride (NS) flush 30 mL  30 mL InterCATHeter PRN    dexamethasone (DECADRON) 10 mg/mL injection 6 mg  6 mg IntraVENous Q24H    albuterol (PROVENTIL HFA, VENTOLIN HFA, PROAIR HFA) inhaler 2 Puff  2 Puff Inhalation Q6H RT    ascorbic acid (vitamin C) (VITAMIN C) tablet 500 mg  500 mg Oral BID    zinc sulfate (ZINCATE) 50 mg zinc (220 mg) capsule 50 mg  50 mg Oral DAILY    insulin lispro (HUMALOG) injection   SubCUTAneous AC&HS    lisinopril-hydroCHLOROthiazide (PRINZIDE, ZESTORETIC) 20-12.5 mg per tablet 1 Tablet  1 Tablet Oral DAILY    acetaminophen (TYLENOL) tablet 650 mg  650 mg Oral Q6H PRN    Or    acetaminophen (TYLENOL) suppository 650 mg  650 mg Rectal Q6H PRN    polyethylene glycol (MIRALAX) packet 17 g  17 g Oral DAILY PRN    ondansetron (ZOFRAN ODT) tablet 4 mg  4 mg Oral Q8H PRN    Or    ondansetron (ZOFRAN) injection 4 mg  4 mg IntraVENous Q6H PRN    enoxaparin (LOVENOX) injection 40 mg  40 mg SubCUTAneous Q12H    azithromycin (ZITHROMAX) tablet 500 mg  500 mg Oral DAILY    cefTRIAXone (ROCEPHIN) 1 g in 0.9% sodium chloride (MBP/ADV) 50 mL MBP  1 g IntraVENous Q24H     Review of Systems  Constitutional: negative for fever, chills, sweats  Cardiovascular: negative for chest pain, palpitations, syncope, edema  Gastrointestinal:  negative for dysphagia, reflux, vomiting, diarrhea, abdominal pain, or melena  Neurologic:  negative for focal weakness, numbness, headache  Objective:     Vitals:    09/18/21 0702 09/18/21 0847 09/18/21 1125 09/18/21 1147   BP:  119/79  (!) 143/96   Pulse:  (!) 107  (!) 111   Resp:  23  23   Temp:  97.9 °F (36.6 °C)  99.4 °F (37.4 °C)   SpO2: (!) 81% (!) 77% (!) 86% (!) 80%   Weight:       Height:           Intake/Output Summary (Last 24 hours) at 9/18/2021 1315  Last data filed at 9/18/2021 0848  Gross per 24 hour   Intake 0 ml   Output 1700 ml   Net -1700 ml     Physical Exam:   Constitution:  the patient is well developed and in no acute distress on BiPAP at 22/18.   HEENT:  Sclera clear, pupils equal, oral mucosa moist  Respiratory: diminished throughout on BiPAP 100%  Cardiovascular:  RRR without M,G,R  Gastrointestinal: soft and non-tender; with positive bowel sounds. Musculoskeletal: warm without cyanosis. There is trace lower extremity edema. Skin:  no jaundice or rashes, no wounds   Neurologic: no gross neuro deficits     Psychiatric: Patient is a little bit more lethargic than prior days, but still following commands and even answering questions. CXR: 9/16      CT Chest 9/14       LAB:  Recent Labs     09/18/21  0455 09/17/21  0732 09/16/21  0618   WBC 4.8 2.9* 6.2   HGB 11.4* 10.9* 11.2*   HCT 41.9 39.6 41.0    402 433     Recent Labs     09/18/21 0455 09/17/21  0732 09/16/21 0618    135* 136   K 4.2 4.4 3.7    100 99   CO2 31 30 28   * 169* 178*   BUN 12 9 10   CREA 0.61 0.53* 0.81   CA 8.7 8.5 8.5   ALB  --  2.6* 2.8*   AST  --  53* 46*   ALT  --  29 35   AP  --  77 80     Recent Labs     09/16/21 0618   CRP 9.4*     Recent Labs     09/16/21  1710   PHI 7.36   PCO2I 51.1*   PO2I 61*   HCO3I 28.5*     No results for input(s): SDES, CULT in the last 72 hours. Assessment and Plan:  (Medical Decision Making)   Principal Problem:    COVID-19 (9/14/2021) with ARDS. -Patient is still DNI. Currently on BiPAP with higher settings. Saturations fluctuating from 80-86, the patient is still interactive. Did go over need for medication such as morphine and Ativan to help with air hunger/anxiety with the patient's mother and her grandmother. They all understand. She is too unstable to go home with hospice. I did answer all her questions. She also showed me she was not cyanotic and this was due to hair dye in her fingers. Active Problems:    DM w/o complication type II ([address] (9/14/2021)  --Range noted. Follow-up with PMD      HTN (hypertension) (9/14/2021)    Controlled       Acute respiratory failure with hypoxia ([facility] (9/16/2021)  -Continue BiPAP.   -Continue antibiotics for possible coinfection    Anxiety/dyspnea  -Multifactorial related to the above issues. Suspect sleep apnea  -In the future will consider polysomnogram if agreeable    Morbid obesity  -We will need to lose weight in the future    All questions answered for the patient, grandmother and case discussed with hospitalist and nursing      More than 50% of the time documented was spent in face-to-face contact with the patient and in the care of the patient on the floor/unit where the patient is located.     Víctor Schneider MD

## 2021-09-18 NOTE — PROGRESS NOTES
Patient on 100% Bipap with sats at 83%. Patient continues to refuse intubation. Mother in room visiting. MD to speak with mom again shortly. Call light within reach and patient instructed to call if assistance is needed. Will continue to follow.

## 2021-09-18 NOTE — PROGRESS NOTES
Hospitalist at bedside, pt cleared to floor   Patient resting in bed with no complaints at this time. Patient is alert and orientated with no distress noted. IV intact and patent with no s/s of infection noted. Respirations even and slightly labored with heart rate regular. Patient unable to ambulate independently without assistance r/t severe dyspnea. Patient on 100% Bipap with sat at  78%. Patient questioned again this AM about intubation and again patient  declines. Bed in low locked position with call light within reach. Patient instructed to call if assistance is needed. Will continue to monitor. Patient given ativan for agitation and  Morphine for pain. Patient grimacing and moaning. Meds given per order.

## 2021-09-18 NOTE — PROGRESS NOTES
Patient continues on 100% Bipap sats at 87%. Mother and aunt have visited today. Patient more comfortable today with ativan and morphine given throughout the day. Patient alert and orientated. Nutrition consulted. Patient with no complaints and denies any pain at this time. Call light within reach and patient instructed to call if assistance is needed. Report to be given to oncoming RN. Patient has stated her aunt Johanna Hansen is primary decision maker if she is unable to make decisions for herself.   Shahla Barba [phone]

## 2021-09-19 NOTE — PROGRESS NOTES
Patient /97. MD notified. Orders received for PRN hydralazine 10 mg IV. Rechecked again per MD. /105. Patient given PRN hydralazine. Will continue to monitor.

## 2021-09-19 NOTE — PROGRESS NOTES
Beside shift report received from [facility]  Patient lying in bed  Respirations even and unlabored on Bipap 100%  O2 sat 88% via continuous pulse ox  No signs of distress  No needs expressed at this time  Safety measures in place

## 2021-09-19 NOTE — PROGRESS NOTES
[facility] Hospitalist Progress Note     Name:  Diana Wells  Age:26 y.o. Sex:female   :  1995    MRN:  [id number]     Admit Date:  2021    Reason for Admission:  Acute respiratory failure ([address] [J96.00]    Assessment & Plan     Acute respiratory failure secondary to COVID and possible CAP - albuterol q 6hr, decadron, vitamin C, Zinc, start remdesivir. She is aware of potential side effects from remdesivir such as ALLAN and liver damage. Also start Ceftriaxone, azithromycin. Order sputum culture. If oxygenations worsens and requires >10LNC in next 48 hours, will give actemra. She is agreeable to this treatment. 9/15/2021  FiO2 45%, flow at 6 L/min  2021  Patient presently on 70% FiO2, later on today did had to go up to like 100%. Discussed with patient regarding Actemra. Patient willing to have Actemra. Also spoke to family member. As needed morphine  2021  Presently on BiPAP, 100% FiO2 with oxygen saturations at times in mid 80s. 2021  On BiPAP, 100% FiO2, oxygen saturation 70s to 80s  2021  On BiPAP 100% FiO2, oxygen saturation mostly in high 80s.     DM type II - SS. Check A1C.   2021  Mild uncontrolled diabetes mellitus, change Lantus to 20 units twice daily. Continue sliding scale insulin     HTN- ACE/HCTZ     BMI 68.08. DVT prophylaxis - Lovenox    Present CODE STATUS DNR. Guarded prognosis        Hospital Course/Subjective:   Patient is a 23 y.o. female who presented to the ED for cc fevers and chills. Symptoms started . Has came in contact with family members who had Ricardo. Was found 74% on RA. She is 454 pounds. Unvaccinated. Hx of DM type II, HTN, severe morbid obesity. CRP 10.3.      No hx of surgery  Family hx of DM      Vitals - satting well on 6L NC     CT PE protocol showed no PE. Viral pneumonia along with peripheral consolidations in the basal segment of RUL and RLL seen.          Subjective/24 hr Events (21):  9/15/2021  Says breathing okay still mild short of breath    9/16/2021  Patient oxygen requirement increased to around 70% FiO2 this morning  Complaining of shortness of breath, mild chest pain on taking a deep breath. 9/17/2021  Says her breathing okay does not feel much short of breath, complaining of mask being uncomfortable at times  Patient requiring BiPAP, 100% FiO2, oxygen saturations at times mid 80s. Lengthy discussion regarding goals of care with the patient/grandmother/mother. 9/18/2021  Patient sleeping, arousable, interactive appropriately, on BiPAP with 100% FiO2, oxygen saturation 70s to 80s  Later on saw the patient with her mother at the bedside, grandmother on phone and during the discussion, Dr. Mine Rain also came by to examine the patient and speak to the family. As per discussion for now still continue on BiPAP, family which includes patient mother and grandmother understanding that patient oxygen saturation still low, not good for the body, risk of gradual deterioration with present oxygen saturation. Patient still DNR. Does not want to be intubated/resuscitated, mother grandmother in agreement with the patient decision. 9/19/2021  Patient awake alert able to verbalize appropriately, mild respiratory distress on BiPAP  Spoke to patient again about her CODE STATUS explaining that we are respecting her wishes, patient still is DNR. Review of Systems: 14 point review of systems is otherwise negative with the exception of the elements mentioned above.     Objective:     Patient Vitals for the past 24 hrs:   Temp Pulse Resp BP SpO2   09/19/21 1201 99 °F (37.2 °C) 99 20 (!) 126/90 (!) 87 %   09/19/21 0802 99 °F (37.2 °C) (!) 101 20 108/78 --   09/19/21 0759 -- -- -- -- 90 %   09/19/21 0644 -- -- -- (!) 129/93 --   09/19/21 0452 -- -- -- (!) 143/105 --   09/19/21 0421 -- -- -- (!) 143/97 --   09/19/21 0331 98.5 °F (36.9 °C) (!) 103 20 (!) 149/108 (!) 88 %   09/19/21 0316 -- -- -- -- 90 %   09/19/21 0231 -- -- -- -- (!) 87 %   09/18/21 2349 -- -- -- -- (!) 78 %   09/18/21 2329 98.2 °F (36.8 °C) (!) 101 20 (!) 121/90 (!) 84 %   09/18/21 2031 -- -- -- -- (!) 89 %   09/18/21 1953 98.5 °F (36.9 °C) (!) 107 22 (!) 156/104 (!) 88 %   09/18/21 1913 -- -- -- -- (!) 88 %   09/18/21 1454 99.4 °F (37.4 °C) (!) 114 23 (!) 147/93 (!) 79 %     Oxygen Therapy  O2 Sat (%): (!) 87 % (09/19/21 1201)  Pulse via Oximetry: 92 beats per minute (09/19/21 0759)  O2 Device: BIPAP (09/19/21 0759)  Skin Assessment: Clean, dry, & intact (09/18/21 0702)  O2 Flow Rate (L/min): 60 l/min (09/16/21 1303)  O2 Temperature: 87.8 °F (31 °C) (09/16/21 1303)  FIO2 (%): 100 % (09/19/21 0759)    Body mass index is 67.28 kg/m². Physical Exam:   General:     Awake alert, says breathing better, in mild respiratory distress. on BiPAP, FiO2 100%   HENT:   normocephalic, atraumatic. Oropharynx clear with moist mucous membranes and normal hard/soft palate  Eyes:   anicteric sclerae, moist conjunctiva, PERRL, EOMI  Neck:    supple, non-tender. Trachea midline. Lungs:   Bilateral coarse breath sounds   cardiac:   RRR, Normal S1 and S2. No S3, S4 or murmurs. Abdomen:   Soft, non distended, nontender, +BS, no guarding/rebound. Obese  Extremities:   Warm, dry. No edema , pedal pulses present, no digital cyanosis  Skin:   Warn, dry, normnal turgor and texture; no rash, ulcers or nodules appreciated  Neuro:   AAOx3.  No gross focal neurological deficit,  Cranial nerves II-XII grossly intact  Psychiatric:  No anxiety, calm, cooperative      Data Review:  I have reviewed all labs, meds, and studies from the last 24 hours:    Labs:  Recent Results (from the past 24 hour(s))   GLUCOSE, POC    Collection Time: 09/18/21  4:33 PM   Result Value Ref Range    Glucose (POC) 239 (H) 65 - 100 mg/dL    Performed by Tania    GLUCOSE, POC    Collection Time: 09/18/21  9:19 PM   Result Value Ref Range    Glucose (POC) 203 (H) 65 - 100 mg/dL    Performed by [facility] METABOLIC PANEL, BASIC    Collection Time: 09/19/21  4:39 AM   Result Value Ref Range    Sodium 137 136 - 145 mmol/L    Potassium 4.4 3.5 - 5.1 mmol/L    Chloride 101 98 - 107 mmol/L    CO2 29 21 - 32 mmol/L    Anion gap 7 7 - 16 mmol/L    Glucose 146 (H) 65 - 100 mg/dL    BUN 14 6 - 23 MG/DL    Creatinine 0.63 0.6 - 1.0 MG/DL    GFR est AA >60 >60 ml/min/1.73m2    GFR est non-AA >60 >60 ml/min/1.73m2    Calcium 8.8 8.3 - 10.4 MG/DL   GLUCOSE, POC    Collection Time: 09/19/21  7:24 AM   Result Value Ref Range    Glucose (POC) 155 (H) 65 - 100 mg/dL    Performed by Verdis Modest    GLUCOSE, POC    Collection Time: 09/19/21 11:22 AM   Result Value Ref Range    Glucose (POC) 179 (H) 65 - 100 mg/dL    Performed by Verdis Modest        All Micro Results     Procedure Component Value Units Date/Time    CULTURE, BLOOD [[id number]] Collected: 09/14/21 0759    Order Status: Completed Specimen: Blood Updated: 09/19/21 0637     Special Requests: --        LEFT  HAND       Culture result: NO GROWTH 5 DAYS       CULTURE, BLOOD [[id number]] Collected: 09/14/21 0757    Order Status: Completed Specimen: Blood Updated: 09/19/21 0637     Special Requests: --        RIGHT  Antecubital       Culture result: NO GROWTH 5 DAYS       CULTURE, RESPIRATORY/SPUTUM/BRONCH Clarisse Cordial STAIN [[id number]] Collected: 09/14/21 0915    Order Status: Canceled Specimen: Sputum     COVID-19 RAPID TEST [[id number]]  (Abnormal) Collected: 09/14/21 0535    Order Status: Completed Specimen: Nasopharyngeal Updated: 09/14/21 0558     Specimen source NASAL        COVID-19 rapid test Detected        Comment:      The specimen is POSITIVE for SARS-CoV-2, the novel coronavirus associated with COVID-19. This test has been authorized by the FDA under an Emergency Use Authorization (EUA) for use by authorized laboratories.         Fact sheet for Healthcare Providers: ConventionUpdate.co.nz  Fact sheet for Patients: [facility].       Methodology: Isothermal Nucleic Acid Amplification  RESULTS VERIFIED, PHONED TO AND READ BACK BY   FERDINAND BENOIT AT 4484 ON 9/14/21 ACL               EKG Results     None          Other Studies:  No results found.     Current Meds:   Current Facility-Administered Medications   Medication Dose Route Frequency    hydrALAZINE (APRESOLINE) 20 mg/mL injection 10 mg  10 mg IntraVENous Q6H PRN    morphine injection 1 mg  1 mg IntraVENous Q4H PRN    LORazepam (ATIVAN) injection 0.5 mg  0.5 mg IntraVENous Q6H PRN    phenol throat spray (CHLORASEPTIC) 1 Spray  1 Spray Oral PRN    insulin glargine (LANTUS) injection 20 Units  20 Units SubCUTAneous BID    pantoprazole (PROTONIX) tablet 40 mg  40 mg Oral ACB    sodium chloride (NS) flush 30 mL  30 mL InterCATHeter Q8H    sodium chloride (NS) flush 30 mL  30 mL InterCATHeter PRN    dexamethasone (DECADRON) 10 mg/mL injection 6 mg  6 mg IntraVENous Q24H    albuterol (PROVENTIL HFA, VENTOLIN HFA, PROAIR HFA) inhaler 2 Puff  2 Puff Inhalation Q6H RT    ascorbic acid (vitamin C) (VITAMIN C) tablet 500 mg  500 mg Oral BID    zinc sulfate (ZINCATE) 50 mg zinc (220 mg) capsule 50 mg  50 mg Oral DAILY    insulin lispro (HUMALOG) injection   SubCUTAneous AC&HS    lisinopril-hydroCHLOROthiazide (PRINZIDE, ZESTORETIC) 20-12.5 mg per tablet 1 Tablet  1 Tablet Oral DAILY    acetaminophen (TYLENOL) tablet 650 mg  650 mg Oral Q6H PRN    Or    acetaminophen (TYLENOL) suppository 650 mg  650 mg Rectal Q6H PRN    polyethylene glycol (MIRALAX) packet 17 g  17 g Oral DAILY PRN    ondansetron (ZOFRAN ODT) tablet 4 mg  4 mg Oral Q8H PRN    Or    ondansetron (ZOFRAN) injection 4 mg  4 mg IntraVENous Q6H PRN    enoxaparin (LOVENOX) injection 40 mg  40 mg SubCUTAneous Q12H    azithromycin (ZITHROMAX) tablet 500 mg  500 mg Oral DAILY    cefTRIAXone (ROCEPHIN) 1 g in 0.9% sodium chloride (MBP/ADV) 50 mL MBP  1 g IntraVENous Q24H       Problem List:  Hospital Problems as of 9/19/2021 Never Reviewed        Codes Class Noted - Resolved POA    Morbid obesity ([facility] [address] ICD-10-CM: E66.01  ICD-9-CM: 278.01  9/18/2021 - Present Unknown        Suspected sleep apnea ICD-10-CM: R29.818  ICD-9-CM: 781.99  9/18/2021 - Present Unknown        Acute respiratory failure with hypoxia [facility]) ICD-10-CM: J96.01  ICD-9-CM: 518.81  9/16/2021 - Present Yes        * (Principal) Acute respiratory distress syndrome (ARDS) due to COVID-19 virus [facility]) ICD-10-CM: U07.1, J80  ICD-9-CM: 518.82, 079.89  9/14/2021 - Present Unknown        DM w/o complication type II (HCC) (Chronic) ICD-10-CM: E11.9  ICD-9-CM: 250.00  9/14/2021 - Present Yes        HTN (hypertension) (Chronic) ICD-10-CM: I10  ICD-9-CM: 401.9  9/14/2021 - Present Yes        RESOLVED: Acute respiratory failure ([facility] [address] ICD-10-CM: J96.00  ICD-9-CM: 518.81  9/14/2021 - 9/16/2021 Yes                   Signed By: Shaila Benitez MD   [employer] [facility]    September 19, 2021

## 2021-09-19 NOTE — PROGRESS NOTES
Patient in bed resting. Respirations even and unlabored. On BiPAP 100%. No needs expressed at this time. Call light in reach. Safety measures in place. No signs of acute distress at this time. Preparing to give bedside shift report to oncoming RN.

## 2021-09-20 NOTE — PROGRESS NOTES
MSN, CM:  Patient still requiring 100% Bipap. Patient's discharge plan is unclear at this time r/t oxygen demand. Patient will be reevaluate when oxygen demand is decrease. Case Management will continue to follow.

## 2021-09-20 NOTE — PROGRESS NOTES
Received report from [place], [facility]. Patient awake resting in bed. Respirations present. On bipap. On continuous pulse oximetry. O2 sat 90 % at this time. No signs of distress. AxO x4. No needs expressed. Bed low and locked. Call light within reach. Will continue to monitor.

## 2021-09-20 NOTE — PROGRESS NOTES
ACUTE OT GOALS:  (Developed with and agreed upon by patient and/or caregiver.)  1) Patient will complete lower body bathing and dressing with MOD I and adaptive equipment as needed. 2) Patient will complete toileting with SUPERVISION. 3) Patient will complete functional transfers with SUPERVISION and adaptive equipment as needed. 4) Patient will tolerate at least 25 minutes of OT activity with 1-2 rest breaks while maintaining O2 sats >90%. 5) Patient will verbalize at least 3 energy conservation technique to utilize during ADL/IADL. Timeframe: 7 visits     OCCUPATIONAL THERAPY ASSESSMENT: Initial Assessment and Daily Note OT Treatment Day # 1    Sylvia Keys is a 23 y.o. female   PRIMARY DIAGNOSIS: Acute respiratory distress syndrome (ARDS) due to COVID-19 virus ([facility] [address]  Acute respiratory failure ([facility] [address] [J96.00]       Reason for Referral:    ICD-10: Treatment Diagnosis: Generalized Muscle Weakness (M62.81)  Other lack of cordination (R27.8)  Difficulty in walking, Not elsewhere classified (R26.2)  INPATIENT: Payor: /   ASSESSMENT:     REHAB RECOMMENDATIONS:   Recommendation to date pending progress:  [address] [place] [address] Therapy pending respiratory status   Equipment:    To Be Determined     PRIOR LEVEL OF FUNCTION:  (Prior to Hospitalization)  INITIAL/CURRENT LEVEL OF FUNCTION:  (Based on today's evaluation)   Bathing:   Independent  Dressing:   Independent  Feeding/Grooming:   Independent  Toileting:   Independent  Functional Mobility:   Independent Bathing:   Minimal Assistance  Dressing:   Contact Guard Assistance  Feeding/Grooming:   Contact Guard Assistance  Toileting:   Minimal Assistance  Functional Mobility:   Minimal Assistance     ASSESSMENT:  Ms. Tirso Estrada presented to the hospital with fever, chills, and SOB due to the COVID 19 virus. At baseline, pt is independent for all ADLs and IADLs. Today, pt was received supine in bed on 100% BIPAP with O2 sats in the low 90s.  Family present at bedside. Completed bed mobility CGA. CGA for LB dressing and grooming tasks sitting EOB. Sit>stand Ondina. Side steps EOB Ondina. O2 sats noted to drop to low-mid 80s with minimal activity. Pt able to recover with increased time and pursed lip breathing. RT present at end of session in attempt to wean pt to airvo. Bridgette Mcdonald currently demonstrates overall deficits in strength, balance, activity tolerance, and ADL performance. Patient would benefit from skilled OT services at this time in order to address functional deficits and OT goals stated above. SUBJECTIVE:   Ms. Luisito Betancur states, \"I'm good. \"    SOCIAL HISTORY/LIVING ENVIRONMENT: lives alone in an apartment on the 3rd level (no elevator access), walk in shower, independent for all ADLs and IADLs, no DME  Home Environment: Independent living  One/Two Story Residence: Other (Comment) (one story apartment; lives on 3rd floor)  Living Alone: Yes  Support Systems: Other Family Member(s)    OBJECTIVE:     PAIN: VITAL SIGNS: LINES/DRAINS:   Pre Treatment: Pain Screen  Pain Scale 1: Numeric (0 - 10)  Pain Intensity 1: 0  Post Treatment: no change  Vital Signs  O2 Sat (%): 92 %  O2 Device: BIPAP Continuous Pulse Oximetry and Pelayo Catheter  O2 Device: BIPAP     GROSS EVALUATION:  BUEs Within Functional Limits Abnormal/ Functional Abnormal/ Non-Functional (see comments) Not Tested Comments:   AROM [x] [] [] []    PROM [] [] [] [x]    Strength [] [x] [] [] generalized weakness    Balance [] [x] [] [] Fair+ sitting balance, fair standing room    Posture [x] [] [] []    Sensation [x] [] [] []    Coordination [x] [] [] []    Tone [] [] [] [x]    Edema [] [] [] [x]    Activity Tolerance [] [x] [] [] Poor activity tolerance due to respiratory status    [] [] [] []      COGNITION/  PERCEPTION: Intact Impaired   (see comments) Comments:   Orientation [x] []    Vision [x] []    Hearing [x] []    Judgment/ Insight [x] []    Attention [x] []    Memory [x] [] Command Following [x] []    Emotional Regulation [x] []     [] []      ACTIVITIES OF DAILY LIVING: I Mod I S SBA CGA Min Mod Max Total NT Comments   BASIC ADLs:              Bathing/ Showering [] [] [] [] [] [] [] [] [] [x]    Toileting [] [] [] [] [] [] [] [] [] [x]    Dressing [] [] [] [] [x] [] [] [] [] [] Donned socks    Feeding [] [] [] [] [] [] [] [] [] [x]    Grooming [] [] [] [] [x] [] [] [] [] [] Sitting EOB   Personal Device Care [] [] [] [] [] [] [] [] [] [x]    Functional Mobility [] [] [] [] [] [x] [] [] [] [] Sit>stand, side steps EOB   I=Independent, Mod I=Modified Independent, S=Supervision, SBA=Standby Assistance, CGA=Contact Guard Assistance,   Min=Minimal Assistance, Mod=Moderate Assistance, Max=Maximal Assistance, Total=Total Assistance, NT=Not Tested    MOBILITY: I Mod I S SBA CGA Min Mod Max Total  NT x2 Comments:   Supine to sit [] [] [] [] [x] [] [] [] [] [] []    Sit to supine [] [] [] [] [x] [] [] [] [] [] []    Sit to stand [] [] [] [] [] [x] [] [] [] [] []    Bed to chair [] [] [] [] [] [] [] [] [] [x] []    I=Independent, Mod I=Modified Independent, S=Supervision, SBA=Standby Assistance, CGA=Contact Guard Assistance,   Min=Minimal Assistance, Mod=Moderate Assistance, Max=Maximal Assistance, Total=Total Assistance, NT=Not Tested    [address] [facility] [address] [address] AM-PAC 6 Clicks   Daily Activity Inpatient Short Form        How much help from another person does the patient currently need. .. Total A Lot A Little None   1. Putting on and taking off regular lower body clothing? [] 1   [] 2   [x] 3   [] 4   2. Bathing (including washing, rinsing, drying)? [] 1   [] 2   [x] 3   [] 4   3. Toileting, which includes using toilet, bedpan or urinal?   [] 1   [] 2   [x] 3   [] 4   4. Putting on and taking off regular upper body clothing? [] 1   [] 2   [x] 3   [] 4   5. Taking care of personal grooming such as brushing teeth? [] 1   [] 2   [x] 3   [] 4   6. Eating meals?    [] 1   [] 2   [] 3 [x] 4   © 2007, Trustees of [facility], under license to [employer]. All rights reserved     Score:  Initial: 19 completed on 9/20/21 Most Recent: X (Date: -- )   Interpretation of Tool:  Represents activities that are increasingly more difficult (i.e. Bed mobility, Transfers, Gait). PLAN:   FREQUENCY/DURATION: OT Plan of Care: 3 times/week for duration of hospital stay or until stated goals are met, whichever comes first.    PROBLEM LIST:   (Skilled intervention is medically necessary to address:)  1. Decreased ADL/Functional Activities  2. Decreased Activity Tolerance  3. Decreased Balance  4. Decreased Strength  5. Decreased Transfer Abilities   INTERVENTIONS PLANNED:   (Benefits and precautions of occupational therapy have been discussed with the patient.)  1. Self Care Training  2. Therapeutic Activity  3. Therapeutic Exercise/HEP  4. Neuromuscular Re-education  5. Education     TREATMENT:     EVALUATION: Low Complexity : (Untimed Charge)    TREATMENT:   ($$ Self Care/Home Management: 8-22 mins$$ Neuromuscular Re-Education: 8-22 mins   )  Co-Treatment PT/OT necessary due to patient's decreased overall endurance/tolerance levels, as well as need for high level skilled assistance to complete functional transfers/mobility and functional tasks  Self Care (10 Minutes): Self care including Lower Body Dressing and Grooming to increase independence and decrease level of assistance required. Neuromuscular Re-education (10 Minutes): Neuromuscular Re-education included Balance Training, Coordination training, Postural training, Sitting balance training and Standing balance training to improve Balance, Coordination, Functional Mobility and Postural Control.     TREATMENT GRID:  N/A    AFTER TREATMENT POSITION/PRECAUTIONS:  Chair, Needs within reach, RN notified and Visitors at bedside    INTERDISCIPLINARY COLLABORATION:  RN/PCT, PT/PTA and OT/LANGLEY    TOTAL TREATMENT DURATION:  OT Patient Time In/Time Out  Time In: 1510  Time Out: [address], OT

## 2021-09-20 NOTE — PROGRESS NOTES
[facility] Hospitalist Progress Note     Name:  Diana Wells  Age:26 y.o. Sex:female   :  1995    MRN:  [id number]     Admit Date:  2021    Reason for Admission:  Acute respiratory failure ([address] [J96.00]    Assessment & Plan     Acute respiratory failure secondary to COVID and possible CAP - albuterol q 6hr, decadron, vitamin C, Zinc, start remdesivir. She is aware of potential side effects from remdesivir such as ALLAN and liver damage. Also start Ceftriaxone, azithromycin. Order sputum culture. If oxygenations worsens and requires >10LNC in next 48 hours, will give actemra. She is agreeable to this treatment. 9/15/2021  FiO2 45%, flow at 6 L/min  2021  Patient presently on 70% FiO2, later on today did had to go up to like 100%. Discussed with patient regarding Actemra. Patient willing to have Actemra. Also spoke to family member. As needed morphine  2021  Presently on BiPAP, 100% FiO2 with oxygen saturations at times in mid 80s. 2021  On BiPAP, 100% FiO2, oxygen saturation 70s to 80s  2021  On BiPAP 100% FiO2, oxygen saturation mostly in high 80s.  2021  On BiPAP 10% FiO2 oxygen saturation in 90%     DM type II - SS. Check A1C.   2021  Mild uncontrolled diabetes mellitus, change Lantus to 20 units twice daily. Continue sliding scale insulin  2021  Held Lantus continue sliding scale,    Poor nutrition, unable to eat secondary patient being on BiPAP. Spoke to the nutritionist, ordered TPN.       HTN-p.o. medication held secondary patient on BiPAP.     BMI 68.08. DVT prophylaxis - Lovenox    Present CODE STATUS DNR. Guarded prognosis        Hospital Course/Subjective:   Patient is a 23 y.o. female who presented to the ED for cc fevers and chills. Symptoms started . Has came in contact with family members who had Ricardo. Was found 74% on RA. She is 454 pounds. Unvaccinated. Hx of DM type II, HTN, severe morbid obesity.  CRP 10.3.      No hx of surgery  Family hx of DM      Vitals - satting well on 6L NC     CT PE protocol showed no PE. Viral pneumonia along with peripheral consolidations in the basal segment of RUL and RLL seen. Subjective/24 hr Events (09/20/21):  9/15/2021  Says breathing okay still mild short of breath    9/16/2021  Patient oxygen requirement increased to around 70% FiO2 this morning  Complaining of shortness of breath, mild chest pain on taking a deep breath. 9/17/2021  Says her breathing okay does not feel much short of breath, complaining of mask being uncomfortable at times  Patient requiring BiPAP, 100% FiO2, oxygen saturations at times mid 80s. Lengthy discussion regarding goals of care with the patient/grandmother/mother. 9/18/2021  Patient sleeping, arousable, interactive appropriately, on BiPAP with 100% FiO2, oxygen saturation 70s to 80s  Later on saw the patient with her mother at the bedside, grandmother on phone and during the discussion, Dr. Won Mena also came by to examine the patient and speak to the family. As per discussion for now still continue on BiPAP, family which includes patient mother and grandmother understanding that patient oxygen saturation still low, not good for the body, risk of gradual deterioration with present oxygen saturation. Patient still DNR. Does not want to be intubated/resuscitated, mother grandmother in agreement with the patient decision. 9/19/2021  Patient awake alert able to verbalize appropriately, mild respiratory distress on BiPAP  Spoke to patient again about her CODE STATUS explaining that we are respecting her wishes, patient still is DNR.    9/20/2021  Patient awake alert says breathing better, on BiPAP 100% FiO2, oxygen saturation in 90s    Later on today I spoke to the patient complains of Ms. Shabana Emmanuel, whom patient says will be making decisions for her if she cannot. Had a lengthy discussion about her CODE STATUS.   For now patient CODE STATUS is DNR but she automatically will become a full code if her pulse ox  saturation less than 50% or if she has cardiac arrest    Review of Systems: 14 point review of systems is otherwise negative with the exception of the elements mentioned above. Objective:     Patient Vitals for the past 24 hrs:   Temp Pulse Resp BP SpO2   09/20/21 1457 -- -- -- -- 90 %   09/20/21 1345 -- -- -- -- 93 %   09/20/21 1200 98.1 °F (36.7 °C) (!) 108 20 132/88 90 %   09/20/21 0845 97.6 °F (36.4 °C) (!) 101 20 119/83 96 %   09/20/21 0731 -- -- -- -- 92 %   09/20/21 0345 98.6 °F (37 °C) 97 20 127/88 91 %   09/20/21 0210 -- -- -- -- 94 %   09/19/21 2345 97.8 °F (36.6 °C) 90 19 126/89 93 %   09/19/21 2024 -- -- -- -- (!) 89 %   09/19/21 1910 98.4 °F (36.9 °C) 72 19 129/87 92 %   09/19/21 1731 -- -- -- -- 100 %     Oxygen Therapy  O2 Sat (%): 90 % (09/20/21 1457)  Pulse via Oximetry: 108 beats per minute (09/20/21 1457)  O2 Device: BIPAP (09/20/21 1457)  Skin Assessment: Clean, dry, & intact (09/18/21 0702)  O2 Flow Rate (L/min): 60 l/min (09/19/21 1538)  O2 Temperature: 87.8 °F (31 °C) (09/16/21 1303)  FIO2 (%): 100 % (09/20/21 1345)    Body mass index is 67.28 kg/m². Physical Exam:   General:     Awake alert, says breathing better, in mild respiratory distress. on BiPAP, FiO2 100%   HENT:   normocephalic, atraumatic. Oropharynx clear with moist mucous membranes and normal hard/soft palate  Eyes:   anicteric sclerae, moist conjunctiva, PERRL, EOMI  Neck:    supple, non-tender. Trachea midline. Lungs:   Bilateral coarse breath sounds   cardiac:   RRR, Normal S1 and S2. No S3, S4 or murmurs. Abdomen:   Soft, non distended, nontender, +BS, no guarding/rebound. Obese  Extremities:   Warm, dry. No edema , pedal pulses present, no digital cyanosis  Skin:   Warn, dry, normnal turgor and texture; no rash, ulcers or nodules appreciated  Neuro:   AAOx3.  No gross focal neurological deficit,  Cranial nerves II-XII grossly intact  Psychiatric:  No anxiety, calm, cooperative      Data Review:  I have reviewed all labs, meds, and studies from the last 24 hours:    Labs:  Recent Results (from the past 24 hour(s))   CBC WITH AUTOMATED DIFF    Collection Time: 09/19/21  4:00 PM   Result Value Ref Range    WBC 8.0 4.3 - 11.1 K/uL    RBC 6.08 (H) 4.05 - 5.2 M/uL    HGB 11.7 11.7 - 15.4 g/dL    HCT 41.8 35.8 - 46.3 %    MCV 68.8 (L) 79.6 - 97.8 FL    MCH 19.2 (L) 26.1 - 32.9 PG    MCHC 28.0 (L) 31.4 - 35.0 g/dL    RDW 21.2 (H) 11.9 - 14.6 %    PLATELET 691 602 - 870 K/uL    MPV 10.0 9.4 - 12.3 FL    ABSOLUTE NRBC 0.04 0.0 - 0.2 K/uL    DF AUTOMATED      NEUTROPHILS 84 (H) 43 - 78 %    LYMPHOCYTES 11 (L) 13 - 44 %    MONOCYTES 3 (L) 4.0 - 12.0 %    EOSINOPHILS 0 (L) 0.5 - 7.8 %    BASOPHILS 0 0.0 - 2.0 %    IMMATURE GRANULOCYTES 1 0.0 - 5.0 %    ABS. NEUTROPHILS 6.7 1.7 - 8.2 K/UL    ABS. LYMPHOCYTES 0.9 0.5 - 4.6 K/UL    ABS. MONOCYTES 0.3 0.1 - 1.3 K/UL    ABS. EOSINOPHILS 0.0 0.0 - 0.8 K/UL    ABS. BASOPHILS 0.0 0.0 - 0.2 K/UL    ABS. IMM.  GRANS. 0.1 0.0 - 0.5 K/UL   GLUCOSE, POC    Collection Time: 09/19/21  4:03 PM   Result Value Ref Range    Glucose (POC) 262 (H) 65 - 100 mg/dL    Performed by Esperanza Esteves 61, POC    Collection Time: 09/19/21  9:09 PM   Result Value Ref Range    Glucose (POC) 158 (H) 65 - 100 mg/dL    Performed by Sharmila    GLUCOSE, POC    Collection Time: 09/20/21  7:48 AM   Result Value Ref Range    Glucose (POC) 155 (H) 65 - 100 mg/dL    Performed by Alma Felix    GLUCOSE, POC    Collection Time: 09/20/21 11:51 AM   Result Value Ref Range    Glucose (POC) 198 (H) 65 - 100 mg/dL    Performed by Alma Felix        All Micro Results     Procedure Component Value Units Date/Time    CULTURE, BLOOD [[id number]] Collected: 09/14/21 0759    Order Status: Completed Specimen: Blood Updated: 09/19/21 0637     Special Requests: --        LEFT  HAND       Culture result: NO GROWTH 5 DAYS       CULTURE, BLOOD [[id number]] Collected: 09/14/21 8485    Order Status: Completed Specimen: Blood Updated: 09/19/21 0637     Special Requests: --        RIGHT  Antecubital       Culture result: NO GROWTH 5 DAYS       CULTURE, RESPIRATORY/SPUTUM/BRONCH Meghana Velez [[id number]] Collected: 09/14/21 0915    Order Status: Canceled Specimen: Sputum     COVID-19 RAPID TEST [[id number]]  (Abnormal) Collected: 09/14/21 0535    Order Status: Completed Specimen: Nasopharyngeal Updated: 09/14/21 0558     Specimen source NASAL        COVID-19 rapid test Detected        Comment:      The specimen is POSITIVE for SARS-CoV-2, the novel coronavirus associated with COVID-19. This test has been authorized by the FDA under an Emergency Use Authorization (EUA) for use by authorized laboratories. Fact sheet for Healthcare Providers: Renetta.mirza  Fact sheet for Patients: Renetta.mirza       Methodology: Isothermal Nucleic Acid Amplification  RESULTS VERIFIED, PHONED TO AND READ BACK BY   FERDINAND BENOIT AT 4146 ON 9/14/21 ACL               EKG Results     None          Other Studies:  No results found.     Current Meds:   Current Facility-Administered Medications   Medication Dose Route Frequency    azithromycin (ZITHROMAX) 500 mg in 0.9% sodium chloride 250 mL (VIAL-MATE)  500 mg IntraVENous Q24H    [START ON 9/21/2021] dexamethasone (DECADRON) 10 mg/mL injection 10 mg  10 mg IntraVENous Q24H    TPN ADULT - dextrose 5% amino acid 4.25%   IntraVENous QPM    hydrALAZINE (APRESOLINE) 20 mg/mL injection 10 mg  10 mg IntraVENous Q6H PRN    morphine injection 1 mg  1 mg IntraVENous Q4H PRN    LORazepam (ATIVAN) injection 0.5 mg  0.5 mg IntraVENous Q6H PRN    phenol throat spray (CHLORASEPTIC) 1 Spray  1 Spray Oral PRN    [Held by provider] insulin glargine (LANTUS) injection 20 Units  20 Units SubCUTAneous BID    pantoprazole (PROTONIX) tablet 40 mg  40 mg Oral ACB    sodium chloride (NS) flush 30 mL  30 mL InterCATHeter Q8H    sodium chloride (NS) flush 30 mL  30 mL InterCATHeter PRN    albuterol (PROVENTIL HFA, VENTOLIN HFA, PROAIR HFA) inhaler 2 Puff  2 Puff Inhalation Q6H RT    ascorbic acid (vitamin C) (VITAMIN C) tablet 500 mg  500 mg Oral BID    zinc sulfate (ZINCATE) 50 mg zinc (220 mg) capsule 50 mg  50 mg Oral DAILY    insulin lispro (HUMALOG) injection   SubCUTAneous AC&HS    lisinopril-hydroCHLOROthiazide (PRINZIDE, ZESTORETIC) 20-12.5 mg per tablet 1 Tablet  1 Tablet Oral DAILY    acetaminophen (TYLENOL) tablet 650 mg  650 mg Oral Q6H PRN    Or    acetaminophen (TYLENOL) suppository 650 mg  650 mg Rectal Q6H PRN    polyethylene glycol (MIRALAX) packet 17 g  17 g Oral DAILY PRN    ondansetron (ZOFRAN ODT) tablet 4 mg  4 mg Oral Q8H PRN    Or    ondansetron (ZOFRAN) injection 4 mg  4 mg IntraVENous Q6H PRN    enoxaparin (LOVENOX) injection 40 mg  40 mg SubCUTAneous Q12H    cefTRIAXone (ROCEPHIN) 1 g in 0.9% sodium chloride (MBP/ADV) 50 mL MBP  1 g IntraVENous Q24H       Problem List:  Hospital Problems as of 9/20/2021 Never Reviewed        Codes Class Noted - Resolved POA    Morbid obesity ([facility] [address] ICD-10-CM: E66.01  ICD-9-CM: 278.01  9/18/2021 - Present Unknown        Suspected sleep apnea ICD-10-CM: R29.818  ICD-9-CM: 781.99  9/18/2021 - Present Unknown        Acute respiratory failure with hypoxia (HCC) ICD-10-CM: J96.01  ICD-9-CM: 518.81  9/16/2021 - Present Yes        * (Principal) Acute respiratory distress syndrome (ARDS) due to COVID-19 virus [facility]) ICD-10-CM: U07.1, J80  ICD-9-CM: 404.54, 079.89  9/14/2021 - Present Unknown        DM w/o complication type II (HCC) (Chronic) ICD-10-CM: E11.9  ICD-9-CM: 250.00  9/14/2021 - Present Yes        HTN (hypertension) (Chronic) ICD-10-CM: I10  ICD-9-CM: 401.9  9/14/2021 - Present Yes        RESOLVED: Acute respiratory failure ([address] ICD-10-CM: J96.00  ICD-9-CM: 518.81  9/14/2021 - 9/16/2021 Yes                   Signed By: Nasir Culp MD   [facility]    September 20, 2021

## 2021-09-20 NOTE — PROGRESS NOTES
Patient nurse asked for patient to be placed on airvo. Patient was placed on airvo and oxygenation dropped to 79%. Patient unable to maintain oxygenation on airvo. Patient placed back on bipap oxygenation now 94%. Nurse notified.

## 2021-09-20 NOTE — PROGRESS NOTES
Talat Romero  Admission Date: 9/14/2021         Daily Progress Note: 9/20/2021    The patient's chart is reviewed and the patient is discussed with the staff. Background: 22 yo [place] American female admitted with acute respiratory failure due to Yamel Man. She was admitted on 9/14 with a 2 day history of dyspnea. She was positive for COVID. She was started on decadron and remdesivir. She initially required 6L NC but has since required BiPAP FiO2 @ 100% and her resting saturation was 91% but drops to the 70s with exertion. She received Actemra 9/16. She made it very clear to several providers that she does not want intubation and her mother confirmed this as well. PMH includes DM, HTN, and morbid obesity (444 pounds). Subjective:     On BiPAP all weekend and today. Oxygen saturations ranging 89-93% on 100% FiO2. MOrphine and ativan were added last week. Watching TV, looks comfortable currently.       Current Facility-Administered Medications   Medication Dose Route Frequency    hydrALAZINE (APRESOLINE) 20 mg/mL injection 10 mg  10 mg IntraVENous Q6H PRN    morphine injection 1 mg  1 mg IntraVENous Q4H PRN    LORazepam (ATIVAN) injection 0.5 mg  0.5 mg IntraVENous Q6H PRN    phenol throat spray (CHLORASEPTIC) 1 Spray  1 Spray Oral PRN    insulin glargine (LANTUS) injection 20 Units  20 Units SubCUTAneous BID    pantoprazole (PROTONIX) tablet 40 mg  40 mg Oral ACB    sodium chloride (NS) flush 30 mL  30 mL InterCATHeter Q8H    sodium chloride (NS) flush 30 mL  30 mL InterCATHeter PRN    dexamethasone (DECADRON) 10 mg/mL injection 6 mg  6 mg IntraVENous Q24H    albuterol (PROVENTIL HFA, VENTOLIN HFA, PROAIR HFA) inhaler 2 Puff  2 Puff Inhalation Q6H RT    ascorbic acid (vitamin C) (VITAMIN C) tablet 500 mg  500 mg Oral BID    zinc sulfate (ZINCATE) 50 mg zinc (220 mg) capsule 50 mg  50 mg Oral DAILY    insulin lispro (HUMALOG) injection   SubCUTAneous AC&HS    lisinopril-hydroCHLOROthiazide (PRINZIDE, ZESTORETIC) 20-12.5 mg per tablet 1 Tablet  1 Tablet Oral DAILY    acetaminophen (TYLENOL) tablet 650 mg  650 mg Oral Q6H PRN    Or    acetaminophen (TYLENOL) suppository 650 mg  650 mg Rectal Q6H PRN    polyethylene glycol (MIRALAX) packet 17 g  17 g Oral DAILY PRN    ondansetron (ZOFRAN ODT) tablet 4 mg  4 mg Oral Q8H PRN    Or    ondansetron (ZOFRAN) injection 4 mg  4 mg IntraVENous Q6H PRN    enoxaparin (LOVENOX) injection 40 mg  40 mg SubCUTAneous Q12H    azithromycin (ZITHROMAX) tablet 500 mg  500 mg Oral DAILY    cefTRIAXone (ROCEPHIN) 1 g in 0.9% sodium chloride (MBP/ADV) 50 mL MBP  1 g IntraVENous Q24H     Review of Systems  Constitutional: negative for fever, chills, sweats  Cardiovascular: negative for chest pain, palpitations, syncope, edema  Gastrointestinal:  negative for dysphagia, reflux, vomiting, diarrhea, abdominal pain, or melena  Neurologic:  negative for focal weakness, numbness, headache    Objective:     Vitals:    09/19/21 2024 09/19/21 2345 09/20/21 0210 09/20/21 0345   BP:  126/89  127/88   Pulse:  90  97   Resp:  19  20   Temp:  97.8 °F (36.6 °C)  98.6 °F (37 °C)   SpO2: (!) 89% 93% 94% 91%   Weight:       Height:           Intake/Output Summary (Last 24 hours) at 9/20/2021 0724  Last data filed at 9/19/2021 2345  Gross per 24 hour   Intake 100 ml   Output 800 ml   Net -700 ml     Physical Exam:   Constitution:  the patient is morbidly obese on BiPAP at 22/18. HEENT:  Sclera clear, pupils equal, BiPAP  Respiratory: diminished throughout on BiPAP 100%  Cardiovascular:  RRR without M,G,R  Gastrointestinal: soft and non-tender; with positive bowel sounds. Musculoskeletal: warm without cyanosis. There is trace lower extremity edema.   Skin:  no jaundice or rashes, no wounds   Neurologic: no gross neuro deficits     Psychiatric: A & O    CXR: 9/16      CT Chest 9/14       LAB:  Recent Labs     09/19/21  1600 09/18/21  0455 09/17/21  0732   WBC 8.0 4.8 2.9*   HGB 11.7 11.4* 10.9*   HCT 41.8 41.9 39.6    413 402     Recent Labs     09/19/21  0439 09/18/21  0455 09/17/21  0732    138 135*   K 4.4 4.2 4.4    100 100   CO2 29 31 30   * 132* 169*   BUN 14 12 9   CREA 0.63 0.61 0.53*   CA 8.8 8.7 8.5   ALB  --   --  2.6*   AST  --   --  53*   ALT  --   --  29   AP  --   --  77       Assessment and Plan:  (Medical Decision Making)       COVID-19 (9/14/2021) with ARDS. -Patient is still DNR. Currently on BiPAP with higher settings, 100% FiO2  --decadron 6 mg IV D 6, increase to 10 mg daily  -- remdesivir- 9/14-9/16  -- toci- 9/16      DM w/o complication type II ([facility] [address] (9/14/2021)  --Range noted. Follow-up with PMD. lantus on hold- may to adjust       Acute respiratory failure with hypoxia ([address] (9/16/2021)  -with covid 19 PNA  --rocephin and azithromycin initiated for superimposed infection    Anxiety/dyspnea  -Multifactorial related to the above issues. Suspect sleep apnea  --with likely OHS/resrtictive lung disease, with super morbid obesity    Morbid obesity  --risk factor for poor covid outcome    Prophylaxis: protonix,  lovenox     More than 50% of the time documented was spent in face-to-face contact with the patient and in the care of the patient on the floor/unit where the patient is located. Darell Solorzano NP     I have spoken with and examined the patient. I agree with the above assessment and plan as documented. Gen: Morbidly obese and in no acute distress  Lungs:  Decreased BS but clear  Heart:  Distant RRR with no Murmur/Rubs/Gallops  Abd: obese  Ext: tr edema    Pt currently on BIPAP in room. Has not eaten for past week. Sat running 94%. Got up to [facility] and tolerated activity. Need to try to mobilize to limit atx and worsening hypoxemia. DNR noted. Try to get PT to help mobilize.      Vilma Jean MD

## 2021-09-20 NOTE — PROGRESS NOTES
This RN spoke to RT, [place], and request RT to transfer patient from [facility] to [facility] at this time per MD.

## 2021-09-20 NOTE — PROGRESS NOTES
ACUTE PHYSICAL THERAPY GOALS:  (Developed with and agreed upon by patient and/or caregiver. )    LTG:  (1.)Ms. Julieta Soto will move from supine to sit and sit to supine , scoot up and down and roll side to side in bed with INDEPENDENT within 7 treatment day(s). (2.)Ms. Julieta Soto will transfer from bed to chair and chair to bed with INDEPENDENT using the least restrictive device within 7 treatment day(s). (3.)Ms. Julieta Soto will ambulate with INDEPENDENT for 150 feet with the least restrictive device within 7 treatment day(s). (4.)Ms. Julieta Soto will tolerate at least 23 min of dynamic standing activity to assist standing ADLs with the least restrictive device within 7 treatment days. (5.)Ms. Julieta Soto will climb at least 12 steps with MODIFIED INDEPENDENCE with the least restrictive device within 7 treatment days.     ________________________________________________________________________________________________        PHYSICAL THERAPY ASSESSMENT: Initial Assessment, Daily Note and PM PT Treatment Day # 1      Sinai Muhammad is a 23 y.o. female   PRIMARY DIAGNOSIS: Acute respiratory distress syndrome (ARDS) due to COVID-19 virus ([facility] [address]  Acute respiratory failure ([facility] [address] [J96.00]       Reason for Referral:    ICD-10: Treatment Diagnosis: Generalized Muscle Weakness (M62.81)  Other lack of cordination (R27.8)  Difficulty in walking, Not elsewhere classified (R26.2)  Other abnormalities of gait and mobility (R26.89)  Dizziness and Giddiness (R42)  Unspecified Lack of Coordination (R27.9)  INPATIENT: Payor: /     ASSESSMENT:     REHAB RECOMMENDATIONS:   Recommendation to date pending progress:  [address] [place] [address]  Equipment:    None     PRIOR LEVEL OF FUNCTION:  (Prior to Hospitalization) INITIAL/CURRENT LEVEL OF FUNCTION:  (Most Recently Demonstrated)   Bed Mobility:   Independent  Sit to Stand:   Independent  Transfers:   Independent  Gait/Mobility:   Independent Bed Mobility:   Contact Guard Assistance  Sit to Stand:   Minimal Assistance  Transfers:   Minimal Assistance  Gait/Mobility:   Minimal Assistance     ASSESSMENT:  Ms. Marisol Cho presents in sitting in the middle of the bed, on BiPAP, mother present in room. She is A&Ox4. Upon entering, Pnt is agreeable to PT treatment. she reports no pain at rest. Pnt turned w/ CGA, sitting EOB with fair+ sitting balance control. Sit > stand with min A is performed 3 times using min A. Gait x 3-4 ft alongside bed with min A, cues for step length. Gait is noted to be slow. Stand > sit with min A, followed by positioning for comfort. At end of session pt supine in bed with all needs within reach, alarm activated for safety, RN notified. Overall, pt presents as functioning below her baseline, with deficits in mobility including transfers, gait, balance, and activity tolerance. Pt will continue to benefit from skilled therapy services to address stated deficits to promote return to highest level of function, independence, and safety. Will continue to follow.          SUBJECTIVE:   Ms. Marisol Cho states, \"I work from [employer]"    SOCIAL HISTORY/LIVING ENVIRONMENT: lives on 3rd story apt with no elevator, independent at baseline  Home Environment: Independent living  One/Two Story Residence: Other (Comment) (one story apartment; lives on 3rd floor)  Living Alone: Yes  Support Systems: Other Family Member(s)  OBJECTIVE:     PAIN: VITAL SIGNS: LINES/DRAINS:   Pre Treatment: Pain Screen  Pain Scale 1: Numeric (0 - 10)  Pain Intensity 1: 0  Post Treatment: 0   IV  O2 Device: BIPAP     GROSS EVALUATION:   Within Functional Limits Abnormal/ Functional Abnormal/ Non-Functional (see comments) Not Tested Comments:   AROM [] [x] [] [] Decreased due to adiposity    PROM [] [x] [] []    Strength [] [x] [] []    Balance [] [x] [] []    Posture [] [x] [] []    Sensation [x] [] [] []    Coordination [x] [] [] []    Tone [x] [] [] []    Edema [x] [] [] []    Activity Tolerance [] [] [x] [] De-saturates w/ mobility    [] [] [] []      COGNITION/  PERCEPTION: Intact Impaired   (see comments) Comments:   Orientation [x] []    Vision [x] []    Hearing [x] []    Command Following [x] []    Safety Awareness [x] []     [] []      MOBILITY: I Mod I S SBA CGA Min Mod Max Total  NT x2 Comments:   Bed Mobility    Rolling [] [] [] [] [x] [] [] [] [] [] []    Supine to Sit [] [] [] [] [] [] [] [] [] [x] []    Scooting [] [] [] [] [x] [] [] [] [] [] []    Sit to Supine [] [] [] [] [x] [] [] [] [] [] []    Transfers    Sit to Stand [] [] [] [] [] [x] [] [] [] [] []    Bed to Chair [] [] [] [] [] [] [] [] [] [x] []    Stand to Sit [] [] [] [] [] [x] [] [] [] [] []    I=Independent, Mod I=Modified Independent, S=Supervision, SBA=Standby Assistance, CGA=Contact Guard Assistance,   Min=Minimal Assistance, Mod=Moderate Assistance, Max=Maximal Assistance, Total=Total Assistance, NT=Not Tested  GAIT: I Mod I S SBA CGA Min Mod Max Total  NT x2 Comments:   Level of Assistance [] [] [] [] [] [x] [] [] [] [] []    Distance n/a    DME N/A    Gait Quality n/a    Weightbearing Status N/A     I=Independent, Mod I=Modified Independent, S=Supervision, SBA=Standby Assistance, CGA=Contact Guard Assistance,   Min=Minimal Assistance, Mod=Moderate Assistance, Max=Maximal Assistance, Total=Total Assistance, NT=Not Tested    [facility] [address] Mobility Inpatient Short Form       How much difficulty does the patient currently have. .. Unable A Lot A Little None   1. Turning over in bed (including adjusting bedclothes, sheets and blankets)? [] 1   [] 2   [x] 3   [] 4   2. Sitting down on and standing up from a chair with arms ( e.g., wheelchair, bedside commode, etc.)   [] 1   [] 2   [x] 3   [] 4   3. Moving from lying on back to sitting on the side of the bed? [] 1   [] 2   [x] 3   [] 4   How much help from another person does the patient currently need. .. Total A Lot A Little None   4.   Moving to and from a bed to a chair (including a wheelchair)? [] 1   [] 2   [x] 3   [] 4   5. Need to walk in hospital room? [] 1   [x] 2   [] 3   [] 4   6. Climbing 3-5 steps with a railing? [] 1   [x] 2   [] 3   [] 4   © 2007, Trustees of [employer], under license to [employer]. All rights reserved     Score:  Initial: 16 Most Recent: X (Date: -- )    Interpretation of Tool:  Represents activities that are increasingly more difficult (i.e. Bed mobility, Transfers, Gait). PLAN:   FREQUENCY/DURATION: PT Plan of Care: 3 times/week for duration of hospital stay or until stated goals are met, whichever comes first.    PROBLEM LIST:   (Skilled intervention is medically necessary to address:)  1. Decreased ADL/Functional Activities  2. Decreased Activity Tolerance  3. Decreased AROM/PROM  4. Decreased Balance  5. Decreased Coordination  6. Decreased Gait Ability  7. Decreased Strength  8. Decreased Transfer Abilities   INTERVENTIONS PLANNED:   (Benefits and precautions of physical therapy have been discussed with the patient.)  1. Therapeutic Activity  2. Therapeutic Exercise/HEP  3. Neuromuscular Re-education  4. Gait Training  5. Manual Therapy  6. Education     TREATMENT:     EVALUATION: Low Complexity : (Untimed Charge)    TREATMENT:   ($$ Therapeutic Activity: 23-37 mins    )  Co-Treatment PT/OT necessary due to patient's decreased overall endurance/tolerance levels, as well as need for high level skilled assistance to complete functional transfers/mobility and functional tasks  Therapeutic Activity (23 Minutes): Therapeutic activity included Rolling, Sit to Supine, Scooting, Transfer Training, Ambulation on level ground, Sitting balance  and Standing balance to improve functional Mobility, Strength, ROM and Activity tolerance.     TREATMENT GRID:  N/A    AFTER TREATMENT POSITION/PRECAUTIONS:  Bed, Needs within reach, RN notified and Visitors at bedside    INTERDISCIPLINARY COLLABORATION:  RN/PCT, PT/PTA, OT/LANGLEY and RT    TOTAL TREATMENT DURATION:  PT Patient Time In/Time Out  Time In: 1510  Time Out: [id number]

## 2021-09-20 NOTE — PROGRESS NOTES
Patient is on bipap at this time. Medications due at this time: vitamin C 500 mg PO, lisinopril-hydrochlorothiazide 20-12.5 mg PO, zinc 50 mg, and lantus 20 units subcutaneous.  at this time. Dr. Francisco Lopez notified of the above. MD states if patient is unable to come off bipap to hold PO medications. Patient is unable to be transferred from [facility] to [facility]. PO medicaitons held at this time. Dr. Francisco Lopez placed orders to hold lantus at this time.

## 2021-09-20 NOTE — PROGRESS NOTES
This RN spoke to RT, [place], and request RT to transfer patient from [facility] to [facility] at this time per MD.

## 2021-09-20 NOTE — PROGRESS NOTES
Patient awake resting in bed. Respirations present. On bipap at this time. On continuous pulse oximetry. O2 sat 92 % at this time. Respirations present. No signs of distress. AxO 4. No needs expressed. Bed low and locked. Call light within reach. Preparing to give bedside report to oncoming RN.

## 2021-09-20 NOTE — PROGRESS NOTES
[facility]  At the heart of better care     Advance Care Planning   Admit Date:  2021  5:26 AM   Name:  Kaylee Bolden   Age:  23 y.o. Sex:  female  :  1995   MRN:  [id number]   Room:  2/    Kaylee Bolden is able to make her own decisions: YES    Names of POA/surrogate decision maker when patient is altered:  Breana Alejo        Patient / surrogate decision-maker directed:  Spoke to pt. Pt said Anderson Chan will make decision for her if she cannot make. I told the pt that normally its her mother who would be making decisions if she is unable to. Pt said it will be Ms. Aida melgar who will be making the decision. For now pt awake, alert , on Bipap. As per pt , in presence of [address] DNR BUT AUTOMATICALLY BECOMES FULL CODE IF HER PULSE OXIMETRY LESS THEN 50% OR IF PT GOES INTO CARDIAC ARREST. Explained pt and family that any oxygen less then 90% not good for body and why would they wait until less then 50 % to decide intubating. Pt and Ms. Alberto Gomez said they understand about low oxygen not being good but still doesnot want pt to be intubated unless her oxygen is less then 50%. Again for now DNR BUT AUTOMATICALLY FULL CODE IF PT IN CARDIAC ARREST OR PULSE OXIMETRY LESS THEN 50%. Time spent: 25 minutes in direct discussion (face to face and/or over phone).     Signed:  Jose Daniel Warner MD

## 2021-09-20 NOTE — PROGRESS NOTES
Nutrition Note    Parenteral Nutrition:  Start TPN  1L/d of 5%DEX/ 4.25%AA at 45 ml/hr with no lipids  Lytes/L:   Sodium 30 meq (30 meq NaCl), Potassium 20 meq (0 meq KCl, 20 meq KPO4), 8 meq Mg, 4.5 meq Calcium  Other additives: MTE, MVI, 100 mg thiamin    Electronically signed by Kirstie Lorenzo RD on 9/20/2021 at 1:32 PM    Contact: [phone]

## 2021-09-20 NOTE — PROGRESS NOTES
Patient's mother requests to speak to patient relations. This RN called patient relations and left a message with patient's mother's name and number, Danisha Brooks [phone]. Number provided by patient's mother.

## 2021-09-20 NOTE — CONSULTS
Comprehensive Nutrition Assessment  This assessment was completed remotely     Type and Reason for Visit: Initial, Consult  TPN Management (Hospitalist)  Poor intake/appetite 5 or more days (RN generated)    Nutrition Recommendations/Plan:   Change to NPO. Parenteral Nutrition:  Start TPN   5%DEX/ 4.25%AA 1 L   To provide: 340 kcal/d (23% of needs), 42.5 grams of protein/d (26% of needs), 50 grams of CHO/d and 1080 ml of total volume/d. Lytes/L:   Sodium 30 meq (30 meq NaCl), Potassium 20 meq (0 meq KCl, 20 meq KPO4), 8 meq Mg, 4.5 meq Calcium  Other additives: MTE, MVI, 100 mg thiamin (day 1/7)  Vitamin and Mineral Supplement Therapy:  Electrolyte management replacement protocol active. Labs:    BMP daily, Phos and Mg in AM and MWF. Trig in AM. Continue POC Glucoses/SSI. Malnutrition Assessment:  Malnutrition Status: At risk for malnutrition (specify) (minimal po intake since 9/16 d/t increased O2 needs/need for BIPAP)  Nutrition Assessment:   Nutrition History: 9/20:Unable to obtain      Nutrition Background: H/O: DM type II, HTN, severe MO. Presented with cough, cold flu like symptoms fever and chills   Onset of symptoms 9/12. Admitted d/t hypoxic respiratory faikure d/t COVID  Daily Update:  Pt has required BIPAP since 9/16 and O2 sats have been ranging 89-93% on 100% FiO2. Intubation has been recommended but pt declines. Attempted conversion to [facility] today was unsuccessful. PO intake has been minimal d/t worsening respiratory status. Ability to tolerate FT placement is unlikely. Therefore TPN to be started for nutrition support. Abdominal Status (last documented): Obese abdomen with Active  bowel sounds. Last BM 09/20/21.   Pertinent Medications: Zithromax, rocephin, Lantus ( 20 units bid, has been held since 9/18), SSI (12 units yesterday and 6 units thus far today), po protonix ( has not been able to take since 9/16), decadron  Pertinent Labs:   Lab Results   Component Value Date/Time    Sodium 137 09/19/2021 04:39 AM    Potassium 4.4 09/19/2021 04:39 AM    Chloride 101 09/19/2021 04:39 AM    CO2 29 09/19/2021 04:39 AM    Anion gap 7 09/19/2021 04:39 AM    Glucose 146 (H) 09/19/2021 04:39 AM    BUN 14 09/19/2021 04:39 AM    Creatinine 0.63 09/19/2021 04:39 AM    Calcium 8.8 09/19/2021 04:39 AM    Albumin 2.6 (L) 09/17/2021 07:32 AM    Magnesium 1.7 (L) 07/07/2020 02:41 AM     Lab Results   Component Value Date/Time    Glucose 146 (H) 09/19/2021 04:39 AM    Glucose (POC) 269 (H) 09/20/2021 04:06 PM    Glucose (POC) 198 (H) 09/20/2021 11:51 AM    Glucose (POC) 155 (H) 09/20/2021 07:48 AM    Glucose (POC) 158 (H) 09/19/2021 09:09 PM    Glucose (POC) 262 (H) 09/19/2021 04:03 PM    Glucose (POC) 179 (H) 09/19/2021 11:22 AM   HgA1c 12.8 (9-14)    Lab remarks: Today's labs noted to be collected at 1325 but results remain \"in process\"  Will be at some risk for refeeding so will start with very low dextrose and low volume TPN.  Expect will need insulin added to TPN if Lantus continued to be held  Would hold lipids for up to another week given MO and critical illness        Nutrition Related Findings:   NFPE deferred d/t remote assessment and isolation status      Current Nutrition Therapies:  ADULT DIET Regular; 3 carb choices (45 gm/meal)  TPN ADULT - dextrose 5% amino acid 4.25%    Current Intake:   Average Meal Intake: 1-25% (For 14 recorded meals since admission) Average Supplement Intake: None ordered      Anthropometric Measures:  Height: 5' 9\" (175.3 cm)  Current Body Wt: 206.6 kg (455 lb 7.5 oz) (9/16), Weight source: Standing scale  BMI: 67.2, Obese class 3 (BMI 40.0 or greater)  Admission Body Weight: 461 lb 10.3 oz (standing scale, Note pt stated admission weight of 350#)  Ideal Body Weight (lbs) (Calculated): 145 lbs (66 kg),    Usual Body Wt: 205.9 kg (454 lb) (per EMR 2019 to 2020),         Edema: No data recorded   Estimated Daily Nutrient Needs:  Energy (kcal/day): 7391-7686 (Kcal/kg (22-25), Weight Used: Ideal (65.9 kg))  Protein (g/day): >165 (>2,5 grams/kg) Weight Used: (Ideal (65.9 kg))  Fluid (ml/day):   ( (Minimize))    Nutrition Diagnosis:   · Inadequate oral intake related to impaired respiratory function as evidenced by  (intake <25% of estimated neds since admission)    Nutrition Interventions:   Food and/or Nutrient Delivery: Modify current diet, Start parenteral nutrition     Coordination of Nutrition Care: Continue to monitor while inpatient      Goals: Active Goal: PN intake to meet >50% of estimated needs within 3 days    Nutrition Monitoring and Evaluation:      Food/Nutrient Intake Outcomes: Parenteral nutrition intake/tolerance  Physical Signs/Symptoms Outcomes: Fluid status or edema, Biochemical data, Weight, Other (specify) (respiratory status)    Discharge Planning:     Too soon to determine    Urmila Harp RD, LD, [facility]    Disaster Mode Active

## 2021-09-20 NOTE — PROGRESS NOTES
Reviewed notes for spiritual concerns    Noted:    Patient very clear in her wishes    Have spent time with her mother who is here [facility]    She is concerned over her daughters health and decisions    Have followed up with staff and  to be aligned with the care process      Will continue to assess how we can best serve this family              \" Prayers for our staff for safety and compassion \"

## 2021-09-21 NOTE — PROGRESS NOTES
Comprehensive Nutrition Assessment    Type and Reason for Visit: Reassess  TPN Management (Hospitalist)    Nutrition Recommendations/Plan:   Parenteral Nutrition:  Change TPN   14%DEX/ 8%AA at 1 L  To provide: 802 kcal/d (55% of needs), 81 grams of protein/d (49% of needs), 141 grams of CHO/d and 1000 ml of total volume/d. Lytes/L:   Sodium 30 meq (30 meq NaCl), Potassium 30 meq (10 meq KCl, 20 meq KPO4), 8 meq Mg, 4.5 meq Calcium  Other additives: MTE, MVI  Vitamin and Mineral Supplement Therapy:  Electrolyte management replacement protocol active. Labs:    BMP daily, Phos and Mg MWF. Repeat triglyceride   POC Glucoses/SSI active. Malnutrition Assessment:  Malnutrition Status: At risk for malnutrition (specify) (minimal po intake since 9/16 d/t increased O2 needs/need for BIPAP)    Nutrition Assessment:   Nutrition History: 9/20:Unable to obtain      Nutrition Background: H/O: DM type II, HTN, severe MO. Presented with cough, cold flu like symptoms fever and chills   Onset of symptoms 9/12. Admitted d/t hypoxic respiratory faikure d/t COVID  Daily Update:  Pt discussed with RN due to isolation precautions. Pt is now full code and being transferred to ICU. RN reports pt remains on BiPAP at time of visit. Pt is now NPO. Abdominal Status (last documented): Obese, Intact abdomen with Active  bowel sounds. Last BM 09/20/21.   Pertinent Medications: Vitamin C, Azithromycin, Decadron, Lantus (held), SSI (21 units 9/20, 8 units thus far today), Protonix (held)   Pertinent Labs:     Lab Results   Component Value Date/Time     09/21/2021 05:09 AM    K 4.2 09/21/2021 05:09 AM     09/21/2021 05:09 AM    CO2 30 09/21/2021 05:09 AM    AGAP 7 09/21/2021 05:09 AM     (H) 09/21/2021 05:09 AM    BUN 13 09/21/2021 05:09 AM    CREA 0.56 (L) 09/21/2021 05:09 AM    GFRAA >60 09/21/2021 05:09 AM    GFRNA >60 09/21/2021 05:09 AM    CA 8.4 09/21/2021 05:09 AM    MG 2.1 09/21/2021 05:09 AM    PHOS 3.8 09/21/2021 05:09 AM     Labs notable for sodium WNL, K dropped from 4.7 to 4.2, increasing in TPN this evening, Mg and phos WNL. Nutrition Related Findings:   NFPE deferred d/t remote assessment and isolation status. 9/16: Triple lumen PICC placed      Current Nutrition Therapies:  DIET NPO Sips of Water with Meds  Current Parenteral Nutrition Orders:  · Type and Formula: 5%DEX/4.25%AA   · Lipids: None  · Duration: Continuous  · Rate/Volume: 45ml/hr (1L)  · Current PN Order Provides: 340 kcal/d (23% of needs), 42.5 grams of protein/d (26% of needs), 50 grams of CHO/d and 1080 ml of total volume/d    Current Intake:   Average Meal Intake: NPO Average Supplement Intake: NPO      Anthropometric Measures:  Height: 5' 9\" (175.3 cm)  Current Body Wt: 206.6 kg (455 lb 7.5 oz) (9/16), Weight source: Standing scale  BMI: 67.2, Obese class 3 (BMI 40.0 or greater)  Admission Body Weight: 461 lb 10.3 oz (standing scale, Note pt stated admission weight of 350#)  Ideal Body Weight (lbs) (Calculated): 145 lbs (66 kg),    Usual Body Wt: 205.9 kg (454 lb) (per EMR 2019 to 2020), Percent weight change: 0.3          Edema: No data recorded   Estimated Daily Nutrient Needs:  Energy (kcal/day): 3980-7080 (Kcal/kg (22-25), Weight Used: Ideal (65.9 kg))  Protein (g/day): >165 (>2,5 grams/kg) Weight Used: (Ideal (65.9 kg))  Fluid (ml/day):   ( (Minimize))    Nutrition Diagnosis:   · Inadequate oral intake related to impaired respiratory function as evidenced by NPO or clear liquid status due to medical condition, nutrition support-parenteral nutrition    Nutrition Interventions:   Food and/or Nutrient Delivery: Continue NPO, Modify parenteral nutrition     Coordination of Nutrition Care: Continue to monitor while inpatient  Plan of Care discussed with Jah Sam RN    Goals:   Previous Goal Met: Progressing toward goal(s)  Active Goal: TPN at goal rate within 3 days.     Nutrition Monitoring and Evaluation:      Food/Nutrient Intake Outcomes: Parenteral nutrition intake/tolerance  Physical Signs/Symptoms Outcomes: Biochemical data, GI status, Fluid status or edema    Discharge Planning:     Too soon to determine    Electronically signed by Lorenso Favre MS, RDN, LD 9/21/2021 at 1:39 PM  Contact: [phone]    Disaster Mode Active

## 2021-09-21 NOTE — PROGRESS NOTES
TRANSFER - OUT REPORT:    Verbal report given to ICU RN on Tana Stallings  being transferred to ICU room 327 for routine progression of care       Report consisted of patients Situation, Background, Assessment and   Recommendations(SBAR). Information from the following report(s) SBAR, Kardex, Intake/Output, MAR, Recent Results, Med Rec Status and Alarm Parameters  was reviewed with the receiving nurse. Lines:   PICC Triple Lumen 50/03/89 Right;Cephalic (Active)   Central Line Being Utilized Yes 09/21/21 0400   Criteria for Appropriate Use Limited/no vessel suitable for conventional peripheral access 09/21/21 0400   Site Assessment Clean, dry, & intact 09/21/21 0400   Phlebitis Assessment 0 09/21/21 0400   Infiltration Assessment 0 09/21/21 0400   Arm Circumference (cm) 57 cm 09/16/21 1901   Date of Last Dressing Change 09/16/21 09/21/21 0400   Dressing Status Clean, dry, & intact 09/21/21 0400   External Catheter Length (cm) 3 centimeters 09/16/21 1901   Dressing Type Disk with Chlorhexadine gluconate (CHG) 09/21/21 0400   Action Taken Catheter repositioned 09/16/21 2050   Hub Color/Line Status Patent 09/21/21 0400   Positive Blood Return (Site #1) Yes 09/21/21 0400   Hub Color/Line Status Patent 09/21/21 0400   Positive Blood Return (Site #2) Yes 09/21/21 0400   Hub Color/Line Status Patent 09/21/21 0400   Positive Blood Return (Site #3) Yes 09/21/21 0400   Alcohol Cap Used No 09/18/21 0729       Peripheral IV 09/14/21 Left Antecubital (Active)   Site Assessment Clean, dry, & intact 09/21/21 0400   Phlebitis Assessment 0 09/21/21 0400   Infiltration Assessment 0 09/21/21 0400   Dressing Status Clean, dry, & intact 09/21/21 0400   Dressing Type Transparent 09/21/21 0400   Hub Color/Line Status Capped 09/21/21 0400        Opportunity for questions and clarification was provided.       Patient transported with:   O2 @ 100% BIPAP liters  Registered Nurse

## 2021-09-21 NOTE — PROGRESS NOTES
[employer] Hospitalist Service  At the heart of better care     Advance Care Planning   Admit Date:  2021  5:26 AM   Name:  Kaylee Bolden   Age:  23 y.o. Sex:  female  :  1995   MRN:  [id number]   Room:  [facility]    Kaylee Bolden is able to make her own decisions:YES    Names of POA/surrogate decision maker when patient is altered:  MOTHER    Other members present in the meeting:   AUNT ,GRAND MOTHER- ON PHONE  CASE MANAGEMENT, [address] [place] [address]    Patient / surrogate decision-maker directed: Full code    Time spent: 25 minutes in direct discussion (face to face and/or over phone).     Signed:  Jose Daniel Warner MD

## 2021-09-21 NOTE — PROGRESS NOTES
Patient is presently in ICU, BiPAP, 100% FiO2. Hospital service will sign off.   Further management as per intensivist.

## 2021-09-21 NOTE — PROGRESS NOTES
Patient's MEWS a 6. VS as follows: BP: 97/61, P: 130, O: 94%, T: 98.9, RR: 24. MD notified. Orders received for 1L NS Bolus. Will continue to monitor.

## 2021-09-21 NOTE — PROGRESS NOTES
TRANSFER - IN REPORT:    Verbal report received from Rocky (name) on Juan Gamboa  being received from [address] [place] [address] (unit) for change in patient condition(increased resp distress)      Report consisted of patients Situation, Background, Assessment and   Recommendations(SBAR). Information from the following report(s) SBAR, Kardex, ED Summary, Intake/Output, MAR, Recent Results, Cardiac Rhythm sinus rhythm and Quality Measures was reviewed with the receiving nurse. Opportunity for questions and clarification was provided. Assessment completed upon patients arrival to unit and care assumed.

## 2021-09-21 NOTE — PROGRESS NOTES
[facility] Hospitalist Progress Note     Name:  Natalio Patiño  Age:26 y.o. Sex:female   :  1995    MRN:  [id number]     Admit Date:  2021    Reason for Admission:  Acute respiratory failure ([facility] [facility] [J96.00]    Assessment & Plan     Acute respiratory failure secondary to COVID and possible CAP - albuterol q 6hr, decadron, vitamin C, Zinc, start remdesivir. She is aware of potential side effects from remdesivir such as ALLAN and liver damage. Also start Ceftriaxone, azithromycin. Order sputum culture. If oxygenations worsens and requires >10LNC in next 48 hours, will give actemra. She is agreeable to this treatment. 9/15/2021  FiO2 45%, flow at 6 L/min  2021  Patient presently on 70% FiO2, later on today did had to go up to like 100%. Discussed with patient regarding Actemra. Patient willing to have Actemra. Also spoke to family member. As needed morphine  2021  Presently on BiPAP, 100% FiO2 with oxygen saturations at times in mid 80s. 2021  On BiPAP, 100% FiO2, oxygen saturation 70s to 80s  2021  On BiPAP 100% FiO2, oxygen saturation mostly in high 80s.  2021  On BiPAP 100% FiO2 oxygen saturation in 90%  2021  Patient on BiPAP, current percent FiO2, oxygen saturation greater than 90s. Patient was started on TPN since 2021       DM type II - SS. Check A1C.   2021  Mild uncontrolled diabetes mellitus, change Lantus to 20 units twice daily. Continue sliding scale insulin  2021  Held Lantus continue sliding scale,    HTN-p.o. medication held secondary patient on BiPAP.     BMI 68.08. DVT prophylaxis - Lovenox    Present CODE STATUS DNR. Guarded prognosis        Hospital Course/Subjective:   Patient is a 23 y.o. female who presented to the ED for cc fevers and chills. Symptoms started . Has came in contact with family members who had Ricardo. Was found 74% on RA. She is 454 pounds. Unvaccinated. Hx of DM type II, HTN, severe morbid obesity. CRP 10.3.    No hx of surgery  Family hx of DM      Vitals - satting well on 6L NC     CT PE protocol showed no PE. Viral pneumonia along with peripheral consolidations in the basal segment of RUL and RLL seen. Subjective/24 hr Events (09/21/21):  9/15/2021  Says breathing okay still mild short of breath    9/16/2021  Patient oxygen requirement increased to around 70% FiO2 this morning  Complaining of shortness of breath, mild chest pain on taking a deep breath. 9/17/2021  Says her breathing okay does not feel much short of breath, complaining of mask being uncomfortable at times  Patient requiring BiPAP, 100% FiO2, oxygen saturations at times mid 80s. Lengthy discussion regarding goals of care with the patient/grandmother/mother. 9/18/2021  Patient sleeping, arousable, interactive appropriately, on BiPAP with 100% FiO2, oxygen saturation 70s to 80s  Later on saw the patient with her mother at the bedside, grandmother on phone and during the discussion, Dr. Vanessa Tate also came by to examine the patient and speak to the family. As per discussion for now still continue on BiPAP, family which includes patient mother and grandmother understanding that patient oxygen saturation still low, not good for the body, risk of gradual deterioration with present oxygen saturation. Patient still DNR. Does not want to be intubated/resuscitated, mother grandmother in agreement with the patient decision. 9/19/2021  Patient awake alert able to verbalize appropriately, mild respiratory distress on BiPAP  Spoke to patient again about her CODE STATUS explaining that we are respecting her wishes, patient still is DNR.    9/20/2021  Patient awake alert says breathing better, on BiPAP 100% FiO2, oxygen saturation in 90s    Later on today I spoke to the patient complains of MsRadha Brooksariella Paty, whom patient says will be making decisions for her if she cannot. Had a lengthy discussion about her CODE STATUS.   For now patient CODE STATUS is DNR but she automatically will become a full code if her pulse ox  saturation less than 50% or if she has cardiac arrest    9/21/2021  Patient awake alert oriented x3  Oxygen saturation 100% FiO2, on BiPAP greater than 90%  Says breathing better    Later on today had a family discussion with mother, father at bedside, aunt and grandmother on phone, apart from me case management Ms. Jesus Manuelseansonu, palliative care MsRadha Quintanilla, nursing supervisor Ms. Radha Best were present during the discussion. Patient CODE STATUS was changed to full code, patient was transferred to ICU. Review of Systems: 14 point review of systems is otherwise negative with the exception of the elements mentioned above. Objective:     Patient Vitals for the past 24 hrs:   Temp Pulse Resp BP SpO2   09/21/21 1511 -- -- -- -- (!) 89 %   09/21/21 1145 98.8 °F (37.1 °C) (!) 102 20 137/89 93 %   09/21/21 0803 -- -- -- -- 98 %   09/21/21 0802 -- -- -- -- 98 %   09/21/21 0743 98.2 °F (36.8 °C) (!) 104 20 124/88 93 %   09/21/21 0329 98.7 °F (37.1 °C) 100 21 (!) 129/96 93 %   09/21/21 0234 -- -- -- -- 95 %   09/20/21 2346 98.3 °F (36.8 °C) 94 21 (!) 139/96 96 %   09/20/21 2024 98.9 °F (37.2 °C) (!) 130 24 97/61 94 %   09/20/21 1947 -- -- -- -- 93 %   09/20/21 1600 98.5 °F (36.9 °C) (!) 107 20 132/88 94 %     Oxygen Therapy  O2 Sat (%): (!) 89 % (09/21/21 1511)  Pulse via Oximetry: 111 beats per minute (09/21/21 1511)  O2 Device: BIPAP (09/21/21 1511)  Skin Assessment: Clean, dry, & intact (09/18/21 0702)  O2 Flow Rate (L/min): 60 l/min (09/19/21 1538)  O2 Temperature: 87.8 °F (31 °C) (09/16/21 1303)  FIO2 (%): 100 % (09/21/21 1511)    Body mass index is 67.28 kg/m². Physical Exam:   General:     Awake alert, says breathing better, in mild respiratory distress. on BiPAP, FiO2 100%   HENT:   normocephalic, atraumatic.  Oropharynx clear with moist mucous membranes and normal hard/soft palate  Eyes:   anicteric sclerae, moist conjunctiva, PERRL, EOMI  Neck:    supple, non-tender. Trachea midline. Lungs:   Bilateral coarse breath sounds   cardiac:   RRR, Normal S1 and S2. No S3, S4 or murmurs. Abdomen:   Soft, non distended, nontender, +BS, no guarding/rebound. Obese  Extremities:   Warm, dry. No edema , pedal pulses present, no digital cyanosis  Skin:   Warn, dry, normnal turgor and texture; no rash, ulcers or nodules appreciated  Neuro:   AAOx3. No gross focal neurological deficit,  Cranial nerves II-XII grossly intact  Psychiatric:  No anxiety, calm, cooperative  Right arm PICC line. Data Review:  I have reviewed all labs, meds, and studies from the last 24 hours:    Labs:  Recent Results (from the past 24 hour(s))   GLUCOSE, POC    Collection Time: 09/20/21  4:06 PM   Result Value Ref Range    Glucose (POC) 269 (H) 65 - 100 mg/dL    Performed by [facility]land    GLUCOSE, POC    Collection Time: 09/20/21  9:12 PM   Result Value Ref Range    Glucose (POC) 202 (H) 65 - 100 mg/dL    Performed by [facility]    CBC WITH AUTOMATED DIFF    Collection Time: 09/21/21  5:09 AM   Result Value Ref Range    WBC 11.6 (H) 4.3 - 11.1 K/uL    RBC 6.08 (H) 4.05 - 5.2 M/uL    HGB 11.5 (L) 11.7 - 15.4 g/dL    HCT 42.0 35.8 - 46.3 %    MCV 69.1 (L) 79.6 - 97.8 FL    MCH 18.9 (L) 26.1 - 32.9 PG    MCHC 27.4 (L) 31.4 - 35.0 g/dL    RDW 21.4 (H) 11.9 - 14.6 %    PLATELET 528 936 - 410 K/uL    MPV Unable to calculate. Recommend adding IPF. 9.4 - 12.3 FL    ABSOLUTE NRBC 0.03 0.0 - 0.2 K/uL    NEUTROPHILS 78 43 - 78 %    LYMPHOCYTES 16 13 - 44 %    MONOCYTES 4 4.0 - 12.0 %    EOSINOPHILS 1 0.5 - 7.8 %    BASOPHILS 0 0.0 - 2.0 %    IMMATURE GRANULOCYTES 1 0.0 - 5.0 %    ABS. NEUTROPHILS 9.0 (H) 1.7 - 8.2 K/UL    ABS. LYMPHOCYTES 1.9 0.5 - 4.6 K/UL    ABS. MONOCYTES 0.5 0.1 - 1.3 K/UL    ABS. EOSINOPHILS 0.1 0.0 - 0.8 K/UL    ABS. BASOPHILS 0.0 0.0 - 0.2 K/UL    ABS. IMM.  GRANS. 0.1 0.0 - 0.5 K/UL    RBC COMMENTS SLIGHT  ANISOCYTOSIS + POIKILOCYTOSIS        RBC COMMENTS RARE  POLYCHROMASIA        PLATELET COMMENTS ADEQUATE      DF AUTOMATED     PHOSPHORUS    Collection Time: 09/21/21  5:09 AM   Result Value Ref Range    Phosphorus 3.8 2.5 - 4.5 MG/DL   METABOLIC PANEL, BASIC    Collection Time: 09/21/21  5:09 AM   Result Value Ref Range    Sodium 141 136 - 145 mmol/L    Potassium 4.2 3.5 - 5.1 mmol/L    Chloride 104 98 - 107 mmol/L    CO2 30 21 - 32 mmol/L    Anion gap 7 7 - 16 mmol/L    Glucose 130 (H) 65 - 100 mg/dL    BUN 13 6 - 23 MG/DL    Creatinine 0.56 (L) 0.6 - 1.0 MG/DL    GFR est AA >60 >60 ml/min/1.73m2    GFR est non-AA >60 >60 ml/min/1.73m2    Calcium 8.4 8.3 - 10.4 MG/DL   MAGNESIUM    Collection Time: 09/21/21  5:09 AM   Result Value Ref Range    Magnesium 2.1 1.8 - 2.4 mg/dL   TRIGLYCERIDE    Collection Time: 09/21/21  5:09 AM   Result Value Ref Range    Triglyceride 233 (H) 35 - 150 MG/DL   GLUCOSE, POC    Collection Time: 09/21/21  7:30 AM   Result Value Ref Range    Glucose (POC) 151 (H) 65 - 100 mg/dL    Performed by [address] [place] [address], POC    Collection Time: 09/21/21 11:31 AM   Result Value Ref Range    Glucose (POC) 215 (H) 65 - 100 mg/dL    Performed by Jones Root        All Micro Results     Procedure Component Value Units Date/Time    CULTURE, BLOOD [[id number]] Collected: 09/14/21 0759    Order Status: Completed Specimen: Blood Updated: 09/19/21 0637     Special Requests: --        LEFT  HAND       Culture result: NO GROWTH 5 DAYS       CULTURE, BLOOD [[id number]] Collected: 09/14/21 0757    Order Status: Completed Specimen: Blood Updated: 09/19/21 0637     Special Requests: --        RIGHT  Antecubital       Culture result: NO GROWTH 5 DAYS       CULTURE, RESPIRATORY/SPUTUM/BRONCH Jennye Coombe STAIN [[id number]] Collected: 09/14/21 0915    Order Status: Canceled Specimen: Sputum     COVID-19 RAPID TEST [[id number]]  (Abnormal) Collected: 09/14/21 0535    Order Status: Completed Specimen: Nasopharyngeal Updated: 09/14/21 0558     Specimen source NASAL COVID-19 rapid test Detected        Comment:      The specimen is POSITIVE for SARS-CoV-2, the novel coronavirus associated with COVID-19. This test has been authorized by the FDA under an Emergency Use Authorization (EUA) for use by authorized laboratories. Fact sheet for Healthcare Providers: ConventionUpdate.co.nz  Fact sheet for Patients: ConventionUpdate.co.nz       Methodology: Isothermal Nucleic Acid Amplification  RESULTS VERIFIED, PHONED TO AND READ BACK BY   FERDINAND BENOIT AT 8691 ON 9/14/21 ACL               EKG Results     None          Other Studies:  No results found.     Current Meds:   Current Facility-Administered Medications   Medication Dose Route Frequency    [START ON 9/22/2021] dexamethasone (DECADRON) 10 mg/mL injection 6 mg  6 mg IntraVENous Q24H    TPN ADULT-CENTRAL - dextrose 14% amino acid 8%   IntraVENous QPM    azithromycin (ZITHROMAX) 500 mg in 0.9% sodium chloride 250 mL (VIAL-MATE)  500 mg IntraVENous Q24H    TPN ADULT - dextrose 5% amino acid 4.25%   IntraVENous QPM    NUTRITIONAL SUPPORT ELECTROLYTE PRN ORDERS   Does Not Apply PRN    hydrALAZINE (APRESOLINE) 20 mg/mL injection 10 mg  10 mg IntraVENous Q6H PRN    morphine injection 1 mg  1 mg IntraVENous Q4H PRN    LORazepam (ATIVAN) injection 0.5 mg  0.5 mg IntraVENous Q6H PRN    phenol throat spray (CHLORASEPTIC) 1 Spray  1 Spray Oral PRN    [Held by provider] insulin glargine (LANTUS) injection 20 Units  20 Units SubCUTAneous BID    pantoprazole (PROTONIX) tablet 40 mg  40 mg Oral ACB    sodium chloride (NS) flush 30 mL  30 mL InterCATHeter Q8H    sodium chloride (NS) flush 30 mL  30 mL InterCATHeter PRN    albuterol (PROVENTIL HFA, VENTOLIN HFA, PROAIR HFA) inhaler 2 Puff  2 Puff Inhalation Q6H RT    insulin lispro (HUMALOG) injection   SubCUTAneous AC&HS    lisinopril-hydroCHLOROthiazide (PRINZIDE, ZESTORETIC) 20-12.5 mg per tablet 1 Tablet  1 Tablet Oral DAILY    acetaminophen (TYLENOL) tablet 650 mg  650 mg Oral Q6H PRN    Or    acetaminophen (TYLENOL) suppository 650 mg  650 mg Rectal Q6H PRN    polyethylene glycol (MIRALAX) packet 17 g  17 g Oral DAILY PRN    ondansetron (ZOFRAN ODT) tablet 4 mg  4 mg Oral Q8H PRN    Or    ondansetron (ZOFRAN) injection 4 mg  4 mg IntraVENous Q6H PRN    enoxaparin (LOVENOX) injection 40 mg  40 mg SubCUTAneous Q12H    cefTRIAXone (ROCEPHIN) 1 g in 0.9% sodium chloride (MBP/ADV) 50 mL MBP  1 g IntraVENous Q24H       Problem List:  Hospital Problems as of 9/21/2021 Date Reviewed: 9/21/2021        Codes Class Noted - Resolved POA    Morbid obesity ([facility] ICD-10-CM: E66.01  ICD-9-CM: 278.01  9/18/2021 - Present Unknown        Suspected sleep apnea ICD-10-CM: R29.818  ICD-9-CM: 781.99  9/18/2021 - Present Unknown        Acute respiratory failure with hypoxia (HCC) ICD-10-CM: J96.01  ICD-9-CM: 518.81  9/16/2021 - Present Yes        * (Principal) Acute respiratory distress syndrome (ARDS) due to COVID-19 virus [facility]) ICD-10-CM: U07.1, J80  ICD-9-CM: 518.82, 079.89  9/14/2021 - Present Unknown        DM w/o complication type II (HCC) (Chronic) ICD-10-CM: E11.9  ICD-9-CM: 250.00  9/14/2021 - Present Yes        HTN (hypertension) (Chronic) ICD-10-CM: I10  ICD-9-CM: 401.9  9/14/2021 - Present Yes        RESOLVED: Acute respiratory failure ([facility] ICD-10-CM: J96.00  ICD-9-CM: 518.81  9/14/2021 - 9/16/2021 Yes                   Signed By: Li Pizarro MD   [employer]    September 21, 2021

## 2021-09-21 NOTE — PROGRESS NOTES
09/21/21 0803   Oxygen Therapy   O2 Sat (%) 98 %   Pulse via Oximetry 96 beats per minute   O2 Device BIPAP   FIO2 (%) 100 %   Respiratory   Respiratory (WDL) X   Breath Sounds Bilateral Diminished   Cough Non-productive   CPAP/BIPAP   CPAP/BIPAP Start/Stop On   Device Mode BIPAP   $$ Bipap Daily Yes   Mask Type and Size Full face; Medium   PIP Observed 22 cm H20   IPAP (cm H2O) 22 cm H2O   EPAP (cm H2O) 18 cm H2O   Vt Spont (ml) 841 ml   Ve Observed (l/min) 15 l/min   Total RR (Spontaneous) 18 breaths per minute   Insp Rise Time (sec) 4   Leak (Estimated) 44 L/min   Pt's Home Machine No   Biomedical Check Performed Yes   Settings Verified Yes   Alarm Settings   High Pressure 30   Apnea 20   Low Ve 3   High Rate 50   Pulmonary Toilet   Pulmonary Toilet H. O.B elevated   pt. Remains on BIPAP at current doc. Settings, bipap machine and alarms working well, will continue to monitor.

## 2021-09-21 NOTE — PROGRESS NOTES
PC notified me that after family/patient discussion that she is now a FULL code. She is on 100% FiO2 on BiPAP. We will plan to move to the ICU. ICU coordinator aware. High risk for decompensation.     Denise Corona NP

## 2021-09-21 NOTE — CONSULTS
Palliative Care    Patient: Yesy Gallagher MRN: [id number]  SSN: [id number]    YOB: 1995  Age: 23 y.o. Sex: female       Date of Request: 9/21/21  Date of Consult:  9/21/2021  Reason for Consult:  goals of care  Requesting Physician: AYAKA Mabry     Assessment/Plan:     Principal Diagnosis:    Dyspnea  R06.00    Additional Diagnoses:   · Acute Respiratory Failure, Unspecified  J96.00  · Advance Care Planning Counseling Z71.89  · Debility, Unspecified  R53.81  · Encounter for Palliative Care  Z51.5    Palliative Performance Scale (PPS):       Medical Decision Making:   Reviewed and summarized notes from admission to present. Discussed case with appropriate providers: Dr Della Manuel.[email or web address]   Reviewed laboratory and x-ray data from admission to present. Pt resting in bed, no visitors present. She has been on BIPAP at 100% for several days. She agreed to talk with me about her wishes concerning her care. It soon became clear that she does not understand that it is the BIPAP that is keeping her alive. She expressed surprised that her current oxygen needs cannot be supported at home. Ms Lurdes Boyd confirmed that she wants to live, but balked when told that intubation and mechanical ventilation may be necessary to give her the greatest chance of living. The pt became frustrated when trying to connect her desire to live with her fear of possible intubation. She asked that we continue the conversation when her mother arrives. We agreed to do so. Addendum: A family meeting was held in the early afternoon with the pt's mother and father present, Aunt Hector Hines via phone, Dr Gregor Martins, Amniata Sanderson, nursing supervisor Didier Bonner, and myself. The outcome of the meeting was that the pt agreed to go to the ICU for monitoring, with intubation being an option if needed. She is now a full code. The pt stated that her mother, Srikanth Sams, is her medical decision-maker.       Will continue to follow. Will discuss findings with members of the interdisciplinary team.      Thank you for this referral.          .   In addition to the E&M described above, a 60--minute ACP meeting was spent discussing the pt's wishes concerning end of life care. Subjective:     History obtained from:  Patient, Family, Care Provider and Chart    Chief Complaint: Dyspnea     History of Present Illness:  Patient is a 23 y.o. female who presented to the ED on 9/14/21 for cc fevers and chills. Symptoms started 9/12. Has came in contact with family members who had Ricardo. Was found 74% on RA. Unvaccinated. Hx of DM type II, HTN, severe morbid obesity, and other conditions listed below. Found to have acute respiratory failure and ARDS secondary to COVID and possible CAP. She was started on decadron and remdesivir. She initially required 6L NC but has since required BiPAP FiO2 @ 100% and her resting saturation was 91% but drops to the 70s with exertion. She received Actemra 9/16. She made it very clear to several providers that she does not want intubation and her mother confirmed this as well. Advance Directive: No       Code Status:  DNR            Health Care Power of : No - Patient does not have a [address]. No past medical history on file. No past surgical history on file. Family History   Problem Relation Age of Onset    No Known Problems Mother     No Known Problems Father     No Known Problems Sister     No Known Problems Brother     No Known Problems Sister       Social History     Tobacco Use    Smoking status: Not on file   Substance Use Topics    Alcohol use: Not on file     Prior to Admission medications    Medication Sig Start Date End Date Taking? Authorizing Provider   metFORMIN (GLUCOPHAGE) 1,000 mg tablet Take 1,000 mg by mouth daily. Yes Provider, Historical   hydroCHLOROthiazide (MICROZIDE) 12.5 mg capsule Take 12.5 mg by mouth daily.    Yes Provider, Historical   furosemide (LASIX) 20 mg tablet Take 20 mg by mouth daily. Yes Provider, Historical   rosuvastatin (Crestor) 10 mg tablet Take 10 mg by mouth nightly. Yes Provider, Historical   methocarbamol (ROBAXIN-750) 750 mg tablet Take 1 Tab by mouth four (4) times daily as needed (for spasm/pain). 12/6/19  Yes Andrew Ramos MD   lisinopril (PRINIVIL) 10 mg tablet Take 1 Tab by mouth daily. 12/6/19  Yes Angela Humphrey MD   ibuprofen (MOTRIN) 800 mg tablet Take 1 Tab by mouth every eight (8) hours as needed for Pain. Patient not taking: Reported on 9/14/2021 12/6/19   Andrew Ramos MD       Allergies   Allergen Reactions    Shellfish Derived Anaphylaxis        Review of Systems:  A comprehensive review of systems was negative except as noted above. Objective:     Visit Vitals  /88 (BP 1 Location: Left lower arm)   Pulse (!) 104   Temp 98.2 °F (36.8 °C)   Resp 20   Ht 5' 9\" (1.753 m)   Wt (!) 455 lb 9.6 oz (206.7 kg)   SpO2 98%   BMI 67.28 kg/m²        Physical Exam:    General:  Cooperative. Morbidly obese   Eyes:  Conjunctivae/corneas clear    Nose: Nares normal. Septum midline. Neck: Supple, symmetrical, trachea midline, no JVD   Lungs:   Unlabored on BIPAP at 100%   Heart:  Regular rate and rhythm, no murmur    Abdomen:   Soft, non-tender, non-distended   Extremities: Normal, atraumatic, no cyanosis or edema   Skin: Skin color, texture, turgor normal. No rash or lesions.    Neurologic: Nonfocal   Psych: Lethargic but oriented      Assessment:     Hospital Problems  Never Reviewed        Codes Class Noted POA    Morbid obesity ([facility] ICD-10-CM: E66.01  ICD-9-CM: 278.01  9/18/2021 Unknown        Suspected sleep apnea ICD-10-CM: R29.818  ICD-9-CM: 781.99  9/18/2021 Unknown        Acute respiratory failure with hypoxia [facility]) ICD-10-CM: J96.01  ICD-9-CM: 518.81  9/16/2021 Yes        * (Principal) Acute respiratory distress syndrome (ARDS) due to COVID-19 virus [facility]) ICD-10-CM: U07.1, [id number]  ICD-9-CM: 518.82, 079.89  9/14/2021 Unknown        DM w/o complication type II ([facility] [facility] (Chronic) ICD-10-CM: E11.9  ICD-9-CM: 250.00  9/14/2021 Yes        HTN (hypertension) (Chronic) ICD-10-CM: I10  ICD-9-CM: 401.9  9/14/2021 Yes              Signed By: Sara Kendrick NP     September 21, 2021

## 2021-09-21 NOTE — PROGRESS NOTES
Thresa Collet  Admission Date: 9/14/2021         Daily Progress Note: 9/21/2021    The patient's chart is reviewed and the patient is discussed with the staff. Background: 22 [facility] admitted with acute respiratory failure due to Josefine Crank. She was admitted on 9/14 with a 2 day history of dyspnea. She was positive for COVID. She was started on decadron and remdesivir. She initially required 6L NC but has since required BiPAP FiO2 @ 100% and her resting saturation was 91% but drops to the 70s with exertion. She received Actemra 9/16. She made it very clear to several providers that she does not want intubation and her mother confirmed this as well. PMH includes DM, HTN, and morbid obesity (444 pounds). Subjective:     On BiPAP for 5 days on 100% FiO2. Previously a DNR and now mother/patient discussing intubation. Discussed with mother if aggressive care desired, we need need to move her to ICU. Mother wants to only discuss in person and come up- back and forth each day.   sats are in the 70's with talking and minimal movement- recovers if still and not talking to 90's    Current Facility-Administered Medications   Medication Dose Route Frequency    azithromycin (ZITHROMAX) 500 mg in 0.9% sodium chloride 250 mL (VIAL-MATE)  500 mg IntraVENous Q24H    dexamethasone (DECADRON) 10 mg/mL injection 10 mg  10 mg IntraVENous Q24H    TPN ADULT - dextrose 5% amino acid 4.25%   IntraVENous QPM    NUTRITIONAL SUPPORT ELECTROLYTE PRN ORDERS   Does Not Apply PRN    hydrALAZINE (APRESOLINE) 20 mg/mL injection 10 mg  10 mg IntraVENous Q6H PRN    morphine injection 1 mg  1 mg IntraVENous Q4H PRN    LORazepam (ATIVAN) injection 0.5 mg  0.5 mg IntraVENous Q6H PRN    phenol throat spray (CHLORASEPTIC) 1 Spray  1 Spray Oral PRN    [Held by provider] insulin glargine (LANTUS) injection 20 Units  20 Units SubCUTAneous BID    pantoprazole (PROTONIX) tablet 40 mg  40 mg Oral ACB    sodium chloride (NS) flush 30 mL  30 mL InterCATHeter Q8H    sodium chloride (NS) flush 30 mL  30 mL InterCATHeter PRN    albuterol (PROVENTIL HFA, VENTOLIN HFA, PROAIR HFA) inhaler 2 Puff  2 Puff Inhalation Q6H RT    ascorbic acid (vitamin C) (VITAMIN C) tablet 500 mg  500 mg Oral BID    zinc sulfate (ZINCATE) 50 mg zinc (220 mg) capsule 50 mg  50 mg Oral DAILY    insulin lispro (HUMALOG) injection   SubCUTAneous AC&HS    lisinopril-hydroCHLOROthiazide (PRINZIDE, ZESTORETIC) 20-12.5 mg per tablet 1 Tablet  1 Tablet Oral DAILY    acetaminophen (TYLENOL) tablet 650 mg  650 mg Oral Q6H PRN    Or    acetaminophen (TYLENOL) suppository 650 mg  650 mg Rectal Q6H PRN    polyethylene glycol (MIRALAX) packet 17 g  17 g Oral DAILY PRN    ondansetron (ZOFRAN ODT) tablet 4 mg  4 mg Oral Q8H PRN    Or    ondansetron (ZOFRAN) injection 4 mg  4 mg IntraVENous Q6H PRN    enoxaparin (LOVENOX) injection 40 mg  40 mg SubCUTAneous Q12H    cefTRIAXone (ROCEPHIN) 1 g in 0.9% sodium chloride (MBP/ADV) 50 mL MBP  1 g IntraVENous Q24H     Review of Systems  Constitutional: negative for fever, chills, sweats  Cardiovascular: negative for chest pain, palpitations, syncope, edema  Gastrointestinal:  negative for dysphagia, reflux, vomiting, diarrhea, abdominal pain, or melena  Neurologic:  negative for focal weakness, numbness, headache    Objective:     Vitals:    09/20/21 2346 09/21/21 0234 09/21/21 0329 09/21/21 0743   BP: (!) 139/96  (!) 129/96 124/88   Pulse: 94  100 (!) 104   Resp: 21 21 20   Temp: 98.3 °F (36.8 °C)  98.7 °F (37.1 °C) 98.2 °F (36.8 °C)   SpO2: 96% 95% 93% 93%   Weight:       Height:           Intake/Output Summary (Last 24 hours) at 9/21/2021 0748  Last data filed at 9/21/2021 0330  Gross per 24 hour   Intake --   Output 1400 ml   Net -1400 ml     Physical Exam:   Constitution:  the patient is morbidly obese on BiPAP at 22/18.   HEENT:  Sclera clear, pupils equal, BiPAP  Respiratory: diminished throughout on BiPAP 100%  Cardiovascular:  RRR without M,G,R  Gastrointestinal: soft and non-tender; with positive bowel sounds. Musculoskeletal: warm without cyanosis. There is trace lower extremity edema. Skin:  no jaundice or rashes, no wounds   Neurologic: no gross neuro deficits     Psychiatric: A & O    CXR: 9/16      CT Chest 9/14       LAB:  Recent Labs     09/21/21  0509 09/19/21  1600   WBC 11.6* 8.0   HGB 11.5* 11.7   HCT 42.0 41.8    279     Recent Labs     09/21/21  0509 09/20/21  1325 09/19/21  0439    140 137   K 4.2 4.7 4.4    101 101   CO2 30 28 29   * 215* 146*   BUN 13 15 14   CREA 0.56* 0.64 0.63   MG 2.1 2.2  --    CA 8.4 9.1 8.8   PHOS 3.8 4.2  --        Assessment and Plan:  (Medical Decision Making)       COVID-19 (9/14/2021) with ARDS. -Patient is still DNR. Currently on BiPAP with higher settings, 100% FiO2  --decadron 6 mg IV D 6, increase to 10 mg daily  -- remdesivir- 9/14-9/16  -- toci- 9/16  -- PC consulted to discuss PCO with family. She is currently a DNR. Mother/patient aware of high risk for decompensation and desires to [facility] the way they are until she arrives. \"      DM w/o complication type II ([address] (9/14/2021)  --Range noted. Follow-up with PMD. lantus on hold- may to adjust       Acute respiratory failure with hypoxia ([address] (9/16/2021)  -with covid 19 PNA  --rocephin and azithromycin initiated for superimposed infection    Anxiety/dyspnea  -Multifactorial related to the above issues. Suspect sleep apnea  --with likely OHS/resrtictive lung disease, with super morbid obesity    Morbid obesity  --risk factor for poor covid outcome    Prophylaxis: protonix,  lovenox     More than 50% of the time documented was spent in face-to-face contact with the patient and in the care of the patient on the floor/unit where the patient is located. Tyesha Patel NP      I have spoken with and examined the patient.  I agree with the above assessment and plan as documented. Gen: Obese BF in NAD  Lungs:  Decreased BS  Heart:  RRR with no Murmur/Rubs/Gallops  Abd: obese  Ext: Tr edema    PC discussion completed and pt is now a Cat 1. Moving to ICU. Sat currently 93 on 100% BIPAP. Anesthesia notified of potential difficult intubation.      Ventura Choe MD

## 2021-09-21 NOTE — PROGRESS NOTES
Bedside report received from [facility]. Patient asleep resting in bed, pt on Bipap with continuous pulse ox at bedside, O2 sats 93%. No resp distress noted on assessment. Call light in reach.

## 2021-09-21 NOTE — PROGRESS NOTES
Bedside shift report received from [facility]. Patient in bed resting. Respirations even and unlabored. On BiPAP 100%. No needs expressed at this time. Safety measures in place. Call light in reach. No signs of acute distress at this time. Will continue to monitor.

## 2021-09-22 PROBLEM — D69.6 THROMBOCYTOPENIA (HCC): Status: ACTIVE | Noted: 2021-01-01

## 2021-09-22 NOTE — PROGRESS NOTES
ACUTE PHYSICAL THERAPY GOALS:  (Developed with and agreed upon by patient and/or caregiver. )    LTG:  (1.)Ms. Yusef Teixeira will move from supine to sit and sit to supine , scoot up and down and roll side to side in bed with INDEPENDENT within 7 treatment day(s). (2.)Ms. Yusef Teixeira will transfer from bed to chair and chair to bed with INDEPENDENT using the least restrictive device within 7 treatment day(s). (3.)Ms. Yusef Teixeira will ambulate with INDEPENDENT for 100 feet with the least restrictive device within 7 treatment day(s). (4.)Ms. Yusef Teixeira will tolerate at least 23 min of dynamic standing activity to assist standing ADLs with the least restrictive device within 7 treatment days. (5.)Ms. Yusef Teixeira will climb at least 12 steps with MODIFIED INDEPENDENCE with the least restrictive device within 7 treatment days.     ________________________________________________________________________________________________        PHYSICAL THERAPY ASSESSMENT: Daily Note, Re-evaluation and PM PT Treatment Day # 2      Diana Wells is a 23 y.o. female   PRIMARY DIAGNOSIS: Acute respiratory distress syndrome (ARDS) due to COVID-19 virus ([facility]  Acute respiratory failure ([facility] [J96.00]       Reason for Referral:    ICD-10: Treatment Diagnosis: Generalized Muscle Weakness (M62.81)  Other lack of cordination (R27.8)  Difficulty in walking, Not elsewhere classified (R26.2)  Other abnormalities of gait and mobility (R26.89)  Dizziness and Giddiness (R42)  Unspecified Lack of Coordination (R27.9)  INPATIENT: Payor: /     ASSESSMENT:     REHAB RECOMMENDATIONS:   Recommendation to date pending progress:  [address] [place] [address]  Equipment:    None     PRIOR LEVEL OF FUNCTION:  (Prior to Hospitalization) INITIAL/CURRENT LEVEL OF FUNCTION:  (Most Recently Demonstrated)   Bed Mobility:   Contact Guard Assistance  Sit to Stand:   Minimal Assistance  Transfers:   Minimal Assistance  Gait/Mobility:   Minimal Assistance Bed Mobility:   Contact Guard Assistance x 2  Sit to Stand:   Contact Guard Assistance x 2  Transfers:   Contact Guard Assistance x 2  Gait/Mobility:   Contact Guard Assistance x 2     ASSESSMENT:  Ms. Frankie Connelly was re-evaluated today due to decline in status and transfer to ICU. She presents in supine on BiPAP. Upon entering, Pnt is agreeable to PT treatment. she reports no pain in chest at rest (however w standing, pt reported high levels of calf pain- RN notified). Pnt performed supine > sit with CGAx2, sitting EOB with fair+ sitting balance control. Sit > stand with CGAx2 using HHA. Gait x 6 ft with CGAx2, cues for step length and posture. Gait is noted to be slow. Stand > sit with CGAx2, followed by positioning for comfort. At end of session pt up in bedside chair with all needs within reach, alarm activated for safety, RN notified. Overall, good progress today as pnt improved in standing tolerance. Pnt continues to present as functioning below her baseline, with deficits in mobility including transfers, gait, balance, and activity tolerance. Pt will continue to benefit from skilled therapy services to address stated deficits to promote return to highest level of function, independence, and safety. Will continue to follow.            SUBJECTIVE:   Ms. Frankie Connelly states, \"It' hot in here\"    SOCIAL HISTORY/LIVING ENVIRONMENT: lives on 3rd story apt with no elevator, independent at baseline  Home Environment: Independent living  One/Two Story Residence: Other (Comment) (one story apartment; lives on 3rd floor)  Living Alone: Yes  Support Systems: Other Family Member(s)  OBJECTIVE:     PAIN: VITAL SIGNS: LINES/DRAINS:   Pre Treatment: Pain Screen  Pain Scale 1: Numeric (0 - 10)  Pain Intensity 1: 0  Post Treatment: 0   IV  O2 Device: BIPAP     GROSS EVALUATION:   Within Functional Limits Abnormal/ Functional Abnormal/ Non-Functional (see comments) Not Tested Comments:   AROM [] [x] [] [] Decreased due to adiposity    PROM [] [x] [] []    Strength [] [x] [] []    Balance [] [x] [] []    Posture [] [x] [] [] flexed   Sensation [x] [] [] []    Coordination [x] [] [] []    Tone [x] [] [] []    Edema [x] [] [] []    Activity Tolerance [] [] [x] [] De-saturates w/ mobility    [] [] [] []      COGNITION/  PERCEPTION: Intact Impaired   (see comments) Comments:   Orientation [x] []    Vision [x] []    Hearing [x] []    Command Following [x] []    Safety Awareness [x] []     [] []      MOBILITY: I Mod I S SBA CGA Min Mod Max Total  NT x2 Comments:   Bed Mobility    Rolling [] [] [] [] [x] [] [] [] [] [] [x]    Supine to Sit [] [] [] [] [x] [] [] [] [] [] [x]    Scooting [] [] [] [] [x] [] [] [] [] [] [x]    Sit to Supine [] [] [] [] [x] [] [] [] [] [] [x]    Transfers    Sit to Stand [] [] [] [] [x] [] [] [] [] [] [x]    Bed to Chair [] [] [] [] [x] [] [] [] [] [] [x]    Stand to Sit [] [] [] [] [x] [] [] [] [] [] [x]    I=Independent, Mod I=Modified Independent, S=Supervision, SBA=Standby Assistance, CGA=Contact Guard Assistance,   Min=Minimal Assistance, Mod=Moderate Assistance, Max=Maximal Assistance, Total=Total Assistance, NT=Not Tested  GAIT: I Mod I S SBA CGA Min Mod Max Total  NT x2 Comments:   Level of Assistance [] [] [] [] [x] [] [] [] [] [] [x]    Distance x6'    DME HHAx2    Gait Quality n/a    Weightbearing Status N/A     I=Independent, Mod I=Modified Independent, S=Supervision, SBA=Standby Assistance, CGA=Contact Guard Assistance,   Min=Minimal Assistance, Mod=Moderate Assistance, Max=Maximal Assistance, Total=Total Assistance, NT=Not Tested    [facility]       How much difficulty does the patient currently have. .. Unable A Lot A Little None   1. Turning over in bed (including adjusting bedclothes, sheets and blankets)? [] 1   [] 2   [x] 3   [] 4   2.   Sitting down on and standing up from a chair with arms ( e.g., wheelchair, bedside commode, etc.)   [] 1   [] 2   [x] 3   [] 4   3. Moving from lying on back to sitting on the side of the bed? [] 1   [] 2   [x] 3   [] 4   How much help from another person does the patient currently need. .. Total A Lot A Little None   4. Moving to and from a bed to a chair (including a wheelchair)? [] 1   [] 2   [x] 3   [] 4   5. Need to walk in hospital room? [] 1   [x] 2   [] 3   [] 4   6. Climbing 3-5 steps with a railing? [] 1   [x] 2   [] 3   [] 4   © 2007, Trustees of [employer], under license to [employer]. All rights reserved     Score:  Initial: 16 Most Recent: 16 (Date: 9/22 )    Interpretation of Tool:  Represents activities that are increasingly more difficult (i.e. Bed mobility, Transfers, Gait). PLAN:   FREQUENCY/DURATION: PT Plan of Care: 3 times/week for duration of hospital stay or until stated goals are met, whichever comes first.    PROBLEM LIST:   (Skilled intervention is medically necessary to address:)  1. Decreased ADL/Functional Activities  2. Decreased Activity Tolerance  3. Decreased AROM/PROM  4. Decreased Balance  5. Decreased Coordination  6. Decreased Gait Ability  7. Decreased Strength  8. Decreased Transfer Abilities   INTERVENTIONS PLANNED:   (Benefits and precautions of physical therapy have been discussed with the patient.)  1. Therapeutic Activity  2. Therapeutic Exercise/HEP  3. Neuromuscular Re-education  4. Gait Training  5. Manual Therapy  6. Education     TREATMENT:     RE-EVALUATION:  (Untimed Charge)    TREATMENT:   ($$ Therapeutic Activity: 8-22 mins    )  Co-Treatment PT/OT necessary due to patient's decreased overall endurance/tolerance levels, as well as need for high level skilled assistance to complete functional transfers/mobility and functional tasks  Therapeutic Activity (15 Minutes):  Therapeutic activity included Rolling, Sit to Supine, Scooting, Transfer Training, Ambulation on level ground, Sitting balance  and Standing balance to improve functional Mobility, Strength, ROM and Activity tolerance.     TREATMENT GRID:  N/A    AFTER TREATMENT POSITION/PRECAUTIONS:  Bed, Needs within reach, RN notified and Visitors at bedside    INTERDISCIPLINARY COLLABORATION:  RN/PCT, PT/PTA and OT/LANGLEY    TOTAL TREATMENT DURATION:  PT Patient Time In/Time Out  Time In: 1309  Time Out: Damien 9444

## 2021-09-22 NOTE — PROGRESS NOTES
Problem: Risk for Spread of Infection  Goal: Prevent transmission of infectious organism to others  Description: Prevent the transmission of infectious organisms to other patients, staff members, and visitors. Outcome: Progressing Towards Goal     Problem: Patient Education:  Go to Education Activity  Goal: Patient/Family Education  Outcome: Progressing Towards Goal     Problem: Airway Clearance - Ineffective  Goal: Achieve or maintain patent airway  Outcome: Progressing Towards Goal     Problem: Gas Exchange - Impaired  Goal: Absence of hypoxia  Outcome: Progressing Towards Goal  Goal: Promote optimal lung function  Outcome: Progressing Towards Goal     Problem: Breathing Pattern - Ineffective  Goal: Ability to achieve and maintain a regular respiratory rate  Outcome: Progressing Towards Goal     Problem:  Body Temperature -  Risk of, Imbalanced  Goal: Ability to maintain a body temperature within defined limits  Outcome: Progressing Towards Goal  Goal: Will regain or maintain usual level of consciousness  Outcome: Progressing Towards Goal  Goal: Complications related to the disease process, condition or treatment will be avoided or minimized  Outcome: Progressing Towards Goal     Problem: Isolation Precautions - Risk of Spread of Infection  Goal: Prevent transmission of infectious organism to others  Outcome: Progressing Towards Goal     Problem: Nutrition Deficits  Goal: Optimize nutrtional status  Outcome: Progressing Towards Goal     Problem: Risk for Fluid Volume Deficit  Goal: Maintain normal heart rhythm  Outcome: Progressing Towards Goal  Goal: Maintain absence of muscle cramping  Outcome: Progressing Towards Goal  Goal: Maintain normal serum potassium, sodium, calcium, phosphorus, and pH  Outcome: Progressing Towards Goal     Problem: Loneliness or Risk for Loneliness  Goal: Demonstrate positive use of time alone when socialization is not possible  Outcome: Progressing Towards Goal     Problem: Fatigue  Goal: Verbalize increase energy and improved vitality  Outcome: Progressing Towards Goal     Problem: Patient Education: Go to Patient Education Activity  Goal: Patient/Family Education  Outcome: Progressing Towards Goal     Problem: Diabetes Self-Management  Goal: *Disease process and treatment process  Description: Define diabetes and identify own type of diabetes; list 3 options for treating diabetes. Outcome: Progressing Towards Goal  Goal: *Incorporating nutritional management into lifestyle  Description: Describe effect of type, amount and timing of food on blood glucose; list 3 methods for planning meals. Outcome: Progressing Towards Goal  Goal: *Incorporating physical activity into lifestyle  Description: State effect of exercise on blood glucose levels. Outcome: Progressing Towards Goal  Goal: *Developing strategies to promote health/change behavior  Description: Define the ABC's of diabetes; identify appropriate screenings, schedule and personal plan for screenings. Outcome: Progressing Towards Goal  Goal: *Using medications safely  Description: State effect of diabetes medications on diabetes; name diabetes medication taking, action and side effects. Outcome: Progressing Towards Goal  Goal: *Monitoring blood glucose, interpreting and using results  Description: Identify recommended blood glucose targets  and personal targets. Outcome: Progressing Towards Goal  Goal: *Prevention, detection, treatment of acute complications  Description: List symptoms of hyper- and hypoglycemia; describe how to treat low blood sugar and actions for lowering  high blood glucose level. Outcome: Progressing Towards Goal  Goal: *Prevention, detection and treatment of chronic complications  Description: Define the natural course of diabetes and describe the relationship of blood glucose levels to long term complications of diabetes.   Outcome: Progressing Towards Goal  Goal: *Developing strategies to address psychosocial issues  Description: Describe feelings about living with diabetes; identify support needed and support network  Outcome: Progressing Towards Goal  Goal: *Insulin pump training  Outcome: Progressing Towards Goal  Goal: *Sick day guidelines  Outcome: Progressing Towards Goal  Goal: *Patient Specific Goal (EDIT GOAL, INSERT TEXT)  Outcome: Progressing Towards Goal     Problem: Patient Education: Go to Patient Education Activity  Goal: Patient/Family Education  Outcome: Progressing Towards Goal     Problem: Falls - Risk of  Goal: *Absence of Falls  Description: Document [place] Fall Risk and appropriate interventions in the flowsheet. Outcome: Progressing Towards Goal  Note: Fall Risk Interventions:  Mobility Interventions: Bed/chair exit alarm, Communicate number of staff needed for ambulation/transfer         Medication Interventions: Bed/chair exit alarm, Teach patient to arise slowly, Patient to call before getting OOB    Elimination Interventions: Call light in reach, Bed/chair exit alarm              Problem: Patient Education: Go to Patient Education Activity  Goal: Patient/Family Education  Outcome: Progressing Towards Goal     Problem: Pressure Injury - Risk of  Goal: *Prevention of pressure injury  Description: Document Evangelista Scale and appropriate interventions in the flowsheet.   Outcome: Progressing Towards Goal  Note: Pressure Injury Interventions:       Moisture Interventions: Absorbent underpads, Internal/External urinary devices    Activity Interventions: Assess need for specialty bed    Mobility Interventions: Float heels, HOB 30 degrees or less, Pressure redistribution bed/mattress (bed type)    Nutrition Interventions: Document food/fluid/supplement intake                     Problem: Patient Education: Go to Patient Education Activity  Goal: Patient/Family Education  Outcome: Progressing Towards Goal     Problem: Patient Education: Go to Patient Education Activity  Goal: Patient/Family Education  Outcome: Progressing Towards Goal     Problem: Patient Education: Go to Patient Education Activity  Goal: Patient/Family Education  Outcome: Progressing Towards Goal

## 2021-09-22 NOTE — PROGRESS NOTES
Pt transferred to ICU on 9/21 for increased O2 demand and BiPAP. PT weaned to 90%. Pt up in the chair with PT/OT recommending home health. CM following pt's status for CM needs.

## 2021-09-22 NOTE — PROGRESS NOTES
ACUTE OT GOALS:  (Developed with and agreed upon by patient and/or caregiver.)  1) Patient will complete lower body bathing and dressing with MOD I and adaptive equipment as needed. CONTINUE TO ADDRESS (2021)  2) Patient will complete toileting with MOD I. - UPDATED 2021  3) Patient will complete functional transfers with  MOD I and adaptive equipment as needed. -  UPDATED 2021  4) Patient will tolerate at least 25 minutes of OT activity with 1-2 rest breaks while maintaining O2 sats >90%. - CONTINUE TO ADDRESS (2021)  5) Patient will verbalize at least 3 energy conservation technique to utilize during ADL/IADL. - CONTINUE TO ADDRESS (2021)    NEW GOALS (ADDED 2021)  1. Patient will complete functional activity with MOD I and adaptive equipment as needed.   Timeframe: 7 visits     OCCUPATIONAL THERAPY ASSESSMENT: Daily Note and Re-evaluation OT Treatment Day # 1    Mona Lozano is a 23 y.o. female   PRIMARY DIAGNOSIS: Acute respiratory distress syndrome (ARDS) due to COVID-19 virus ([facility]  Acute respiratory failure ([facility] [address] [J96.00]       Reason for Referral:    ICD-10: Treatment Diagnosis: Generalized Muscle Weakness (M62.81)  Other lack of cordination (R27.8)  Difficulty in walking, Not elsewhere classified (R26.2)  INPATIENT: Payor: /   ASSESSMENT:     REHAB RECOMMENDATIONS:   Recommendation to date pending progress:  [address] [place] [address] pending respiratory status   Equipment:    To Be Determined     PRIOR LEVEL OF FUNCTION:  (Prior to Hospitalization)  INITIAL/CURRENT LEVEL OF FUNCTION:  (Based on today's evaluation)   Bathing:   Independent  Dressing:   Independent  Feeding/Grooming:   Independent  Toileting:   Independent  Functional Mobility:   Independent Bathing:   Minimal Assistance  Dressing:   Contact Guard Assistance  Feeding/Grooming:   Set Up  Toileting:   Standby Assistance  Functional Mobility:   Contact Guard Assistance x 2     ASSESSMENT:  Ms. Marcial Hodgson presented to the hospital with fever, chills, and SOB due to the COVID 19 virus. Presents for re-evaluation after decline in respiratory status with subsequent discharge to ICU; currently resting on BIPAP. Sats at 95% at rest. CGA/min A for bed mobility/supine to sit transfer to edge of bed. Good EOB sitting balance. Sats dropping to 88% with bed mobility; quickly recovering to 92% with rest and use of energy conservation strategies. Pt then stood with CGA x 2; good standing balance however had to return to sitting d/t LLE leg pain (resolved with NWB); no redness, swelling or heat noted. Pt completed 2nd trial of sit to stand and ambulated to bedside chair with CGA x 2. Sats dropping to mid 80s with mobility/activity however quickly recovering to low 90s. Addressed energy conservation strategies and pursed lip breathing. Goals updated to reflect pt's current status. Will continue OT efforts as indicated. SUBJECTIVE:   Ms. Marcial Hodgson states, \"I'm feeling better now. \"    SOCIAL HISTORY/LIVING ENVIRONMENT: lives alone in an apartment on the 3rd level (no elevator access), walk in shower, independent for all ADLs and IADLs, no DME  Home Environment: Independent living  One/Two Story Residence: Other (Comment) (one story apartment; lives on 3rd floor)  Living Alone: Yes  Support Systems: Other Family Member(s)    OBJECTIVE:     PAIN: VITAL SIGNS: LINES/DRAINS:   Pre Treatment: Pain Screen  Pain Scale 1: Numeric (0 - 10)  Pain Intensity 1: 0  Post Treatment: no change    Continuous Pulse Oximetry and Pelayo Catheter  O2 Device: BIPAP     GROSS EVALUATION:  BUEs Within Functional Limits Abnormal/ Functional Abnormal/ Non-Functional (see comments) Not Tested Comments:   AROM [x] [] [] []    PROM [] [] [] [x]    Strength [] [x] [] [] generalized weakness    Balance [] [x] [] [] Good sitting balance, fair (+) standing room    Posture [x] [] [] []    Sensation [x] [] [] []    Coordination [x] [] [] []    Tone [] [] [] [x]    Edema [] [] [] [x]    Activity Tolerance [] [x] [] [] Poor activity tolerance due to respiratory status    [] [] [] []      COGNITION/  PERCEPTION: Intact Impaired   (see comments) Comments:   Orientation [x] []    Vision [x] []    Hearing [x] []    Judgment/ Insight [x] []    Attention [x] []    Memory [x] []    Command Following [x] []    Emotional Regulation [x] []     [] []      ACTIVITIES OF DAILY LIVING: I Mod I S SBA CGA Min Mod Max Total NT Comments   BASIC ADLs:              Bathing/ Showering [] [] [] [] [] [] [] [] [] [x]    Toileting [] [] [] [] [] [] [] [] [] [x]    Dressing [] [] [] [] [] [] [] [] [] [x]    Feeding [] [] [] [] [] [] [] [] [] [x]    Grooming [] [] [] [] [] [] [] [] [] [x]    Personal Device Care [] [] [] [] [] [] [] [] [] [x]    Functional Mobility [] [] [] [] [x] [] [] [] [] [] X 2   I=Independent, Mod I=Modified Independent, S=Supervision, SBA=Standby Assistance, CGA=Contact Guard Assistance,   Min=Minimal Assistance, Mod=Moderate Assistance, Max=Maximal Assistance, Total=Total Assistance, NT=Not Tested    MOBILITY: I Mod I S SBA CGA Min Mod Max Total  NT x2 Comments:   Supine to sit [] [] [] [] [x] [] [] [] [] [] [x]    Sit to supine [] [] [] [] [] [] [] [] [] [x] []    Sit to stand [] [] [] [] [x] [] [] [] [] [] [x]    Bed to chair [] [] [] [] [x] [] [] [] [] [] [x]    I=Independent, Mod I=Modified Independent, S=Supervision, SBA=Standby Assistance, CGA=Contact Guard Assistance,   Min=Minimal Assistance, Mod=Moderate Assistance, Max=Maximal Assistance, Total=Total Assistance, NT=Not Tested    Crystal Archer AM-PAC 6 Clicks   Daily Activity Inpatient Short Form        How much help from another person does the patient currently need. .. Total A Lot A Little None   1. Putting on and taking off regular lower body clothing? [] 1   [] 2   [x] 3   [] 4   2. Bathing (including washing, rinsing, drying)? [] 1   [] 2   [x] 3   [] 4   3.   Toileting, which includes using toilet, bedpan or urinal?   [] 1   [] 2   [x] 3   [] 4   4. Putting on and taking off regular upper body clothing? [] 1   [] 2   [x] 3   [] 4   5. Taking care of personal grooming such as brushing teeth? [] 1   [] 2   [] 3   [x] 4   6. Eating meals? [] 1   [] 2   [] 3   [x] 4   © 2007, Trustees of [facility] [employer], under license to [employer]. All rights reserved     Score:  Initial: 19 completed on 9/20/21 Most Recent: 20 (Date: 9/22/2021 )   Interpretation of Tool:  Represents activities that are increasingly more difficult (i.e. Bed mobility, Transfers, Gait). PLAN:   FREQUENCY/DURATION: OT Plan of Care: 3 times/week for duration of hospital stay or until stated goals are met, whichever comes first.    PROBLEM LIST:   (Skilled intervention is medically necessary to address:)  1. Decreased ADL/Functional Activities  2. Decreased Activity Tolerance  3. Decreased Balance  4. Decreased Strength  5. Decreased Transfer Abilities   INTERVENTIONS PLANNED:   (Benefits and precautions of occupational therapy have been discussed with the patient.)  1. Self Care Training  2. Therapeutic Activity  3. Therapeutic Exercise/HEP  4. Neuromuscular Re-education  5. Education     TREATMENT:     EVALUATION: Re-evaluation    TREATMENT:   ( $$ Neuromuscular Re-Education: 23-37 mins   )  Co-Treatment PT/OT necessary due to patient's decreased overall endurance/tolerance levels, as well as need for high level skilled assistance to complete functional transfers/mobility and functional tasks  Neuromuscular Re-education (23 Minutes): Neuromuscular Re-education included Balance Training, Coordination training, Postural training, Sitting balance training and Standing balance training to improve Balance, Coordination, Functional Mobility and Postural Control.     TREATMENT GRID:  N/A    AFTER TREATMENT POSITION/PRECAUTIONS:  Chair, Needs within reach, RN notified and Visitors at bedside    INTERDISCIPLINARY COLLABORATION:  RN/PCT, PT/PTA and OT/LANGLEY    TOTAL TREATMENT DURATION:  OT Patient Time In/Time Out  Time In: 1309  Time Out: 175 [facility], OT

## 2021-09-22 NOTE — PROGRESS NOTES
Comprehensive Nutrition Assessment    Type and Reason for Visit: Reassess  TPN Management (Pulmonary)    Nutrition Recommendations/Plan:   · Parenteral Nutrition:  · 14%DEX/ 8%AA at 1 L; holding lipids d/t MO and critical illness day 3/7  · Lytes/L:   · Sodium 30 meq (30 meq NaCl), Potassium 30 meq (20 meq KCl, 10 meq KPO4), 5 meq Mg, 4.5 meq Calcium  · Other additives: MTE, MVI  · Vitamin and Mineral Supplement Therapy:  · Electrolyte management replacement protocol active. · Labs:   · BMP daily, Phos and Mg MWF.  · POC Glucoses/SSI active. Malnutrition Assessment:  Malnutrition Status: At risk for malnutrition (specify) (minimal po intake since 9/16 d/t increased O2 needs/need for BIPAP)    Nutrition Assessment:   Nutrition History: 9/20:Unable to obtain      Nutrition Background: H/O: DM type II, HTN, severe MO. Presented with cough, cold flu like symptoms fever and chills   Onset of symptoms 9/12. Admitted d/t hypoxic respiratory faikure d/t COVID  Daily Update:  Pt transferred to ICU yesterday after [address] discussion. Observed pt through window. Pt continues with BiPAP. TPN infusing at 43ml/hr. Noted UOP of 475ml 9/21 and 550ml thus far today. Given recent transfer to unit and measured UOP, continuing 1L TPN this evening. Abdominal Status (last documented): Obese, Intact abdomen with Active  bowel sounds. Last BM 09/20/21.   Pertinent Medications: Azithromycin, Decadron, SSI (17units 9/21, 6 units thus far today), Protonix (held)  Pertinent Labs:     Lab Results   Component Value Date/Time     09/22/2021 03:40 AM    K 4.3 09/22/2021 03:40 AM     09/22/2021 03:40 AM    CO2 30 09/22/2021 03:40 AM    AGAP 7 09/22/2021 03:40 AM     (H) 09/22/2021 03:40 AM    BUN 15 09/22/2021 03:40 AM    CREA 0.57 (L) 09/22/2021 03:40 AM    GFRAA >60 09/22/2021 03:40 AM    GFRNA >60 09/22/2021 03:40 AM    CA 8.8 09/22/2021 03:40 AM    MG 2.3 09/22/2021 03:40 AM    PHOS 4.2 09/22/2021 03:40 AM     Labs notable for slight increase in mg and phos. Reducing in TPN this evening. Nutrition Related Findings:   NFPE deferred d/t remote assessment and isolation status. 9/16: Triple lumen PICC placed      Current Nutrition Therapies:  DIET NPO Sips of Water with Meds  Current Parenteral Nutrition Orders:  · Type and Formula: 14%DEX/8%AA   · Lipids: None  · Duration: Continuous  · Rate/Volume: 43ml/hr  · Current PN Order Provides: 802 kcal/d (55% of needs), 81 grams of protein/d (49% of needs), 141 grams of CHO/d and 1000 ml of total volume/d    Current Intake:   Average Meal Intake: NPO Average Supplement Intake: NPO      Anthropometric Measures:  Height: 5' 9\" (175.3 cm)  Current Body Wt: 206.6 kg (455 lb 7.5 oz) (9/16), Weight source: Standing scale  BMI: 67.2, Obese class 3 (BMI 40.0 or greater)  Admission Body Weight: 461 lb 10.3 oz (standing scale, Note pt stated admission weight of 350#)  Ideal Body Weight (lbs) (Calculated): 145 lbs (66 kg),    Usual Body Wt: 205.9 kg (454 lb) (per EMR 2019 to 2020), Percent weight change: 0.3          Edema: No data recorded   Estimated Daily Nutrient Needs:  Energy (kcal/day): 0721-7928 (Kcal/kg (22-25), Weight Used: Ideal (65.9 kg))  Protein (g/day): >165 (>2,5 grams/kg) Weight Used: (Ideal (65.9 kg))  Fluid (ml/day):   ( (Minimize))    Nutrition Diagnosis:   · Inadequate oral intake related to impaired respiratory function as evidenced by NPO or clear liquid status due to medical condition, nutrition support-parenteral nutrition    Nutrition Interventions:   Food and/or Nutrient Delivery: Continue NPO, Modify parenteral nutrition     Coordination of Nutrition Care: Continue to monitor while inpatient  Plan of Care discussed with CY Lancaster and Kirke Severance, RN    Goals:   Previous Goal Met: Progressing toward goal(s)  Active Goal: TPN at goal rate within 3 days.     Nutrition Monitoring and Evaluation:      Food/Nutrient Intake Outcomes: Parenteral nutrition intake/tolerance  Physical Signs/Symptoms Outcomes: Biochemical data, GI status    Discharge Planning:     Too soon to determine    Electronically signed by William Lam MS, RDN, LD 9/22/2021 at 1:28 PM  Contact: [phone]    Disaster Mode Active

## 2021-09-22 NOTE — PROGRESS NOTES
Marek Sofia  Admission Date: 9/14/2021         Critical Care Progress Note: 9/22/2021    The patient's chart is reviewed and the patient is discussed with the staff. Background: 24 yo [place] American female admitted with acute respiratory failure due to [address]. She was admitted on 9/14 with a 2 day history of dyspnea. She was positive for COVID. She was started on decadron and remdesivir. She initially required 6L NC but has since required BiPAP FiO2 @ 100% and her resting saturation was 91% but drops to the 70s with exertion. She received Actemra 9/16. She made it very clear to several providers that she does not want intubation and her mother confirmed this as well. After long discussion on 9/21, category status changed to Category 1 and pt moved to tele obs ICU on 100% BIPAP. PMH includes DM, HTN, and morbid obesity (444 pounds). Subjective:     Pt weaned to 90% O2 on BIPAP. Did not tolerate drop to 80%. Sat currently 96%.      Current Facility-Administered Medications   Medication Dose Route Frequency    dexamethasone (DECADRON) 10 mg/mL injection 6 mg  6 mg IntraVENous Q24H    TPN ADULT-CENTRAL - dextrose 14% amino acid 8%   IntraVENous QPM    azithromycin (ZITHROMAX) 500 mg in 0.9% sodium chloride 250 mL (VIAL-MATE)  500 mg IntraVENous Q24H    NUTRITIONAL SUPPORT ELECTROLYTE PRN ORDERS   Does Not Apply PRN    hydrALAZINE (APRESOLINE) 20 mg/mL injection 10 mg  10 mg IntraVENous Q6H PRN    morphine injection 1 mg  1 mg IntraVENous Q4H PRN    LORazepam (ATIVAN) injection 0.5 mg  0.5 mg IntraVENous Q6H PRN    phenol throat spray (CHLORASEPTIC) 1 Spray  1 Spray Oral PRN    [Held by provider] insulin glargine (LANTUS) injection 20 Units  20 Units SubCUTAneous BID    pantoprazole (PROTONIX) tablet 40 mg  40 mg Oral ACB    sodium chloride (NS) flush 30 mL  30 mL InterCATHeter Q8H    sodium chloride (NS) flush 30 mL  30 mL InterCATHeter PRN    albuterol (PROVENTIL HFA, VENTOLIN HFA, PROAIR HFA) inhaler 2 Puff  2 Puff Inhalation Q6H RT    insulin lispro (HUMALOG) injection   SubCUTAneous AC&HS    lisinopril-hydroCHLOROthiazide (PRINZIDE, ZESTORETIC) 20-12.5 mg per tablet 1 Tablet  1 Tablet Oral DAILY    acetaminophen (TYLENOL) tablet 650 mg  650 mg Oral Q6H PRN    Or    acetaminophen (TYLENOL) suppository 650 mg  650 mg Rectal Q6H PRN    polyethylene glycol (MIRALAX) packet 17 g  17 g Oral DAILY PRN    ondansetron (ZOFRAN ODT) tablet 4 mg  4 mg Oral Q8H PRN    Or    ondansetron (ZOFRAN) injection 4 mg  4 mg IntraVENous Q6H PRN    enoxaparin (LOVENOX) injection 40 mg  40 mg SubCUTAneous Q12H    cefTRIAXone (ROCEPHIN) 1 g in 0.9% sodium chloride (MBP/ADV) 50 mL MBP  1 g IntraVENous Q24H     Review of Systems    Constitutional: negative for fever, chills, sweats  Cardiovascular: negative for chest pain, palpitations, syncope, edema  Gastrointestinal:  negative for dysphagia, reflux, vomiting, diarrhea, abdominal pain, or melena  Neurologic:  negative for focal weakness, numbness, headache    Objective:     Vitals:    09/22/21 0952 09/22/21 0953 09/22/21 0954 09/22/21 0955   BP:       Pulse: (!) 104 (!) 104 (!) 104 (!) 103   Resp: 20 26 22 18   Temp:       SpO2: (!) 86% (!) 84% 90% (!) 88%   Weight:       Height:           Intake/Output Summary (Last 24 hours) at 9/22/2021 1038  Last data filed at 9/22/2021 0300  Gross per 24 hour   Intake 3617.32 ml   Output 475 ml   Net 3142.32 ml     Physical Exam:   Constitution:  the patient is morbidly obese on BiPAP at 22/18. 90% with sat 95%  HEENT:  Sclera clear, pupils equal, BiPAP  Respiratory: diminished throughout on BiPAP 100%  Cardiovascular:  RRR without M,G,R  Gastrointestinal: soft and non-tender; with positive bowel sounds. Musculoskeletal: warm without cyanosis. There is trace lower extremity edema.   Skin:  no jaundice or rashes, no wounds   Neurologic: no gross neuro deficits     Psychiatric: A & O    CXR: 9/16      CT Chest 9/14       LAB:  Recent Labs     09/22/21  0340 09/21/21  0509 09/19/21  1600   WBC 13.3* 11.6* 8.0   HGB 11.7 11.5* 11.7   HCT 43.1 42.0 41.8   * 164 279     Recent Labs     09/22/21  0340 09/21/21  0509 09/20/21  1325    141 140   K 4.3 4.2 4.7    104 101   CO2 30 30 28   * 130* 215*   BUN 15 13 15   CREA 0.57* 0.56* 0.64   MG 2.3 2.1 2.2   CA 8.8 8.4 9.1   PHOS 4.2 3.8 4.2       Assessment and Plan:  (Medical Decision Making)       COVID-19 (9/14/2021) with ARDS. -Patient is now category 1. Currently on BiPAP with higher settings, 90% FiO2  --decadron 6 mg IV D 6, increase to 10 mg daily  -- remdesivir- 9/14-9/16  -- toci- 9/16      DM w/o complication type II ([address] (9/14/2021)  --Range noted. lantus on hold- may to adjust       Acute respiratory failure with hypoxia (HCC) (9/16/2021)  -with covid 19 PNA  --rocephin and azithromycin initiated for superimposed infection    Thrombocytopenia: platelets down to 708Y On lovenox. Check HIT. Hold on stopping lovenox for now due to high risk of clots. Anxiety/dyspnea  -Multifactorial related to the above issues. Suspect sleep apnea  --with likely OHS/resrtictive lung disease, with super morbid obesity    Morbid obesity  --risk factor for poor covid outcome. Try to mobilize with PT- tolerated well 2 days ago. Prophylaxis: protonix,  lovenox    Called mother Monique Ko [phone] with update. Critically ill. E/M time 33 min. More than 50% of the time documented was spent in face-to-face contact with the patient and in the care of the patient on the floor/unit where the patient is located.     Ama Francois MD

## 2021-09-22 NOTE — PROGRESS NOTES
09/21/21 2048   Oxygen Therapy   O2 Sat (%) 95 %   Pulse via Oximetry 95 beats per minute   O2 Device BIPAP   FIO2 (%) 100 %   Respiratory   Respiratory (WDL) X   Respiratory Pattern Regular   Chest/Tracheal Assessment Chest expansion, symmetrical   CPAP/BIPAP   Device Mode BIPAP   Mask Type and Size Full face   Skin Condition intact   PIP Observed 23 cm H20   IPAP (cm H2O) 22 cm H2O   EPAP (cm H2O) 18 cm H2O   Inspiratory Time (sec) 1 seconds   Vt Spont (ml) 901 ml   Ve Observed (l/min) 19.8 l/min   Backup Rate 16   Total RR (Spontaneous) 22 breaths per minute   Insp Rise Time (sec) 4   Leak (Estimated) 11 L/min   Pt's Home Machine No   Biomedical Check Performed Yes   Settings Verified Yes   Alarm Settings   High Pressure 30   Low Pressure 5   Apnea 20   Low Ve 2   High Rate 50   Low Rate 8   Pulmonary Toilet   Pulmonary Toilet H. O.B elevated

## 2021-09-23 PROBLEM — I82.409 DVT (DEEP VENOUS THROMBOSIS) (HCC): Status: ACTIVE | Noted: 2021-01-01

## 2021-09-23 NOTE — PROGRESS NOTES
[facility]  Admission Date: 9/14/2021         Critical Care Progress Note: 9/23/2021  Length of stay: 9 days. Background: 24 yo [place] American female admitted with acute respiratory failure due to [address]. She was admitted on 9/14 with a 2 day history of dyspnea. She was positive for COVID. She was started on decadron and remdesivir. She initially required 6L NC but has since required BiPAP FiO2 @ 100% and her resting saturation was 91% but drops to the 70s with exertion. She received Actemra 9/16. She made it very clear to several providers that she does not want intubation and her mother confirmed this as well. After long discussion on 9/21, category status changed to Category 1 and pt moved to tele obs ICU on 100% BIPAP. PMH includes DM, HTN, and morbid obesity (444 pounds).    Subjective:     On BiPAP for ~ 7 days, now weaned O2 to 75%, sat 92%    Date of COVID-19 symptom onset: 9/10    COVID-19 Meds:  Dexamethasone 6mg daily: 9/14-  Remdesivir: 9/14-9/17  Actemra: 9/16    Vaccination: Not Vaccinated    Current Facility-Administered Medications   Medication Dose Route Frequency    TPN ADULT-CENTRAL - dextrose 14% amino acid 8%   IntraVENous QPM    argatroban 250 mg in 0.9% sodium chloride 250 mL (1000 mcg/mL) infusion  0.5-10 mcg/kg/min IntraVENous TITRATE    dexamethasone (DECADRON) 10 mg/mL injection 6 mg  6 mg IntraVENous Q24H    NUTRITIONAL SUPPORT ELECTROLYTE PRN ORDERS   Does Not Apply PRN    hydrALAZINE (APRESOLINE) 20 mg/mL injection 10 mg  10 mg IntraVENous Q6H PRN    morphine injection 1 mg  1 mg IntraVENous Q4H PRN    LORazepam (ATIVAN) injection 0.5 mg  0.5 mg IntraVENous Q6H PRN    phenol throat spray (CHLORASEPTIC) 1 Spray  1 Spray Oral PRN    [Held by provider] insulin glargine (LANTUS) injection 20 Units  20 Units SubCUTAneous BID    pantoprazole (PROTONIX) tablet 40 mg  40 mg Oral ACB    sodium chloride (NS) flush 30 mL  30 mL InterCATHeter Q8H    sodium chloride (NS) flush 30 mL  30 mL InterCATHeter PRN    insulin lispro (HUMALOG) injection   SubCUTAneous AC&HS    lisinopril-hydroCHLOROthiazide (PRINZIDE, ZESTORETIC) 20-12.5 mg per tablet 1 Tablet  1 Tablet Oral DAILY    acetaminophen (TYLENOL) tablet 650 mg  650 mg Oral Q6H PRN    Or    acetaminophen (TYLENOL) suppository 650 mg  650 mg Rectal Q6H PRN    polyethylene glycol (MIRALAX) packet 17 g  17 g Oral DAILY PRN    ondansetron (ZOFRAN ODT) tablet 4 mg  4 mg Oral Q8H PRN    Or    ondansetron (ZOFRAN) injection 4 mg  4 mg IntraVENous Q6H PRN     Review of Systems    Constitutional: negative for fever, chills, sweats  Cardiovascular: negative for chest pain, palpitations, syncope, edema  Gastrointestinal:  negative for dysphagia, reflux, vomiting, diarrhea, abdominal pain, or melena  Neurologic:  negative for focal weakness, numbness, headache    Objective:     Vitals:    09/23/21 0800 09/23/21 0810 09/23/21 0900 09/23/21 0901   BP: 120/69  120/71    Pulse: (!) 101  (!) 108    Resp: 18  21    Temp: 98.4 °F (36.9 °C)      SpO2: 94% 94% 93% 92%   Weight:       Height:           Intake/Output Summary (Last 24 hours) at 9/23/2021 1111  Last data filed at 9/23/2021 0300  Gross per 24 hour   Intake 1425.89 ml   Output 1675 ml   Net -249.11 ml     Physical Exam:   Constitution:  the patient is morbidly obese on BiPAP at 22/18. 75% with sat 92%  HEENT:  Sclera clear, pupils equal, BiPAP  Respiratory: diminished throughout on BiPAP 100%  Cardiovascular:  RRR without M,G,R  Gastrointestinal: soft and non-tender; with positive bowel sounds. Musculoskeletal: warm without cyanosis. There is trace lower extremity edema.   Skin:  no jaundice or rashes, no wounds   Neurologic: no gross neuro deficits     Psychiatric: A & O    CXR:     9/16      CT Chest 9/14       LAB:  Recent Labs     09/23/21  0325 09/22/21 2026 09/22/21  0340 09/21/21  0509 09/21/21  0509   WBC 14.7*  --  13.3*  --  11.6*   HGB 11.3*  --  11.7 --  11.5*   HCT 41.2  --  43.1  --  42.0   PLT 95* 105* 127*   < > 164   INR  --  1.2  --   --   --     < > = values in this interval not displayed. Recent Labs     09/23/21  0325 09/22/21 2026 09/22/21  0340 09/21/21  0509 09/20/21  1325 09/20/21  1325     --  142 141   < > 140   K 4.2  --  4.3 4.2   < > 4.7     --  105 104   < > 101   CO2 31  --  30 30   < > 28   *  --  151* 130*   < > 215*   BUN 15  --  15 13   < > 15   CREA 0.55*  --  0.57* 0.56*   < > 0.64   MG  --   --  2.3 2.1  --  2.2   CA 9.3  --  8.8 8.4   < > 9.1   PHOS  --   --  4.2 3.8  --  4.2   ALB  --  2.9*  --   --   --   --    AST  --  73*  --   --   --   --    ALT  --  55  --   --   --   --    AP  --  171*  --   --   --   --     < > = values in this interval not displayed. Assessment and Plan:  (Medical Decision Making)       COVID-19 (9/14/2021) with ARDS. -Patient is now category 1. Currently on BiPAP with higher settings, 75% FiO2  --decadron 6 mg IV D 6, 9 days   -- remdesivir- 9/14-9/16  -- toci- 9/16      DM w/o complication type II ([address] (9/14/2021)  --Range noted. SSI, lantus is on hold      Acute respiratory failure with hypoxia ([address] (9/16/2021)  -with covid 19 PNA  --now off rocephin and azithromycin for superimposed infection  --on BiPAP, weaning oxygen, try and airvo/NRB if able. -- OOB to chair, has tolerated well. Thrombocytopenia: platelets down to 47H lovenox, stopped   HIT P. Now on argatroban drip. Anxiety/dyspnea  -Multifactorial related to the above issues. Suspect sleep apnea  --with likely OHS/resrtictive lung disease, with super morbid obesity    Morbid obesity  --risk factor for poor covid outcome. Try to mobilize with PT- tolerated well 2 days ago. Prophylaxis: protonix,  argatroban     More than 50% of the time documented was spent in face-to-face contact with the patient and in the care of the patient on the floor/unit where the patient is located.     Marcial Herzog, AYAKA    I have spoken with and examined the patient. I agree with the above assessment and plan as documented. Gen: Morbidly obese and in NAD  Lungs:  Decreased BS  Heart:  RRR with no Murmur/Rubs/Gallops  Abd: obese  Ext: tr edema    Pt currently on 75% BIPAP up in chair with sat 97%. No distress evident. Can wean O2 a little further. Now on argatroban for DVT and falling platelet count on lovenox. Continue to mobilize as able. Mother updated by her nurse recently.      Flaquita De La Fuente MD

## 2021-09-23 NOTE — PROGRESS NOTES
Comprehensive Nutrition Assessment  This assessment was completed remotely. Type and Reason for Visit: Reassess  TPN Management (Pulmonary)    Nutrition Recommendations/Plan:   · Parenteral Nutrition:  · Advance TPN: 14%DEX/ 8%AA to 65 ml/hr. Holding lipids d/t MO and critical inflammatory illness. · To provide: 1242 kcal/d (86% of needs), 125 grams of protein/d (76% of needs), 218 grams of CHO/d and 1560 ml of total volume/d. · Lytes/L:   · Sodium 30 meq (30 meq NaCl), Potassium 20 meq (15 meq KCl, 5 meq KPO4), 3 meq Mg, 4.5 meq Calcium  · Other additives: MTE, MVI, 7 units insulin/L or .05 units/grm of CHO  · Vitamin and Mineral Supplement Therapy:  · Electrolyte management replacement protocol active. · Labs:   · BMP daily, Phos and Mg MWF. · Change POC Glucoses/SSI from ac and hs to every 6 hrs. Malnutrition Assessment:  Malnutrition Status: At risk for malnutrition (specify) (minimal po intake since 9/16 d/t increased O2 needs/need for BIPAP)    Nutrition Assessment:   Nutrition History: 9/20:Unable to obtain      Nutrition Background: H/O: DM type II, HTN, severe MO. Presented with cough, cold flu like symptoms fever and chills   Onset of symptoms 9/12. Admitted d/t hypoxic respiratory faikure d/t COVID  Daily Update:  Remains in ICU on 100% BiPAP with plan to attempt AIRVO/NRB as tolerated. 1L NS bolus given 9/20  Abdominal Status (last documented): Intact, Obese abdomen with Active  bowel sounds. Last BM 09/20/21.   Pertinent Medications: Azithromycin, rocephin, Decadron, SSI (15 units 9/22,3 units thus far today),po Protonix (held)  Pertinent Labs:     Lab Results   Component Value Date/Time    Sodium 142 09/23/2021 03:25 AM    Potassium 4.2 09/23/2021 03:25 AM    Chloride 105 09/23/2021 03:25 AM    CO2 31 09/23/2021 03:25 AM    Anion gap 6 (L) 09/23/2021 03:25 AM    Glucose 141 (H) 09/23/2021 03:25 AM    BUN 15 09/23/2021 03:25 AM    Creatinine 0.55 (L) 09/23/2021 03:25 AM    Calcium 9.3 09/23/2021 03:25 AM    Albumin 2.9 (L) 09/22/2021 08:26 PM    Magnesium 2.3 09/22/2021 03:40 AM    Phosphorus 4.2 09/22/2021 03:40 AM     Lab Results   Component Value Date/Time    Glucose 141 (H) 09/23/2021 03:25 AM    Glucose (POC) 163 (H) 09/23/2021 08:19 AM    Glucose (POC) 154 (H) 09/22/2021 10:02 PM    Glucose (POC) 228 (H) 09/22/2021 04:09 PM    Glucose (POC) 228 (H) 09/22/2021 11:58 AM    Glucose (POC) 169 (H) 09/22/2021 09:05 AM    Glucose (POC) 190 (H) 09/21/2021 08:58 PM     Remarks:  Increased Glucoses/ H/O DM and currently on steroids. Current TPN contains no insulin. Would benefit from addition of insulin to TPN while Lantus is on hold.   Could increase volume of TPN to increase nutrition provided      Nutrition Related Findings:   9/16: Triple lumen PICC placed   9/19: TPN of 1L/d 5%DEX/4.25%AA started, 9/20: TPN advanced to 1L/d of 14%DEX/8%AA, 9/23: TPN advanced to 1 .56 L/d   Current Nutrition Therapies:  DIET NPO Sips of Water with Meds  TPN ADULT-CENTRAL - dextrose 14% amino acid 8%  Current Parenteral Nutrition Orders:  · Type and Formula: 14%DEX/8%AA   · Lipids: None  · Duration: Continuous  · Rate/Volume: 44 ml/hr or 1L/d  · Current PN Order Provides: 796 kcal (55% EER), 80 grams protein (48% EPR), 140 gram CHO and 1032 ml of volume per day    Current Intake:   Average Meal Intake: NPO Average Supplement Intake: NPO      Anthropometric Measures:  Height: 5' 9\" (175.3 cm)  Current Body Wt: 196.4 kg (432 lb 15.7 oz) (9/23), Weight source: Bed scale  BMI: 63.9, Obese class 3 (BMI 40.0 or greater)  Admission Body Weight: 461 lb 10.3 oz (standing scale)  Ideal Body Weight (lbs) (Calculated): 145 lbs (66 kg),    Usual Body Wt: 205.9 kg (454 lb) (per EMR 2019 to 2020),         Edema: No data recorded   Estimated Daily Nutrient Needs:  Energy (kcal/day): 0495-5430 (Kcal/kg (22-25), Weight Used: Ideal (65.9 kg))  Protein (g/day): >165 (>2,5 grams/kg) Weight Used: (Ideal (65.9 kg))  Fluid (ml/day):   ( (Minimize))    Nutrition Diagnosis:   · Inadequate oral intake related to impaired respiratory function as evidenced by NPO or clear liquid status due to medical condition, nutrition support-parenteral nutrition    Nutrition Interventions:   Food and/or Nutrient Delivery: Continue NPO, Modify parenteral nutrition     Coordination of Nutrition Care: Continue to monitor while inpatient  Plan of Care discussed with Vlad Fang RN     Goals:   Previous Goal Met: Progressing toward goal(s)  Active Goal: TPN at goal rate within 3 days. Nutrition Monitoring and Evaluation:      Food/Nutrient Intake Outcomes: Parenteral nutrition intake/tolerance  Physical Signs/Symptoms Outcomes: Biochemical data, Other (specify) (respiratory status)    Discharge Planning:     Too soon to determine    Edith An RD, LD, [address] [facility]    Disaster Mode Active

## 2021-09-23 NOTE — PROGRESS NOTES
ACUTE PHYSICAL THERAPY GOALS:  (Developed with and agreed upon by patient and/or caregiver. )  LTG:  (1.)Ms. Tirso Estrada will move from supine to sit and sit to supine , scoot up and down and roll side to side in bed with INDEPENDENT within 7 treatment day(s). (2.)Ms. Tirso Estrada will transfer from bed to chair and chair to bed with INDEPENDENT using the least restrictive device within 7 treatment day(s). (3.)Ms. Tirso Estrada will ambulate with INDEPENDENT for 100 feet with the least restrictive device within 7 treatment day(s). (4.)Ms. Tirso Estrada will tolerate at least 23 min of dynamic standing activity to assist standing ADLs with the least restrictive device within 7 treatment days. (5.)Ms. Tirso Estrada will climb at least 12 steps with MODIFIED INDEPENDENCE with the least restrictive device within 7 treatment days. PHYSICAL THERAPY: Daily Note and AM Treatment Day # 3    Sylvia Keys is a 23 y.o. female   PRIMARY DIAGNOSIS: Acute respiratory distress syndrome (ARDS) due to COVID-19 virus ([facility]  Acute respiratory failure (HCC) [J96.00]         ASSESSMENT:     REHAB RECOMMENDATIONS: CURRENT LEVEL OF FUNCTION:  (Most Recently Demonstrated)   Recommendation to date pending progress:  Setting:   No further skilled therapy   Equipment:    None Bed Mobility:   Minimal Assistance  Sit to Stand:   Contact Guard Assistance  Transfers:   Contact Guard Assistance  Gait/Mobility:   Contact Guard Assistance     ASSESSMENT:  Ms. Tirso Estrada presents supine, on 75% BIPAP, stats 93%. Pt tolerated mobility well, required LINDA to sit to EOB, good static and dynamic sitting and standing balance at bedside. Pt currently menstruating, performed ADLs with OT at bedside in sitting and standing for cleaning, CGA from PT for support as needed. Pt then ambulated few steps bed to chair, O2 remained >90% with activity. Pt seated breaks as needed with activity. Pt making good progress with activity, limited still due to O2 needs.  PT to cont to follow for acute care needs. SUBJECTIVE:   Ms. Marce Acevedo states, \"ok. \"    SOCIAL HISTORY/ LIVING ENVIRONMENT: see eval  Home Environment: Independent living  One/Two Story Residence: Other (Comment) (one story apartment; lives on 3rd floor)  Living Alone: Yes  Support Systems: Other Family Member(s)  OBJECTIVE:     PAIN: VITAL SIGNS: LINES/DRAINS:   Pre Treatment: Pain Screen  Pain Scale 1: Numeric (0 - 10)  Pain Intensity 1: 0  Post Treatment: 0 Vital Signs  O2 Sat (%): 93 %  O2 Device: BIPAP  FIO2 (%): 75 % Continuous Pulse Oximetry and IV  O2 Device: BIPAP     MOBILITY: I Mod I S SBA CGA Min Mod Max Total  NT x2 Comments:   Bed Mobility    Rolling [] [] [] [] [] [] [] [] [] [x] []    Supine to Sit [] [] [] [] [] [x] [] [] [] [] []    Scooting [] [] [] [] [x] [] [] [] [] [] []    Sit to Supine [] [] [] [] [] [] [] [] [] [x] [] In chair   Transfers    Sit to Stand [] [] [] [] [x] [] [] [] [] [] []    Bed to Chair [] [] [] [] [] [] [] [] [] [] []    Stand to Sit [] [] [] [] [x] [] [] [] [] [] []    I=Independent, Mod I=Modified Independent, S=Supervision, SBA=Standby Assistance, CGA=Contact Guard Assistance,   Min=Minimal Assistance, Mod=Moderate Assistance, Max=Maximal Assistance, Total=Total Assistance, NT=Not Tested    BALANCE: Good Fair+ Fair Fair- Poor NT Comments   Sitting Static [x] [] [] [] [] []    Sitting Dynamic [x] [] [] [] [] []              Standing Static [x] [] [] [] [] []    Standing Dynamic [] [x] [] [] [] []      GAIT: I Mod I S SBA CGA Min Mod Max Total  NT x2 Comments:   Level of Assistance [] [] [] [] [x] [] [] [] [] [] [] BIPAP 75%   Distance 3'    DME HHA    Gait Quality Trunk sway, shuffled    Weightbearing  Status N/A     I=Independent, Mod I=Modified Independent, S=Supervision, SBA=Standby Assistance, CGA=Contact Guard Assistance,   Min=Minimal Assistance, Mod=Moderate Assistance, Max=Maximal Assistance, Total=Total Assistance, NT=Not Tested    PLAN:   FREQUENCY/DURATION: PT Plan of Care: 3 times/week for duration of hospital stay or until stated goals are met, whichever comes first.  TREATMENT:     TREATMENT:   ($$ Therapeutic Activity: 23-37 mins    )  Therapeutic Activity (23 Minutes): Therapeutic activity included Supine to Sit, Scooting, Transfer Training, Ambulation on level ground, Sitting balance  and Standing balance to improve functional Mobility, Strength, Activity tolerance and balance.    Co-Treatment PT/OT necessary due to patient's decreased overall endurance/tolerance levels, as well as need for high level skilled assistance to complete functional transfers/mobility and functional tasks    TREATMENT GRID:  N/A    AFTER TREATMENT POSITION/PRECAUTIONS:  Chair, Needs within reach and RN notified    INTERDISCIPLINARY COLLABORATION:  RN/PCT, PT/PTA and OT/LANGLEY    TOTAL TREATMENT DURATION:  PT Patient Time In/Time Out  Time In: 0922  Time Out: Miah Gan, PT

## 2021-09-23 NOTE — PROGRESS NOTES
ACUTE OT GOALS:  (Developed with and agreed upon by patient and/or caregiver.)  1) Patient will complete lower body bathing and dressing with MOD I and adaptive equipment as needed. CONTINUE TO ADDRESS (2021)  2) Patient will complete toileting with MOD I. - UPDATED 2021  3) Patient will complete functional transfers with  MOD I and adaptive equipment as needed. -  UPDATED 2021  4) Patient will tolerate at least 25 minutes of OT activity with 1-2 rest breaks while maintaining O2 sats >90%. - CONTINUE TO ADDRESS (2021)  5) Patient will verbalize at least 3 energy conservation technique to utilize during ADL/IADL. - CONTINUE TO ADDRESS (2021)     NEW GOALS (ADDED 2021)  1. Patient will complete functional activity with MOD I and adaptive equipment as needed. Timeframe: 7 visits     OCCUPATIONAL THERAPY: Daily Note OT Treatment Day # 2    Natalio Patiño is a 23 y.o. female   PRIMARY DIAGNOSIS: Acute respiratory distress syndrome (ARDS) due to COVID-19 virus ([facility] [facility]  Acute respiratory failure ([facility] [address] [facility] [J96.00]       Payor: /   ASSESSMENT:     REHAB RECOMMENDATIONS: CURRENT LEVEL OF FUNCTION:  (Most Recently Demonstrated)   Recommendation to date pending progress:  [address] [place] [address]  Equipment:    To Be Determined Bathing:   Minimal Assistance  Dressing:   Minimal Assistance  Feeding/Grooming:   Not tested  Toileting:   Not tested  Functional Mobility:   Contact Guard Assistance x 2     ASSESSMENT:  Ms. Alicia Gallo continues to present with deficits in overall strength and activity tolerance, limiting her ability to complete ADLs and functional mobility. Currently in ICU; resting on 70% BIPAP with sats at 93% and above at rest. Completed bed mobility with min A; sat EOB for couple of minutes d/t reported dizziness and feeling hot; vital stable.  Completed sit to stand with min A x 2; able to manage michelle-care with set-up however CGA x 2 to maintain standing balance while performing functional activity. Ambulated to bedside chair with CGA x 2. Sats dropping to 88% with mobility/activity but recovered quickly to 93%. Progressing well towards goals. Tolerated therapy well. Will continue OT efforts as indicated. SUBJECTIVE:   Ms. Annabel Nguyen states, \"I feel better today. \"    SOCIAL HISTORY/LIVING ENVIRONMENT: See initial eval  Home Environment: Independent living  One/Two Story Residence: Other (Comment) (one story apartment; lives on 3rd floor)  Living Alone: Yes  Support Systems: Other Family Member(s)    OBJECTIVE:     PAIN: VITAL SIGNS: LINES/DRAINS:   Pre Treatment: Pain Screen  Pain Scale 1: Numeric (0 - 10)  Pain Intensity 1: 0  Post Treatment: 0   Pelayo Catheter and IV  O2 Device: BIPAP     ACTIVITIES OF DAILY LIVING: I Mod I S SBA CGA Min Mod Max Total NT Comments   BASIC ADLs:              Bathing/ Showering [] [] [] [] [] [x] [] [] [] [] LB    Toileting [] [] [] [x] [] [] [] [] [] [] Kiki-care   Dressing [] [] [] [] [] [] [] [] [] [x]    Feeding [] [] [] [] [] [] [] [] [] [x]    Grooming [] [] [] [] [] [] [] [] [] [x]    Personal Device Care [] [] [] [] [] [] [] [] [] [x]    Functional Mobility [] [] [] [] [x] [] [] [] [] [] x2   I=Independent, Mod I=Modified Independent, S=Supervision, SBA=Standby Assistance, CGA=Contact Guard Assistance,   Min=Minimal Assistance, Mod=Moderate Assistance, Max=Maximal Assistance, Total=Total Assistance, NT=Not Tested    MOBILITY: I Mod I S SBA CGA Min Mod Max Total  NT x2 Comments:   Supine to sit [] [] [] [] [] [x] [] [] [] [] [x]    Sit to supine [] [] [] [] [] [] [] [] [] [x] []    Sit to stand [] [] [] [] [x] [x] [] [] [] [] [x]    Bed to chair [] [] [] [] [x] [x] [] [] [] [] [x]    I=Independent, Mod I=Modified Independent, S=Supervision, SBA=Standby Assistance, CGA=Contact Guard Assistance,   Min=Minimal Assistance, Mod=Moderate Assistance, Max=Maximal Assistance, Total=Total Assistance, NT=Not Tested    BALANCE: Good Fair+ Fair Fair- Poor NT Comments   Sitting Static [x] [x] [] [] [] []    Sitting Dynamic [] [x] [] [] [] []              Standing Static [] [x] [] [] [] []    Standing Dynamic [] [x] [x] [] [] [] Close CGA provided     PLAN:   FREQUENCY/DURATION: OT Plan of Care: 3 times/week for duration of hospital stay or until stated goals are met, whichever comes first.    TREATMENT:   TREATMENT:   ($$ Self Care/Home Management: 8-22 mins$$ Neuromuscular Re-Education: 8-22 mins   )  Self Care (10 Minutes): Self care including Lower Body Bathing and Energy Conservation Training to increase independence. Neuromuscular Re-education (13 Minutes): Neuromuscular Re-education included Balance Training, Coordination training, Postural training, Sitting balance training and Standing balance training to improve Balance, Coordination and Postural Control.     TREATMENT GRID:  N/A    AFTER TREATMENT POSITION/PRECAUTIONS:  Chair, Needs within reach and RN notified    INTERDISCIPLINARY COLLABORATION:  RN/PCT, PT/PTA and OT/LANGLEY    TOTAL TREATMENT DURATION:  OT Patient Time In/Time Out  Time In: 1033  Time Out: [facility]

## 2021-09-24 NOTE — PROGRESS NOTES
[place]  Admission Date: 9/14/2021         Critical Care Progress Note: 9/24/2021  Length of stay: 10 days. Background: 22 yo [place] American female admitted with acute respiratory failure due to Reeceyne [place]. She was admitted on 9/14 with a 2 day history of dyspnea. She was positive for COVID. She was started on decadron and remdesivir. She initially required 6L NC but has since required BiPAP FiO2 @ 100% and her resting saturation was 91% but drops to the 70s with exertion. She received Actemra 9/16. She made it very clear to several providers that she does not want intubation and her mother confirmed this as well. After long discussion on 9/21, category status changed to Category 1 and pt moved to tele [facility] ICU on 100% BIPAP. PMH includes DM, HTN, and morbid obesity (444 pounds). Subjective:     Last 24 hours: On BiPAP for ~ 7 days, now weaned to 70% with 02 saturation in upper 90's.  On IV Insulin 16 u/hr    Date of COVID-19 symptom onset: 9/10    COVID-19 Meds:  Dexamethasone 6mg daily: 9/14-  Remdesivir: 9/14-9/17  Actemra: 9/16    Vaccination: Not Vaccinated    Current Facility-Administered Medications   Medication Dose Route Frequency    TPN ADULT-CENTRAL - dextrose 14% amino acid 8%   IntraVENous QPM    TPN ADULT-CENTRAL - dextrose 14% amino acid 8%   IntraVENous QPM    insulin lispro (HUMALOG) injection   SubCUTAneous Q6H    heparin 25,000 units in dextrose 500 mL infusion  18-36 Units/kg/hr IntraVENous TITRATE    dexamethasone (DECADRON) 10 mg/mL injection 6 mg  6 mg IntraVENous Q24H    NUTRITIONAL SUPPORT ELECTROLYTE PRN ORDERS   Does Not Apply PRN    hydrALAZINE (APRESOLINE) 20 mg/mL injection 10 mg  10 mg IntraVENous Q6H PRN    morphine injection 1 mg  1 mg IntraVENous Q4H PRN    LORazepam (ATIVAN) injection 0.5 mg  0.5 mg IntraVENous Q6H PRN    phenol throat spray (CHLORASEPTIC) 1 Spray  1 Spray Oral PRN    [Held by provider] insulin glargine (LANTUS) injection 20 Units  20 Units SubCUTAneous BID    pantoprazole (PROTONIX) tablet 40 mg  40 mg Oral ACB    sodium chloride (NS) flush 30 mL  30 mL InterCATHeter Q8H    sodium chloride (NS) flush 30 mL  30 mL InterCATHeter PRN    lisinopril-hydroCHLOROthiazide (PRINZIDE, ZESTORETIC) 20-12.5 mg per tablet 1 Tablet  1 Tablet Oral DAILY    acetaminophen (TYLENOL) tablet 650 mg  650 mg Oral Q6H PRN    Or    acetaminophen (TYLENOL) suppository 650 mg  650 mg Rectal Q6H PRN    polyethylene glycol (MIRALAX) packet 17 g  17 g Oral DAILY PRN    ondansetron (ZOFRAN ODT) tablet 4 mg  4 mg Oral Q8H PRN    Or    ondansetron (ZOFRAN) injection 4 mg  4 mg IntraVENous Q6H PRN     Review of Systems    Constitutional: negative for fever, chills, sweats  Cardiovascular: negative for chest pain, palpitations, syncope, edema  Gastrointestinal:  negative for dysphagia, reflux, vomiting, diarrhea, abdominal pain, or melena  Neurologic:  negative for focal weakness, numbness, headache    Objective:     Vitals:    09/24/21 1015 09/24/21 1030 09/24/21 1045 09/24/21 1100   BP:       Pulse: (!) 106 (!) 108  (!) 109   Resp: 21 19 27   Temp:       SpO2: 94% 91% (!) 82% 91%   Weight:       Height:           Intake/Output Summary (Last 24 hours) at 9/24/2021 1102  Last data filed at 9/24/2021 7071  Gross per 24 hour   Intake 3312.86 ml   Output 1700 ml   Net 1612.86 ml     Physical Exam:   Constitution:  the patient is morbidly obese on BiPAP at 22/18. 70% with sat 97%  HEENT:  Sclera clear, pupils equal, BiPAP  Respiratory: diminished throughout  Cardiovascular:  RRR without M,G,R  Gastrointestinal: soft and non-tender; with positive bowel sounds. Musculoskeletal: warm without cyanosis. Chronic LE venous stasis.    Skin:  no jaundice or rashes, no wounds   Neurologic: no gross neuro deficits     Psychiatric: A & O    CXR:     9/16      CT Chest 9/14       LAB:  Recent Labs     09/24/21  0202 09/23/21 2021 09/23/21  0325 09/22/21 2026 09/22/21 2026   WBC 20.7* 17.8* 14.7*  --   --    HGB 10.1* 10.6* 11.3*  --   --    HCT 36.5 38.3 41.2  --   --    * 102* 95*   < > 105*   INR  --   --   --   --  1.2    < > = values in this interval not displayed. Recent Labs     09/24/21  0202 09/23/21  0325 09/22/21 2026 09/22/21  0340    142  --  142   K 3.7 4.2  --  4.3   CL 99 105  --  105   CO2 27 31  --  30   * 141*  --  151*   BUN 25* 15  --  15   CREA 1.24* 0.55*  --  0.57*   MG 1.9  --   --  2.3   CA 8.8 9.3  --  8.8   PHOS 6.0*  --   --  4.2   ALB 2.7*  --  2.9*  --    AST 54*  --  73*  --    ALT 47  --  55  --    *  --  171*  --      Assessment and Plan:  (Medical Decision Making)       COVID-19 (9/14/2021) with ARDS. -. Currently on BiPAP  70% FiO2 and weaning FIO2. If tolerates weaning, try Airvo. --decadron 6 mg IV D 7,   -- remdesivir- 9/14-9/16  -- toci- 9/16      DM w/o complication type II ([address] (9/14/2021)  --Insulin drip. Acute respiratory failure with hypoxia ([address] (9/16/2021)  -with covid 19 PNA  --now off rocephin and azithromycin for superimposed infection  --on BiPAP, weaning oxygen, try and airvo/NRB if able. -- OOB to chair, has tolerated well. Thrombocytopenia: platelets now >  447G lovenox, stopped, now on IV Heparin. HIT P.  4T score is 3 (low probability for HIT)    Anxiety/dyspnea  -Multifactorial related to the above issues. Suspect sleep apnea  --with likely OHS/resrtictive lung disease, with super morbid obesity    Morbid obesity  --risk factor for poor covid outcome. Try to mobilize with PT- tolerated well 2 days ago. Prophylaxis: protonix,     Prognosis is guarded. Patient is critically ill. 31 minutes spent with patient.      Karo Pablo MD

## 2021-09-24 NOTE — PROGRESS NOTES
CM received a phone call from pt's mother, Terri Palumbo ([phone]) with a request for her emergency medical leave she took from work when [facility] was admitted. Meera Novak states her work is requesting Miya's covid test. [facility] [place] [place] attempted to get the result from My Chart but failed. CM obtained copy of release in MR and [facility] [place] signed. The release was taken to MR in a biohazard bag and given to . CM provided MR with [employer] email address. Pt remains on 70% BiPAP with sat 96%. Primary nurse to attempt to wean to try AirVo. Pt on TPN. PT/OT getting pt up in chair. Pt is self pay. Out of work since July '21. pt receives SNAP benefit $300. Per DECO note, [employer] christina has been started.

## 2021-09-24 NOTE — PROGRESS NOTES
Spiritual Care Visit, follow up visit. Patient is a 23year old woman who may be considered for hospice. Conversedwith RN in [facility] about patient's condition. Visit by Ge Steel, Staff .  Robson., Collette.MAT., B.A.

## 2021-09-24 NOTE — PROGRESS NOTES
Problem: Risk for Spread of Infection  Goal: Prevent transmission of infectious organism to others  Description: Prevent the transmission of infectious organisms to other patients, staff members, and visitors. Outcome: Progressing Towards Goal     Problem: Patient Education:  Go to Education Activity  Goal: Patient/Family Education  Outcome: Progressing Towards Goal     Problem: Airway Clearance - Ineffective  Goal: Achieve or maintain patent airway  Outcome: Progressing Towards Goal     Problem: Gas Exchange - Impaired  Goal: Absence of hypoxia  Outcome: Progressing Towards Goal  Goal: Promote optimal lung function  Outcome: Progressing Towards Goal     Problem: Breathing Pattern - Ineffective  Goal: Ability to achieve and maintain a regular respiratory rate  Outcome: Progressing Towards Goal     Problem:  Body Temperature -  Risk of, Imbalanced  Goal: Ability to maintain a body temperature within defined limits  Outcome: Progressing Towards Goal  Goal: Will regain or maintain usual level of consciousness  Outcome: Progressing Towards Goal  Goal: Complications related to the disease process, condition or treatment will be avoided or minimized  Outcome: Progressing Towards Goal     Problem: Isolation Precautions - Risk of Spread of Infection  Goal: Prevent transmission of infectious organism to others  Outcome: Progressing Towards Goal     Problem: Nutrition Deficits  Goal: Optimize nutrtional status  Outcome: Progressing Towards Goal     Problem: Risk for Fluid Volume Deficit  Goal: Maintain normal heart rhythm  Outcome: Progressing Towards Goal  Goal: Maintain absence of muscle cramping  Outcome: Progressing Towards Goal  Goal: Maintain normal serum potassium, sodium, calcium, phosphorus, and pH  Outcome: Progressing Towards Goal     Problem: Loneliness or Risk for Loneliness  Goal: Demonstrate positive use of time alone when socialization is not possible  Outcome: Progressing Towards Goal     Problem: Fatigue  Goal: Verbalize increase energy and improved vitality  Outcome: Progressing Towards Goal     Problem: Patient Education: Go to Patient Education Activity  Goal: Patient/Family Education  Outcome: Progressing Towards Goal     Problem: Patient Education: Go to Patient Education Activity  Goal: Patient/Family Education  Outcome: Progressing Towards Goal     Problem: Falls - Risk of  Goal: *Absence of Falls  Description: Document Cecelia Delfina Fall Risk and appropriate interventions in the flowsheet. Outcome: Progressing Towards Goal  Note: Fall Risk Interventions:  Mobility Interventions: Bed/chair exit alarm, Communicate number of staff needed for ambulation/transfer         Medication Interventions: Bed/chair exit alarm, Patient to call before getting OOB, Teach patient to arise slowly, Utilize gait belt for transfers/ambulation    Elimination Interventions: Bed/chair exit alarm, Call light in reach, Patient to call for help with toileting needs              Problem: Patient Education: Go to Patient Education Activity  Goal: Patient/Family Education  Outcome: Progressing Towards Goal     Problem: Pressure Injury - Risk of  Goal: *Prevention of pressure injury  Description: Document Evangelista Scale and appropriate interventions in the flowsheet. Outcome: Progressing Towards Goal  Note: Pressure Injury Interventions:       Moisture Interventions: Absorbent underpads, Check for incontinence Q2 hours and as needed, Internal/External urinary devices    Activity Interventions: Chair cushion, Increase time out of bed, Pressure redistribution bed/mattress(bed type), PT/OT evaluation    Mobility Interventions: Chair cushion, HOB 30 degrees or less, Pressure redistribution bed/mattress (bed type), PT/OT evaluation, Turn and reposition approx.  every two hours(pillow and wedges)    Nutrition Interventions: Document food/fluid/supplement intake                     Problem: Patient Education: Go to Patient Education Activity  Goal: Patient/Family Education  Outcome: Progressing Towards Goal     Problem: Patient Education: Go to Patient Education Activity  Goal: Patient/Family Education  Outcome: Progressing Towards Goal     Problem: Patient Education: Go to Patient Education Activity  Goal: Patient/Family Education  Outcome: Progressing Towards Goal

## 2021-09-24 NOTE — PROGRESS NOTES
ACUTE PHYSICAL THERAPY GOALS:  (Developed with and agreed upon by patient and/or caregiver. )  LTG:  (1.)Ms. Maxx Pierce will move from supine to sit and sit to supine , scoot up and down and roll side to side in bed with INDEPENDENT within 7 treatment day(s). (2.)Ms. Maxx Pierce will transfer from bed to chair and chair to bed with INDEPENDENT using the least restrictive device within 7 treatment day(s). (3.)Ms. Maxx Pierce will ambulate with INDEPENDENT for 100 feet with the least restrictive device within 7 treatment day(s). (4.)Ms. Maxx Pierce will tolerate at least 23 min of dynamic standing activity to assist standing ADLs with the least restrictive device within 7 treatment days. (5.)Ms. Maxx Pierce will climb at least 12 steps with MODIFIED INDEPENDENCE with the least restrictive device within 7 treatment days. PHYSICAL THERAPY: Daily Note and AM Treatment Day # 4    Yobani Siblye is a 23 y.o. female   PRIMARY DIAGNOSIS: Acute respiratory distress syndrome (ARDS) due to COVID-19 virus ([facility]  Acute respiratory failure (HCC) [J96.00]         ASSESSMENT:     REHAB RECOMMENDATIONS: CURRENT LEVEL OF FUNCTION:  (Most Recently Demonstrated)   Recommendation to date pending progress:  Setting:   No further skilled therapy   Equipment:    None Bed Mobility:   Minimal Assistance  Sit to Stand:   Contact Guard Assistance  Transfers:   Contact Guard Assistance  Gait/Mobility:   Contact Guard Assistance     ASSESSMENT:  Ms. Maxx Pierce presents supine, on 60% BIPAP, stats 92%. Pt appears slightly more fatigued today with mobility, frequent rest breaks between transitions. Pt with good static and dynamic sitting EOB while working with OT for ADL needs. Pt HHA and close contact bed to chair, shuffled steps, trunk sway. Pt making slow steady progress toward goals. PT to cont to follow for acute care needs. SUBJECTIVE:   Ms. Maxx Pierce states, \"ok. \"    SOCIAL HISTORY/ LIVING ENVIRONMENT: see eval  Home Environment: Independent living  One/Two Story Residence: Other (Comment) (one story apartment; lives on 3rd floor)  Living Alone: Yes  Support Systems: Other Family Member(s)  OBJECTIVE:     PAIN: VITAL SIGNS: LINES/DRAINS:   Pre Treatment:  0  Post Treatment: 0 Vital Signs  O2 Sat (%): 92 %  O2 Device: BIPAP  O2 Flow Rate (L/min): 60 l/min Continuous Pulse Oximetry and IV  O2 Device: BIPAP     MOBILITY: I Mod I S SBA CGA Min Mod Max Total  NT x2 Comments:   Bed Mobility    Rolling [] [] [] [] [] [] [] [] [] [x] []    Supine to Sit [] [] [] [] [] [x] [] [] [] [] []    Scooting [] [] [] [] [x] [] [] [] [] [] []    Sit to Supine [] [] [] [] [] [] [] [] [] [x] [] In chair   Transfers    Sit to Stand [] [] [] [] [x] [] [] [] [] [] []    Bed to Chair [] [] [] [] [x] [] [] [] [] [] []    Stand to Sit [] [] [] [] [x] [] [] [] [] [] []    I=Independent, Mod I=Modified Independent, S=Supervision, SBA=Standby Assistance, CGA=Contact Guard Assistance,   Min=Minimal Assistance, Mod=Moderate Assistance, Max=Maximal Assistance, Total=Total Assistance, NT=Not Tested    BALANCE: Good Fair+ Fair Fair- Poor NT Comments   Sitting Static [x] [] [] [] [] []    Sitting Dynamic [x] [] [] [] [] []              Standing Static [x] [] [] [] [] []    Standing Dynamic [] [x] [] [] [] []      GAIT: I Mod I S SBA CGA Min Mod Max Total  NT x2 Comments:   Level of Assistance [] [] [] [] [x] [] [] [] [] [] [] BIPAP 60%   Distance 3'    DME HHA    Gait Quality Trunk sway, shuffled    Weightbearing  Status N/A     I=Independent, Mod I=Modified Independent, S=Supervision, SBA=Standby Assistance, CGA=Contact Guard Assistance,   Min=Minimal Assistance, Mod=Moderate Assistance, Max=Maximal Assistance, Total=Total Assistance, NT=Not Tested    PLAN:   FREQUENCY/DURATION: PT Plan of Care: 3 times/week for duration of hospital stay or until stated goals are met, whichever comes first.  TREATMENT:     TREATMENT:   ($$ Therapeutic Activity: 23-37 mins    )  Therapeutic Activity (25 Minutes): Therapeutic activity included Supine to Sit, Scooting, Transfer Training, Ambulation on level ground, Sitting balance  and Standing balance to improve functional Mobility, Strength, Activity tolerance and balance.    Co-Treatment PT/OT necessary due to patient's decreased overall endurance/tolerance levels, as well as need for high level skilled assistance to complete functional transfers/mobility and functional tasks    TREATMENT GRID:  N/A    AFTER TREATMENT POSITION/PRECAUTIONS:  Chair, Needs within reach and RN notified    INTERDISCIPLINARY COLLABORATION:  RN/PCT, PT/PTA and OT/LANGLEY    TOTAL TREATMENT DURATION:  PT Patient Time In/Time Out  Time In: 1026  Time Out: [address], PT

## 2021-09-24 NOTE — PROGRESS NOTES
ACUTE OT GOALS:  (Developed with and agreed upon by patient and/or caregiver.)  1) Patient will complete lower body bathing and dressing with MOD I and adaptive equipment as needed. CONTINUE TO ADDRESS (2021)  2) Patient will complete toileting with MOD I. - UPDATED 2021  3) Patient will complete functional transfers with  MOD I and adaptive equipment as needed. -  UPDATED 2021  4) Patient will tolerate at least 25 minutes of OT activity with 1-2 rest breaks while maintaining O2 sats >90%. - CONTINUE TO ADDRESS (2021)  5) Patient will verbalize at least 3 energy conservation technique to utilize during ADL/IADL. - CONTINUE TO ADDRESS (2021)     NEW GOALS (ADDED 2021)  1. Patient will complete functional activity with MOD I and adaptive equipment as needed. Timeframe: 7 visits     OCCUPATIONAL THERAPY: Daily Note OT Treatment Day # 3    María Bruno is a 23 y.o. female   PRIMARY DIAGNOSIS: Acute respiratory distress syndrome (ARDS) due to COVID-19 virus ([facility]  Acute respiratory failure ([facility] [J96.00]       Payor: /   ASSESSMENT:     REHAB RECOMMENDATIONS: CURRENT LEVEL OF FUNCTION:  (Most Recently Demonstrated)   Recommendation to date pending progress:  [address] [place] [address]  Equipment:    To Be Determined Bathing:   Minimal Assistance  Dressing:   Minimal Assistance  Feeding/Grooming:   Set Up  Toileting:   Not tested  Functional Mobility:   Contact Guard Assistance x 2     ASSESSMENT:  Ms. Everett Stevens continues to present with deficits in overall strength and activity tolerance, limiting her ability to complete ADLs and functional mobility. Currently in ICU; resting on 60% BIPAP with sats > 90% at rest. Completed bed mobility with min A. Participated in total body bathing while seated EOB; min A for UB bathing; mod A for LB bathing. Did de-compensate more today into the mid [de-identified] (however pt currently on less Fi02 than previous session).  Stood and completed michelle-care in static standing. Seated rest break provided before ambulating to chair with CGA x 2. Doing well with therapy; progressing well towards goals. Will continue OT efforts as indicated. SUBJECTIVE:   Ms. Javier Coronado states, \"I'm just always tired. \"    SOCIAL HISTORY/LIVING ENVIRONMENT: See initial eval  Home Environment: Independent living  One/Two Story Residence: Other (Comment) (one story apartment; lives on 3rd floor)  Living Alone: Yes  Support Systems: Other Family Member(s)    OBJECTIVE:     PAIN: VITAL SIGNS: LINES/DRAINS:   Pre Treatment: Pain Screen  Pain Scale 1: Numeric (0 - 10)  Pain Intensity 1: 0  Post Treatment: 0   Pelayo Catheter and IV  O2 Device: BIPAP     ACTIVITIES OF DAILY LIVING: I Mod I S SBA CGA Min Mod Max Total NT Comments   BASIC ADLs:              Bathing/ Showering [] [] [] [] [] [x] [x] [] [] [] Min A for UB bathing; mod A for LB bathing   Toileting [] [] [] [x] [] [] [] [] [] [] Kiki-care in static standing   Dressing [] [] [x] [] [] [] [] [] [] [] Donning new gown   Feeding [] [] [] [] [] [] [] [] [] [x]    Grooming [] [] [] [] [] [] [] [] [] [x]    Personal Device Care [] [] [] [] [] [] [] [] [] [x]    Functional Mobility [] [] [] [] [x] [] [] [] [] [] x2 steps to chair   I=Independent, Mod I=Modified Independent, S=Supervision, SBA=Standby Assistance, CGA=Contact Guard Assistance,   Min=Minimal Assistance, Mod=Moderate Assistance, Max=Maximal Assistance, Total=Total Assistance, NT=Not Tested    MOBILITY: I Mod I S SBA CGA Min Mod Max Total  NT x2 Comments:   Supine to sit [] [] [] [] [] [x] [] [] [] [] []    Sit to supine [] [] [] [] [] [] [] [] [] [x] []    Sit to stand [] [] [] [] [x] [] [] [] [] [] [x]    Bed to chair [] [] [] [] [x] [] [] [] [] [] [x]    I=Independent, Mod I=Modified Independent, S=Supervision, SBA=Standby Assistance, CGA=Contact Guard Assistance,   Min=Minimal Assistance, Mod=Moderate Assistance, Max=Maximal Assistance, Total=Total Assistance, NT=Not Tested    BALANCE: Good Fair+ Fair Fair- Poor NT Comments   Sitting Static [x] [x] [] [] [] []    Sitting Dynamic [] [x] [] [] [] []              Standing Static [] [x] [] [] [] []    Standing Dynamic [] [x] [x] [] [] []      PLAN:   FREQUENCY/DURATION: OT Plan of Care: 3 times/week for duration of hospital stay or until stated goals are met, whichever comes first.    TREATMENT:   TREATMENT:   ($$ Self Care/Home Management: 23-37 mins    )  Self Care (24 Minutes): Self care including Upper Body Bathing, Lower Body Bathing, Upper Body Dressing and Energy Conservation Training to increase independence and decrease level of assistance required.     TREATMENT GRID:  N/A    AFTER TREATMENT POSITION/PRECAUTIONS:  Chair, Needs within reach and RN notified    INTERDISCIPLINARY COLLABORATION:  RN/PCT, PT/PTA and OT/LANGLEY    TOTAL TREATMENT DURATION:  OT Patient Time In/Time Out  Time In: 1026  Time Out: [address], OT

## 2021-09-24 NOTE — PROGRESS NOTES
Comprehensive Nutrition Assessment  This assessment was completed remotely. Type and Reason for Visit: Reassess  TPN Management (Pulmonary)    Nutrition Recommendations/Plan:   · Parenteral Nutrition:  · TPN: 14%DEX/ 8%AA to 65 ml/hr. Holding lipids d/t MO and critical inflammatory illness. · To provide: 1242 kcal/d (86% of needs), 125 grams of protein/d (76% of needs), 218 grams of CHO/d and 1560 ml of total volume/d. · Lytes/L:   · Sodium 60 meq (60 meq NaCl), Potassium 30 meq (30 meq KCl, 0 meq KPO4), 5 meq Mg, 4.5 meq Calcium  · Other additives: MTE, MVI, 14 units insulin/L or .1 units/gram of CHO  · Vitamin and Mineral Supplement Therapy:  · Electrolyte management replacement protocol active. · Labs:   · BMP daily, Phos and Mg MWF. · Continue POC Glucoses/SSI every 6 hrs. Daily Update:  Remains TPN dependent d/t respiratory status limits ability to take po nutrition. Abdominal Status (last documented): Intact, Obese abdomen with Active  bowel sounds. Last BM 09/20/21.   Pertinent Medications: decadron, SSI 15 units 9/23,3 units thus far today), po Protonix   Pertinent Labs:     Lab Results   Component Value Date/Time    Sodium 137 09/24/2021 02:02 AM    Potassium 3.7 09/24/2021 02:02 AM    Chloride 99 09/24/2021 02:02 AM    CO2 27 09/24/2021 02:02 AM    Anion gap 11 09/24/2021 02:02 AM    Glucose 170 (H) 09/24/2021 02:02 AM    BUN 25 (H) 09/24/2021 02:02 AM    Creatinine 1.24 (H) 09/24/2021 02:02 AM    Calcium 8.8 09/24/2021 02:02 AM    Albumin 2.7 (L) 09/24/2021 02:02 AM    Magnesium 1.9 09/24/2021 02:02 AM    Phosphorus 6.0 (H) 09/24/2021 02:02 AM   cCa 9.84  Lab Results   Component Value Date/Time    Glucose 170 (H) 09/24/2021 02:02 AM    Glucose (POC) 195 (H) 09/24/2021 06:13 AM    Glucose (POC) 183 (H) 09/23/2021 11:35 PM    Glucose (POC) 189 (H) 09/23/2021 05:46 PM    Glucose (POC) 220 (H) 09/23/2021 12:22 PM    Glucose (POC) 163 (H) 09/23/2021 08:19 AM    Glucose (POC) 154 (H) 09/22/2021 10:02 PM     Remarks:  Heprain drip is providing significant additonal volume and dextrose  Increased Glucoses: H/O DM and currently on steroids. Insulin added to TPN 9/23 and is providing 11 units/d. Would benefit from increased insulin in TPN while Lantus is on hold. K and Na slight trend down, Phos high, Mg with slight trend down. Current TPN provides 30 meq Na/L, 31 meq K/d, 8 meq Phos/d and 5 meq Mg/d. Would benefit from omission of Phos and increase of Na, K and Mg in TPN.     Current Nutrition Therapies:  DIET NPO Sips of Water with Meds  Current Parenteral Nutrition Orders:  · Type and Formula: 14%DEX/8%AA   · Lipids: None  · Duration: Continuous  · Rate/Volume: 65 ml/hr  · Current PN Order Provides: 1242 kcal/d (86% of needs), 125 grams of protein/d (76% of needs), 218 grams of CHO/d and 1560 ml of total volume/d         Urmila Harp RD, LD, [facility] [phone]    Disaster Mode Active

## 2021-09-25 NOTE — PROGRESS NOTES
Comprehensive Nutrition Assessment  This assessment was completed remotely. Type and Reason for Visit: Reassess  TPN Management (Pulmonary)    Nutrition Recommendations/Plan:   · Parenteral Nutrition:  · TPN: 14%DEX/ 8%AA to 65 ml/hr. Holding lipids d/t MO and critical inflammatory illness. · To provide: 1242 kcal/d (86% of needs), 125 grams of protein/d (76% of needs), 218 grams of CHO/d and 1560 ml of total volume/d. · Lytes/L:   · Sodium 60 meq (80 meq NaCl), Potassium 50 meq (50 meq KCl, 0 meq KPO4), 5 meq Mg, 4.5 meq Calcium  · Other additives: MTE, MVI, 21 units insulin/L or .15 units/gram of CHO  · Vitamin and Mineral Supplement Therapy:  · Electrolyte management replacement protocol active. · Labs:   · BMP daily, Phos and Mg in AM MWF. · Continue POC Glucoses/SSI every 6 hrs. Daily Update:  Remains TPN dependent d/t respiratory status limits ability to take po nutrition. Has not been able to tolearte transition to AIRVO  Abdominal Status (last documented): Intact, Obese, Soft abdomen with Active  bowel sounds. Last BM 09/24/21.   Pertinent Medications: decadron, SSI (15 units 9/24,6 units thus far today), po Protonix   Pertinent Labs:     Lab Results   Component Value Date/Time    Sodium 133 (L) 09/25/2021 03:51 AM    Potassium 3.7 09/25/2021 03:51 AM    Chloride 98 09/25/2021 03:51 AM    CO2 25 09/25/2021 03:51 AM    Anion gap 10 09/25/2021 03:51 AM    Glucose 172 (H) 09/25/2021 03:51 AM    BUN 47 (H) 09/25/2021 03:51 AM    Creatinine 1.20 (H) 09/25/2021 03:51 AM    Calcium 8.8 09/25/2021 03:51 AM    Albumin 2.7 (L) 09/24/2021 02:02 AM    Magnesium 1.9 09/24/2021 02:02 AM    Phosphorus 6.0 (H) 09/24/2021 02:02 AM   cCa 9.84  Lab Results   Component Value Date/Time    Glucose 172 (H) 09/25/2021 03:51 AM    Glucose (POC) 199 (H) 09/25/2021 11:08 AM    Glucose (POC) 191 (H) 09/24/2021 10:40 PM    Glucose (POC) 193 (H) 09/24/2021 03:55 PM    Glucose (POC) 195 (H) 09/24/2021 06:13 AM    Glucose (POC) 183 (H) 09/23/2021 11:35 PM    Glucose (POC) 189 (H) 09/23/2021 05:46 PM     Remarks:  Heprain drip is providing significant additonal volume and dextrose  Increased Glucoses: H/O DM and currently on steroids. Insulin added to TPN 9/23 (.05 units/gram CHO)) and increased to 0.1 units/gram on 9/24. K stable/low normal  Na low   Current TPN provides 60 meq Na/L(incrreased for 30 meq/L), 47 meq K/d ( increased from 31 meq/d), 0 meq Phos/d ( decreased from 8 meq/d), and 8 meq Mg/d( increased from 5 meq/d). Would benefit from increase of Na, K and insulin in TPN.     Current Nutrition Therapies:  DIET NPO Sips of Water with Meds  Current Parenteral Nutrition Orders:  · Type and Formula: 14%DEX/8%AA   · Lipids: None  · Duration: Continuous  · Rate/Volume: 65 ml/hr  · Current PN Order Provides: 1242 kcal/d (86% of needs), 125 grams of protein/d (76% of needs), 218 grams of CHO/d and 1560 ml of total volume/d         Deborah Linton RD, LD, [facility] [phone]    Disaster Mode Active

## 2021-09-25 NOTE — PROGRESS NOTES
Dawson Miller  Admission Date: 9/14/2021         Critical Care Progress Note: 9/25/2021  Length of stay: 11 days. Background: 22 yo [place] American female admitted with acute respiratory failure due to [address]. She was admitted on 9/14 with a 2 day history of dyspnea. She was positive for COVID. She was started on decadron and remdesivir. She initially required 6L NC but has since required BiPAP FiO2 @ 100% and her resting saturation was 91% but drops to the 70s with exertion. She received Actemra 9/16. She made it very clear to several providers that she does not want intubation and her mother confirmed this as well. After long discussion on 9/21, category status changed to Category 1 and pt moved to tele obs ICU on 100% BIPAP. PMH includes DM, HTN, and morbid obesity (444 pounds). Subjective:     Last 24 hours: On BiPAP for ~ 8 days, now weaned to 75% with 02 saturation in upper 90's. Unable to tolerate Airvo.  On IV Heparin/TPN    Date of COVID-19 symptom onset: 9/10    COVID-19 Meds:  Dexamethasone 6mg daily: 9/14-  Remdesivir: 9/14-9/17  Actemra: 9/16    Vaccination: Not Vaccinated    Current Facility-Administered Medications   Medication Dose Route Frequency    TPN ADULT-CENTRAL - dextrose 14% amino acid 8%   IntraVENous QPM    insulin lispro (HUMALOG) injection   SubCUTAneous Q6H    heparin 25,000 units in dextrose 500 mL infusion  18-36 Units/kg/hr IntraVENous TITRATE    dexamethasone (DECADRON) 10 mg/mL injection 6 mg  6 mg IntraVENous Q24H    NUTRITIONAL SUPPORT ELECTROLYTE PRN ORDERS   Does Not Apply PRN    hydrALAZINE (APRESOLINE) 20 mg/mL injection 10 mg  10 mg IntraVENous Q6H PRN    morphine injection 1 mg  1 mg IntraVENous Q4H PRN    LORazepam (ATIVAN) injection 0.5 mg  0.5 mg IntraVENous Q6H PRN    phenol throat spray (CHLORASEPTIC) 1 Spray  1 Spray Oral PRN    [Held by provider] insulin glargine (LANTUS) injection 20 Units  20 Units SubCUTAneous BID    pantoprazole (PROTONIX) tablet 40 mg  40 mg Oral ACB    sodium chloride (NS) flush 30 mL  30 mL InterCATHeter Q8H    sodium chloride (NS) flush 30 mL  30 mL InterCATHeter PRN    lisinopril-hydroCHLOROthiazide (PRINZIDE, ZESTORETIC) 20-12.5 mg per tablet 1 Tablet  1 Tablet Oral DAILY    acetaminophen (TYLENOL) tablet 650 mg  650 mg Oral Q6H PRN    Or    acetaminophen (TYLENOL) suppository 650 mg  650 mg Rectal Q6H PRN    polyethylene glycol (MIRALAX) packet 17 g  17 g Oral DAILY PRN    ondansetron (ZOFRAN ODT) tablet 4 mg  4 mg Oral Q8H PRN    Or    ondansetron (ZOFRAN) injection 4 mg  4 mg IntraVENous Q6H PRN     Review of Systems    Constitutional: negative for fever, chills, sweats  Cardiovascular: negative for chest pain, palpitations, syncope, edema  Gastrointestinal:  negative for dysphagia, reflux, vomiting, diarrhea, abdominal pain, or melena  Neurologic:  negative for focal weakness, numbness, headache    Objective:     Vitals:    09/25/21 0700 09/25/21 0800 09/25/21 0824 09/25/21 0900   BP: (!) 144/85 (!) 103/55  (!) 91/54   Pulse: (!) 104 (!) 105  (!) 106   Resp: 12 17  19   Temp:    99 °F (37.2 °C)   SpO2: 97% 96% 100%    Weight:       Height:           Intake/Output Summary (Last 24 hours) at 9/25/2021 1156  Last data filed at 9/25/2021 0800  Gross per 24 hour   Intake 2090.24 ml   Output 1775 ml   Net 315.24 ml     Physical Exam:   Constitution:  the patient is morbidly obese on BiPAP at 22/18. 75% with sat 97%  HEENT:  Sclera clear, pupils equal, BiPAP  Respiratory: diminished throughout  Cardiovascular:  RRR without M,G,R  Gastrointestinal: soft and non-tender; with positive bowel sounds. Musculoskeletal: warm without cyanosis. Chronic LE venous stasis.    Skin:  no jaundice or rashes, no wounds   Neurologic: no gross neuro deficits     Psychiatric: A & O    CXR:     9/16      CT Chest 9/14       LAB:  Recent Labs     09/25/21  0351 09/24/21  0202 09/23/21 2021 09/23/21  0325 09/22/21 2026 WBC 17.8* 20.7* 17.8*   < >  --    HGB 9.2* 10.1* 10.6*   < >  --    HCT 33.2* 36.5 38.3   < >  --    * 109* 102*   < > 105*   INR  --   --   --   --  1.2    < > = values in this interval not displayed. Recent Labs     09/25/21  0351 09/24/21  0202 09/23/21  0325 09/22/21 2026   * 137 142  --    K 3.7 3.7 4.2  --    CL 98 99 105  --    CO2 25 27 31  --    * 170* 141*  --    BUN 47* 25* 15  --    CREA 1.20* 1.24* 0.55*  --    MG  --  1.9  --   --    CA 8.8 8.8 9.3  --    PHOS  --  6.0*  --   --    ALB  --  2.7*  --  2.9*   AST  --  54*  --  73*   ALT  --  47  --  55   AP  --  161*  --  171*     Assessment and Plan:  (Medical Decision Making)       COVID-19 (9/14/2021) with ARDS. -. Currently on BiPAP  75% FiO2 and weaning FIO2. Patient remains BIPAP dependent. .   --decadron 6 mg IV D 8,   -- remdesivir- 9/14-9/16  -- toci- 9/16      DM w/o complication type II ([facility] [address] (9/14/2021)  --SSI      Acute respiratory failure with hypoxia ([address] (9/16/2021)  -with covid 19 PNA  --now off rocephin and azithromycin for superimposed infection  --on BiPAP, weaning oxygen, try and airvo/NRB if able. -- OOB to chair, has tolerated well. Thrombocytopenia: platelets now >  783Y and stable. lovenox, stopped, now on IV Heparin. HIT P.  4T score is 3 (low probability for HIT)    Anxiety/dyspnea  -Multifactorial related to the above issues. Suspect sleep apnea  --with likely OHS/resrtictive lung disease, with super morbid obesity    Morbid obesity  --risk factor for poor covid outcome. Try to mobilize with PT- tolerated well 2 days ago. Nutrition:  TPN    Prophylaxis: protonix,     Prognosis is guarded. Patient is critically ill. 31 minutes spent with patient.      Sarai Choudhury MD

## 2021-09-26 NOTE — PROGRESS NOTES
Pulmonary Critical care  progress Note: 9/26/2021        Mariah Fitch                                                     Admission Date: 9/14/2021       Critical Care Progress Note: 9/25/2021  Length of stay: 11 days.     Background: 22 yo [facility] American female admitted with acute respiratory failure due to [address]. She was admitted on 9/14 with a 2 day history of dyspnea. She was positive for COVID. She was started on decadron and remdesivir. She initially required 6L NC but has since required BiPAP FiO2 @ 100% and her resting saturation was 91% but drops to the 70s with exertion. She received Actemra 9/16. She made it very clear to several providers that she does not want intubation and her mother confirmed this as well. After long discussion on 9/21, category status changed to Category 1 and pt moved to tele obs ICU on 100% BIPAP.     PMH includes DM, HTN, and morbid obesity (444 pounds). The patient's chart is reviewed and the patient is discussed with the staff. Subjective:     Last 24 hours: On BiPAP for ~ 9 days, now weaned to 75% with 02 saturation in upper 90's. Unable to tolerate Airvo. On IV Heparin/TPN     Date of COVID-19 symptom onset: 9/10     COVID-19 Meds:  Dexamethasone 6mg daily: 9/14-  Remdesivir: 9/14-9/17  Actemra: 9/16     Vaccination: Not Vaccinated    ROS -unable to obtain due to patient status     Objective:   Blood pressure (!) 89/51, pulse (!) 107, temperature 98.2 °F (36.8 °C), resp. rate 21, height 5' 9\" (1.753 m), weight (!) 437 lb 14.4 oz (198.6 kg), SpO2 98 %. Intake/Output Summary (Last 24 hours) at 9/26/2021 0820  Last data filed at 9/26/2021 0500  Gross per 24 hour   Intake 2428. 55 ml   Output 2250 ml   Net 178.55 ml     Physical Exam:          Constitutional:  Morbidly obese, lethargic, on BIPAP  EENMT:  Sclera clear, pupils equal  Respiratory: diminished   Cardiovascular:  RRR with no M,G,R;  Gastrointestinal:  soft; distant bowel sounds present  Musculoskeletal:  warm with no cyanosis, chronic LE venous stasis   SKIN:  no jaundice or ecchymosis   Neurologic:  moving all extremities,no gross neuro deficit   Psychiatric:  Calm, sedated     CXR:        LINES:      DRIPS:     TPN  Precedex gtt  HEparin gtt    Recent Labs     09/26/21  0519   PHI 7.37   PCO2I 41.9   PO2I 74*   HCO3I 24.1      Recent Labs     09/26/21 0258 09/25/21 0351 09/24/21 0202 09/23/21 2021   WBC 13.9* 17.8* 20.7* 17.8*   HGB 8.3* 9.2* 10.1* 10.6*   HCT 30.0* 33.2* 36.5 38.3   * 109* 109* 102*     Recent Labs     09/26/21 0258 09/25/21 0351 09/24/21 0202   * 133* 137   K 4.1 3.7 3.7    98 99   CO2 26 25 27   * 172* 170*   BUN 34* 47* 25*   CREA 0.71 1.20* 1.24*   MG 2.1  --  1.9   PHOS 3.2  --  6.0*   CA 8.7 8.8 8.8   ALB  --   --  2.7*       Assessment and Plan :  (Medical Decision Making)        COVID-19 (9/14/2021) with ARDS. -. Currently on BiPAP  75% FiO2 and weaning FIO2. Patient remains BIPAP dependent. , will try to wean Fi02  May  need to be intubated soon ,if no progress made  --decadron 6 mg IV D 8,   -- remdesivir- 9/14-9/16  -- toci- 9/16       DM w/o complication type II ([address] (9/14/2021)  --SSI       Acute respiratory failure with hypoxia ([address] (9/16/2021)  -with covid 19 PNA  --now off rocephin and azithromycin for superimposed infection  --on BiPAP, weaning oxygen, try and airvo/NRB if able. -- OOB to chair, has tolerated well.      Thrombocytopenia: platelets now >  307H and stable, 135 today . lovenox, stopped, now on IV Heparin. HIT P.  4T score is 3 (low probability for HIT)     Anxiety/dyspnea  -Multifactorial related to the above issues.     Suspect sleep apnea  --with likely OHS/resrtictive lung disease, with super morbid obesity     Morbid obesity  --risk factor for poor covid outcome.  Try to mobilize with PT- tolerated well 2 days ago.     Nutrition:  TPN    Prognosis guarded, remains critically ill, spent 31 min patient care      Tobias Nicole MD

## 2021-09-26 NOTE — PROGRESS NOTES
..Bedside and verbal shift change report received from  [address] [facility], [facility] (offgoing nurse). Report included the following information SBAR, Kardex, ED Summary, Intake/Output, MAR, Recent Results, Med Rec Status, Cardiac Rhythm Sinus Tachy, Alarm Parameters , Quality Measures and Dual Neuro Assessment. Dual skin assessment completed at bedside: Pelayo; R triple lumen picc;  Excoriation on backside (list pertinent skin assessment findings)    Dual verification of gtts completed (name of gtts verified): Heparin at 15 units/kg/hr

## 2021-09-26 NOTE — PROGRESS NOTES
Comprehensive Nutrition Assessment  This assessment was completed remotely. Type and Reason for Visit: Reassess  TPN Management (Pulmonary)    Nutrition Recommendations/Plan:   · Parenteral Nutrition:  · TPN: 14%DEX/ 8%AA to 65 ml/hr. Holding lipids d/t MO and critical inflammatory illness. · To provide: 1242 kcal/d (86% of needs), 125 grams of protein/d (76% of needs), 218 grams of CHO/d and 1560 ml of total volume/d. · Lytes/L:   · Sodium 80 meq (80 meq NaCl), Potassium 50 meq (45 meq KCl, 5 meq KPO4), 5 meq Mg, 4.5 meq Calcium  · Other additives: MTE, MVI, 28 units insulin/L or .2 units/gram of CHO  · Vitamin and Mineral Supplement Therapy:  · Electrolyte management replacement protocol active. · Labs:   · BMP daily, Phos and Mg in AM MWF. · Continue POC Glucoses/SSI every 6 hrs. Daily Update:  Remains TPN dependent d/t respiratory status limits ability to take po nutrition. On BiPAP for ~ 9 days, unable to tolerate AIRVO. Pulmonary notes \"May  need to be intubated soon ,if no progress made\"  Abdominal Status (last documented): Intact, Obese, Pannus, Semi-soft abdomen with Active  bowel sounds. Last BM 09/25/21.   Pertinent Medications: decadron, precedex,SSI (15 units 9/24,6 units thus far today), po Protonix   Pertinent Labs:     Lab Results   Component Value Date/Time    Sodium 134 (L) 09/26/2021 02:58 AM    Potassium 4.1 09/26/2021 02:58 AM    Chloride 101 09/26/2021 02:58 AM    CO2 26 09/26/2021 02:58 AM    Anion gap 7 09/26/2021 02:58 AM    Glucose 210 (H) 09/26/2021 02:58 AM    BUN 34 (H) 09/26/2021 02:58 AM    Creatinine 0.71 09/26/2021 02:58 AM    Calcium 8.7 09/26/2021 02:58 AM    Albumin 2.7 (L) 09/24/2021 02:02 AM    Magnesium 2.1 09/26/2021 02:58 AM    Phosphorus 3.2 09/26/2021 02:58 AM     Lab Results   Component Value Date/Time    Glucose 210 (H) 09/26/2021 02:58 AM    Glucose (POC) 218 (H) 09/26/2021 06:07 AM    Glucose (POC) 219 (H) 09/25/2021 11:52 PM    Glucose (POC) 182 (H) 09/25/2021 04:42 PM    Glucose (POC) 199 (H) 09/25/2021 11:08 AM    Glucose (POC) 191 (H) 09/24/2021 10:40 PM    Glucose (POC) 193 (H) 09/24/2021 03:55 PM     Remarks:  Heprain drip is providing significant additonal volume and dextrose  Increased Glucoses: H/O DM and currently on steroids. Insulin added to TPN 9/23 (.05 units/gram CHO)) and increased to 0.1 units/gram on 9/24 and to 0.15 units/gram on 9/25  K with slight trend up. Na low but slight trend up  Phos WDL-trend down. Current TPN provides 80 meq Na/L(increased from 60 meq/L), 78 meq K/d ( increased from 47 meq/d), 0 meq Phos/d ( decreased from 8 meq/d) on 9;24, and 8 meq Mg/d. Would benefit from addition of Phos and increased insulin in TPN.     Current Nutrition Therapies:  DIET NPO Sips of Water with Meds  Current Parenteral Nutrition Orders:  · Type and Formula: 14%DEX/8%AA   · Lipids: None  · Duration: Continuous  · Rate/Volume: 65 ml/hr  · Current PN Order Provides: 1242 kcal/d (86% of needs), 125 grams of protein/d (76% of needs), 218 grams of CHO/d and 1560 ml of total volume/d         Emily Pastrana RD, LD, [facility] [phone]    Disaster Mode Active

## 2021-09-26 NOTE — PROGRESS NOTES
Bedside, Verbal and Written shift change report given to CY Montalvo (oncoming nurse) by Evelyne Arrington RN (offgoing nurse). Report included the following information SBAR, Kardex, OR Summary, Procedure Summary, Intake/Output, MAR, Recent Results, Alarm Parameters  and Quality Measures. Heparin gtt dual verified.

## 2021-09-27 NOTE — PROGRESS NOTES
Comprehensive Nutrition Assessment  This assessment was completed remotely. Type and Reason for Visit: Reassess  TPN Management (Pulmonary)    Nutrition Recommendations/Plan:   · Parenteral Nutrition:  · TPN: 14%DEX/ 8%AA to 65 ml/hr. 1 dose of 250 ml 20% lipids tonight ( once weekly is plan if remains on TPN). · To provide: Average of 1313 kcal/d (90% of needs), 125 grams of protein/d (76% of needs), 218 grams of CHO/d and 1560 to 1810 ml of total volume/d. · Lytes/L:   · Sodium 80 meq (80 meq NaCl), Potassium 30 meq (25 meq KCl, 5 meq KPO4), 5 meq Mg, 4.5 meq Calcium, New K will be 47 meq/d. · Other additives: MTE, MVI, 28 units insulin/L or .2 units/gram of CHO. This is maximum recommended insulin. Aditonal insulin should be provided with eiter insulin drip or frequent SSI. · Vitamin and Mineral Supplement Therapy:  · Electrolyte management replacement protocol active. · Labs:   · BMP daily, Phos and Mg in AM MWF. · Change POC Glucoses/SSI to every 4 hrs. Daily Update:  Remains TPN dependent d/t respiratory status limits ability to take po nutrition. On BiPAP for ~10 days, unable to tolerate AIRVO. Pulmonary notes 9/26: \"May need to be intubated soon ,if no progress made\"  Abdominal Status (last documented): Intact, Obese abdomen with Active  bowel sounds. Last BM 09/26/21.   Pertinent Medications: decadron, precedex,SSI (42 units 9/26,9 units thus far today), po Protonix   Pertinent Labs:     Lab Results   Component Value Date/Time    Sodium 134 (L) 09/27/2021 03:08 AM    Potassium 4.9 09/27/2021 03:08 AM    Chloride 104 09/27/2021 03:08 AM    CO2 26 09/27/2021 03:08 AM    Anion gap 4 (L) 09/27/2021 03:08 AM    Glucose 302 (H) 09/27/2021 03:08 AM    BUN 30 (H) 09/27/2021 03:08 AM    Creatinine 0.71 09/27/2021 03:08 AM    Calcium 9.3 09/27/2021 03:08 AM    Albumin 2.7 (L) 09/24/2021 02:02 AM    Magnesium 2.2 09/27/2021 03:08 AM    Phosphorus 3.2 09/27/2021 03:08 AM     Lab Results   Component Value Date/Time    Glucose 302 (H) 09/27/2021 03:08 AM    Glucose (POC) 290 (H) 09/27/2021 05:42 AM    Glucose (POC) 306 (H) 09/26/2021 11:20 PM    Glucose (POC) 305 (H) 09/26/2021 05:41 PM    Glucose (POC) 303 (H) 09/26/2021 12:45 PM    Glucose (POC) 218 (H) 09/26/2021 06:07 AM    Glucose (POC) 219 (H) 09/25/2021 11:52 PM     Remarks:  Heprain drip is providing significant additonal volume and dextrose  Increased Glucoses: H/O DM and currently on steroids. Insulin added to TPN 9/23 (.05 units/gram CHO)) and increased to 0.1 units/gram on 9/24,o 0.15 units/gram on 9/25 and then to maximum of 0.2 units/ gram on 9/26. Trend up of glucoses despite increased insulin in TPN likely r/t stress repsoinse, dextrose from heparin and continued steroid use. K with continued trend up. .  Na low but stable  Phos stablen. Current TPN provides 80 meq Na/L(increased from 60 meq/L), 78 meq K/d ( increased from 47 meq/d), 8 meq Phos/d ( add back on 9/26) and 8 meq Mg/d. Would benefit from decreased K in TPN, increased frequency of SSI and potentially increased SSI coverage or insulin drip.     Current Nutrition Therapies:  DIET NPO Sips of Water with Meds  Current Parenteral Nutrition Orders:  · Type and Formula: 14%DEX/8%AA   · Lipids: None  · Duration: Continuous  · Rate/Volume: 65 ml/hr  · Current PN Order Provides: 1242 kcal/d (86% of needs), 125 grams of protein/d (76% of needs), 218 grams of CHO/d and 1560 ml of total volume/d         Manus Greg, TANNER, LD, [facility] [phone]    Disaster Mode Active

## 2021-09-27 NOTE — PROGRESS NOTES
Pulmonary Critical care  progress Note: 9/27/2021        Robert Diaz                                                     Admission Date: 9/14/2021       Critical Care Progress Note: 9/25/2021  Length of stay: 11 days.     Background: 24 yo [facility] female admitted with acute respiratory failure due to Warren Ralph. She was admitted on 9/14 with a 2 day history of dyspnea. She was positive for COVID. She was started on decadron and remdesivir. She initially required 6L NC but has since required BiPAP FiO2 @ 100% and her resting saturation was 91% but drops to the 70s with exertion. She received Actemra 9/16. She made it very clear to several providers that she does not want intubation and her mother confirmed this as well. After long discussion on 9/21, category status changed to Category 1 and pt moved to tele obs ICU on 100% BIPAP.     PMH includes DM, HTN, and morbid obesity (444 pounds). The patient's chart is reviewed and the patient is discussed with the staff. Subjective:     Last 24 hours: On BiPAP for ~ 10 days, now weaned to 70% with 02 saturation in upper 97s. Unable to tolerate Airvo. On IV Heparin/TPN  Seems comfortable     Date of COVID-19 symptom onset: 9/10     COVID-19 Meds:  Dexamethasone 6mg daily: 9/14-  Remdesivir: 9/14-9/17  Actemra: 9/16     Vaccination: Not Vaccinated    ROS -unable to obtain due to patient status     Objective:   Blood pressure 138/83, pulse 76, temperature 97 °F (36.1 °C), resp. rate 17, height 5' 9\" (1.753 m), weight (!) 437 lb 14.4 oz (198.6 kg), SpO2 99 %.      Intake/Output Summary (Last 24 hours) at 9/27/2021 1109  Last data filed at 9/27/2021 8425  Gross per 24 hour   Intake 2700.62 ml   Output 3175 ml   Net -474.38 ml     Physical Exam:          Constitutional:  Morbidly obese, on BIPAP  EENMT:  Sclera clear, pupils equal  Respiratory: diminished   Cardiovascular:  RRR with no M,G,R;  Gastrointestinal:  soft; distant bowel sounds present  Musculoskeletal:  warm with no cyanosis, chronic LE venous stasis   SKIN:  no jaundice or ecchymosis   Neurologic:  moving all extremities,no gross neuro deficit   Psychiatric:  Calm,     CXR:        LINES:      DRIPS:     TPN  Precedex gtt  Heparin gtt    Recent Labs     09/26/21  0519   PHI 7.37   PCO2I 41.9   PO2I 74*   HCO3I 24.1      Recent Labs     09/27/21  0308 09/26/21  0258 09/25/21  0351   WBC 15.9* 13.9* 17.8*   HGB 9.0* 8.3* 9.2*   HCT 32.0* 30.0* 33.2*    135* 109*     Recent Labs     09/27/21  0308 09/26/21  0258 09/25/21  0351   * 134* 133*   K 4.9 4.1 3.7    101 98   CO2 26 26 25   * 210* 172*   BUN 30* 34* 47*   CREA 0.71 0.71 1.20*   MG 2.2 2.1  --    PHOS 3.2 3.2  --    CA 9.3 8.7 8.8       Assessment and Plan :  (Medical Decision Making)        COVID-19 (9/14/2021) with ARDS. -. Currently on BiPAP  70% FiO2 and weaning FIO2. Patient remains BIPAP dependent. , will try to wean Fi02  May  need to be intubated ,if no progress made  --decadron 6 mg IV D 9/10,   -- remdesivir- 9/14-9/16  -- toci- 9/16       DM w/o complication type II ([address] (9/14/2021)  --SSI       Acute respiratory failure with hypoxia ([address] (9/16/2021)  -with covid 19 PNA  --now off rocephin and azithromycin for superimposed infection  --on BiPAP, weaning oxygen, try and airvo/NRB if able. -- OOB to chair, has tolerated well.      Thrombocytopenia: platelets now >  770U and stable, 179 today . lovenox, stopped, now on IV Heparin. HIT negative.      Anxiety/dyspnea  -Multifactorial related to the above issues.     Suspect sleep apnea  --with likely OHS/resrtictive lung disease, with super morbid obesity     Morbid obesity  --risk factor for poor covid outcome.  Try to mobilize with PT- tolerated well 2 days ago.     Nutrition:  TPN    Prognosis guarded, remains critically ill, spent 30 min patient care      Sweta Henderson MD

## 2021-09-27 NOTE — PROGRESS NOTES
ACUTE OT GOALS:  (Developed with and agreed upon by patient and/or caregiver.)  1) Patient will complete lower body bathing and dressing with MOD I and adaptive equipment as needed. CONTINUE TO ADDRESS (2021)  2) Patient will complete toileting with MOD I. - UPDATED 2021  3) Patient will complete functional transfers with  MOD I and adaptive equipment as needed. -  UPDATED 2021  4) Patient will tolerate at least 25 minutes of OT activity with 1-2 rest breaks while maintaining O2 sats >90%. - CONTINUE TO ADDRESS (2021)  5) Patient will verbalize at least 3 energy conservation technique to utilize during ADL/IADL. - CONTINUE TO ADDRESS (2021)     NEW GOALS (ADDED 2021)  1. Patient will complete functional activity with MOD I and adaptive equipment as needed. Timeframe: 7 visits     OCCUPATIONAL THERAPY: Daily Note OT Treatment Day # 4    Eugenie Osorio is a 23 y.o. female   PRIMARY DIAGNOSIS: Acute respiratory distress syndrome (ARDS) due to COVID-19 virus ([facility]  Acute respiratory failure ([facility] [J96.00]       Payor: /   ASSESSMENT:     REHAB RECOMMENDATIONS: CURRENT LEVEL OF FUNCTION:  (Most Recently Demonstrated)   Recommendation to date pending progress:  [address] [place] [address]  Equipment:    To Be Determined Bathing:   Minimal Assistance - LB bathing/michelle-care  Dressing:   Minimal Assistance - donned new gown  Feeding/Grooming:   Set Up  Toileting:   Not tested  Functional Mobility:   Contact Guard Assistance x 2     ASSESSMENT:  Ms. Marcial Hodgson continues to present with deficits in overall strength, activity tolerance, ADL performance, and functional mobility. Currently in ICU; resting on 70% BIPAP with sats at > 90% at rest. Pt performed bed mobility with min A. Participated in LB bathing and michelle-care while seated EOB; min A required for LB bathing. New gown donned with min A (as BUEs are generally weak). Stood and ambulated ~5 ft to bedside chair with CGA x 2.  Sp02 maintaining well with activity/mobility; sats at 92% up in chair. Progressing well towards therapy goals. Will continue OT efforts as indicated.      SUBJECTIVE:   Ms. Chris Hart states, \"I'm feeling better today    SOCIAL HISTORY/LIVING ENVIRONMENT: See initial eval  Home Environment: Independent living  One/Two Story Residence: Other (Comment) (one story apartment; lives on 3rd floor)  Living Alone: Yes  Support Systems: Other Family Member(s)    OBJECTIVE:     PAIN: VITAL SIGNS: LINES/DRAINS:   Pre Treatment: Pain Screen  Pain Scale 1: Numeric (0 - 10)  Pain Intensity 1: 0  Post Treatment: 0   Pelayo Catheter and IV  O2 Device: BIPAP     ACTIVITIES OF DAILY LIVING: I Mod I S SBA CGA Min Mod Max Total NT Comments   BASIC ADLs:              Bathing/ Showering [] [] [] [] [] [x] [x] [] [] [] mod A for LB bathing/michelle-care   Toileting [] [] [] [] [] [] [] [] [] [x]    Dressing [] [] [] [] [] [x] [] [] [] [] Donning new gown   Feeding [] [] [] [] [] [] [] [] [] [x]    Grooming [] [] [] [] [] [] [] [] [] [x]    Personal Device Care [] [] [] [] [] [] [] [] [] [x]    Functional Mobility [] [] [] [] [x] [] [] [] [] [] x2 steps to chair   I=Independent, Mod I=Modified Independent, S=Supervision, SBA=Standby Assistance, CGA=Contact Guard Assistance,   Min=Minimal Assistance, Mod=Moderate Assistance, Max=Maximal Assistance, Total=Total Assistance, NT=Not Tested    MOBILITY: I Mod I S SBA CGA Min Mod Max Total  NT x2 Comments:   Supine to sit [] [] [] [] [] [x] [] [] [] [] []    Sit to supine [] [] [] [] [] [] [] [] [] [x] []    Sit to stand [] [] [] [] [x] [] [] [] [] [] [x]    Bed to chair [] [] [] [] [x] [] [] [] [] [] [x]    I=Independent, Mod I=Modified Independent, S=Supervision, SBA=Standby Assistance, CGA=Contact Guard Assistance,   Min=Minimal Assistance, Mod=Moderate Assistance, Max=Maximal Assistance, Total=Total Assistance, NT=Not Tested    BALANCE: Good Fair+ Fair Fair- Poor NT Comments   Sitting Static [x] [x] [] [] [] []    Sitting Dynamic [] [x] [] [] [] []              Standing Static [] [x] [] [] [] []    Standing Dynamic [] [x] [x] [] [] []      PLAN:   FREQUENCY/DURATION: OT Plan of Care: 3 times/week for duration of hospital stay or until stated goals are met, whichever comes first.    TREATMENT:   TREATMENT:   ($$ Self Care/Home Management: 8-22 mins$$ Neuromuscular Re-Education: 8-22 mins   )  Self Care (13 Minutes): Self care including Lower Body Bathing, Upper Body Dressing and Energy Conservation Training to increase independence and decrease level of assistance required. Neuromuscular Re-education (10 Minutes): Neuromuscular Re-education included Balance Training, Coordination training, Postural training, Sitting balance training and Standing balance training to improve Balance, Coordination and Postural Control.     TREATMENT GRID:  N/A    AFTER TREATMENT POSITION/PRECAUTIONS:  Chair, Needs within reach and RN notified    INTERDISCIPLINARY COLLABORATION:  RN/PCT, PT/PTA and OT/LANGLEY    TOTAL TREATMENT DURATION:  OT Patient Time In/Time Out  Time In: 6874  Time Out: ADRYAN Alba

## 2021-09-27 NOTE — PROGRESS NOTES
ACUTE PHYSICAL THERAPY GOALS:  (Developed with and agreed upon by patient and/or caregiver. )  LTG:  (1.)Ms. Julieta Soto will move from supine to sit and sit to supine , scoot up and down and roll side to side in bed with INDEPENDENT within 7 treatment day(s). (2.)Ms. Julieta Soto will transfer from bed to chair and chair to bed with INDEPENDENT using the least restrictive device within 7 treatment day(s). (3.)Ms. Julieta Soto will ambulate with INDEPENDENT for 100 feet with the least restrictive device within 7 treatment day(s). (4.)Ms. Julieta Soto will tolerate at least 23 min of dynamic standing activity to assist standing ADLs with the least restrictive device within 7 treatment days. (5.)Ms. Julieta Soto will climb at least 12 steps with MODIFIED INDEPENDENCE with the least restrictive device within 7 treatment days. PHYSICAL THERAPY: Daily Note and PM Treatment Day # 5    Sinai Muhammad is a 23 y.o. female   PRIMARY DIAGNOSIS: Acute respiratory distress syndrome (ARDS) due to COVID-19 virus ([facility]  Acute respiratory failure ([facility]) [J96.00]         ASSESSMENT:     REHAB RECOMMENDATIONS: CURRENT LEVEL OF FUNCTION:  (Most Recently Demonstrated)   Recommendation to date pending progress:  Setting:   No further skilled therapy   Equipment:    None Bed Mobility:   Minimal Assistance  Sit to Stand:   Contact Guard Assistance  Transfers:   Contact Guard Assistance  Gait/Mobility:   Contact Guard Assistance     ASSESSMENT:  Ms. Julieta Soto presents supine on BIPAP and agreeable to treatment. She performed bed mobility with with SBA. Pt performed seated EOB activities with supervision. She stood and ambulated to chair with CGA-Ondina x 2. She was somewhat lethargic today. No significant progress made today. SUBJECTIVE:   Ms. Julieta Soto states, \"ok. \"    SOCIAL HISTORY/ LIVING ENVIRONMENT: see eval  Home Environment: Independent living  One/Two Story Residence: Other (Comment) (one story apartment; lives on 3rd floor)  Living Alone: Yes  Support Systems: Other Family Member(s)  OBJECTIVE:     PAIN: VITAL SIGNS: LINES/DRAINS:   Pre Treatment: Pain Screen  Pain Scale 1: Numeric (0 - 10)  Pain Intensity 1: 00  Post Treatment: 0   Continuous Pulse Oximetry, Pelayo Catheter and IV  O2 Device: BIPAP     MOBILITY: I Mod I S SBA CGA Min Mod Max Total  NT x2 Comments:   Bed Mobility    Rolling [] [] [] [x] [] [] [] [] [] [] []    Supine to Sit [] [] [] [x] [] [] [] [] [] [] []    Scooting [] [] [] [x] [] [] [] [] [] [] []    Sit to Supine [] [] [] [] [] [] [] [] [] [] []    Transfers    Sit to Stand [] [] [] [] [x] [] [] [] [] [] []    Bed to Chair [] [] [] [] [x] [x] [] [] [] [] [x]    Stand to Sit [] [] [] [] [x] [] [] [] [] [] [x]    I=Independent, Mod I=Modified Independent, S=Supervision, SBA=Standby Assistance, CGA=Contact Guard Assistance,   Min=Minimal Assistance, Mod=Moderate Assistance, Max=Maximal Assistance, Total=Total Assistance, NT=Not Tested    BALANCE: Good Fair+ Fair Fair- Poor NT Comments   Sitting Static [x] [] [] [] [] []    Sitting Dynamic [x] [] [] [] [] []              Standing Static [x] [] [] [] [] []    Standing Dynamic [] [x] [] [] [] []      GAIT: I Mod I S SBA CGA Min Mod Max Total  NT x2 Comments:   Level of Assistance [] [] [] [] [x] [x] [] [] [] [] [x] BIPAP   Distance 3'    DME HHA    Gait Quality Trunk sway, shuffled    Weightbearing  Status N/A     I=Independent, Mod I=Modified Independent, S=Supervision, SBA=Standby Assistance, CGA=Contact Guard Assistance,   Min=Minimal Assistance, Mod=Moderate Assistance, Max=Maximal Assistance, Total=Total Assistance, NT=Not Tested    PLAN:   FREQUENCY/DURATION: PT Plan of Care: 3 times/week for duration of hospital stay or until stated goals are met, whichever comes first.  TREATMENT:     TREATMENT:   ($$ Therapeutic Activity: 23-37 mins    )  Therapeutic Activity (25 Minutes):  Therapeutic activity included Supine to Sit, Scooting, Transfer Training, Ambulation on level ground, Sitting balance  and Standing balance to improve functional Mobility, Strength, Activity tolerance and balance.    Co-Treatment PT/OT necessary due to patient's decreased overall endurance/tolerance levels, as well as need for high level skilled assistance to complete functional transfers/mobility and functional tasks    TREATMENT GRID:  N/A    AFTER TREATMENT POSITION/PRECAUTIONS:  Chair, Needs within reach and RN notified    INTERDISCIPLINARY COLLABORATION:  RN/PCT, PT/PTA and OT/LANGLEY    TOTAL TREATMENT DURATION:  PT Patient Time In/Time Out  Time In: 1356  Time Out: [address], PT, DPT

## 2021-09-27 NOTE — PROGRESS NOTES
CM reviewed medical record and spoke with pt's primary nurse. Pt remains in ICU OFL with covid 19 precautions. Pt's oxygen demands have kept her at 70% BiPAP. Unable to tolerate a switch to AirVo. Pt's primary nurse reports pt voiced desire to check out AMA on 9/26 stating \"i'm fine without oxygen\". Pt was allowed to voice fears and loneliness to her nurse. Pt's mother went back to [address] [place] [address] last week for her work. CM continues to follow pt and available for any discharge needs identified.

## 2021-09-28 NOTE — PROGRESS NOTES
Pulmonary Critical care  progress Note: 9/28/2021        Rogelio Villareal                                                     Admission Date: 9/14/2021       Critical Care Progress Note: 9/25/2021  Length of stay: 11 days.     Background: 24 yo [facility] female admitted with acute respiratory failure due to Basil Delfina. She was admitted on 9/14 with a 2 day history of dyspnea. She was positive for COVID. She was started on decadron and remdesivir. She initially required 6L NC but has since required BiPAP FiO2 @ 100% and her resting saturation was 91% but drops to the 70s with exertion. She received Actemra 9/16. She made it very clear to several providers that she does not want intubation and her mother confirmed this as well. After long discussion on 9/21, category status changed to Category 1 and pt moved to tele obs ICU on 100% BIPAP.     PMH includes DM, HTN, and morbid obesity (444 pounds). The patient's chart is reviewed and the patient is discussed with the staff. Subjective:     Last 24 hours: On BiPAP for ~ 11 days, now weaned to 65% with 02 saturation in upper 97s. Unable to tolerate Airvo. On IV Heparin/TPN  Seems comfortable     Date of COVID-19 symptom onset: 9/10     COVID-19 Meds:  Dexamethasone 6mg daily: 9/14-  Remdesivir: 9/14-9/17  Actemra: 9/16     Vaccination: Not Vaccinated    ROS -unable to obtain due to patient status     Objective:   Blood pressure 118/74, pulse 68, temperature 98.8 °F (37.1 °C), resp. rate 13, height 5' 9\" (1.753 m), weight (!) 437 lb 14.4 oz (198.6 kg), SpO2 98 %.      Intake/Output Summary (Last 24 hours) at 9/28/2021 1208  Last data filed at 9/28/2021 0559  Gross per 24 hour   Intake 2798.05 ml   Output 4200 ml   Net -1401.95 ml     Physical Exam:          Constitutional:  Morbidly obese, on BIPAP  EENMT:  Sclera clear, pupils equal  Respiratory: diminished   Cardiovascular:  RRR with no M,G,R;  Gastrointestinal:  soft; distant bowel sounds present  Musculoskeletal:  warm with no cyanosis, chronic LE venous stasis   SKIN:  no jaundice or ecchymosis   Neurologic:  moving all extremities,no gross neuro deficit   Psychiatric:  Calm,     CXR:        LINES:      DRIPS:     TPN  Precedex gtt  Heparin gtt    Recent Labs     09/26/21  0519   PHI 7.37   PCO2I 41.9   PO2I 74*   HCO3I 24.1      Recent Labs     09/28/21  0309 09/27/21  0308 09/26/21  0258   WBC 17.6* 15.9* 13.9*   HGB 9.0* 9.0* 8.3*   HCT 31.8* 32.0* 30.0*    179 135*     Recent Labs     09/28/21  0309 09/27/21  0308 09/26/21  0258   * 134* 134*   K 4.5 4.9 4.1    104 101   CO2 24 26 26   * 302* 210*   BUN 31* 30* 34*   CREA 0.74 0.71 0.71   MG  --  2.2 2.1   PHOS  --  3.2 3.2   CA 9.9 9.3 8.7       Assessment and Plan :  (Medical Decision Making)        COVID-19 (9/14/2021) with ARDS. -. Currently on BiPAP  65% FiO2 and weaning FIO2. Patient remains BIPAP dependent. , will try to wean Fi02  May  need to be intubated ,if no progress made  --decadron 6 mg IV D 9/10,   -- remdesivir- 9/14-9/16  -- toci- 9/16       DM w/o complication type II ([address] (9/14/2021)  --SSI       Acute respiratory failure with hypoxia ([address] (9/16/2021)  -with covid 19 PNA  --now off rocephin and azithromycin for superimposed infection  --on BiPAP, weaning oxygen, try and airvo/NRB if able. -- OOB to chair, has tolerated well.      Thrombocytopenia: platelets now >  067W and stable, 179 today . lovenox, stopped, now on IV Heparin. HIT negative.      Anxiety/dyspnea  -Multifactorial related to the above issues.     Suspect sleep apnea  --with likely OHS/resrtictive lung disease, with super morbid obesity     Morbid obesity  --risk factor for poor covid outcome.  Try to mobilize with PT- tolerated well 2 days ago.     Nutrition:  TPN    Prognosis guarded, remains critically ill,       Ana Montelongo MD

## 2021-09-28 NOTE — PROGRESS NOTES
09/28/21 1958   Oxygen Therapy   O2 Sat (%) 96 %   Pulse via Oximetry 66 beats per minute   O2 Device BIPAP   FIO2 (%) 65 %   Respiratory   Respiratory (WDL) X   Patient on Vent Yes - If patient is on vent, add Doc Flowsheet Ventilator (). Respiratory Pattern Tachypneic   Chest/Tracheal Assessment Chest expansion, symmetrical   Breath Sounds Bilateral Diminished   CPAP/BIPAP   Device Mode BIPAP   Mask Type and Size Full face; Large   Skin Condition intact   PIP Observed 20 cm H20   IPAP (cm H2O) 20 cm H2O   EPAP (cm H2O) 14 cm H2O   Inspiratory Time (sec) 0.9 seconds   Vt Spont (ml) 1030 ml   Ve Observed (l/min) 26 l/min   Backup Rate 20   Total RR (Spontaneous) 25 breaths per minute   Insp Rise Time (sec) 3   Leak (Estimated) 22 L/min   Pt's Home Machine No   Biomedical Check Performed Yes   Settings Verified Yes   Alarm Settings   High Pressure 30   Low Pressure 5   Apnea 20   Low Ve 3   High Rate 50   Low Rate 8   Pulmonary Toilet   Pulmonary Toilet H. O.B elevated

## 2021-09-28 NOTE — PROGRESS NOTES
09/27/21 2045   Oxygen Therapy   O2 Sat (%) 98 %   Pulse via Oximetry 73 beats per minute   O2 Device BIPAP   FIO2 (%) 60 %  (weaned)   Respiratory   Respiratory (WDL) X   Respiratory Pattern Regular   Chest/Tracheal Assessment Chest expansion, symmetrical   CPAP/BIPAP   Device Mode BIPAP   Mask Type and Size Full face; Large   Skin Condition intact   PIP Observed 22 cm H20   IPAP (cm H2O) 20 cm H2O   EPAP (cm H2O) 14 cm H2O   Inspiratory Time (sec) 1 seconds   Vt Spont (ml) 740 ml   Ve Observed (l/min) 16.4 l/min   Backup Rate 20   Total RR (Spontaneous) 22 breaths per minute   Insp Rise Time (sec) 3   Leak (Estimated) 8 L/min   Pt's Home Machine No   Biomedical Check Performed Yes   Settings Verified Yes   Alarm Settings   High Pressure 30   Low Pressure 5   Apnea 20   Low Ve 3   High Rate 50   Low Rate 8   Pulmonary Toilet   Pulmonary Toilet H. O.B elevated

## 2021-09-28 NOTE — PROGRESS NOTES
Comprehensive Nutrition Assessment    Type and Reason for Visit: Reassess  TPN Management (Pulmonary)    Nutrition Recommendations/Plan:   · Parenteral Nutrition:  · TPN: 14%DEX/ 8%AA at 65 ml/hr. 250 ml 20% lipids once weekly is plan if remains on TPN; next dose 10/4. · To provide: Average of 1313 kcal/d (90% of needs), 125 grams of protein/d (76% of needs), 218 grams of CHO/d and 1560 to 1810 ml of total volume/d. · Lytes/L:   · Sodium 120 meq (120 meq NaCl), Potassium 20 meq (15 meq KCl, 5 meq KPO4), 5 meq Mg, 4.5 meq Calcium, New K will be 31 meq/d. · Other additives: MTE, MVI, 14 units insulin/L or 0.1 units/gram of CHO. Insulin decreased by 50% in anticipation of improved blood sugar control with added 40 units Lantus outside of TPN today per MD.  · Vitamin and Mineral Supplement Therapy:  · Electrolyte management replacement protocol active. · Labs:   · BMP daily, Phos and Mg MWF. · Continue POC Glucoses/SSI to every 4 hrs. Malnutrition Assessment:  Malnutrition Status: At risk for malnutrition (specify) (minimal po intake since 9/16 d/t increased O2 needs/need for BIPAP)    Nutrition Assessment:   Nutrition History: 9/20:Unable to obtain      Nutrition Background: H/O: DM type II, HTN, severe MO. Presented with cough, cold flu like symptoms fever and chills   Onset of symptoms 9/12. Admitted d/t hypoxic respiratory faikure d/t COVID  Daily Update:  Pt remains on BiPAP. Discussed insulin needs with MD due to max insulin in TPN since 9/26. 40 units Lantus added to [facility] and continuing same SSI. RN reports pt does get 2-3 Glucerna shakes during the day when able to have BiPAP removed for po meds. Abdominal Status (last documented): Obese, Intact abdomen with Hypoactive  bowel sounds. Last BM 09/26/21.   Pertinent Medications: Decadron, Precedex, Heparin, Lantus (40units), SSI (42 units 9/27, 30 units thus far today), Protonix  Pertinent Labs:     Lab Results   Component Value Date/Time     (L) 09/28/2021 03:09 AM    K 4.5 09/28/2021 03:09 AM     09/28/2021 03:09 AM    CO2 24 09/28/2021 03:09 AM    AGAP 9 09/28/2021 03:09 AM     (H) 09/28/2021 03:09 AM    BUN 31 (H) 09/28/2021 03:09 AM    CREA 0.74 09/28/2021 03:09 AM    GFRAA >60 09/28/2021 03:09 AM    GFRNA >60 09/28/2021 03:09 AM    CA 9.9 09/28/2021 03:09 AM     Labs notable for sodium remaining low after increase 9/26. K decreased to 4.5 after decrease in K from 78meq total K to 47meq total K. Further reduction in K for tonight's TPN. Lab Results   Component Value Date/Time    Glucose 279 (H) 09/28/2021 03:09 AM    Glucose (POC) 268 (H) 09/28/2021 12:33 PM    Glucose (POC) 267 (H) 09/28/2021 09:05 AM    Glucose (POC) 301 (H) 09/28/2021 03:19 AM    Glucose (POC) 331 (H) 09/27/2021 09:31 PM    Glucose (POC) 335 (H) 09/27/2021 06:09 PM    Glucose (POC) 368 (H) 09/27/2021 01:46 PM     Glucose remains elevated with 44 units of insulin in TPN (max recommended insulin) and increased frequency of POC glucose/sliding scale.     Nutrition Related Findings:   9/16: Triple lumen PICC placed 9/19: TPN of 1L/d 5%Dex/4.25%AA started, 9/20: TPN advanced to 1L/d of 14%DEX/8%AA, 9/23: TPN advanced to goal of1 .56 L/d      Current Nutrition Therapies:  DIET NPO Sips of Water with Meds  Current Parenteral Nutrition Orders:  · Type and Formula: 14%DEX/8%AA   · Lipids: 250ml (once a week)  · Duration: Continuous  · Rate/Volume: 65 ml/hr  · Current PN Order Provides: Average of 1313 kcal/d (90% of needs), 125 grams of protein/d (76% of needs), 218 grams of CHO/d and 1560 to 1810 ml of total volume/d    Current Intake:   Average Meal Intake: NPO Average Supplement Intake: NPO      Anthropometric Measures:  Height: 5' 9\" (175.3 cm)  Current Body Wt: 198.6 kg (437 lb 13.3 oz) (9/26), Weight source: Bed scale  BMI: 64.6, Obese class 3 (BMI 40.0 or greater)  Admission Body Weight: 461 lb 10.3 oz (standing scale)  Ideal Body Weight (lbs) (Calculated): 145 lbs (66 kg),    Usual Body Wt: 205.9 kg (454 lb) (per EMR 2019 to 2020), Percent weight change: 0.3          Edema: No data recorded   Estimated Daily Nutrient Needs:  Energy (kcal/day): 5824-4368 (Kcal/kg (22-25), Weight Used: Ideal (65.9 kg))  Protein (g/day): >165 (>2,5 grams/kg) Weight Used: (Ideal (65.9 kg))  Fluid (ml/day):   ( (Minimize))    Nutrition Diagnosis:   · Inadequate oral intake related to impaired respiratory function as evidenced by NPO or clear liquid status due to medical condition, nutrition support-parenteral nutrition    Nutrition Interventions:   Food and/or Nutrient Delivery: Continue NPO, Modify parenteral nutrition     Coordination of Nutrition Care: Continue to monitor while inpatient  Plan of Care discussed with Wilfrid Schilder, RN and Dr. Krysten Yen    Goals:   Previous Goal Met: Goal(s) achieved  Active Goal: Continued TPN intake to meet >75% of estimated needs    Nutrition Monitoring and Evaluation:      Food/Nutrient Intake Outcomes: Parenteral nutrition intake/tolerance  Physical Signs/Symptoms Outcomes: Biochemical data, GI status (Respiratory status)    Discharge Planning:     Too soon to determine    Electronically signed by Brandy Espana MS, BLAZE, MEGAN 9/28/2021 at 12:40 PM  Contact: [phone]    Disaster Mode Active

## 2021-09-29 NOTE — PROGRESS NOTES
CM reviewed clinical notes and spoke with pt's nurse. Pt on 100% AirVo with overnight BiPAP. Pt on TPN. Pt with orders to transfer to a Covid floor. OT recommending home health from 9/27 therapy. PT recommends no further therapy. CM following pt's status for any discharge needs.

## 2021-09-29 NOTE — PROGRESS NOTES
[facility] Critical Care Note[de-identified] 9/29/2021  Roethel Homes  Admission Date: 9/14/2021     Length of Stay: 15 days    Background: 24 yo [place] American female admitted with acute respiratory failure due to Walda Champagne. She was admitted on 9/14 with a 2 day history of dyspnea. She was positive for COVID. She was started on decadron and remdesivir. She initially required 6L NC but has since required BiPAP FiO2 @ 100% and her resting saturation was 91% but drops to the 70s with exertion. She received Actemra 9/16.  She made it very clear to several providers that she does not want intubation and her mother confirmed this as well. After long discussion on 9/21, category status changed to Category 1 and pt moved to tele obs ICU on 100% BIPAP. Notable PMH:  has no past medical history on file. 24 Hour events: On airvo 100% -sats are 92   , bipap hs- c/os of headache     ROS: unable to obtain/negative except as listed elsewhere. Lines: (insertion date)   peripheral    Drips: current dose (range)  Precedex Dose (mcg/kg/hr): 0 mcg/kg/hr     Pertinent Exam:         Blood pressure (!) 96/56, pulse (!) 107, temperature 98.9 °F (37.2 °C), resp. rate 19, height 5' 9\" (1.753 m), weight (!) 437 lb (198.2 kg), SpO2 93 %.      Intake/Output Summary (Last 24 hours) at 9/29/2021 1357  Last data filed at 9/29/2021 1217  Gross per 24 hour   Intake 2846.46 ml   Output 4450 ml   Net -1603.54 ml     Constitutional:  ion airvo-appears comfortable  EENMT:  Sclera clear, pupils equal, oral mucosa moist  Respiratory:  crackles bilateral  Cardiovascular:  RRR  Gastrointestinal:  soft with no tenderness; positive bowel sounds present  Musculoskeletal:  warm with no cyanosis, no lower extremity edema  Skin:  no jaundice or ecchymosis  Neurologic:alert  Psychiatric: alert    CXR: none today    Recent Labs     09/29/21  0324 09/28/21  0309 09/27/21  0308   WBC 14.6* 17.6* 15.9*   HGB 8.6* 9.0* 9.0*   HCT 30.1* 31.8* 32.0*  226 179     Recent Labs     09/29/21  0324 09/28/21  0309 09/27/21  0308    134* 134*   K 4.4 4.5 4.9    101 104   CO2 26 24 26   * 279* 302*   BUN 30* 31* 30*   CREA 0.67 0.74 0.71   MG 1.8  --  2.2   CA 9.9 9.9 9.3   PHOS 5.9*  --  3.2     No results for input(s): LAC, TROPHS, BNPNT, CRP in the last 72 hours. No lab exists for component: ESR  Recent Labs     09/29/21  1306 09/29/21  0910 09/29/21  0615   GLUCPOC 284* 188* 220*     ECHO: No results found for this or any previous visit. Results     ** No results found for the last 336 hours. **        Inpat Anti-Infectives (From admission, onward)    None        Ventilator Settings:  Ideal body weight: 66.2 kg (145 lb 15.1 oz)   Mode FIO2 Rate Tidal Volume Pressure PEEP      90 %               Peak airway pressure:         Minute ventilation: 26 l/min  ABG:No results for input(s): PH, PCO2, PO2, HCO3, PHI, PCO2I, PO2I, HCO3I in the last 72 hours. Assessment and Plan:  (Medical Decision Making)   Impression: 23 y.o. female with covid tolerating airvo    COVID-19 (9/14/2021) with ARDS. -off bipap during the day  --decadron 6 mg IV D 9/10,   -- remdesivir- 9/14-9/16  -- toci- 9/16       DM w/o complication type II ([address] (9/14/2021)  --SSI       Acute respiratory failure with hypoxia ([address] (9/16/2021)  -with covid 19 PNA  --now off rocephin and azithromycin for superimposed infection  --optiflo  100%  -- OOB to chair, has tolerated well. Move to flooron continuous O2 sat monitor     Thrombocytopenia: platelets now >  783O and stable, 249 today .  lovenox, stopped, now on IV Heparin.  HIT negative.      Anxiety/dyspnea  -Multifactorial related to the above issues.     Suspect sleep apnea  --with likely OHS/resrtictive lung disease, with super morbid obesity     Morbid obesity  --risk factor for poor covid outcome.  Try to mobilize with PT- tolerated well 2 days ago.     Nutrition:  TPN    Full Code    The patient is critically ill with respiratory failure, circulatory failure and requires high complexity decision making for assessment and support including frequent ventilator adjustment , frequent evaluation and titration of therapies , application of advanced monitoring technologies and extensive interpretation of multiple databases  Time devoted to patient care services described in this note- 15 min face to face/ 20 min total evaluation time    Cumulative time devoted to patient care services by me for day of service -30 min     Cat Salguero MD

## 2021-09-29 NOTE — PROGRESS NOTES
Comprehensive Nutrition Assessment    Type and Reason for Visit: Reassess  TPN Management (Pulmonary)    Nutrition Recommendations/Plan:   Meals and Snacks:  Continue current diet. Nutrition Supplement Therapy:   Medical food supplement therapy:  Initiate Glucerna Shake once per day (this provides 220 kcal and 10 grams protein per bottle)    · Parental Nutrition:  · Allow current bag to complete infusing this evening. Not reordering this evening. Malnutrition Assessment:  Malnutrition Status: At risk for malnutrition (specify) (minimal po intake since 9/16 d/t increased O2 needs/need for BIPAP)    Nutrition Assessment:   Nutrition History: 9/20:Unable to obtain      Nutrition Background: H/O: DM type II, HTN, severe MO. Presented with cough, cold flu like symptoms fever and chills   Onset of symptoms 9/12. Admitted d/t hypoxic respiratory faikure d/t COVID  Daily Update:  Pt seen through window on Airvo. Per RN pt has been on Airvo since 0800 and weaning. Pt ate 100% of breakfast this AM. Discussed allowing TPN to complete infusing this evening since pt is eating well. Abdominal Status (last documented): Intact, Obese, Pannus, Semi-soft abdomen with Hypoactive  bowel sounds. Last BM 09/26/21. Pertinent Medications: Decadron, Precedex, Heparin, Lantus 40 units, SSI (60 units 9/28, 15 units thus far today), Protonix  Pertinent Labs:     Lab Results   Component Value Date/Time     09/29/2021 03:24 AM    K 4.4 09/29/2021 03:24 AM     09/29/2021 03:24 AM    CO2 26 09/29/2021 03:24 AM    AGAP 8 09/29/2021 03:24 AM     (H) 09/29/2021 03:24 AM    BUN 30 (H) 09/29/2021 03:24 AM    CREA 0.67 09/29/2021 03:24 AM    GFRAA >60 09/29/2021 03:24 AM    GFRNA >60 09/29/2021 03:24 AM    CA 9.9 09/29/2021 03:24 AM    MG 1.8 09/29/2021 03:24 AM    PHOS 5.9 (H) 09/29/2021 03:24 AM     Labs notable for sodium trending up after increase in TPN, Potassium trending down with decrease in TPN.  Phos elevated today.    Nutrition Related Findings:   9/16: Triple lumen PICC placed 9/19: TPN of 1L/d 5%Dex/4.25%AA started, 9/20: TPN advanced to 1L/d of 14%DEX/8%AA, 9/23: TPN advanced to goal of1 .56 L/d (9/29) transitioned to Airvo, ate 100% of breakfast, stopping TPN. Current Nutrition Therapies:  ADULT DIET Regular; 3 carb choices (45 gm/meal);  Low Sodium (2 gm)  Current Parenteral Nutrition Orders:  · Type and Formula: 14%DEX/8%AA   · Lipids: 250ml (once a week)  · Duration: Continuous  · Rate/Volume: 65 ml/hr  · Current PN Order Provides: Average of 1313 kcal/d (90% of needs), 125 grams of protein/d (76% of needs), 218 grams of CHO/d and 1560 to 1810 ml of total volume/d    Current Intake:   Average Meal Intake: NPO Average Supplement Intake: NPO      Anthropometric Measures:  Height: 5' 9\" (175.3 cm)  Current Body Wt: 198.6 kg (437 lb 13.3 oz) (9/26), Weight source: Bed scale  BMI: 64.6, Obese class 3 (BMI 40.0 or greater)  Admission Body Weight: 461 lb 10.3 oz (standing scale)  Ideal Body Weight (lbs) (Calculated): 145 lbs (66 kg),    Usual Body Wt: 205.9 kg (454 lb) (per EMR 2019 to 2020), Percent weight change: 0.3          Edema: Generalized: 2+;Non-pitting (9/29/2021  7:30 AM)     Estimated Daily Nutrient Needs:  Energy (kcal/day): 2323-0824 (Kcal/kg (22-25), Weight Used: Ideal (65.9 kg))  Protein (g/day): >165 (>2,5 grams/kg) Weight Used: (Ideal (65.9 kg))  Fluid (ml/day):   ( (Minimize))    Nutrition Diagnosis:   · Inadequate oral intake related to impaired respiratory function as evidenced by  (recent transition to Airvo, diet started this AM)    Nutrition Interventions:   Food and/or Nutrient Delivery: Continue current diet, Start oral nutrition supplement, Discontinue parenteral nutrition     Coordination of Nutrition Care: Continue to monitor while inpatient  Plan of Care discussed with Jyoti Schneider RN    Goals:   Previous Goal Met: Goal(s) achieved  Active Goal: Meet >75% estimated nutrition needs by RD follow up. Nutrition Monitoring and Evaluation:      Food/Nutrient Intake Outcomes: Food and nutrient intake, Supplement intake  Physical Signs/Symptoms Outcomes: Biochemical data, GI status (Respiratory status)    Discharge Planning:     Too soon to determine    Electronically signed by William Lam MS, RDN, LD 9/29/2021 at 11:39 AM  Contact: [phone]    Disaster Mode Active

## 2021-09-29 NOTE — PROGRESS NOTES
Bedside and verbal shift change report received from  CY ADKINS (offgoing nurse). Report included the following information SBAR, Kardex, ED Summary, Procedure Summary, Intake/Output, MAR, Recent Results, Med Rec Status, Cardiac Rhythm NSR and Alarm Parameters . Dual skin assessment completed at bedside: WDL. No pressure sores and/or injuries noted at this time.      Dual verification of gtts completed:  Precedex @ 0.4 mcg/kg/hr  Heparin @ 13 units/kg/hr  TPN @ 65 mL/hr

## 2021-09-29 NOTE — PROGRESS NOTES
09/29/21 1650   Dual Skin Pressure Injury Assessment   Dual Skin Pressure Injury Assessment WDL   Second Care Provider (Based on [address] [place] [address]) Wilhelmina Libman, RN   Skin Integumentary   Skin Integumentary (WDL) WDL    Pressure  Injury Documentation No Pressure Injury Noted-Pressure Ulcer Prevention Initiated   Skin Color Appropriate for ethnicity   Skin Condition/Temp Warm;Dry   Skin Integrity Intact   Turgor Non-tenting   Hair Growth Present  (blue hair)   Nails WDL   Varicosities Absent     Patient's skin clean, dry, and intact aside from inner thighs; boggy and clammy. Will continue to monitor.

## 2021-09-29 NOTE — PROGRESS NOTES
Patient resting quietly in bed on AirVo 50L at 100%. Patient's Triple lumen clean, dry, and intact with heparin gtt infusing. No complaints at this time. Will give report to night shift nurse.

## 2021-09-29 NOTE — PROGRESS NOTES
TRANSFER - OUT REPORT:    Verbal report given to 6th floor RN(name) on Stella Ignacio  being transferred to  (unit) for routine progression of care       Report consisted of patients Situation, Background, Assessment and   Recommendations(SBAR). Information from the following report(s) SBAR, Kardex, ED Summary, Procedure Summary, Intake/Output, MAR, Recent Results, Med Rec Status, Cardiac Rhythm NSR and Alarm Parameters  was reviewed with the receiving nurse. Lines:   PICC Triple Lumen 28/50/74 Right;Cephalic (Active)   Central Line Being Utilized Yes 09/29/21 0730   Criteria for Appropriate Use Limited/no vessel suitable for conventional peripheral access 09/29/21 0730   Site Assessment Clean, dry, & intact 09/29/21 0730   Phlebitis Assessment 0 09/29/21 0730   Infiltration Assessment 0 09/29/21 0730   Arm Circumference (cm) 54 cm 09/28/21 1606   Date of Last Dressing Change 09/28/21 09/29/21 0730   Dressing Status Clean, dry, & intact 09/29/21 0730   External Catheter Length (cm) 3 centimeters 09/28/21 1606   Dressing Type Disk with Chlorhexadine gluconate (CHG); Transparent;Stabilization/securement device 09/29/21 0730   Action Taken Dressing changed; Other (comment) 09/28/21 1606   Hub Color/Line Status Red;Patent; Infusing 09/29/21 0730   Positive Blood Return (Site #1) Yes 09/29/21 0730   Hub Color/Line Status White;Patent; Infusing 09/29/21 0730   Positive Blood Return (Site #2) Yes 09/29/21 0730   Hub Color/Line Status Gray;Patent; Infusing 09/29/21 0730   Positive Blood Return (Site #3) Yes 09/29/21 0730   Alcohol Cap Used No 09/29/21 0730        Opportunity for questions and clarification was provided.       Patient transported with:   Monitor  O2 @ 15 L NRB liters  Registered Nurse

## 2021-09-29 NOTE — PROGRESS NOTES
Requested by Pulmonology, to transfer pt to Hospitalist service. Managed today by Pulmonology. Chart reviewed. Dx:  1- Admitted w/ covid-19, wa son kerrie now on HF 55L  2- Hx of DM and obesity, on decadron  3- Low normal BP    Rx:  Lantus/ Humalog  Heparin gtt, per Pulmonology    To start follow up tomorrow.     Signed By: Sandra Duran MD     September 29, 2021

## 2021-09-30 PROBLEM — N17.9 AKI (ACUTE KIDNEY INJURY) (HCC): Status: ACTIVE | Noted: 2021-01-01

## 2021-09-30 NOTE — PROGRESS NOTES
Occupational Therapy Note:    OT treatment attempted however patient is currently a HOLD d/t respiratory status. Will check back later as patient's condition permits.     Brandie Vega, OTR/L, CLT

## 2021-09-30 NOTE — PROGRESS NOTES
Attempted to see pt. This AM for PT. RN requested to hold this AM secondary to hypotension. Will follow up in PM as able.

## 2021-09-30 NOTE — PROGRESS NOTES
ACUTE PHYSICAL THERAPY GOALS:  (Developed with and agreed upon by patient and/or caregiver. )  LTG:  (1.)Ms. Javier Coronado will move from supine to sit and sit to supine , scoot up and down and roll side to side in bed with INDEPENDENT within 7 treatment day(s). (2.)Ms. Javier Coronado will transfer from bed to chair and chair to bed with INDEPENDENT using the least restrictive device within 7 treatment day(s). (3.)Ms. Javier Coronado will ambulate with INDEPENDENT for 100 feet with the least restrictive device within 7 treatment day(s). (4.)Ms. Javier Coronado will tolerate at least 23 min of dynamic standing activity to assist standing ADLs with the least restrictive device within 7 treatment days. (5.)Ms. Javier Coronado will climb at least 12 steps with MODIFIED INDEPENDENCE with the least restrictive device within 7 treatment days. PHYSICAL THERAPY: Daily Note and PM Treatment Day # 6    Porsche Jimenez is a 23 y.o. female   PRIMARY DIAGNOSIS: Acute respiratory distress syndrome (ARDS) due to COVID-19 virus ([facility] [address]  Acute respiratory failure (HCC) [J96.00]         ASSESSMENT:     REHAB RECOMMENDATIONS: CURRENT LEVEL OF FUNCTION:  (Most Recently Demonstrated)   Recommendation to date pending progress:  Setting:   No further skilled therapy   Equipment:    None Bed Mobility:   Minimal Assistance  Sit to Stand:   Not tested  Transfers:   Not tested  Gait/Mobility:   Not tested     ASSESSMENT:  Ms. Javier Coronado seen this PM for bed mobility activities. Pt. Desaturating to 78% on Airvo during rolling activities to assist w/ toileting hygiene. Deferred OOB or further exercises @ this time. Pt. Cont. To mobilize below her baseline & benefits from cont. PT to address. SUBJECTIVE:   Ms. Javier Coronado states, \"Can you help me get cleaned up? \"    SOCIAL HISTORY/ LIVING ENVIRONMENT: see eval  Home Environment: Independent living  One/Two Story Residence: Other (Comment) (one story apartment; lives on 3rd floor)  Living Alone: Yes  Support Systems: Other Family Member(s)  OBJECTIVE:     PAIN: VITAL SIGNS: LINES/DRAINS:   Pre Treatment: Pain Screen  Pain Scale 1: Numeric (0 - 10)  Pain Intensity 1: 00  Post Treatment: 0  HR   O2 89%-78% Continuous Pulse Oximetry, Pelayo Catheter and IV  O2 Device: Heated, Hi flow nasal cannula     MOBILITY: I Mod I S SBA CGA Min Mod Max Total  NT x2 Comments:   Bed Mobility    Rolling [] [] [] [] [] [x] [] [] [] [] []    Supine to Sit [] [] [] [] [] [] [] [] [] [x] []    Scooting [] [] [] [] [] [] [] [] [] [x] []    Sit to Supine [] [] [] [] [] [] [] [] [] [x] []    Transfers    Sit to Stand [] [] [] [] [] [] [] [] [] [] [x]    Bed to Chair [] [] [] [] [] [] [] [] [] [] [x]    Stand to Sit [] [] [] [] [] [] [] [] [] [] [x]    I=Independent, Mod I=Modified Independent, S=Supervision, SBA=Standby Assistance, CGA=Contact Guard Assistance,   Min=Minimal Assistance, Mod=Moderate Assistance, Max=Maximal Assistance, Total=Total Assistance, NT=Not Tested    BALANCE: Good Fair+ Fair Fair- Poor NT Comments   Sitting Static [] [] [] [] [] [x]    Sitting Dynamic [] [] [] [] [] [x]              Standing Static [] [] [] [] [] [x]    Standing Dynamic [] [] [] [] [] [x]      GAIT: I Mod I S SBA CGA Min Mod Max Total  NT x2 Comments:   Level of Assistance [] [] [] [] [] [] [] [] [] [x] [] BIPAP   Distance NT    DME HHA    Gait Quality N/A    Weightbearing  Status N/A     I=Independent, Mod I=Modified Independent, S=Supervision, SBA=Standby Assistance, CGA=Contact Guard Assistance,   Min=Minimal Assistance, Mod=Moderate Assistance, Max=Maximal Assistance, Total=Total Assistance, NT=Not Tested    PLAN:   FREQUENCY/DURATION: PT Plan of Care: 3 times/week for duration of hospital stay or until stated goals are met, whichever comes first.  TREATMENT:     TREATMENT:   ($$ Therapeutic Activity: 23-37 mins    )  Therapeutic Activity (32 Minutes):  Therapeutic activity included Rolling and Scooting to improve functional Mobility, Strength, Activity tolerance and /.   Therapeutic Activity (32 Minutes): Therapeutic activity included Rolling to improve functional Mobility, Strength and Activity tolerance.     TREATMENT GRID:  N/A    AFTER TREATMENT POSITION/PRECAUTIONS:  Chair, Needs within reach and RN notified    INTERDISCIPLINARY COLLABORATION:  RN/PCT and PT/PTA    TOTAL TREATMENT DURATION:  PT Patient Time In/Time Out  Time In: 1357  Time Out: [address] [facility],7Gws, PT

## 2021-09-30 NOTE — PROGRESS NOTES
[facility]  Admission Date: 9/14/2021         Daily Progress Note: 9/30/2021    The patient's chart is reviewed and the patient is discussed with the staff. Background: 24 yo [place] American female admitted with acute respiratory failure due to [address] She was admitted on 9/14 with a 2 day history of dyspnea. She was positive for COVID. She was started on decadron and remdesivir. She initially required 6L NC but has since required BiPAP FiO2 @ 100% and her resting saturation was 91% but drops to the 70s with exertion. She received Actemra 9/16.  She made it very clear to several providers that she does not want intubation and her mother confirmed this as well. After long discussion on 9/21, category status changed to Category 1 and pt moved to tele obs ICU on 100% BIPAP. She was stable on airvo and transferred to floor yesterday. Subjective:     Patient alert. Had some issues with dizziness this AM along with tachycardia and marginal BP. Improved with IVF bolus. Had difficulty tolerating CPAP last night.      Current Facility-Administered Medications   Medication Dose Route Frequency    insulin glargine (LANTUS) injection 15 Units  15 Units SubCUTAneous DAILY    insulin lispro (HUMALOG) injection 5 Units  5 Units SubCUTAneous TIDAC    insulin lispro (HUMALOG) injection   SubCUTAneous AC&HS    heparin 25,000 units in dextrose 500 mL infusion  18-36 Units/kg/hr IntraVENous TITRATE    dexamethasone (DECADRON) 10 mg/mL injection 6 mg  6 mg IntraVENous Q24H    NUTRITIONAL SUPPORT ELECTROLYTE PRN ORDERS   Does Not Apply PRN    hydrALAZINE (APRESOLINE) 20 mg/mL injection 10 mg  10 mg IntraVENous Q6H PRN    morphine injection 1 mg  1 mg IntraVENous Q4H PRN    LORazepam (ATIVAN) injection 0.5 mg  0.5 mg IntraVENous Q6H PRN    phenol throat spray (CHLORASEPTIC) 1 Spray  1 Spray Oral PRN    pantoprazole (PROTONIX) tablet 40 mg  40 mg Oral ACB    sodium chloride (NS) flush 30 mL  30 mL InterCATHeter Q8H    sodium chloride (NS) flush 30 mL  30 mL InterCATHeter PRN    acetaminophen (TYLENOL) tablet 650 mg  650 mg Oral Q6H PRN    Or    acetaminophen (TYLENOL) suppository 650 mg  650 mg Rectal Q6H PRN    polyethylene glycol (MIRALAX) packet 17 g  17 g Oral DAILY PRN    ondansetron (ZOFRAN ODT) tablet 4 mg  4 mg Oral Q8H PRN    Or    ondansetron (ZOFRAN) injection 4 mg  4 mg IntraVENous Q6H PRN     Review of Systems    Constitutional: negative for fever, chills, sweats  Cardiovascular: negative for chest pain, palpitations, syncope, edema  Gastrointestinal:  negative for dysphagia, reflux, vomiting, diarrhea, abdominal pain, or melena  Neurologic:  negative for focal weakness, numbness, headache  Objective:     Vitals:    09/29/21 2248 09/30/21 0254 09/30/21 0658 09/30/21 0852   BP: (!) 102/58 (!) 98/48 (!) 91/54    Pulse: (!) 116 (!) 111 (!) 116    Resp: 20 20 20    Temp: 98.6 °F (37 °C) 98.5 °F (36.9 °C) 98.2 °F (36.8 °C)    SpO2: 100% 100% 93% 95%   Weight:       Height:           Intake/Output Summary (Last 24 hours) at 9/30/2021 1034  Last data filed at 9/30/2021 1838  Gross per 24 hour   Intake 580 ml   Output 1250 ml   Net -670 ml     Physical Exam:   Constitution:  the patient is well developed and in no acute distress  HEENT:  Sclera clear, pupils equal, oral mucosa moist  Respiratory: crackles/ diminished on airvo  Cardiovascular:  RRR without M,G,R  Gastrointestinal: soft and non-tender; with positive bowel sounds. Musculoskeletal: warm without cyanosis. There is no lower extremity edema.   Skin:  no jaundice or rashes, no wounds   Neurologic: no gross neuro deficits     Psychiatric:  alert and oriented x 3    CXR: 9/26      LAB:  Recent Labs     09/30/21  0606 09/29/21  0324 09/28/21  0309   WBC 19.7* 14.6* 17.6*   HGB 8.0* 8.6* 9.0*   HCT 28.3* 30.1* 31.8*    249 226     Recent Labs     09/30/21  0606 09/29/21  0324 09/28/21  0309    136 134*   K 4.2 4.4 4.5    102 101   CO2 20* 26 24   * 203* 279*   BUN 54* 30* 31*   CREA 1.48* 0.67 0.74   MG  --  1.8  --    CA 9.5 9.9 9.9   PHOS  --  5.9*  --      No results for input(s): LAC, TROPHS, BNPNT, CRP in the last 72 hours. No lab exists for component: ESR  No results for input(s): PH, PCO2, PO2, HCO3, PHI, PCO2I, PO2I, HCO3I in the last 72 hours. No results for input(s): SDES, CULT in the last 72 hours. Assessment and Plan:  (Medical Decision Making)   Principal Problem:    Acute respiratory distress syndrome (ARDS) due to COVID[facility]19 virus [facility] (9/14/2021)    Received remdesivir, toci 9/16, on dexa. Stable on airvo during day and BPAP QHS, continue dexa    Active Problems:    DM w/o complication type II ([facility] (9/14/2021)    SSI and lantus, per primary      HTN (hypertension) (9/14/2021)    Marginal BPs today- received 500cc bolus. Acute respiratory failure with hypoxia ([facility] (9/16/2021)    Received rocephin/azithromycin for superimposed infection. Morbid obesity ([facility] (9/18/2021)    Adds to complexity of care      Suspected sleep apnea (9/18/2021)    Likely OHS/restircitve disease with super morbid obesity      Thrombocytopenia ([facility] (9/22/2021)    Platelets stable, HIT negative. DVT (deep venous thrombosis) ([facility] (9/23/2021)    Heparin gtt      ALLAN    Cr up to 1.48 today, with soft BP And tachycardic ? If dry. -7.7L since admission. Will start NS @ 50mL/hr      More than 50% of the time documented was spent in face-to-face contact with the patient and in the care of the patient on the floor/unit where the patient is located. Alfonso Morocho, NP    I have spoken with and examined the patient. I agree with the above assessment and plan as documented. Gen: awake pleasant on 100%  Lungs:  CTA anterior  Heart:  tachy  Abd:NTND  Ext: no edema    Requiring FiO2 100% with O2 sat 91% and tachycardia. On heparin for DVT and possible PE  Will give 500ml bolus.     Omer Moura MD

## 2021-09-30 NOTE — PROGRESS NOTES
Hospitalist Progress Note   Admit Date:  2021  5:26 AM   Name:  Dea Hernandez   Age:  23 y.o. Sex:  female  :  1995   MRN:  [id number]   Room:  [facility]/    Presenting Complaint: Shortness of Breath    Reason(s) for Admission: Acute respiratory failure [facility]) [J96.00]     Hospital Course & Interval History:     Patient was admitted due to acute respiratory failure due to COVID-19 infection. She was admitted from . She was treated with Decadron and remdesivir. Initially she required oxygen via nasal cannula, but later she had to have BiPAP treatment she received Actemra on . She made it clear that she would not want intubation. Her mother confirmed with that. She has improved on treatment now on high flow oxygen and move out of ICU. CT of the chest is nondiagnostic for pulmonary embolism due to suboptimal opacification of the pulmonary arteries. Some peripheral consolidation in the paced segment right upper lobe and right lung base, there is possibility of pulmonary infarct. Patient is found to have left calf vein thrombosis. She was on Lovenox. She is now on IV heparin drip. Subjective (21):  21   She is complaining of feeling lightheaded. She does not have appetite. No headache. She is feeling weak. He does not eat much. Patient has been on heparin IV drip. No report of passing blood per rectum, or blood per urine. Assessment & Plan:     Principal Problem:    Acute respiratory distress syndrome (ARDS) due to COVID-19 virus ([facility] (2021)  Patient was on BiPAP. Now off BiPAP and on high flow oxygen. On Decadron 6 mg IV once a day. Patient completed remdesivir  She received Actemra as well  We will try to wean oxygen down, as tolerated. Today the patient is complaining of being lightheaded. Blood pressure is on the low side. We will give fluid resuscitation with 500 cc of IV bolus for 1 hour and monitor.      Active Problems:    DM w/o complication type II ([facility] (9/14/2021)  Blood sugar is in 100s. Continue to cover with sliding scale with short acting insulin. Patient is on Lantus 15 units subcu once a day. HTN (hypertension) (9/14/2021)  Pressure is acceptable. Currently at 120/90 mmHg. Monitor. Acute respiratory failure with hypoxia ([facility] (9/16/2021)  Improving from admission time. Patient is off BiPAP, on high flow oxygen. Morbid obesity ([facility] (9/18/2021)        Suspected sleep apnea (9/18/2021)        Thrombocytopenia ([facility] (9/22/2021)  Resolved      DVT (deep venous thrombosis) ([facility] (9/23/2021)  Patient is on IV heparin drip. ALLAN (acute kidney injury) ([facility] (9/30/2021)  Due to multiple factors, including hemodynamic instability. She is getting some IV fluid resuscitation now. Avoid potentially nephrotoxic agents. We will monitor renal function daily. Dispo/Discharge Planning: To be determined    Diet:  ADULT DIET Regular; 3 carb choices (45 gm/meal);  Low Sodium (2 gm)  ADULT ORAL NUTRITION SUPPLEMENT Dinner; Diabetic Supplement  DVT PPx: Already on heparin IV drip for the treatment of DVT  Code status: Full Code    Hospital Problems as of 9/30/2021 Date Reviewed: 9/21/2021        Codes Class Noted - Resolved POA    ALLAN (acute kidney injury) ([facility] ICD-10-CM: N17.9  ICD-9-CM: 584.9  9/30/2021 - Present Unknown        DVT (deep venous thrombosis) ([facility] ICD-10-CM: I82.409  ICD-9-CM: 453.40  9/23/2021 - Present Unknown        Thrombocytopenia ([facility] ICD-10-CM: D69.6  ICD-9-CM: 287.5  9/22/2021 - Present Unknown        Morbid obesity ([facility] ICD-10-CM: E66.01  ICD-9-CM: 278.01  9/18/2021 - Present Unknown        Suspected sleep apnea ICD-10-CM: R29.818  ICD-9-CM: 781.99  9/18/2021 - Present Unknown        Acute respiratory failure with hypoxia (HCC) ICD-10-CM: J96.01  ICD-9-CM: 518.81  9/16/2021 - Present Yes        * (Principal) Acute respiratory distress syndrome (ARDS) due to COVID-19 virus [facility]) ICD-10-CM: U07.1, J80  ICD-9-CM: 518.82, 079.89  9/14/2021 - Present Unknown        DM w/o complication type II (HCC) (Chronic) ICD-10-CM: E11.9  ICD-9-CM: 250.00  9/14/2021 - Present Yes        HTN (hypertension) (Chronic) ICD-10-CM: I10  ICD-9-CM: 401.9  9/14/2021 - Present Yes        RESOLVED: Acute respiratory failure ([facility] ICD-10-CM: J96.00  ICD-9-CM: 518.81  9/14/2021 - 9/16/2021 Yes              Objective:     Patient Vitals for the past 24 hrs:   Temp Pulse Resp BP SpO2   09/30/21 1136 -- -- -- -- 98 %   09/30/21 1051 98.5 °F (36.9 °C) (!) 121 25 (!) 123/91 (!) 85 %   09/30/21 0852 -- -- -- -- 95 %   09/30/21 0658 98.2 °F (36.8 °C) (!) 116 20 (!) 91/54 93 %   09/30/21 0254 98.5 °F (36.9 °C) (!) 111 20 (!) 98/48 100 %   09/29/21 2248 98.6 °F (37 °C) (!) 116 20 (!) 102/58 100 %   09/29/21 1904 98.4 °F (36.9 °C) (!) 132 22 (!) 115/96 94 %   09/29/21 1637 98.6 °F (37 °C) (!) 122 22 (!) 97/54 97 %   09/29/21 1600 -- -- -- -- (!) 88 %   09/29/21 1545 -- (!) 123 (!) 41 -- 100 %   09/29/21 1530 -- (!) 118 26 -- 95 %   09/29/21 1515 -- (!) 121 28 -- 90 %   09/29/21 1500 -- (!) 112 27 94/68 94 %   09/29/21 1445 -- (!) 108 22 -- 97 %   09/29/21 1430 -- (!) 109 25 -- 96 %   09/29/21 1415 -- (!) 105 17 -- 95 %   09/29/21 1400 -- (!) 106 22 (!) 92/54 93 %   09/29/21 1345 -- (!) 107 19 -- 93 %   09/29/21 1330 -- (!) 111 -- -- 93 %   09/29/21 1315 -- (!) 110 -- -- (!) 89 %   09/29/21 1300 -- (!) 156 -- (!) 96/56 94 %   09/29/21 1245 -- 99 -- -- 94 %   09/29/21 1230 -- 98 -- (!) 88/53 93 %   09/29/21 1215 -- (!) 115 -- -- (!) 88 %     Oxygen Therapy  O2 Sat (%): 98 % (09/30/21 1136)  Pulse via Oximetry: 115 beats per minute (09/30/21 0852)  O2 Device: Heated; Hi flow nasal cannula (09/30/21 1136)  Skin Assessment: Clean, dry, & intact (09/29/21 1904)  Skin Protection for O2 Device: No (09/26/21 1900)  O2 Flow Rate (L/min): 50 l/min (09/30/21 1136)  O2 Temperature: 87.8 °F (31 °C) (09/30/21 1136)  FIO2 (%): 95 % (weaned from 100%) (09/30/21 1136)    Estimated body mass index is 64.53 kg/m² as calculated from the following:    Height as of this encounter: 5' 9\" (1.753 m). Weight as of this encounter: 198.2 kg (437 lb). Intake/Output Summary (Last 24 hours) at 9/30/2021 1210  Last data filed at 9/30/2021 2482  Gross per 24 hour   Intake 580 ml   Output 1250 ml   Net -670 ml         Physical Exam:     Visit Vitals  BP (!) 123/91 (BP 1 Location: Left lower arm, BP Patient Position: Sitting)   Pulse (!) 121   Temp 98.5 °F (36.9 °C)   Resp 25   Ht 5' 9\" (1.753 m)   Wt (!) 198.2 kg (437 lb)   SpO2 98%   BMI 64.53 kg/m²        General:    Well nourished. BMI of 65. Patient sitting up in bed, on high flow oxygen. Appears shortness of breath with speaking. Complaining of  feeling lightheaded. Head:  Normocephalic, atraumatic, pale   Eyes:  Sclerae appear normal.  Pupils equally round. ENT:  Nares appear normal, no drainage. Moist oral mucosa  Neck:  No restricted ROM. Trachea midline   CV:   RRR. No m/r/g. No jugular venous distension. Lungs:   Diminished breath sound. No wheezing, rhonchi, or rales. Shortness of breath. Abdomen: Bowel sounds present. Soft, nontender, nondistended. Extremities: No cyanosis or clubbing. No edema  Skin:     No rashes and normal coloration. Warm and dry. Neuro:  Cranial nerves II-XII grossly intact. Sensation intact. A&Ox3  Psych:  Normal mood and affect.       I have reviewed ordered lab tests and independently visualized imaging below:    Last 24hr Labs:  Recent Results (from the past 24 hour(s))   GLUCOSE, POC    Collection Time: 09/29/21  1:06 PM   Result Value Ref Range    Glucose (POC) 284 (H) 65 - 100 mg/dL    Performed by [facility], POC    Collection Time: 09/29/21  5:45 PM   Result Value Ref Range    Glucose (POC) 250 (H) 65 - 100 mg/dL    Performed by [facility], POC    Collection Time: 09/29/21  8:50 PM   Result Value Ref Range    Glucose (POC) 232 (H) 65 - 100 mg/dL    Performed by Sherine Armstrong    METABOLIC PANEL, BASIC    Collection Time: 09/30/21  6:06 AM   Result Value Ref Range    Sodium 137 136 - 145 mmol/L    Potassium 4.2 3.5 - 5.1 mmol/L    Chloride 104 98 - 107 mmol/L    CO2 20 (L) 21 - 32 mmol/L    Anion gap 13 7 - 16 mmol/L    Glucose 106 (H) 65 - 100 mg/dL    BUN 54 (H) 6 - 23 MG/DL    Creatinine 1.48 (H) 0.6 - 1.0 MG/DL    GFR est AA 55 (L) >60 ml/min/1.73m2    GFR est non-AA 46 (L) >60 ml/min/1.73m2    Calcium 9.5 8.3 - 10.4 MG/DL   CBC WITH AUTOMATED DIFF    Collection Time: 09/30/21  6:06 AM   Result Value Ref Range    WBC 19.7 (H) 4.3 - 11.1 K/uL    RBC 4.02 (L) 4.05 - 5.2 M/uL    HGB 8.0 (L) 11.7 - 15.4 g/dL    HCT 28.3 (L) 35.8 - 46.3 %    MCV 70.4 (L) 79.6 - 97.8 FL    MCH 19.9 (L) 26.1 - 32.9 PG    MCHC 28.3 (L) 31.4 - 35.0 g/dL    RDW 21.8 (H) 11.9 - 14.6 %    PLATELET 685 701 - 568 K/uL    MPV Unable to calculate. Recommend adding IPF. 9.4 - 12.3 FL    ABSOLUTE NRBC 0.02 0.0 - 0.2 K/uL    DF AUTOMATED      NEUTROPHILS 63 43 - 78 %    LYMPHOCYTES 23 13 - 44 %    MONOCYTES 10 4.0 - 12.0 %    EOSINOPHILS 1 0.5 - 7.8 %    BASOPHILS 1 0.0 - 2.0 %    IMMATURE GRANULOCYTES 3 0.0 - 5.0 %    ABS. NEUTROPHILS 12.3 (H) 1.7 - 8.2 K/UL    ABS. LYMPHOCYTES 4.5 0.5 - 4.6 K/UL    ABS. MONOCYTES 1.9 (H) 0.1 - 1.3 K/UL    ABS. EOSINOPHILS 0.2 0.0 - 0.8 K/UL    ABS. BASOPHILS 0.1 0.0 - 0.2 K/UL    ABS. IMM.  GRANS. 0.6 (H) 0.0 - 0.5 K/UL   GLUCOSE, POC    Collection Time: 09/30/21  6:50 AM   Result Value Ref Range    Glucose (POC) 124 (H) 65 - 100 mg/dL    Performed by [employer]    HEPARIN XA UFH    Collection Time: 09/30/21  9:44 AM   Result Value Ref Range    Heparin Xa UFH 0.22 (L) 0.3 - 0.7 IU/mL   GLUCOSE, POC    Collection Time: 09/30/21 10:47 AM   Result Value Ref Range    Glucose (POC) 178 (H) 65 - 100 mg/dL    Performed by [facility]        All Micro Results     Procedure Component Value Units Date/Time    CULTURE, BLOOD [[id number]] Collected: 09/14/21 0759    Order Status: Completed Specimen: Blood Updated: 09/19/21 0637     Special Requests: --        LEFT  HAND       Culture result: NO GROWTH 5 DAYS       CULTURE, BLOOD [[id number]] Collected: 09/14/21 0757    Order Status: Completed Specimen: Blood Updated: 09/19/21 0637     Special Requests: --        RIGHT  Antecubital       Culture result: NO GROWTH 5 DAYS       CULTURE, RESPIRATORY/SPUTUM/BRONCH Dannial Hane STAIN [[id number]] Collected: 09/14/21 0915    Order Status: Canceled Specimen: Sputum     COVID-19 RAPID TEST [[id number]]  (Abnormal) Collected: 09/14/21 0535    Order Status: Completed Specimen: Nasopharyngeal Updated: 09/14/21 0558     Specimen source NASAL        COVID-19 rapid test Detected        Comment:      The specimen is POSITIVE for SARS-CoV-2, the novel coronavirus associated with COVID-19. This test has been authorized by the FDA under an Emergency Use Authorization (EUA) for use by authorized laboratories. Fact sheet for Healthcare Providers: ConventionUpdate.co.nz  Fact sheet for Patients: ConventionUpdate.co.nz       Methodology: Isothermal Nucleic Acid Amplification  RESULTS VERIFIED, PHONED TO AND READ BACK BY   FERDINAND BENOIT AT 7241 ON 9/14/21 ACL               Other Studies:  No results found.     Current Meds:  Current Facility-Administered Medications   Medication Dose Route Frequency    0.9% sodium chloride infusion  50 mL/hr IntraVENous CONTINUOUS    insulin glargine (LANTUS) injection 15 Units  15 Units SubCUTAneous DAILY    insulin lispro (HUMALOG) injection 5 Units  5 Units SubCUTAneous TIDAC    insulin lispro (HUMALOG) injection   SubCUTAneous AC&HS    heparin 25,000 units in dextrose 500 mL infusion  18-36 Units/kg/hr IntraVENous TITRATE    dexamethasone (DECADRON) 10 mg/mL injection 6 mg  6 mg IntraVENous Q24H    NUTRITIONAL SUPPORT ELECTROLYTE PRN ORDERS   Does Not Apply PRN    hydrALAZINE (APRESOLINE) 20 mg/mL injection 10 mg  10 mg IntraVENous Q6H PRN    morphine injection 1 mg  1 mg IntraVENous Q4H PRN    LORazepam (ATIVAN) injection 0.5 mg  0.5 mg IntraVENous Q6H PRN    phenol throat spray (CHLORASEPTIC) 1 Spray  1 Spray Oral PRN    pantoprazole (PROTONIX) tablet 40 mg  40 mg Oral ACB    sodium chloride (NS) flush 30 mL  30 mL InterCATHeter Q8H    sodium chloride (NS) flush 30 mL  30 mL InterCATHeter PRN    acetaminophen (TYLENOL) tablet 650 mg  650 mg Oral Q6H PRN    Or    acetaminophen (TYLENOL) suppository 650 mg  650 mg Rectal Q6H PRN    polyethylene glycol (MIRALAX) packet 17 g  17 g Oral DAILY PRN    ondansetron (ZOFRAN ODT) tablet 4 mg  4 mg Oral Q8H PRN    Or    ondansetron (ZOFRAN) injection 4 mg  4 mg IntraVENous Q6H PRN       Signed:  Jay Ramos MD    Part of this note may have been written by using a voice dictation software. The note has been proof read but may still contain some grammatical/other typographical errors.

## 2021-09-30 NOTE — PROGRESS NOTES
Hourly rounds performed during shift, pt on Bipap during the night, now back on Air Vo 55L at 100%, pt denies any needs at this time. Bed in low and locked position, call light and personal items within reach. Dual Heparin gtt confirmation with [email or web address]. Pt on heparin gtt at 13units/kg/hr. Am labs drawn, awaiting resultsWill continue to monitor and give bedside report to oncoming shift nurse.

## 2021-10-01 NOTE — PROGRESS NOTES
Mariah Fitch  Admission Date: 9/14/2021         Daily Progress Note: 10/1/2021    The patient's chart is reviewed and the patient is discussed with the staff. Background: 22 yo [place] American female admitted with acute respiratory failure due to [address]. She was admitted on 9/14 with a 2 day history of dyspnea. She was positive for COVID. She was started on decadron and remdesivir. She initially required 6L NC but has since required BiPAP FiO2 @ 100% and her resting saturation was 91% but drops to the 70s with exertion. She received Actemra 9/16.  She made it very clear to several providers that she does not want intubation and her mother confirmed this as well. After long discussion on 9/21, category status changed to Category 1 and pt moved to tele obs ICU on 100% BIPAP. She was stable on airvo and transferred to floor yesterday. Subjective:     Patient still having trouble sleeping throughout night. Tolerating CPAP okay at night, getting used to mask. Denies any SOB, still having productive cough but states this isn't bothering her. No more light headedness. VSS today.  On airvo 60L/100%    Current Facility-Administered Medications   Medication Dose Route Frequency    0.9% sodium chloride infusion  50 mL/hr IntraVENous CONTINUOUS    insulin glargine (LANTUS) injection 15 Units  15 Units SubCUTAneous DAILY    insulin lispro (HUMALOG) injection 5 Units  5 Units SubCUTAneous TIDAC    insulin lispro (HUMALOG) injection   SubCUTAneous AC&HS    heparin 25,000 units in dextrose 500 mL infusion  18-36 Units/kg/hr IntraVENous TITRATE    dexamethasone (DECADRON) 10 mg/mL injection 6 mg  6 mg IntraVENous Q24H    NUTRITIONAL SUPPORT ELECTROLYTE PRN ORDERS   Does Not Apply PRN    hydrALAZINE (APRESOLINE) 20 mg/mL injection 10 mg  10 mg IntraVENous Q6H PRN    morphine injection 1 mg  1 mg IntraVENous Q4H PRN    LORazepam (ATIVAN) injection 0.5 mg  0.5 mg IntraVENous Q6H PRN    phenol throat spray (CHLORASEPTIC) 1 Spray  1 Spray Oral PRN    pantoprazole (PROTONIX) tablet 40 mg  40 mg Oral ACB    sodium chloride (NS) flush 30 mL  30 mL InterCATHeter Q8H    sodium chloride (NS) flush 30 mL  30 mL InterCATHeter PRN    acetaminophen (TYLENOL) tablet 650 mg  650 mg Oral Q6H PRN    Or    acetaminophen (TYLENOL) suppository 650 mg  650 mg Rectal Q6H PRN    polyethylene glycol (MIRALAX) packet 17 g  17 g Oral DAILY PRN    ondansetron (ZOFRAN ODT) tablet 4 mg  4 mg Oral Q8H PRN    Or    ondansetron (ZOFRAN) injection 4 mg  4 mg IntraVENous Q6H PRN     Review of Systems    Constitutional: negative for fever, chills, sweats  Cardiovascular: negative for chest pain, palpitations, syncope, edema  Gastrointestinal:  negative for dysphagia, reflux, vomiting, diarrhea, abdominal pain, or melena  Neurologic:  negative for focal weakness, numbness, headache  Objective:     Vitals:    10/01/21 0700 10/01/21 0907 10/01/21 0920 10/01/21 1043   BP:  (!) 103/48     Pulse:  (!) 120 (!) 119    Resp:  12     Temp:  97.5 °F (36.4 °C)     SpO2: 96% (!) 82% 92% 91%   Weight:       Height:           Intake/Output Summary (Last 24 hours) at 10/1/2021 1111  Last data filed at 10/1/2021 0531  Gross per 24 hour   Intake 480 ml   Output 3500 ml   Net -3020 ml     Physical Exam:   Constitution:  the patient is well developed and in no acute distress  HEENT:  Sclera clear, pupils equal, oral mucosa moist  Respiratory: diminished on airvo  Cardiovascular:  RRR without M,G,R  Gastrointestinal: soft and non-tender; with positive bowel sounds. Musculoskeletal: warm without cyanosis. There is no lower extremity edema.   Skin:  no jaundice or rashes, no wounds   Neurologic: no gross neuro deficits     Psychiatric:  alert and oriented x 3    CXR: 9/26      LAB:  Recent Labs     10/01/21  0133 09/30/21  0606 09/29/21  0324   WBC 15.9* 19.7* 14.6*   HGB 8.2* 8.0* 8.6*   HCT 29.2* 28.3* 30.1*    275 249     Recent Labs 10/01/21  0133 09/30/21  0606 09/29/21  0324    137 136   K 4.7 4.2 4.4    104 102   CO2 24 20* 26   * 106* 203*   BUN 44* 54* 30*   CREA 0.91 1.48* 0.67   MG 1.9  --  1.8   CA 9.4 9.5 9.9   PHOS 4.6*  --  5.9*     No results for input(s): LAC, TROPHS, BNPNT, CRP in the last 72 hours. No lab exists for component: ESR  No results for input(s): PH, PCO2, PO2, HCO3, PHI, PCO2I, PO2I, HCO3I in the last 72 hours. No results for input(s): SDES, CULT in the last 72 hours. Assessment and Plan:  (Medical Decision Making)   Principal Problem:    Acute respiratory distress syndrome (ARDS) due to COVID-19 virus ([facility] (9/14/2021)    Received remdesivir, toci 9/16, on dexa. On airvo 60L/100%. CPAP QHS, mobilize with PT/OT    Active Problems:    DM w/o complication type II ([facility] (9/14/2021)    SSI and lantus, per primary      HTN (hypertension) (9/14/2021)    BP improved today and controlled. Acute respiratory failure with hypoxia ([facility] (9/16/2021)    Received rocephin/azithromycin for superimposed infection. Morbid obesity ([facility] (9/18/2021)    Adds to complexity of care      Suspected sleep apnea (9/18/2021)    Likely OHS/restircitve disease with super morbid obesity      Thrombocytopenia ([facility] (9/22/2021)    Platelets stable, HIT negative. DVT (deep venous thrombosis) ([facility] (9/23/2021)    Heparin gtt      ALLAN    Cr improving with fluid, no signs of overload so continue for today. More than 50% of the time documented was spent in face-to-face contact with the patient and in the care of the patient on the floor/unit where the patient is located. Lenka Pierre NP      The patient has been seen and examined by me personally, the chart,labs, and radiographic studies have been reviewed. Chest: CTA  Extremities: trace edema    I agree with the above assessment and plan. Overall feels better- will see next on Monday- call sooner if needed.       Jayne Traore MD.

## 2021-10-01 NOTE — PROGRESS NOTES
Patient watching TV in bed. On Airvo 60/100. Have had to place NRB over Airvo multiple times during shift to keep O2 SAT above 90%. Hourly rounds performed this shift. Bed lowered and locked. Call light within reach. All needs met at this time. Bedside shift report will be given to oncoming nurse.

## 2021-10-01 NOTE — PROGRESS NOTES
Patient has been tachycardiac. Dr. Edilson Mcghee notified. New order for patient to be on remote telemetry.

## 2021-10-01 NOTE — PROGRESS NOTES
Hospitalist Progress Note   Admit Date:  2021  5:26 AM   Name:  Mariah Fitch   Age:  23 y.o. Sex:  female  :  1995   MRN:  [id number]   Room:  [facility]/    Presenting Complaint: Shortness of Breath    Reason(s) for Admission: Acute respiratory failure [facility]) [J96.00]     Hospital Course & Interval History:     Patient was admitted due to acute respiratory failure due to COVID-19 infection. She was admitted from . She was treated with Decadron and remdesivir. Initially she required oxygen via nasal cannula, but later she had to have BiPAP treatment she received Actemra on . She made it clear that she would not want intubation. Her mother confirmed with that. She has improved on treatment now on high flow oxygen and move out of ICU. CT of the chest is nondiagnostic for pulmonary embolism due to suboptimal opacification of the pulmonary arteries. Some peripheral consolidation in the paced segment right upper lobe and right lung base, there is possibility of pulmonary infarct. Patient is found to have left calf vein thrombosis. She was on Lovenox. She is now on IV heparin drip. Subjective (10/01/21):  21   She is complaining of feeling lightheaded. She does not have appetite. No headache. She is feeling weak. He does not eat much. Patient has been on heparin IV drip. No report of passing blood per rectum, or blood per urine. 10/1/21   Patient's blood pressure has improved compared to yesterday she denies feeling lightheaded today. She is on CPAP this morning and she wears it from last night. She has been in bed. Not much ambulation. Still on 60 L/min of oxygen. Assessment & Plan:     Principal Problem:    Acute respiratory distress syndrome (ARDS) due to COVID-19 virus ([facility] (2021)  Patient was on BiPAP. Now off BiPAP and on high flow oxygen. On Decadron 6 mg IV once a day.    Patient completed remdesivir  She received Actemra as well  We will try to wean oxygen down, as tolerated. Blood pressure is improved. No lightheadedness. We will continue to monitor her symptoms and wean oxygen down. Active Problems:    DM w/o complication type II ([facility] (9/14/2021)  Blood sugar is in 100s. Continue to cover with sliding scale with short acting insulin. Patient is on Lantus 15 units subcu once a day. HTN (hypertension) (9/14/2021)  Blood pressure is currently at 103/48 mmHg. Monitor. Acute respiratory failure with hypoxia ([facility] [address] (9/16/2021)  Improving from admission time. Patient is off BiPAP, on high flow oxygen. Morbid obesity ([facility] [address] (9/18/2021)     Suspected sleep apnea (9/18/2021)     Thrombocytopenia ([facility] (9/22/2021)  Resolved      DVT (deep venous thrombosis) ([facility] (9/23/2021)  Patient is on IV heparin drip. Noted blood pressure is better, and her platelet is improved, and there is no plan for intervention. We will change IV heparin to p.o. anticoagulant. ALLAN (acute kidney injury) ([facility] (9/30/2021)  Due to multiple factors, including hemodynamic instability. She is getting some IV fluid resuscitation now. Avoid potentially nephrotoxic agents. We will monitor renal function daily. Dispo/Discharge Planning: To be determined    Diet:  ADULT DIET Regular; 3 carb choices (45 gm/meal);  Low Sodium (2 gm)  ADULT ORAL NUTRITION SUPPLEMENT Dinner; Diabetic Supplement  DVT PPx: Already on heparin IV drip for the treatment of DVT  Code status: Full Code    Hospital Problems as of 10/1/2021 Date Reviewed: 9/21/2021        Codes Class Noted - Resolved POA    ALLAN (acute kidney injury) ([facility] ICD-10-CM: N17.9  ICD-9-CM: 584.9  9/30/2021 - Present Unknown        DVT (deep venous thrombosis) ([facility] ICD-10-CM: I82.409  ICD-9-CM: 453.40  9/23/2021 - Present Unknown        Thrombocytopenia ([facility] ICD-10-CM: D69.6  ICD-9-CM: 287.5  9/22/2021 - Present Unknown        Morbid obesity ([facility] ICD-10-CM: E66.01  ICD-9-CM: 278.01  9/18/2021 - Present Unknown        Suspected sleep apnea ICD-10-CM: R29.818  ICD-9-CM: 781.99  9/18/2021 - Present Unknown        Acute respiratory failure with hypoxia [facility]) ICD-10-CM: J96.01  ICD-9-CM: 518.81  9/16/2021 - Present Yes        * (Principal) Acute respiratory distress syndrome (ARDS) due to COVID-19 virus [facility]) ICD-10-CM: U07.1, J80  ICD-9-CM: 518.82, 079.89  9/14/2021 - Present Unknown        DM w/o complication type II (HCC) (Chronic) ICD-10-CM: E11.9  ICD-9-CM: 250.00  9/14/2021 - Present Yes        HTN (hypertension) (Chronic) ICD-10-CM: I10  ICD-9-CM: 401.9  9/14/2021 - Present Yes        RESOLVED: Acute respiratory failure ([address] ICD-10-CM: J96.00  ICD-9-CM: 518.81  9/14/2021 - 9/16/2021 Yes              Objective:     Patient Vitals for the past 24 hrs:   Temp Pulse Resp BP SpO2   10/01/21 0907 97.5 °F (36.4 °C) (!) 120 12 (!) 103/48 (!) 82 %   10/01/21 0530 98.5 °F (36.9 °C) (!) 121 25 103/62 (!) 86 %   10/01/21 0021 -- -- -- -- (!) 88 %   09/30/21 2311 98.9 °F (37.2 °C) (!) 121 25 121/75 (!) 88 %   09/30/21 2025 -- -- -- -- 95 %   09/30/21 2008 98.8 °F (37.1 °C) (!) 125 25 (!) 123/41 90 %   09/30/21 1820 -- -- -- -- 97 %   09/30/21 1555 98.3 °F (36.8 °C) (!) 125 25 (!) 112/57 92 %   09/30/21 1136 -- -- -- -- 98 %   09/30/21 1051 98.5 °F (36.9 °C) (!) 121 25 (!) 123/91 (!) 85 %     Oxygen Therapy  O2 Sat (%): (!) 82 % (10/01/21 0907)  Pulse via Oximetry: 121 beats per minute (09/30/21 2025)  O2 Device: Heated; Hi flow nasal cannula (10/01/21 0021)  Skin Assessment: Clean, dry, & intact (09/29/21 1904)  Skin Protection for O2 Device: No (09/26/21 1900)  O2 Flow Rate (L/min): 60 l/min (10/01/21 0021)  O2 Temperature: 87.8 °F (31 °C) (09/30/21 1820)  FIO2 (%): 100 % (10/01/21 0021)    Estimated body mass index is 64.53 kg/m² as calculated from the following:    Height as of this encounter: 5' 9\" (1.753 m). Weight as of this encounter: 198.2 kg (437 lb).     Intake/Output Summary (Last 24 hours) at 10/1/2021 0933  Last data filed at 10/1/2021 0531  Gross per 24 hour   Intake 840 ml   Output 3500 ml   Net -2660 ml         Physical Exam:     Visit Vitals  BP (!) 103/48 (BP 1 Location: Left lower arm, BP Patient Position: At rest)   Pulse (!) 120   Temp 97.5 °F (36.4 °C)   Resp 12   Ht 5' 9\" (1.753 m)   Wt (!) 198.2 kg (437 lb)   SpO2 (!) 82%   BMI 64.53 kg/m²        General:    Well nourished. BMI of 65. Patient sitting up in bed, on high flow oxygen. Appears shortness of breath with speaking. Head:  Normocephalic, atraumatic, pale   Eyes:  Sclerae appear normal.  Pupils equally round. ENT:  Nares appear normal, no drainage. Moist oral mucosa  Neck:  No restricted ROM. Trachea midline   CV:   RRR. No m/r/g. No jugular venous distension. Lungs:   Diminished breath sound. No wheezing, rhonchi, or rales. Shortness of breath. Abdomen: Bowel sounds present. Soft, nontender, nondistended. Extremities: No cyanosis or clubbing. No edema  Skin:     No rashes and normal coloration. Warm and dry. Neuro:  Cranial nerves II-XII grossly intact. Sensation intact. A&Ox3  Psych:  Normal mood and affect.       I have reviewed ordered lab tests and independently visualized imaging below:    Last 24hr Labs:  Recent Results (from the past 24 hour(s))   HEPARIN XA UFH    Collection Time: 09/30/21  9:44 AM   Result Value Ref Range    Heparin Xa UFH 0.22 (L) 0.3 - 0.7 IU/mL   GLUCOSE, POC    Collection Time: 09/30/21 10:47 AM   Result Value Ref Range    Glucose (POC) 178 (H) 65 - 100 mg/dL    Performed by [employer]    GLUCOSE, POC    Collection Time: 09/30/21  3:55 PM   Result Value Ref Range    Glucose (POC) 275 (H) 65 - 100 mg/dL    Performed by [employer]    HEPARIN XA UFH    Collection Time: 09/30/21  6:29 PM   Result Value Ref Range    Heparin Xa UFH 0.54 0.3 - 0.7 IU/mL   GLUCOSE, POC    Collection Time: 09/30/21  7:52 PM   Result Value Ref Range    Glucose (POC) 184 (H) 65 - 100 mg/dL    Performed by [facility] [place]    METABOLIC PANEL, BASIC    Collection Time: 10/01/21  1:33 AM   Result Value Ref Range    Sodium 136 136 - 145 mmol/L    Potassium 4.7 3.5 - 5.1 mmol/L    Chloride 105 98 - 107 mmol/L    CO2 24 21 - 32 mmol/L    Anion gap 7 7 - 16 mmol/L    Glucose 138 (H) 65 - 100 mg/dL    BUN 44 (H) 6 - 23 MG/DL    Creatinine 0.91 0.6 - 1.0 MG/DL    GFR est AA >60 >60 ml/min/1.73m2    GFR est non-AA >60 >60 ml/min/1.73m2    Calcium 9.4 8.3 - 10.4 MG/DL   PHOSPHORUS    Collection Time: 10/01/21  1:33 AM   Result Value Ref Range    Phosphorus 4.6 (H) 2.5 - 4.5 MG/DL   MAGNESIUM    Collection Time: 10/01/21  1:33 AM   Result Value Ref Range    Magnesium 1.9 1.8 - 2.4 mg/dL   CBC WITH AUTOMATED DIFF    Collection Time: 10/01/21  1:33 AM   Result Value Ref Range    WBC 15.9 (H) 4.3 - 11.1 K/uL    RBC 4.25 4.05 - 5.2 M/uL    HGB 8.2 (L) 11.7 - 15.4 g/dL    HCT 29.2 (L) 35.8 - 46.3 %    MCV 68.7 (L) 79.6 - 97.8 FL    MCH 19.3 (L) 26.1 - 32.9 PG    MCHC 28.1 (L) 31.4 - 35.0 g/dL    RDW 22.4 (H) 11.9 - 14.6 %    PLATELET 934 674 - 893 K/uL    MPV 10.9 9.4 - 12.3 FL    ABSOLUTE NRBC 0.00 0.0 - 0.2 K/uL    DF AUTOMATED      NEUTROPHILS 69 43 - 78 %    LYMPHOCYTES 21 13 - 44 %    MONOCYTES 8 4.0 - 12.0 %    EOSINOPHILS 0 (L) 0.5 - 7.8 %    BASOPHILS 0 0.0 - 2.0 %    IMMATURE GRANULOCYTES 2 0.0 - 5.0 %    ABS. NEUTROPHILS 10.9 (H) 1.7 - 8.2 K/UL    ABS. LYMPHOCYTES 3.3 0.5 - 4.6 K/UL    ABS. MONOCYTES 1.3 0.1 - 1.3 K/UL    ABS. EOSINOPHILS 0.0 0.0 - 0.8 K/UL    ABS. BASOPHILS 0.1 0.0 - 0.2 K/UL    ABS. IMM.  GRANS. 0.3 0.0 - 0.5 K/UL   HEPARIN XA UFH    Collection Time: 10/01/21  1:33 AM   Result Value Ref Range    Heparin Xa UFH 0.67 0.3 - 0.7 IU/mL   GLUCOSE, POC    Collection Time: 10/01/21  8:57 AM   Result Value Ref Range    Glucose (POC) 141 (H) 65 - 100 mg/dL    Performed by [employer]        [employer] Results     Procedure Component Value Units Date/Time    CULTURE, BLOOD [[id number]] Collected: 09/14/21 0759    Order Status: Completed Specimen: Blood Updated: 09/19/21 0637     Special Requests: --        LEFT  HAND       Culture result: NO GROWTH 5 DAYS       CULTURE, BLOOD [[id number]] Collected: 09/14/21 0757    Order Status: Completed Specimen: Blood Updated: 09/19/21 0637     Special Requests: --        RIGHT  Antecubital       Culture result: NO GROWTH 5 DAYS       CULTURE, RESPIRATORY/SPUTUM/BRONCH Zaira Genaro STAIN [[id number]] Collected: 09/14/21 0915    Order Status: Canceled Specimen: Sputum     COVID-19 RAPID TEST [[id number]]  (Abnormal) Collected: 09/14/21 0535    Order Status: Completed Specimen: Nasopharyngeal Updated: 09/14/21 0558     Specimen source NASAL        COVID-19 rapid test Detected        Comment:      The specimen is POSITIVE for SARS-CoV-2, the novel coronavirus associated with COVID-19. This test has been authorized by the FDA under an Emergency Use Authorization (EUA) for use by authorized laboratories. Fact sheet for Healthcare Providers: Trading Blockdate.co.nz  Fact sheet for Patients: Trading BlockdaUUCUN.co.nz       Methodology: Isothermal Nucleic Acid Amplification  RESULTS VERIFIED, PHONED TO AND READ BACK BY   FERDINAND BENOIT AT 5063 ON 9/14/21 ACL               Other Studies:  No results found.     Current Meds:  Current Facility-Administered Medications   Medication Dose Route Frequency    0.9% sodium chloride infusion  50 mL/hr IntraVENous CONTINUOUS    insulin glargine (LANTUS) injection 15 Units  15 Units SubCUTAneous DAILY    insulin lispro (HUMALOG) injection 5 Units  5 Units SubCUTAneous TIDAC    insulin lispro (HUMALOG) injection   SubCUTAneous AC&HS    heparin 25,000 units in dextrose 500 mL infusion  18-36 Units/kg/hr IntraVENous TITRATE    dexamethasone (DECADRON) 10 mg/mL injection 6 mg  6 mg IntraVENous Q24H    NUTRITIONAL SUPPORT ELECTROLYTE PRN ORDERS   Does Not Apply PRN    hydrALAZINE (APRESOLINE) 20 mg/mL injection 10 mg  10 mg IntraVENous Q6H PRN    morphine injection 1 mg  1 mg IntraVENous Q4H PRN    LORazepam (ATIVAN) injection 0.5 mg  0.5 mg IntraVENous Q6H PRN    phenol throat spray (CHLORASEPTIC) 1 Spray  1 Spray Oral PRN    pantoprazole (PROTONIX) tablet 40 mg  40 mg Oral ACB    sodium chloride (NS) flush 30 mL  30 mL InterCATHeter Q8H    sodium chloride (NS) flush 30 mL  30 mL InterCATHeter PRN    acetaminophen (TYLENOL) tablet 650 mg  650 mg Oral Q6H PRN    Or    acetaminophen (TYLENOL) suppository 650 mg  650 mg Rectal Q6H PRN    polyethylene glycol (MIRALAX) packet 17 g  17 g Oral DAILY PRN    ondansetron (ZOFRAN ODT) tablet 4 mg  4 mg Oral Q8H PRN    Or    ondansetron (ZOFRAN) injection 4 mg  4 mg IntraVENous Q6H PRN       Signed:  Denis Cook MD    Part of this note may have been written by using a voice dictation software. The note has been proof read but may still contain some grammatical/other typographical errors.

## 2021-10-01 NOTE — PROGRESS NOTES
Comprehensive Nutrition Assessment    Type and Reason for Visit: Reassess  TPN Management (Pulmonary) TPN discontinued 9/29    Nutrition Recommendations/Plan:   Meals and Snacks:  Continue current diet. Nutrition Supplement Therapy:   Medical food supplement therapy:  Change to Glucerna Shake three times per day (this provides 220 kcal and 10 grams protein per bottle)     Malnutrition Assessment:  Malnutrition Status: At risk for malnutrition (specify) (minimal po intake since 9/16 d/t increased O2 needs/need for BIPAP)    Nutrition Assessment:   Nutrition History: 9/20:Unable to obtain      Nutrition Background: H/O: DM type II, HTN, severe MO. Presented with cough, cold flu like symptoms fever and chills   Onset of symptoms 9/12. Admitted d/t hypoxic respiratory faikure d/t COVID  Daily Update: Attempted to call pt x2. Spoke with Mary Velasquez RN. RN reported pt with >75% of intake for breakfast this AM. RN also stated pt attempted to eat lunch at bedside, however oxygen saturation decreased into ~70s. Repositioned pt back in bed, pt declined to eat after this event. RN reported plan to set pt with tray table over bed for dinner tonight. Unknown intake of supplements. Will increase ONS to TID. Abdominal Status (last documented): Obese abdomen with Active  bowel sounds. Last BM 09/29/21.   Pertinent Labs:  Lab Results   Component Value Date/Time    Sodium 136 10/01/2021 01:33 AM    Potassium 4.7 10/01/2021 01:33 AM    Chloride 105 10/01/2021 01:33 AM    CO2 24 10/01/2021 01:33 AM    Anion gap 7 10/01/2021 01:33 AM    Glucose 138 (H) 10/01/2021 01:33 AM    BUN 44 (H) 10/01/2021 01:33 AM    Creatinine 0.91 10/01/2021 01:33 AM    Calcium 9.4 10/01/2021 01:33 AM    Albumin 2.7 (L) 09/24/2021 02:02 AM    Magnesium 1.9 10/01/2021 01:33 AM    Phosphorus 4.6 (H) 10/01/2021 01:33 AM     Lab Results   Component Value Date/Time    Glucose 138 (H) 10/01/2021 01:33 AM    Glucose (POC) 200 (H) 10/01/2021 11:16 AM    Glucose (POC) 141 (H) 10/01/2021 08:57 AM    Glucose (POC) 184 (H) 09/30/2021 07:52 PM    Glucose (POC) 275 (H) 09/30/2021 03:55 PM    Glucose (POC) 178 (H) 09/30/2021 10:47 AM    Glucose (POC) 124 (H) 09/30/2021 06:50 AM       Nutrition Related Findings:   9/16: Triple lumen PICC placed 9/19: TPN of 1L/d 5%Dex/4.25%AA started, 9/20: TPN advanced to 1L/d of 14%DEX/8%AA, 9/23: TPN advanced to goal of1 .56 L/d (9/29) transitioned to Airvo, ate 100% of breakfast, stopping TPN. Current Nutrition Therapies:  ADULT DIET Regular; 3 carb choices (45 gm/meal); Low Sodium (2 gm)  ADULT ORAL NUTRITION SUPPLEMENT Dinner; Diabetic Supplement    Current Intake:   Average Meal Intake: % (breakfast per RN) Average Supplement Intake: 51-75%      Anthropometric Measures:  Height: 5' 9\" (175.3 cm)  Current Body Wt: 198.2 kg (436 lb 15.2 oz) (9/29), Weight source: Stated  BMI: 64.5, Obese class 3 (BMI 40.0 or greater)  Admission Body Weight: 461 lb 10.3 oz (standing scale)  Ideal Body Weight (lbs) (Calculated): 145 lbs (66 kg),    Usual Body Wt: 205.9 kg (454 lb) (per EMR 2019 to 2020), Percent weight change: 0.3          Edema: No data recorded   Estimated Daily Nutrient Needs:  Energy (kcal/day): 6593-0228 (Kcal/kg (22-25), Weight Used: Ideal (65.9 kg))  Protein (g/day): 80-99 (1.2-1.5) Weight Used: (Ideal (66kg))  Fluid (ml/day):   ( (Minimize))    Nutrition Diagnosis:   · Inadequate oral intake related to impaired respiratory function as evidenced by  (waxing and waning intake r/t oxygen demands)     Nutrition Interventions:   Food and/or Nutrient Delivery: Continue current diet, Continue oral nutrition supplement     Coordination of Nutrition Care: Continue to monitor while inpatient  Plan of Care discussed with Chandler Cheung RN    Goals:   Previous Goal Met: Progressing toward goal(s)  Active Goal: Meet >75% estimated nutrition needs by RD follow up.     Nutrition Monitoring and Evaluation:      Food/Nutrient Intake Outcomes: Food and nutrient intake, Supplement intake  Physical Signs/Symptoms Outcomes: Hemodynamic status, Meal time behavior    Discharge Planning:     Too soon to determine    Yanet Gaxiola RD, LD  [phone]    Disaster Mode Active

## 2021-10-01 NOTE — PROGRESS NOTES
ACUTE PHYSICAL THERAPY GOALS:  (Developed with and agreed upon by patient and/or caregiver. )  LTG:  (1.)Ms. Brigido Burrows will move from supine to sit and sit to supine , scoot up and down and roll side to side in bed with INDEPENDENT within 7 treatment day(s). (2.)Ms. Brigido Burrows will transfer from bed to chair and chair to bed with INDEPENDENT using the least restrictive device within 7 treatment day(s). (3.)Ms. Brigido Burrows will ambulate with INDEPENDENT for 100 feet with the least restrictive device within 7 treatment day(s). (4.)Ms. Brigido Burrows will tolerate at least 23 min of dynamic standing activity to assist standing ADLs with the least restrictive device within 7 treatment days. (5.)Ms. Brigido Burrows will climb at least 12 steps with MODIFIED INDEPENDENCE with the least restrictive device within 7 treatment days. PHYSICAL THERAPY: Daily Note and AM Treatment Day # 6    Dea Hernandez is a 23 y.o. female   PRIMARY DIAGNOSIS: Acute respiratory distress syndrome (ARDS) due to COVID-19 virus ([facility]  [facility] [J96.00]         ASSESSMENT:     REHAB RECOMMENDATIONS: CURRENT LEVEL OF FUNCTION:  (Most Recently Demonstrated)   Recommendation to date pending progress:  Setting:   No further skilled therapy   Equipment:    None Bed Mobility:   Standby Assistance  Sit to Stand:  [employer] Assistance  Transfers:   Standby Assistance  Gait/Mobility:   Not tested     ASSESSMENT:  Ms. Brigido Burrows was supine at arrival, agreed to participate. Pt was valencia to get to EOB with SBA. Sat for 3min with sats above 88%. She stood and side stepped 2ft and sat again. Sat level down to 70% and added mask with 15L taking less than 2min to increase to 90%. She sat EOB with mask, but participated in OT activity with mask off and staying above 88%. She stayed above 88% sitting EOB including while starting to eat lunch for 3min. Left sitting EOB to eat lunch, on airvo, sat level above 88% while eating.   Notified RN of EOB position for meal and will call for BBTB. Pt. Cont. To mobilize below her baseline & benefits from cont. PT to address.      SUBJECTIVE:   Ms. Kadie Cabrera states, \" it feels good getting up brushing teeth\"    SOCIAL HISTORY/ LIVING ENVIRONMENT: see eval  Home Environment: Independent living  One/Two Story Residence: Other (Comment) (one story apartment; lives on 3rd floor)  Living Alone: Yes  Support Systems: Other Family Member(s)  OBJECTIVE:     PAIN: VITAL SIGNS: LINES/DRAINS:   Pre Treatment: Pain Screen  Pain Scale 1: Numeric (0 - 10)  Pain Intensity 1: 00  Post Treatment: 0  HR   O2 89%-78% Continuous Pulse Oximetry, IV, Purewick and %  O2 Device: Heated, Hi flow nasal cannula (NRB added)     MOBILITY: I Mod I S SBA CGA Min Mod Max Total  NT x2 Comments:   Bed Mobility    Rolling [] [] [] [x] [] [x] [] [] [] [] []    Supine to Sit [] [] [] [x] [] [] [] [] [] [] []    Scooting [] [] [] [x] [] [] [] [] [] [] []    Sit to Supine [] [] [] [] [] [] [] [] [] [] []    Transfers    Sit to Stand [] [] [] [x] [] [] [] [] [] [] []    Bed to Chair [] [] [] [] [] [] [] [] [] [] []    Stand to Sit [] [] [] [x] [] [] [] [] [] [] []    I=Independent, Mod I=Modified Independent, S=Supervision, SBA=Standby Assistance, CGA=Contact Guard Assistance,   Min=Minimal Assistance, Mod=Moderate Assistance, Max=Maximal Assistance, Total=Total Assistance, NT=Not Tested    BALANCE: Good Fair+ Fair Fair- Poor NT Comments   Sitting Static [x] [] [] [] [] [x]    Sitting Dynamic [x] [] [] [] [] [x]              Standing Static [x] [] [] [] [] [x]    Standing Dynamic [x] [] [] [] [] [x]      GAIT: I Mod I S SBA CGA Min Mod Max Total  NT x2 Comments:   Level of Assistance [] [] [] [] [] [] [] [] [] [x] [] BIPAP   Distance NT    DME HHA    Gait Quality N/A    Weightbearing  Status N/A     I=Independent, Mod I=Modified Independent, S=Supervision, SBA=Standby Assistance, CGA=Contact Guard Assistance,   Min=Minimal Assistance, Mod=Moderate Assistance, Max=Maximal Assistance, Total=Total Assistance, NT=Not Tested    PLAN:   FREQUENCY/DURATION: PT Plan of Care: 3 times/week for duration of hospital stay or until stated goals are met, whichever comes first.  TREATMENT:     TREATMENT:   ($$ Therapeutic Activity: 23-37 mins    )  Therapeutic Activity (24 Minutes): Therapeutic activity included Rolling, Supine to Sit, Sit to Supine, Scooting, Lateral Scooting, Transfer Training, Sitting balance  and Standing balance to improve functional Mobility, Strength, Activity tolerance and end.           TREATMENT GRID:  N/A    AFTER TREATMENT POSITION/PRECAUTIONS:  Chair, Needs within reach and RN notified    INTERDISCIPLINARY COLLABORATION:  RN/PCT, PT/PTA and OT/LANGLEY    TOTAL TREATMENT DURATION:  PT Patient Time In/Time Out  Time In: 1125  Time Out: 1150    Aby Cárdenas DPT

## 2021-10-01 NOTE — PROGRESS NOTES
ACUTE OT GOALS:  (Developed with and agreed upon by patient and/or caregiver.)  1) Patient will complete lower body bathing and dressing with MOD I and adaptive equipment as needed. CONTINUE TO ADDRESS (2021)  2) Patient will complete toileting with MOD I. - UPDATED 2021  3) Patient will complete functional transfers with  MOD I and adaptive equipment as needed. -  UPDATED 2021  4) Patient will tolerate at least 25 minutes of OT activity with 1-2 rest breaks while maintaining O2 sats >90%. - CONTINUE TO ADDRESS (2021)  5) Patient will verbalize at least 3 energy conservation technique to utilize during ADL/IADL. - CONTINUE TO ADDRESS (2021)     NEW GOALS (ADDED 2021)  1. Patient will complete functional activity with MOD I and adaptive equipment as needed. Timeframe: 7 visits     OCCUPATIONAL THERAPY: Daily Note OT Treatment Day # 5    Natalio Patiño is a 23 y.o. female   PRIMARY DIAGNOSIS: Acute respiratory distress syndrome (ARDS) due to COVID-19 virus ([facility] [address]  Acute respiratory failure ([facility] [J96.00]       Payor: /   ASSESSMENT:     REHAB RECOMMENDATIONS: CURRENT LEVEL OF FUNCTION:  (Most Recently Demonstrated)   Recommendation to date pending progress:  [address] [place] [address]  Equipment:    To Be Determined Bathing:   Not tested   Dressing:   Not tested   Feeding/Grooming:   Set Up  Toileting:   Not tested  Functional Mobility:   Not tested     ASSESSMENT:  Ms. Alicia Gallo continues to present with deficits in overall strength, activity tolerance, ADL performance, and functional mobility. Pt was sitting EOB working with PT. Pt completed grooming with set up. Pt was left sitting EOB eating lunch. Continue POC. SUBJECTIVE:   Ms. Alicia Gallo states, \"I would like to brush my teeth. \"     SOCIAL HISTORY/LIVING ENVIRONMENT: See initial eval  Home Environment: Independent living  One/Two Story Residence: Other (Comment) (one story apartment; lives on 3rd floor)  Living Alone:  Yes  Support Systems: Other Family Member(s)    OBJECTIVE:     PAIN: VITAL SIGNS: LINES/DRAINS:   Pre Treatment: Pain Screen  Pain Scale 1: Numeric (0 - 10)  Pain Intensity 1: 0  Post Treatment: 0   Pelayo Catheter and IV  O2 Device: Heated, Hi flow nasal cannula (NRB added)     ACTIVITIES OF DAILY LIVING: I Mod I S SBA CGA Min Mod Max Total NT Comments   BASIC ADLs:              Bathing/ Showering [] [] [] [] [] [] [] [] [] [x]    Toileting [] [] [] [] [] [] [] [] [] [x]    Dressing [] [] [] [] [] [] [] [] [] [x]    Feeding [] [] [] [] [] [] [] [] [] [x]    Grooming [] [] [x] [] [] [] [] [] [] [] Brush teeth and wash face    Personal Device Care [] [] [] [] [] [] [] [] [] [x]    Functional Mobility [] [] [] [] [] [] [] [] [] [x]    I=Independent, Mod I=Modified Independent, S=Supervision, SBA=Standby Assistance, CGA=Contact Guard Assistance,   Min=Minimal Assistance, Mod=Moderate Assistance, Max=Maximal Assistance, Total=Total Assistance, NT=Not Tested    MOBILITY: I Mod I S SBA CGA Min Mod Max Total  NT x2 Comments:   Supine to sit [] [] [] [] [] [] [] [] [] [x] []    Sit to supine [] [] [] [] [] [] [] [] [] [x] []    Sit to stand [] [] [] [] [] [] [] [] [] [x] []    Bed to chair [] [] [] [] [] [] [] [] [] [x] []    I=Independent, Mod I=Modified Independent, S=Supervision, SBA=Standby Assistance, CGA=Contact Guard Assistance,   Min=Minimal Assistance, Mod=Moderate Assistance, Max=Maximal Assistance, Total=Total Assistance, NT=Not Tested    BALANCE: Good Fair+ Fair Fair- Poor NT Comments   Sitting Static [x] [x] [] [] [] []    Sitting Dynamic [] [x] [] [] [] []              Standing Static [] [] [] [] [] [x]    Standing Dynamic [] [] [] [] [] [x]      PLAN:   FREQUENCY/DURATION: OT Plan of Care: 3 times/week for duration of hospital stay or until stated goals are met, whichever comes first.    TREATMENT:   TREATMENT:   ($$ Self Care/Home Management: 8-22 mins    )  Self Care (15 Minutes): Self care including Grooming and Energy Conservation Training to increase independence and decrease level of assistance required.     TREATMENT GRID:  N/A    AFTER TREATMENT POSITION/PRECAUTIONS:  Bed, Needs within reach, RN notified and sitting EOB eating lunch     INTERDISCIPLINARY COLLABORATION:  RN/PCT, PT/PTA and OT/LANGLEY    TOTAL TREATMENT DURATION:  OT Patient Time In/Time Out  Time In: 1140  Time Out: [address] [place] [address]

## 2021-10-02 NOTE — PROGRESS NOTES
Pt placed on V60 CPAP 12 cmH2O:       10/02/21 0008   Oxygen Therapy   O2 Sat (%) 96 %   Pulse via Oximetry 112 beats per minute   O2 Device CPAP mask   FIO2 (%) 65 %   CPAP/BIPAP   CPAP/BIPAP Start/Stop On   Device Mode CPAP   Mask Type and Size Full face   Skin Condition Intact   PIP Observed 15 cm H20   IPAP (cm H2O) 12 cm H2O   EPAP (cm H2O) 12 cm H2O   Vt Spont (ml) 875 ml   Ve Observed (l/min) 20.2 l/min   Total RR (Spontaneous) 23 breaths per minute   Alarm Settings   High Pressure 30   Low Pressure 5   Apnea 20   Low Ve 3   High Rate 50   Low Rate 8

## 2021-10-02 NOTE — PROGRESS NOTES
Patient on Airvo 60L/100%. Hourly rounds performed this shift. Bed lowered and locked. Call light within reach. All needs met at this time. Bedside shift report will be given to oncoming nurse.

## 2021-10-02 NOTE — PROGRESS NOTES
Hospitalist Progress Note   Admit Date:  2021  5:26 AM   Name:  Thresa Collet   Age:  23 y.o. Sex:  female  :  1995   MRN:  [id number]   Room:  [facility]/    Presenting Complaint: Shortness of Breath    Reason(s) for Admission: Acute respiratory failure [facility]) [J96.00]     Hospital Course & Interval History:     Patient was admitted due to acute respiratory failure due to COVID-19 infection. She was admitted from . She was treated with Decadron and remdesivir. Initially she required oxygen via nasal cannula, but later she had to have BiPAP treatment she received Actemra on . She made it clear that she would not want intubation. Her mother confirmed with that. She has improved on treatment now on high flow oxygen and move out of ICU. CT of the chest is nondiagnostic for pulmonary embolism due to suboptimal opacification of the pulmonary arteries. Some peripheral consolidation in the paced segment right upper lobe and right lung base, there is possibility of pulmonary infarct. Patient is found to have left calf vein thrombosis. She was on Lovenox. She is now on IV heparin drip. Subjective (10/02/21):  21   She is complaining of feeling lightheaded. She does not have appetite. No headache. She is feeling weak. He does not eat much. Patient has been on heparin IV drip. No report of passing blood per rectum, or blood per urine. 10/1/21   Patient's blood pressure has improved compared to yesterday she denies feeling lightheaded today. She is on CPAP this morning and she wears it from last night. She has been in bed. Not much ambulation. Still on 60 L/min of oxygen. 10/2/21   Patient is feeling better. She is eating well this morning. Still requires 60 L/min oxygen but not as short of breath as of yesterday. She is talking better. No lightheadedness.      Assessment & Plan:     Principal Problem:    Acute respiratory distress syndrome (ARDS) due to COVID-19 virus ([facility] [address] (9/14/2021)  Patient was on BiPAP. Now off BiPAP and on high flow oxygen at 60 L/min. On Decadron 6 mg IV once a day. Patient completed remdesivir  She received Actemra as well  We will try to wean oxygen down, as tolerated. Blood pressure is improved. No lightheadedness. We will continue to monitor her symptoms and wean oxygen down. Active Problems:    DM w/o complication type II ([facility] [address] (9/14/2021)  Blood sugar is in 100s. Continue to cover with sliding scale with short acting insulin. Patient is on Lantus 15 units subcu once a day. HTN (hypertension) (9/14/2021)  Blood pressure is currently at 121/69 mmHg. Monitor. Acute respiratory failure with hypoxia ([facility] [address] (9/16/2021)  Improving from admission time. Patient is off BiPAP, on high flow oxygen. Morbid obesity ([facility] [address] (9/18/2021)     Suspected sleep apnea (9/18/2021)     Thrombocytopenia ([facility] [address] (9/22/2021)  Resolved      DVT [facility]deep venous thrombosis) [facility] (9/23/2021)  Patient is on IV heparin drip. Noted blood pressure is better, and her platelet is improved, and there is no plan for intervention. We will change IV heparin to p.o. anticoagulant. ALLAN (acute kidney injury) ([facility] (9/30/2021)  Due to multiple factors, including hemodynamic instability. She is getting some IV fluid resuscitation now. Avoid potentially nephrotoxic agents. Renal function is now normalized. Dispo/Discharge Planning: To be determined    Diet:  ADULT DIET Regular; 3 carb choices (45 gm/meal);  Low Sodium (2 gm)  ADULT ORAL NUTRITION SUPPLEMENT Dinner, Breakfast, Lunch; Diabetic Supplement  DVT PPx: on Apixaban   Code status: Full Code    Hospital Problems as of 10/2/2021 Date Reviewed: 9/21/2021        Codes Class Noted - Resolved POA    ALLAN (acute kidney injury) ([facility] ICD-10-CM: N17.9  ICD-9-CM: 584.9  9/30/2021 - Present Unknown        DVT (deep venous thrombosis) ([facility] [address] ICD-10-CM: I82.409  ICD-9-CM: 453.40  9/23/2021 - Present Unknown        Thrombocytopenia ([place] [address].) ICD-10-CM: D69.6  ICD-9-CM: 287.5  9/22/2021 - Present Unknown        Morbid obesity ([facility] ICD-10-CM: E66.01  ICD-9-CM: 278.01  9/18/2021 - Present Unknown        Suspected sleep apnea ICD-10-CM: R29.818  ICD-9-CM: 781.99  9/18/2021 - Present Unknown        Acute respiratory failure with hypoxia [facility]) ICD-10-CM: J96.01  ICD-9-CM: 518.81  9/16/2021 - Present Yes        * (Principal) Acute respiratory distress syndrome (ARDS) due to COVID-19 virus [facility]) ICD-10-CM: U07.1, J80  ICD-9-CM: 518.82, 079.89  9/14/2021 - Present Unknown        DM w/o complication type II (HCC) (Chronic) ICD-10-CM: E11.9  ICD-9-CM: 250.00  9/14/2021 - Present Yes        HTN (hypertension) (Chronic) ICD-10-CM: I10  ICD-9-CM: 401.9  9/14/2021 - Present Yes        RESOLVED: Acute respiratory failure ([place] [address].) ICD-10-CM: J96.00  ICD-9-CM: 518.81  9/14/2021 - 9/16/2021 Yes              Objective:     Patient Vitals for the past 24 hrs:   Temp Pulse Resp BP SpO2   10/02/21 0808 98.4 °F (36.9 °C) (!) 114 22 121/69 94 %   10/02/21 0400 -- (!) 110 -- -- --   10/02/21 0337 98.2 °F (36.8 °C) (!) 115 20 (!) 134/91 95 %   10/02/21 0008 -- -- -- -- 96 %   10/02/21 0000 -- (!) 115 -- -- --   10/01/21 2329 98.4 °F (36.9 °C) (!) 114 18 123/75 94 %   10/01/21 2137 -- -- -- -- 98 %   10/01/21 1948 98.8 °F (37.1 °C) (!) 119 20 124/88 92 %   10/01/21 1508 98.3 °F (36.8 °C) (!) 114 20 130/84 94 %   10/01/21 1200 -- -- -- -- 90 %   10/01/21 1130 98.2 °F (36.8 °C) (!) 118 16 103/72 (!) 88 %     Oxygen Therapy  O2 Sat (%): 94 % (10/02/21 0808)  Pulse via Oximetry: 112 beats per minute (10/02/21 0008)  O2 Device: BIPAP (10/02/21 0724)  Skin Assessment: Clean, dry, & intact (10/02/21 0724)  Skin Protection for O2 Device: Yes (10/01/21 1508)  Orientation: Bilateral (10/01/21 1508)  Location: Cheek (10/01/21 1508)  O2 Flow Rate (L/min): 60 l/min (10/01/21 2137)  O2 Temperature: 87.8 °F (31 °C) (10/01/21 2137)  FIO2 (%): 65 % (10/02/21 0008)    Estimated body mass index is 64.53 kg/m² as calculated from the following:    Height as of this encounter: 5' 9\" (1.753 m). Weight as of this encounter: 198.2 kg (437 lb). Intake/Output Summary (Last 24 hours) at 10/2/2021 1119  Last data filed at 10/2/2021 9159  Gross per 24 hour   Intake --   Output 2500 ml   Net -2500 ml         Physical Exam:     Visit Vitals  /69 (BP 1 Location: Left upper arm, BP Patient Position: At rest)   Pulse (!) 114   Temp 98.4 °F (36.9 °C)   Resp 22   Ht 5' 9\" (1.753 m)   Wt (!) 198.2 kg (437 lb)   SpO2 94%   BMI 64.53 kg/m²        General:    Well nourished. BMI of 65. Patient sitting up in bed, on high flow oxygen. Appears less shortness of breath with speaking. Head:  Normocephalic, atraumatic, pale   Eyes:  Sclerae appear normal.  Pupils equally round. ENT:  Nares appear normal, no drainage. Moist oral mucosa  Neck:  No restricted ROM. Trachea midline   CV:   RRR. No m/r/g. No jugular venous distension. Lungs:   Diminished breath sound. No wheezing, rhonchi, or rales. Shortness of breath. Abdomen: Bowel sounds present. Soft, nontender, nondistended. Extremities: No cyanosis or clubbing. No edema  Skin:     No rashes and normal coloration. Warm and dry. Neuro:  Cranial nerves II-XII grossly intact. Sensation intact. A&Ox3  Psych:  Normal mood and affect.       I have reviewed ordered lab tests and independently visualized imaging below:    Last 24hr Labs:  Recent Results (from the past 24 hour(s))   GLUCOSE, POC    Collection Time: 10/01/21  4:00 PM   Result Value Ref Range    Glucose (POC) 216 (H) 65 - 100 mg/dL    Performed by McCallApril    GLUCOSE, POC    Collection Time: 10/01/21  9:32 PM   Result Value Ref Range    Glucose (POC) 164 (H) 65 - 100 mg/dL    Performed by Jean CarlosFunmiADN    HEPARIN XA UFH    Collection Time: 10/02/21  2:05 AM   Result Value Ref Range    Heparin Xa UFH >1.1 (H) 0.3 - 0.7 IU/mL   CBC WITH AUTOMATED DIFF    Collection Time: 10/02/21  2:05 AM   Result Value Ref Range    WBC 14.8 (H) 4.3 - 11.1 K/uL    RBC 4.28 4.05 - 5.2 M/uL    HGB 8.6 (L) 11.7 - 15.4 g/dL    HCT 29.5 (L) 35.8 - 46.3 %    MCV 68.9 (L) 79.6 - 97.8 FL    MCH 20.1 (L) 26.1 - 32.9 PG    MCHC 29.2 (L) 31.4 - 35.0 g/dL    RDW 22.0 (H) 11.9 - 14.6 %    PLATELET 882 874 - 312 K/uL    MPV 10.3 9.4 - 12.3 FL    ABSOLUTE NRBC 0.00 0.0 - 0.2 K/uL    DF AUTOMATED      NEUTROPHILS 72 43 - 78 %    LYMPHOCYTES 17 13 - 44 %    MONOCYTES 9 4.0 - 12.0 %    EOSINOPHILS 0 (L) 0.5 - 7.8 %    BASOPHILS 0 0.0 - 2.0 %    IMMATURE GRANULOCYTES 2 0.0 - 5.0 %    ABS. NEUTROPHILS 10.6 (H) 1.7 - 8.2 K/UL    ABS. LYMPHOCYTES 2.5 0.5 - 4.6 K/UL    ABS. MONOCYTES 1.4 (H) 0.1 - 1.3 K/UL    ABS. EOSINOPHILS 0.0 0.0 - 0.8 K/UL    ABS. BASOPHILS 0.1 0.0 - 0.2 K/UL    ABS. IMM.  GRANS. 0.3 0.0 - 0.5 K/UL   METABOLIC PANEL, BASIC    Collection Time: 10/02/21  2:05 AM   Result Value Ref Range    Sodium 134 (L) 136 - 145 mmol/L    Potassium 4.8 3.5 - 5.1 mmol/L    Chloride 101 98 - 107 mmol/L    CO2 27 21 - 32 mmol/L    Anion gap 6 (L) 7 - 16 mmol/L    Glucose 130 (H) 65 - 100 mg/dL    BUN 29 (H) 6 - 23 MG/DL    Creatinine 0.73 0.6 - 1.0 MG/DL    GFR est AA >60 >60 ml/min/1.73m2    GFR est non-AA >60 >60 ml/min/1.73m2    Calcium 9.5 8.3 - 10.4 MG/DL   GLUCOSE, POC    Collection Time: 10/02/21  5:24 AM   Result Value Ref Range    Glucose (POC) 136 (H) 65 - 100 mg/dL    Performed by [address] [place] [address], POC    Collection Time: 10/02/21  7:46 AM   Result Value Ref Range    Glucose (POC) 126 (H) 65 - 100 mg/dL    Performed by Carmelo Salvador        All Micro Results     Procedure Component Value Units Date/Time    CULTURE, BLOOD [[id number]] Collected: 09/14/21 0759    Order Status: Completed Specimen: Blood Updated: 09/19/21 0637     Special Requests: --        LEFT  HAND       Culture result: NO GROWTH 5 DAYS       CULTURE, BLOOD [[id number]] Collected: 09/14/21 0757    Order Status: Completed Specimen: Blood Updated: 09/19/21 0637     Special Requests: --        RIGHT  Antecubital       Culture result: NO GROWTH 5 DAYS       CULTURE, RESPIRATORY/SPUTUM/BRONCH Lorrayne Reveal STAIN [[id number]] Collected: 09/14/21 0915    Order Status: Canceled Specimen: Sputum     COVID-19 RAPID TEST [[id number]]  (Abnormal) Collected: 09/14/21 0535    Order Status: Completed Specimen: Nasopharyngeal Updated: 09/14/21 0558     Specimen source NASAL        COVID-19 rapid test Detected        Comment:      The specimen is POSITIVE for SARS-CoV-2, the novel coronavirus associated with COVID-19. This test has been authorized by the FDA under an Emergency Use Authorization (EUA) for use by authorized laboratories. Fact sheet for Healthcare Providers: ConventionUpdate.co.nz  Fact sheet for Patients: ConventionUpdate.co.nz       Methodology: Isothermal Nucleic Acid Amplification  RESULTS VERIFIED, PHONED TO AND READ BACK BY   FERDINAND BENOIT AT 0897 ON 9/14/21 ACL               Other Studies:  No results found.     Current Meds:  Current Facility-Administered Medications   Medication Dose Route Frequency    apixaban (ELIQUIS) tablet 10 mg  10 mg Oral BID    [START ON 10/8/2021] apixaban (ELIQUIS) tablet 5 mg  5 mg Oral BID    0.9% sodium chloride infusion  50 mL/hr IntraVENous CONTINUOUS    insulin glargine (LANTUS) injection 15 Units  15 Units SubCUTAneous DAILY    insulin lispro (HUMALOG) injection 5 Units  5 Units SubCUTAneous TIDAC    insulin lispro (HUMALOG) injection   SubCUTAneous AC&HS    dexamethasone (DECADRON) 10 mg/mL injection 6 mg  6 mg IntraVENous Q24H    NUTRITIONAL SUPPORT ELECTROLYTE PRN ORDERS   Does Not Apply PRN    hydrALAZINE (APRESOLINE) 20 mg/mL injection 10 mg  10 mg IntraVENous Q6H PRN    morphine injection 1 mg  1 mg IntraVENous Q4H PRN    LORazepam (ATIVAN) injection 0.5 mg  0.5 mg IntraVENous Q6H PRN    phenol throat spray (CHLORASEPTIC) 1 Spray  1 Spray Oral PRN    pantoprazole (PROTONIX) tablet 40 mg  40 mg Oral ACB    sodium chloride (NS) flush 30 mL  30 mL InterCATHeter Q8H    sodium chloride (NS) flush 30 mL  30 mL InterCATHeter PRN    acetaminophen (TYLENOL) tablet 650 mg  650 mg Oral Q6H PRN    Or    acetaminophen (TYLENOL) suppository 650 mg  650 mg Rectal Q6H PRN    polyethylene glycol (MIRALAX) packet 17 g  17 g Oral DAILY PRN    ondansetron (ZOFRAN ODT) tablet 4 mg  4 mg Oral Q8H PRN    Or    ondansetron (ZOFRAN) injection 4 mg  4 mg IntraVENous Q6H PRN       Signed:  Cinthia Mccollum MD    Part of this note may have been written by using a voice dictation software. The note has been proof read but may still contain some grammatical/other typographical errors.

## 2021-10-02 NOTE — PROGRESS NOTES
Pt on Opti-Flow Heated High-Flow nasal cannula with Non-Rebreather mask:       10/01/21 2137   Oxygen Therapy   O2 Sat (%) 98 %   Pulse via Oximetry 113 beats per minute   O2 Device Heated; Hi flow nasal cannula; Non-rebreather mask   O2 Flow Rate (L/min) 60 l/min   O2 Temperature 87.8 °F (31 °C)   FIO2 (%) 100 %

## 2021-10-02 NOTE — PROGRESS NOTES
Pt currently resting on Cpap mask, O2 sat >90%. Pt denies needs at this time. Hourly rounds completed, bed in low locked position, call light within reach, and all needs met at this time. End of shift report given to on coming nurse.

## 2021-10-03 NOTE — PROGRESS NOTES
Patient on Airfvo 60L/100. O2 Sat in the high 90s. Hourly rounds performed this shift. Bed lowered and locked. Call light within reach. All needs met at this time. Bedside shift report given to oncoming nurse.

## 2021-10-03 NOTE — PROGRESS NOTES
Pt placed on CPAP:       10/02/21 2340   Oxygen Therapy   O2 Sat (%) 100 %   Pulse via Oximetry 120 beats per minute   O2 Device CPAP mask   CPAP/BIPAP   CPAP/BIPAP Start/Stop On   Device Mode CPAP   Mask Type and Size Full face   Skin Condition Intact   PIP Observed 14 cm H20   IPAP (cm H2O) 12 cm H2O   EPAP (cm H2O) 12 cm H2O   Vt Spont (ml) 844 ml   Ve Observed (l/min) 18.4 l/min   Total RR (Spontaneous) 22 breaths per minute   Alarm Settings   High Pressure 30   Low Pressure 5   Apnea 20   Low Ve 3   High Rate 50   Low Rate 8

## 2021-10-03 NOTE — PROGRESS NOTES
Problem: Risk for Spread of Infection  Goal: Prevent transmission of infectious organism to others  Description: Prevent the transmission of infectious organisms to other patients, staff members, and visitors. Outcome: Progressing Towards Goal     Problem: Patient Education:  Go to Education Activity  Goal: Patient/Family Education  Outcome: Progressing Towards Goal     Problem: Airway Clearance - Ineffective  Goal: Achieve or maintain patent airway  Outcome: Progressing Towards Goal     Problem: Gas Exchange - Impaired  Goal: Absence of hypoxia  Outcome: Progressing Towards Goal  Goal: Promote optimal lung function  Outcome: Progressing Towards Goal     Problem: Breathing Pattern - Ineffective  Goal: Ability to achieve and maintain a regular respiratory rate  Outcome: Progressing Towards Goal     Problem:  Body Temperature -  Risk of, Imbalanced  Goal: Ability to maintain a body temperature within defined limits  Outcome: Progressing Towards Goal  Goal: Will regain or maintain usual level of consciousness  Outcome: Progressing Towards Goal  Goal: Complications related to the disease process, condition or treatment will be avoided or minimized  Outcome: Progressing Towards Goal     Problem: Isolation Precautions - Risk of Spread of Infection  Goal: Prevent transmission of infectious organism to others  Outcome: Progressing Towards Goal     Problem: Risk for Fluid Volume Deficit  Goal: Maintain normal heart rhythm  Outcome: Progressing Towards Goal  Goal: Maintain absence of muscle cramping  Outcome: Progressing Towards Goal  Goal: Maintain normal serum potassium, sodium, calcium, phosphorus, and pH  Outcome: Progressing Towards Goal     Problem: Loneliness or Risk for Loneliness  Goal: Demonstrate positive use of time alone when socialization is not possible  Outcome: Progressing Towards Goal     Problem: Diabetes Self-Management  Goal: *Disease process and treatment process  Description: Define diabetes and identify own type of diabetes; list 3 options for treating diabetes. Outcome: Progressing Towards Goal  Goal: *Incorporating nutritional management into lifestyle  Description: Describe effect of type, amount and timing of food on blood glucose; list 3 methods for planning meals. Outcome: Progressing Towards Goal  Goal: *Incorporating physical activity into lifestyle  Description: State effect of exercise on blood glucose levels. Outcome: Progressing Towards Goal  Goal: *Developing strategies to promote health/change behavior  Description: Define the ABC's of diabetes; identify appropriate screenings, schedule and personal plan for screenings. Outcome: Progressing Towards Goal  Goal: *Using medications safely  Description: State effect of diabetes medications on diabetes; name diabetes medication taking, action and side effects. Outcome: Progressing Towards Goal  Goal: *Monitoring blood glucose, interpreting and using results  Description: Identify recommended blood glucose targets  and personal targets. Outcome: Progressing Towards Goal  Goal: *Prevention, detection, treatment of acute complications  Description: List symptoms of hyper- and hypoglycemia; describe how to treat low blood sugar and actions for lowering  high blood glucose level. Outcome: Progressing Towards Goal  Goal: *Prevention, detection and treatment of chronic complications  Description: Define the natural course of diabetes and describe the relationship of blood glucose levels to long term complications of diabetes.   Outcome: Progressing Towards Goal  Goal: *Developing strategies to address psychosocial issues  Description: Describe feelings about living with diabetes; identify support needed and support network  Outcome: Progressing Towards Goal  Goal: *Insulin pump training  Outcome: Progressing Towards Goal  Goal: *Sick day guidelines  Outcome: Progressing Towards Goal  Goal: *Patient Specific Goal (EDIT GOAL, INSERT TEXT)  Outcome: Progressing Towards Goal

## 2021-10-03 NOTE — PROGRESS NOTES
Hospitalist Progress Note   Admit Date:  2021  5:26 AM   Name:  Gilda Steven   Age:  23 y.o. Sex:  female  :  1995   MRN:  [id number]   Room:  [facility]/    Presenting Complaint: Shortness of Breath    Reason(s) for Admission: Acute respiratory failure [facility]) [J96.00]     Hospital Course & Interval History:     Patient was admitted due to acute respiratory failure due to COVID-19 infection. She was admitted from . She was treated with Decadron and remdesivir. Initially she required oxygen via nasal cannula, but later she had to have BiPAP treatment she received Actemra on . She made it clear that she would not want intubation. Her mother confirmed with that. She has improved on treatment now on high flow oxygen and move out of ICU. CT of the chest is nondiagnostic for pulmonary embolism due to suboptimal opacification of the pulmonary arteries. Some peripheral consolidation in the paced segment right upper lobe and right lung base, there is possibility of pulmonary infarct. Patient is found to have left calf vein thrombosis. She was on Lovenox. She is now on IV heparin drip. Subjective (10/03/21):  21   She is complaining of feeling lightheaded. She does not have appetite. No headache. She is feeling weak. He does not eat much. Patient has been on heparin IV drip. No report of passing blood per rectum, or blood per urine. 10/1/21   Patient's blood pressure has improved compared to yesterday she denies feeling lightheaded today. She is on CPAP this morning and she wears it from last night. She has been in bed. Not much ambulation. Still on 60 L/min of oxygen. 10/2/21   Patient is feeling better. She is eating well this morning. Still requires 60 L/min oxygen but not as short of breath as of yesterday. She is talking better. No lightheadedness. 10/3/21   Patient is feeling the same. Afebrile. No chest pain. Assessment & Plan:     Principal Problem:    Acute respiratory distress syndrome (ARDS) due to COVID-19 virus ([facility] (9/14/2021)  Patient was on BiPAP. Now off BiPAP and on high flow oxygen at 60 L/min. On Decadron 6 mg IV once a day. Patient completed remdesivir  She received Actemra as well  We will try to wean oxygen down, as tolerated. Blood pressure is improved. No lightheadedness. We will continue to monitor her symptoms and wean oxygen down. Active Problems:    DM w/o complication type II ([facility] (9/14/2021)  Blood sugar is in 100s. Continue to cover with sliding scale with short acting insulin. Patient is on Lantus 15 units subcu once a day. HTN (hypertension) (9/14/2021)  Blood pressure is currently at 133/73 mmHg. Monitor. Acute respiratory failure with hypoxia ([facility] (9/16/2021)  Improving from admission time. Patient is off BiPAP, on high flow oxygen. Morbid obesity ([facility] (9/18/2021)     Suspected sleep apnea (9/18/2021)     Thrombocytopenia ([facility] (9/22/2021)  Resolved      DVT (deep venous thrombosis) ([facility] (9/23/2021)  Apixaban      ALLAN (acute kidney injury) ([facility] (9/30/2021)  Due to multiple factors, including hemodynamic instability. She is getting some IV fluid resuscitation now. Avoid potentially nephrotoxic agents. Renal function is now normalized. Dispo/Discharge Planning: To be determined    Diet:  ADULT DIET Regular; 3 carb choices (45 gm/meal);  Low Sodium (2 gm)  ADULT ORAL NUTRITION SUPPLEMENT Dinner, Breakfast, Lunch; Diabetic Supplement  DVT PPx: on Apixaban   Code status: Full Code    Hospital Problems as of 10/3/2021 Date Reviewed: 9/21/2021        Codes Class Noted - Resolved POA    ALLAN (acute kidney injury) ([facility] ICD-10-CM: N17.9  ICD-9-CM: 584.9  9/30/2021 - Present Unknown        DVT (deep venous thrombosis) ([facility] ICD-10-CM: I82.409  ICD-9-CM: 453.40  9/23/2021 - Present Unknown        Thrombocytopenia ([facility] ICD-10-CM: D69.6  ICD-9-CM: 287.5  9/22/2021 - Present Unknown        Morbid obesity ([facility] ICD-10-CM: E66.01  ICD-9-CM: 278.01  9/18/2021 - Present Unknown        Suspected sleep apnea ICD-10-CM: R29.818  ICD-9-CM: 781.99  9/18/2021 - Present Unknown        Acute respiratory failure with hypoxia [facility]) ICD-10-CM: J96.01  ICD-9-CM: 518.81  9/16/2021 - Present Yes        * (Principal) Acute respiratory distress syndrome (ARDS) due to COVID-19 virus [facility]) ICD-10-CM: U07.1, J80  ICD-9-CM: 518.82, 079.89  9/14/2021 - Present Unknown        DM w/o complication type II (HCC) (Chronic) ICD-10-CM: E11.9  ICD-9-CM: 250.00  9/14/2021 - Present Yes        HTN (hypertension) (Chronic) ICD-10-CM: I10  ICD-9-CM: 401.9  9/14/2021 - Present Yes        RESOLVED: Acute respiratory failure ([facility] ICD-10-CM: J96.00  ICD-9-CM: 518.81  9/14/2021 - 9/16/2021 Yes              Objective:     Patient Vitals for the past 24 hrs:   Temp Pulse Resp BP SpO2   10/03/21 1049 98.6 °F (37 °C) (!) 105 16 133/73 100 %   10/03/21 0825 -- -- -- -- 92 %   10/03/21 0727 98.8 °F (37.1 °C) (!) 109 16 117/75 99 %   10/03/21 0455 98.4 °F (36.9 °C) (!) 118 18 116/79 98 %   10/03/21 0400 -- (!) 112 -- -- --   10/03/21 0031 98.9 °F (37.2 °C) (!) 109 20 (!) 139/90 99 %   10/03/21 0000 -- (!) 108 -- -- --   10/02/21 2340 -- -- -- -- 100 %   10/02/21 2015 98.5 °F (36.9 °C) (!) 115 20 120/81 92 %   10/02/21 2000 -- (!) 123 -- -- --   10/02/21 1640 98.9 °F (37.2 °C) (!) 108 20 (!) 135/91 98 %   10/02/21 1210 98.6 °F (37 °C) (!) 115 21 (!) 127/93 96 %     Oxygen Therapy  O2 Sat (%): 100 % (10/03/21 1049)  Pulse via Oximetry: 120 beats per minute (10/03/21 0825)  O2 Device: Heated; Hi flow nasal cannula (10/03/21 0825)  Skin Assessment: Clean, dry, & intact (10/03/21 1688)  Skin Protection for O2 Device: Yes (10/02/21 1640)  Orientation: Bilateral (10/02/21 1640)  Location: Cheek (10/02/21 1640)  O2 Flow Rate (L/min): 60 l/min (10/03/21 0825)  O2 Temperature: 87.8 °F (31 °C) (10/03/21 0825)  FIO2 (%): 100 % (10/03/21 0825)    Estimated body mass index is 64.53 kg/m² as calculated from the following:    Height as of this encounter: 5' 9\" (1.753 m). Weight as of this encounter: 198.2 kg (437 lb). Intake/Output Summary (Last 24 hours) at 10/3/2021 1205  Last data filed at 10/3/2021 0031  Gross per 24 hour   Intake 240 ml   Output 1880 ml   Net -1640 ml         Physical Exam:     Visit Vitals  /73 (BP 1 Location: Left lower arm, BP Patient Position: At rest)   Pulse (!) 105   Temp 98.6 °F (37 °C)   Resp 16   Ht 5' 9\" (1.753 m)   Wt (!) 198.2 kg (437 lb)   SpO2 100%   BMI 64.53 kg/m²        General:    Well nourished. BMI of 65. Patient sitting up in bed, on high flow oxygen. Appears less shortness of breath with speaking. Head:  Normocephalic, atraumatic, pale   Eyes:  Sclerae appear normal.  Pupils equally round. ENT:  Nares appear normal, no drainage. Moist oral mucosa  Neck:  No restricted ROM. Trachea midline   CV:   RRR. No m/r/g. No jugular venous distension. Lungs:   Diminished breath sound. No wheezing, rhonchi, or rales. Shortness of breath. Abdomen: Bowel sounds present. Soft, nontender, nondistended. Extremities: No cyanosis or clubbing. No edema  Skin:     No rashes and normal coloration. Warm and dry. Neuro:  Cranial nerves II-XII grossly intact. Sensation intact. A&Ox3  Psych:  Normal mood and affect.       I have reviewed ordered lab tests and independently visualized imaging below:    Last 24hr Labs:  Recent Results (from the past 24 hour(s))   GLUCOSE, POC    Collection Time: 10/02/21  4:38 PM   Result Value Ref Range    Glucose (POC) 206 (H) 65 - 100 mg/dL    Performed by 1 Db Baxter, POC    Collection Time: 10/02/21  7:56 PM   Result Value Ref Range    Glucose (POC) 174 (H) 65 - 100 mg/dL    Performed by Love Fuller    METABOLIC PANEL, BASIC    Collection Time: 10/03/21  5:36 AM   Result Value Ref Range    Sodium 138 136 - 145 mmol/L    Potassium 4.3 3.5 - 5.1 mmol/L    Chloride 102 98 - 107 mmol/L    CO2 27 21 - 32 mmol/L    Anion gap 9 7 - 16 mmol/L    Glucose 122 (H) 65 - 100 mg/dL    BUN 26 (H) 6 - 23 MG/DL    Creatinine 0.65 0.6 - 1.0 MG/DL    GFR est AA >60 >60 ml/min/1.73m2    GFR est non-AA >60 >60 ml/min/1.73m2    Calcium 9.3 8.3 - 10.4 MG/DL   CBC WITH AUTOMATED DIFF    Collection Time: 10/03/21  5:36 AM   Result Value Ref Range    WBC 15.5 (H) 4.3 - 11.1 K/uL    RBC 4.02 (L) 4.05 - 5.2 M/uL    HGB 8.0 (L) 11.7 - 15.4 g/dL    HCT 28.2 (L) 35.8 - 46.3 %    MCV 70.1 (L) 79.6 - 97.8 FL    MCH 19.9 (L) 26.1 - 32.9 PG    MCHC 28.4 (L) 31.4 - 35.0 g/dL    RDW 21.9 (H) 11.9 - 14.6 %    PLATELET 416 361 - 628 K/uL    MPV 10.8 9.4 - 12.3 FL    ABSOLUTE NRBC 0.03 0.0 - 0.2 K/uL    DF AUTOMATED      NEUTROPHILS 66 43 - 78 %    LYMPHOCYTES 20 13 - 44 %    MONOCYTES 11 4.0 - 12.0 %    EOSINOPHILS 2 0.5 - 7.8 %    BASOPHILS 1 0.0 - 2.0 %    IMMATURE GRANULOCYTES 1 0.0 - 5.0 %    ABS. NEUTROPHILS 10.2 (H) 1.7 - 8.2 K/UL    ABS. LYMPHOCYTES 3.1 0.5 - 4.6 K/UL    ABS. MONOCYTES 1.7 (H) 0.1 - 1.3 K/UL    ABS. EOSINOPHILS 0.3 0.0 - 0.8 K/UL    ABS. BASOPHILS 0.1 0.0 - 0.2 K/UL    ABS. IMM.  GRANS. 0.2 0.0 - 0.5 K/UL   GLUCOSE, POC    Collection Time: 10/03/21  7:26 AM   Result Value Ref Range    Glucose (POC) 126 (H) 65 - 100 mg/dL    Performed by Evelyne Nielson    GLUCOSE, POC    Collection Time: 10/03/21 10:54 AM   Result Value Ref Range    Glucose (POC) 190 (H) 65 - 100 mg/dL    Performed by Evelyne Nielson        All Micro Results     Procedure Component Value Units Date/Time    CULTURE, BLOOD [[id number]] Collected: 09/14/21 0759    Order Status: Completed Specimen: Blood Updated: 09/19/21 0637     Special Requests: --        LEFT  HAND       Culture result: NO GROWTH 5 DAYS       CULTURE, BLOOD [[id number]] Collected: 09/14/21 0757    Order Status: Completed Specimen: Blood Updated: 09/19/21 4619     Special Requests: -- RIGHT  Antecubital       Culture result: NO GROWTH 5 DAYS       CULTURE, RESPIRATORY/SPUTUM/BRONCH Kapil Casanova [[id number]] Collected: 09/14/21 0915    Order Status: Canceled Specimen: Sputum     COVID-19 RAPID TEST [[id number]]  (Abnormal) Collected: 09/14/21 0535    Order Status: Completed Specimen: Nasopharyngeal Updated: 09/14/21 0558     Specimen source NASAL        COVID-19 rapid test Detected        Comment:      The specimen is POSITIVE for SARS-CoV-2, the novel coronavirus associated with COVID-19. This test has been authorized by the FDA under an Emergency Use Authorization (EUA) for use by authorized laboratories. Fact sheet for Healthcare Providers: [employer]  Fact sheet for Patients: [employer]       Methodology: Isothermal Nucleic Acid Amplification  RESULTS VERIFIED, PHONED TO AND READ BACK BY   FERDINAND BENOIT AT 1820 ON 9/14/21 ACL               Other Studies:  No results found.     Current Meds:  Current Facility-Administered Medications   Medication Dose Route Frequency    apixaban (ELIQUIS) tablet 10 mg  10 mg Oral BID    [START ON 10/8/2021] apixaban (ELIQUIS) tablet 5 mg  5 mg Oral BID    0.9% sodium chloride infusion  50 mL/hr IntraVENous CONTINUOUS    insulin glargine (LANTUS) injection 15 Units  15 Units SubCUTAneous DAILY    insulin lispro (HUMALOG) injection 5 Units  5 Units SubCUTAneous TIDAC    insulin lispro (HUMALOG) injection   SubCUTAneous AC&HS    dexamethasone (DECADRON) 10 mg/mL injection 6 mg  6 mg IntraVENous Q24H    NUTRITIONAL SUPPORT ELECTROLYTE PRN ORDERS   Does Not Apply PRN    hydrALAZINE (APRESOLINE) 20 mg/mL injection 10 mg  10 mg IntraVENous Q6H PRN    morphine injection 1 mg  1 mg IntraVENous Q4H PRN    LORazepam (ATIVAN) injection 0.5 mg  0.5 mg IntraVENous Q6H PRN    phenol throat spray (CHLORASEPTIC) 1 Spray  1 Spray Oral PRN    pantoprazole (PROTONIX) tablet 40 mg  40 mg Oral ACB    sodium chloride (NS) flush 30 mL  30 mL InterCATHeter Q8H    sodium chloride (NS) flush 30 mL  30 mL InterCATHeter PRN    acetaminophen (TYLENOL) tablet 650 mg  650 mg Oral Q6H PRN    Or    acetaminophen (TYLENOL) suppository 650 mg  650 mg Rectal Q6H PRN    polyethylene glycol (MIRALAX) packet 17 g  17 g Oral DAILY PRN    ondansetron (ZOFRAN ODT) tablet 4 mg  4 mg Oral Q8H PRN    Or    ondansetron (ZOFRAN) injection 4 mg  4 mg IntraVENous Q6H PRN       Signed:  Joe Rodriguez MD    Part of this note may have been written by using a voice dictation software. The note has been proof read but may still contain some grammatical/other typographical errors.

## 2021-10-04 NOTE — PROGRESS NOTES
Hospitalist Progress Note   Admit Date:  2021  5:26 AM   Name:  Radha Catherine   Age:  23 y.o. Sex:  female  :  1995   MRN:  [id number]   Room:  [facility]/    Presenting Complaint: Shortness of Breath    Reason(s) for Admission: Acute respiratory failure [facility]) [J96.00]     Hospital Course & Interval History:     Patient was admitted due to acute respiratory failure due to COVID-19 infection. She was admitted from . She was treated with Decadron and remdesivir. Initially she required oxygen via nasal cannula, but later she had to have BiPAP treatment she received Actemra on . She made it clear that she would not want intubation. Her mother confirmed with that. She has improved on treatment now on high flow oxygen and move out of ICU. CT of the chest is nondiagnostic for pulmonary embolism due to suboptimal opacification of the pulmonary arteries. Some peripheral consolidation in the paced segment right upper lobe and right lung base, there is possibility of pulmonary infarct. Patient is found to have left calf vein thrombosis. She was on Lovenox. She is now on Apixaban. Subjective (10/04/21):  21   She is complaining of feeling lightheaded. She does not have appetite. No headache. She is feeling weak. He does not eat much. Patient has been on heparin IV drip. No report of passing blood per rectum, or blood per urine. 10/1/21   Patient's blood pressure has improved compared to yesterday she denies feeling lightheaded today. She is on CPAP this morning and she wears it from last night. She has been in bed. Not much ambulation. Still on 60 L/min of oxygen. 10/2/21   Patient is feeling better. She is eating well this morning. Still requires 60 L/min oxygen but not as short of breath as of yesterday. She is talking better. No lightheadedness. 10/3/21   Patient is feeling the same. Afebrile. No chest pain. 10/4/2021  Patient is feeling better. Still on 60 L/min of oxygen. But she is more comfortable, sitting up in her bed. She is eating well. No fever, no chills. No reports of bleeding. Assessment & Plan:     Principal Problem:    Acute respiratory distress syndrome (ARDS) due to COVID-19 virus ([facility] (9/14/2021)  Patient was on BiPAP. Now off BiPAP and on high flow oxygen at 60 L/min. On Decadron 6 mg IV once a day. Patient completed remdesivir  She received Actemra as well  We will try to wean oxygen down, as tolerated. Blood pressure is improved. No lightheadedness. We will continue to monitor her symptoms and wean oxygen down. So far not able to wean down oxygen yet. We will continue to give PPI for GI prophylaxis. Active Problems:    DM w/o complication type II ([facility] (9/14/2021)  Blood sugar is in 100s. Continue to cover with sliding scale with short acting insulin. Patient is on Lantus 15 units subcu once a day. HTN (hypertension) (9/14/2021)  Blood pressure is currently at 148/99 mmHg. Monitor. Acute respiratory failure with hypoxia ([facility] (9/16/2021)  Improving from admission time. Patient is off BiPAP, on high flow oxygen. Morbid obesity ([facility] (9/18/2021)     Suspected sleep apnea (9/18/2021)     Thrombocytopenia ([facility] (9/22/2021)  Resolved      DVT (deep venous thrombosis) ([facility] (9/23/2021)  On Apixaban      ALLAN (acute kidney injury) ([facility] (9/30/2021)  Due to multiple factors, including hemodynamic instability. She is getting some IV fluid resuscitation now. Avoid potentially nephrotoxic agents. Renal function is now normalized. Dispo/Discharge Planning: To be determined    Diet:  ADULT DIET Regular; 3 carb choices (45 gm/meal);  Low Sodium (2 gm)  ADULT ORAL NUTRITION SUPPLEMENT Dinner, Breakfast, Lunch; Diabetic Supplement  DVT PPx: on Apixaban   Code status: Full Code    Hospital Problems as of 10/4/2021 Date Reviewed: 9/21/2021 Codes Class Noted - Resolved POA    ALLAN (acute kidney injury) ([facility] ICD-10-CM: N17.9  ICD-9-CM: 584.9  9/30/2021 - Present Unknown        DVT (deep venous thrombosis) (HCC) ICD-10-CM: I82.409  ICD-9-CM: 453.40  9/23/2021 - Present Unknown        Thrombocytopenia ([facility] ICD-10-CM: D69.6  ICD-9-CM: 287.5  9/22/2021 - Present Unknown        Morbid obesity ([facility] ICD-10-CM: E66.01  ICD-9-CM: 278.01  9/18/2021 - Present Unknown        Suspected sleep apnea ICD-10-CM: R29.818  ICD-9-CM: 781.99  9/18/2021 - Present Unknown        Acute respiratory failure with hypoxia [facility]) ICD-10-CM: J96.01  ICD-9-CM: 518.81  9/16/2021 - Present Yes        * (Principal) Acute respiratory distress syndrome (ARDS) due to COVID-19 virus [facility]) ICD-10-CM: U07.1, J80  ICD-9-CM: 182.76, 079.89  9/14/2021 - Present Unknown        DM w/o complication type II (HCC) (Chronic) ICD-10-CM: E11.9  ICD-9-CM: 250.00  9/14/2021 - Present Yes        HTN (hypertension) (Chronic) ICD-10-CM: I10  ICD-9-CM: 401.9  9/14/2021 - Present Yes        RESOLVED: Acute respiratory failure ([facility] ICD-10-CM: J96.00  ICD-9-CM: 518.81  9/14/2021 - 9/16/2021 Yes              Objective:     Patient Vitals for the past 24 hrs:   Temp Pulse Resp BP SpO2   10/04/21 0833 -- (!) 112 24 -- 94 %   10/04/21 0701 -- 100 25 (!) 148/99 95 %   10/04/21 0404 97.9 °F (36.6 °C) 100 18 133/80 98 %   10/04/21 0058 98.4 °F (36.9 °C) (!) 106 18 123/76 99 %   10/03/21 2238 -- -- -- -- 97 %   10/03/21 2144 -- -- -- -- 96 %   10/03/21 2037 98.8 °F (37.1 °C) 72 18 (!) 140/86 96 %   10/03/21 1520 97.4 °F (36.3 °C) (!) 117 16 117/72 97 %   10/03/21 1049 98.6 °F (37 °C) (!) 105 16 133/73 100 %     Oxygen Therapy  O2 Sat (%): 94 % (10/04/21 0833)  Pulse via Oximetry: 112 beats per minute (10/03/21 2238)  O2 Device: Hi flow nasal cannula; Heated;Humidifier (10/04/21 5981)  Skin Assessment: Clean, dry, & intact (10/03/21 7288)  Skin Protection for O2 Device: Yes (10/02/21 1640)  Orientation: Bilateral (10/02/21 1640)  Location: Cheek (10/02/21 1640)  O2 Flow Rate (L/min): 60 l/min (10/04/21 0833)  O2 Temperature: 87.8 °F (31 °C) (10/03/21 2144)  FIO2 (%): 80 % (10/04/21 0833)    Estimated body mass index is 64.53 kg/m² as calculated from the following:    Height as of this encounter: 5' 9\" (1.753 m). Weight as of this encounter: 198.2 kg (437 lb). Intake/Output Summary (Last 24 hours) at 10/4/2021 0906  Last data filed at 10/3/2021 2037  Gross per 24 hour   Intake --   Output 2450 ml   Net -2450 ml         Physical Exam:     Visit Vitals  BP (!) 148/99 (BP 1 Location: Left lower arm, BP Patient Position: Sitting)   Pulse (!) 112   Temp 97.9 °F (36.6 °C)   Resp 24   Ht 5' 9\" (1.753 m)   Wt (!) 198.2 kg (437 lb)   SpO2 94%   BMI 64.53 kg/m²        General:    Well nourished. BMI of 65. Patient sitting up in bed, on high flow oxygen. Appears less shortness of breath with speaking. She is eating well. She is talking well. Head:  Normocephalic, atraumatic, pale   Eyes:  Sclerae appear normal.  Pupils equally round. ENT:  Nares appear normal, no drainage. Moist oral mucosa  Neck:  No restricted ROM. Trachea midline   CV:   RRR. No m/r/g. No jugular venous distension. Lungs:   Diminished breath sound. No wheezing, rhonchi, or rales. Shortness of breath. Abdomen: Bowel sounds present. Soft, nontender, nondistended. Extremities: No cyanosis or clubbing. No edema  Skin:     No rashes and normal coloration. Warm and dry. Neuro:  Cranial nerves II-XII grossly intact. Sensation intact. A&Ox3  Psych:  Normal mood and affect.       I have reviewed ordered lab tests and independently visualized imaging below:    Last 24hr Labs:  Recent Results (from the past 24 hour(s))   GLUCOSE, POC    Collection Time: 10/03/21 10:54 AM   Result Value Ref Range    Glucose (POC) 190 (H) 65 - 100 mg/dL    Performed by Ángel Cortez    GLUCOSE, POC    Collection Time: 10/03/21  4:13 PM   Result Value Ref Range Glucose (POC) 234 (H) 65 - 100 mg/dL    Performed by Beti Wynn    GLUCOSE, POC    Collection Time: 10/03/21 10:08 PM   Result Value Ref Range    Glucose (POC) 142 (H) 65 - 100 mg/dL    Performed by Javi    GLUCOSE, POC    Collection Time: 10/04/21  7:02 AM   Result Value Ref Range    Glucose (POC) 117 (H) 65 - 100 mg/dL    Performed by Jeovanny Harvey        All Micro Results     Procedure Component Value Units Date/Time    CULTURE, BLOOD [[id number]] Collected: 09/14/21 0759    Order Status: Completed Specimen: Blood Updated: 09/19/21 0637     Special Requests: --        LEFT  HAND       Culture result: NO GROWTH 5 DAYS       CULTURE, BLOOD [[id number]] Collected: 09/14/21 0757    Order Status: Completed Specimen: Blood Updated: 09/19/21 0637     Special Requests: --        RIGHT  Antecubital       Culture result: NO GROWTH 5 DAYS       CULTURE, RESPIRATORY/SPUTUM/BRONCH Ardean Sayer STAIN [[id number]] Collected: 09/14/21 0915    Order Status: Canceled Specimen: Sputum     COVID-19 RAPID TEST [[id number]]  (Abnormal) Collected: 09/14/21 0535    Order Status: Completed Specimen: Nasopharyngeal Updated: 09/14/21 0558     Specimen source NASAL        COVID-19 rapid test Detected        Comment:      The specimen is POSITIVE for SARS-CoV-2, the novel coronavirus associated with COVID-19. This test has been authorized by the FDA under an Emergency Use Authorization (EUA) for use by authorized laboratories. Fact sheet for Healthcare Providers: ConventionUpdate.co.nz  Fact sheet for Patients: ConventionUpdate.co.nz       Methodology: Isothermal Nucleic Acid Amplification  RESULTS VERIFIED, PHONED TO AND READ BACK BY   FERDINAND BENOIT AT 9902 ON 9/14/21 ACL               Other Studies:  No results found.     Current Meds:  Current Facility-Administered Medications   Medication Dose Route Frequency    apixaban (ELIQUIS) tablet 10 mg  10 mg Oral BID    [START ON 10/8/2021] apixaban (ELIQUIS) tablet 5 mg  5 mg Oral BID    insulin glargine (LANTUS) injection 15 Units  15 Units SubCUTAneous DAILY    insulin lispro (HUMALOG) injection 5 Units  5 Units SubCUTAneous TIDAC    insulin lispro (HUMALOG) injection   SubCUTAneous AC&HS    dexamethasone (DECADRON) 10 mg/mL injection 6 mg  6 mg IntraVENous Q24H    NUTRITIONAL SUPPORT ELECTROLYTE PRN ORDERS   Does Not Apply PRN    hydrALAZINE (APRESOLINE) 20 mg/mL injection 10 mg  10 mg IntraVENous Q6H PRN    morphine injection 1 mg  1 mg IntraVENous Q4H PRN    LORazepam (ATIVAN) injection 0.5 mg  0.5 mg IntraVENous Q6H PRN    phenol throat spray (CHLORASEPTIC) 1 Spray  1 Spray Oral PRN    pantoprazole (PROTONIX) tablet 40 mg  40 mg Oral ACB    sodium chloride (NS) flush 30 mL  30 mL InterCATHeter Q8H    sodium chloride (NS) flush 30 mL  30 mL InterCATHeter PRN    acetaminophen (TYLENOL) tablet 650 mg  650 mg Oral Q6H PRN    Or    acetaminophen (TYLENOL) suppository 650 mg  650 mg Rectal Q6H PRN    polyethylene glycol (MIRALAX) packet 17 g  17 g Oral DAILY PRN    ondansetron (ZOFRAN ODT) tablet 4 mg  4 mg Oral Q8H PRN    Or    ondansetron (ZOFRAN) injection 4 mg  4 mg IntraVENous Q6H PRN       Signed:  Tamar Knight MD    Part of this note may have been written by using a voice dictation software. The note has been proof read but may still contain some grammatical/other typographical errors.

## 2021-10-04 NOTE — PROGRESS NOTES
Tana Stallings  Admission Date: 9/14/2021         Daily Progress Note: 10/4/2021    The patient's chart is reviewed and the patient is discussed with the staff. Background: 24 yo [place] American female admitted with acute respiratory failure due to [address]. She was admitted on 9/14 with a 2 day history of dyspnea. She was positive for COVID. She was started on decadron and remdesivir. She initially required 6L NC but has since required BiPAP FiO2 @ 100% and her resting saturation was 91% but drops to the 70s with exertion. She received Actemra 9/16.  She made it very clear to several providers that she does not want intubation and her mother confirmed this as well. After long discussion on 9/21, category status changed to Category 1 and pt moved to tele obs ICU on 100% BIPAP. She was stable on airvo and transferred to floor yesterday.     Subjective:   Pt is up in chair on 80% airvo with O2 sat in 90s    Current Facility-Administered Medications   Medication Dose Route Frequency    apixaban (ELIQUIS) tablet 10 mg  10 mg Oral BID    [START ON 10/8/2021] apixaban (ELIQUIS) tablet 5 mg  5 mg Oral BID    insulin glargine (LANTUS) injection 15 Units  15 Units SubCUTAneous DAILY    insulin lispro (HUMALOG) injection 5 Units  5 Units SubCUTAneous TIDAC    insulin lispro (HUMALOG) injection   SubCUTAneous AC&HS    dexamethasone (DECADRON) 10 mg/mL injection 6 mg  6 mg IntraVENous Q24H    NUTRITIONAL SUPPORT ELECTROLYTE PRN ORDERS   Does Not Apply PRN    hydrALAZINE (APRESOLINE) 20 mg/mL injection 10 mg  10 mg IntraVENous Q6H PRN    morphine injection 1 mg  1 mg IntraVENous Q4H PRN    LORazepam (ATIVAN) injection 0.5 mg  0.5 mg IntraVENous Q6H PRN    phenol throat spray (CHLORASEPTIC) 1 Spray  1 Spray Oral PRN    pantoprazole (PROTONIX) tablet 40 mg  40 mg Oral ACB    sodium chloride (NS) flush 30 mL  30 mL InterCATHeter Q8H    sodium chloride (NS) flush 30 mL  30 mL InterCATHeter PRN  acetaminophen (TYLENOL) tablet 650 mg  650 mg Oral Q6H PRN    Or    acetaminophen (TYLENOL) suppository 650 mg  650 mg Rectal Q6H PRN    polyethylene glycol (MIRALAX) packet 17 g  17 g Oral DAILY PRN    ondansetron (ZOFRAN ODT) tablet 4 mg  4 mg Oral Q8H PRN    Or    ondansetron (ZOFRAN) injection 4 mg  4 mg IntraVENous Q6H PRN     Review of Systems    Constitutional: negative for fever, chills, sweats  Cardiovascular: negative for chest pain, palpitations, syncope, edema  Gastrointestinal:  negative for dysphagia, reflux, vomiting, diarrhea, abdominal pain, or melena  Neurologic:  negative for focal weakness, numbness, headache  Objective:     Vitals:    10/04/21 0404 10/04/21 0701 10/04/21 0833 10/04/21 1058   BP: 133/80 (!) 148/99  106/73   Pulse: 100 100 (!) 112 (!) 119   Resp: 18 25 24 25   Temp: 97.9 °F (36.6 °C) 97.6 °F (36.4 °C)  98.7 °F (37.1 °C)   SpO2: 98% 95% 94% 96%   Weight:       Height:           Intake/Output Summary (Last 24 hours) at 10/4/2021 1521  Last data filed at 10/4/2021 1255  Gross per 24 hour   Intake 600 ml   Output 1500 ml   Net -900 ml     Physical Exam:   Constitution:  the patient is obeseand in no acute distress  HEENT:  Sclera clear, pupils equal, oral mucosa moist  Respiratory: bilateral crackles  Cardiovascular:  RRR without M,G,R  Gastrointestinal: soft and non-tender; with positive bowel sounds. Musculoskeletal: warm without cyanosis. There is no lower extremity edema. Skin:  no jaundice or rashes, no wounds   Neurologic: no gross neuro deficits     Psychiatric:  alert and oriented x 3    CXR: none today    LAB:  Recent Labs     10/04/21  1140 10/04/21  1027 10/03/21  0536 10/02/21  0205 10/02/21  0205   WBC  --  10.9 15.5*  --  14.8*   HGB 8.6* 5.9* 8.0*   < > 8.6*   HCT 30.2* 21.1* 28.2*   < > 29.5*   PLT  --  195 261  --  293    < > = values in this interval not displayed.      Recent Labs     10/04/21  1027 10/03/21  0536 10/02/21  0205    138 134*   K 3.1* 4.3 4.8    102 101   CO2 25 27 27   * 122* 130*   BUN 20 26* 29*   CREA 0.58* 0.65 0.73   MG 1.5*  --   --    CA 7.7* 9.3 9.5   PHOS 3.6  --   --      No results for input(s): LAC, TROPHS, BNPNT, CRP in the last 72 hours. No lab exists for component: ESR  No results for input(s): PH, PCO2, PO2, HCO3, PHI, PCO2I, PO2I, HCO3I in the last 72 hours. No results for input(s): SDES, CULT in the last 72 hours. Assessment and Plan:  (Medical Decision Making)   Principal Problem:      Acute respiratory distress syndrome (ARDS) due to COVID-19 virus ([facility] (9/14/2021)    Received remdesivir, toci 9/16, on dexa. On airvo 60L80%. CPAP QHS, mobilize with PT/OT     Active Problems:    DM w/o complication type II ([facility] (9/14/2021)    SSI and lantus, per primary       HTN (hypertension) (9/14/2021)    BP improved today and controlled.        Acute respiratory failure with hypoxia ([facility] (9/16/2021)    Received rocephin/azithromycin for superimposed infection. -on 80% fio2       Morbid obesity ([address] (9/18/2021)    Adds to complexity of care       Suspected sleep apnea (9/18/2021)    Likely OHS/restircitve disease with super morbid obesity       Thrombocytopenia ([address] (9/22/2021)    Platelets stable, HIT negative.       DVT (deep venous thrombosis) ([facility] (9/23/2021)    Heparin gtt       ALLAN    Cr improving with fluid, no signs of overload so continue for today          More than 50% of the time documented was spent in face-to-face contact with the patient and in the care of the patient on the floor/unit where the patient is located.     Barrett Mendoza MD

## 2021-10-04 NOTE — PROGRESS NOTES
Tele box not working. Monitor room says we have no boxes at this time. Reported to oncoming nurse, Yannick Kay.

## 2021-10-04 NOTE — PROGRESS NOTES
ACUTE PHYSICAL THERAPY GOALS:  (Developed with and agreed upon by patient and/or caregiver.)  Goals Updated 10/4/21  LTG:  (1.)Ms. Ellis De La O will move from supine to sit and sit to supine , scoot up and down and roll side to side in bed with INDEPENDENT within 7 treatment day(s). (2.)Ms. Ellis De La O will transfer from bed to chair and chair to bed with INDEPENDENT using the least restrictive device within 7 treatment day(s). (3.)Ms. Ellis De La O will ambulate with INDEPENDENT for 50 feet with the least restrictive device within 7 treatment day(s). (4.)Ms. Ellis De La O will tolerate at least 23 min of dynamic sitting/standing activity to assist standing ADLs with the least restrictive device within 7 treatment days. (5.)Ms. Ellis De La O will climb at least 12 steps with MODIFIED INDEPENDENCE with the least restrictive device and rest breaks as needed within 7 treatment days.     ________________________________________________________________________________________________        PHYSICAL THERAPY ASSESSMENT: Daily Note, Re-evaluation and AM PT Treatment Day # 1      Samantha Vickers is a 23 y.o. female   PRIMARY DIAGNOSIS: Acute respiratory distress syndrome (ARDS) due to COVID-19 virus ([facility] [address]  Acute respiratory failure ([facility] [J96.00]       Reason for Referral:    ICD-10: Treatment Diagnosis: Generalized Muscle Weakness (M62.81)  Other lack of cordination (R27.8)  Difficulty in walking, Not elsewhere classified (R26.2)  Other abnormalities of gait and mobility (R26.89)  Dizziness and Giddiness (R42)  Unspecified Lack of Coordination (R27.9)  INPATIENT: Payor: /     ASSESSMENT:     REHAB RECOMMENDATIONS:   Recommendation to date pending progress:  Setting:   No further skilled therapy vs no needs dependent upon progress  Equipment:    None     PRIOR LEVEL OF FUNCTION:  (Prior to Hospitalization) INITIAL/CURRENT LEVEL OF FUNCTION:  (Most Recently Demonstrated)   Bed Mobility:   Independent  Sit to Stand:   Independent  Transfers:   Independent  Gait/Mobility:   Independent Bed Mobility:   Not tested  Sit to Stand:   Standby Assistance  Transfers:   Standby Assistance  Gait/Mobility:   Not tested     ASSESSMENT:  Ms. Dennie Bucker presents in sitting in the middle of the bed, on BiPAP, mother present in room. She is A&Ox4. Upon entering, Pnt is agreeable to PT treatment. she reports no pain at rest. Pnt turned w/ CGA, sitting EOB with fair+ sitting balance control. Sit > stand with min A is performed 3 times using min A. Gait x 3-4 ft alongside bed with min A, cues for step length. Gait is noted to be slow. Stand > sit with min A, followed by positioning for comfort. At end of session pt supine in bed with all needs within reach, alarm activated for safety, RN notified. Overall, pt presents as functioning below her baseline, with deficits in mobility including transfers, gait, balance, and activity tolerance. Pt will continue to benefit from skilled therapy services to address stated deficits to promote return to highest level of function, independence, and safety. Will continue to follow. 10/4: Pt seated in bedside chair and agreeable to therapy. She desats into the 70s with standing marches and has one incidence of instability with initial stand requiring her to sit back down. Pt is able to perform seated exercises with SpO2 in the upper 80s and continues to need rest breaks throughout. She demonstrates improvements in functional mobility but continues to have significantly decreased activity tolerance. Pt is able to maintain SpO2 with static standing but fatigues rapidly and reports feeling weak.  She continues to function well below her baseline and will benefit from continued skilled PT.          SUBJECTIVE:   Ms. Dennie Bucker states, \"I just feel weak\"    SOCIAL HISTORY/LIVING ENVIRONMENT: lives on [address] [facility] with no elevator, independent at baseline  Home Environment: Independent living  One/Two Story Residence: Other (Comment) (one story apartment; lives on 3rd floor)  Living Alone: Yes  Support Systems: Other Family Member(s)  OBJECTIVE:     PAIN: VITAL SIGNS: LINES/DRAINS:   Pre Treatment: Pain Screen  Pain Scale 1: Numeric (0 - 10)  Pain Intensity 1: 0  Post Treatment: 0   Continuous pulse ox  O2 Device: Hi flow nasal cannula, Heated, Humidifier     GROSS EVALUATION:   Within Functional Limits Abnormal/ Functional Abnormal/ Non-Functional (see comments) Not Tested Comments:   AROM [] [x] [] [] Decreased due to adiposity    PROM [] [] [] []    Strength [] [x] [] []    Balance [] [x] [] []    Posture [] [x] [] []    Sensation [x] [] [] []    Coordination [] [] [] []    Tone [] [] [] []    Edema [] [] [] []    Activity Tolerance [] [x] [] [] De-saturates w/ mobility    [] [] [] []      COGNITION/  PERCEPTION: Intact Impaired   (see comments) Comments:   Orientation [x] []    Vision [] [x] Wears glasses   Hearing [x] []    Command Following [x] []    Safety Awareness [x] []     [] []      MOBILITY: I Mod I S SBA CGA Min Mod Max Total  NT x2 Comments:   Bed Mobility    Rolling [] [] [] [] [] [] [] [] [] [x] []    Supine to Sit [] [] [] [] [] [] [] [] [] [x] []    Scooting [] [] [] [] [] [] [] [] [] [x] []    Sit to Supine [] [] [] [] [] [] [] [] [] [x] []    Transfers    Sit to Stand [] [] [] [x] [] [] [] [] [] [] []    Bed to Chair [] [] [] [] [] [] [] [] [] [x] []    Stand to Sit [] [] [] [x] [] [] [] [] [] [] []    I=Independent, Mod I=Modified Independent, S=Supervision, SBA=Standby Assistance, CGA=Contact Guard Assistance,   Min=Minimal Assistance, Mod=Moderate Assistance, Max=Maximal Assistance, Total=Total Assistance, NT=Not Tested  GAIT: I Mod I S SBA CGA Min Mod Max Total  NT x2 Comments:   Level of Assistance [] [] [] [] [] [] [] [] [] [x] []    Distance n/a    DME N/A    Gait Quality n/a    Weightbearing Status N/A     I=Independent, Mod I=Modified Independent, S=Supervision, SBA=Standby Assistance, CGA=Contact Guard Assistance,   Min=Minimal Assistance, Mod=Moderate Assistance, Max=Maximal Assistance, Total=Total Assistance, NT=Not [facility]       How much difficulty does the patient currently have. .. Unable A Lot A Little None   1. Turning over in bed (including adjusting bedclothes, sheets and blankets)? [] 1   [] 2   [] 3   [x] 4   2. Sitting down on and standing up from a chair with arms ( e.g., wheelchair, bedside commode, etc.)   [] 1   [] 2   [] 3   [x] 4   3. Moving from lying on back to sitting on the side of the bed? [] 1   [] 2   [] 3   [x] 4   How much help from another person does the patient currently need. .. Total A Lot A Little None   4. Moving to and from a bed to a chair (including a wheelchair)? [] 1   [] 2   [x] 3   [] 4   5. Need to walk in hospital room? [] 1   [] 2   [x] 3   [] 4   6. Climbing 3-5 steps with a railing? [] 1   [x] 2   [] 3   [] 4   © 2007, Trustees of [employer], under license to [employer]. All rights reserved     Score:  Initial: 16 Most Recent: 20 (Date: 10/4/21 )    Interpretation of Tool:  Represents activities that are increasingly more difficult (i.e. Bed mobility, Transfers, Gait). PLAN:   FREQUENCY/DURATION: PT Plan of Care: 3 times/week for duration of hospital stay or until stated goals are met, whichever comes first.    PROBLEM LIST:   (Skilled intervention is medically necessary to address:)  1. Decreased ADL/Functional Activities  2. Decreased Activity Tolerance  3. Decreased AROM/PROM  4. Decreased Balance  5. Decreased Coordination  6. Decreased Gait Ability  7. Decreased Strength  8. Decreased Transfer Abilities   INTERVENTIONS PLANNED:   (Benefits and precautions of physical therapy have been discussed with the patient.)  1. Therapeutic Activity  2. Therapeutic Exercise/HEP  3. Neuromuscular Re-education  4.  Gait Training  5. Manual Therapy  6. Education     TREATMENT:     EVALUATION: Re-Evaluation : (Untimed Charge)    TREATMENT:   ($$ Therapeutic Activity: 8-22 mins  $$ Therapeutic Exercises: 8-22 mins    )  Therapeutic Activity (8 Minutes): Therapeutic activity included Transfer Training, Sitting balance  and Standing balance to improve functional Mobility, Strength, ROM and Activity tolerance. Therapeutic Exercise (18 Minutes): Therapeutic exercises noted below to improve functional activity tolerance, strength and mobility.      TREATMENT GRID:   Date:  10/4 Date:   Date:     Activity/Exercise Parameters Parameters Parameters   Seated march 2x10     LAQ 2x10     Seated heel raise 2x10     ABD/ADD 2x10     Standing March x10                       AFTER TREATMENT POSITION/PRECAUTIONS:  Chair, Needs within reach and RN notified    INTERDISCIPLINARY COLLABORATION:  RN/PCT and PT/PTA    TOTAL TREATMENT DURATION:  PT Patient Time In/Time Out  Time In: 1103  Time Out: 1100 [address]

## 2021-10-04 NOTE — PROGRESS NOTES
Pt taken of High-Flow and placed on CPAP:       10/03/21 2238   Oxygen Therapy   O2 Sat (%) 97 %   Pulse via Oximetry 112 beats per minute   CPAP/BIPAP   Device Mode CPAP   Mask Type and Size Full face   Skin Condition Intact   PIP Observed 13 cm H20   IPAP (cm H2O) 12 cm H2O   EPAP (cm H2O) 12 cm H2O   Vt Spont (ml) 682 ml   Ve Observed (l/min) 12.7 l/min   Total RR (Spontaneous) 19 breaths per minute   Alarm Settings   High Pressure 30   Low Pressure 5   Apnea 20   Low Ve 3   High Rate 50   Low Rate 8

## 2021-10-05 NOTE — PROGRESS NOTES
ACUTE PHYSICAL THERAPY GOALS:  (Developed with and agreed upon by patient and/or caregiver. )  LTG:  (1.)Ms. Javier Coronado will move from supine to sit and sit to supine , scoot up and down and roll side to side in bed with INDEPENDENT within 7 treatment day(s). (2.)Ms. Javier Coronado will transfer from bed to chair and chair to bed with INDEPENDENT using the least restrictive device within 7 treatment day(s). (3.)Ms. Javier Coronado will ambulate with INDEPENDENT for 100 feet with the least restrictive device within 7 treatment day(s). (4.)Ms. Javier Coronado will tolerate at least 23 min of dynamic standing activity to assist standing ADLs with the least restrictive device within 7 treatment days. (5.)Ms. Javier Coronado will climb at least 12 steps with MODIFIED INDEPENDENCE with the least restrictive device within 7 treatment days. PHYSICAL THERAPY: Daily Note and AM Treatment Day # 2    Porsche Jimenez is a 23 y.o. female   PRIMARY DIAGNOSIS: Acute respiratory distress syndrome (ARDS) due to COVID-19 virus ([facility] [address]  Acute respiratory failure ([facility] [address] [J96.00]         ASSESSMENT:     REHAB RECOMMENDATIONS: CURRENT LEVEL OF FUNCTION:  (Most Recently Demonstrated)   Recommendation to date pending progress:  Setting:   No further skilled therapy  vs HHPT based on continued progress  Equipment:    None Bed Mobility:   Not tested  Sit to Stand:  [facility] Assistance  Transfers:   Not tested  Gait/Mobility:   Not tested     ASSESSMENT:  Ms. Javier Coronado presents seated up in recliner and is agreeable to therapy. She is bleeding excessively and upon initial stand she bleeds onto the floor. Pt donned a brief to provide increased comfort with mobility and she was more willing to continue to participate after this. Pt desats with mobility into the low 80s and upper 70s in standing and needs increased time to recover between each stand. She is able to tolerate standing for ~2 minutes x3 with SBA and VC for continued deep breathing.  She then performed seated exercises, alternating with OT and needing extra rest between each activity. Pt continues to benefit from skilled PT due to decreased activity tolerance and overall strength and mobility limited greatly by SpO2 levels and increased O2 needs.       SUBJECTIVE:   Ms. Justice Delcid states, \"I want to do more\"    SOCIAL HISTORY/ LIVING ENVIRONMENT: see eval  Home Environment: Independent living  One/Two Story Residence: Other (Comment) (one story apartment; lives on 3rd floor)  Living Alone: Yes  Support Systems: Other Family Member(s)  OBJECTIVE:     PAIN: VITAL SIGNS: LINES/DRAINS:   Pre Treatment: Pain Screen  Pain Scale 1: Numeric (0 - 10)  Pain Intensity 1: 0  Post Treatment: 0  HR   O2 89%-78% Continuous Pulse Oximetry  O2 Device: Heated, Hi flow nasal cannula     MOBILITY: I Mod I S SBA CGA Min Mod Max Total  NT x2 Comments:   Bed Mobility    Rolling [] [] [] [] [] [] [] [] [] [x] []    Supine to Sit [] [] [] [] [] [] [] [] [] [x] []    Scooting [] [] [] [] [] [] [] [] [] [x] []    Sit to Supine [] [] [] [] [] [] [] [] [] [x] []    Transfers    Sit to Stand [] [] [] [x] [] [] [] [] [] [] []    Bed to Chair [] [] [] [] [] [] [] [] [] [x] []    Stand to Sit [] [] [] [x] [] [] [] [] [] [] []    I=Independent, Mod I=Modified Independent, S=Supervision, SBA=Standby Assistance, CGA=Contact Guard Assistance,   Min=Minimal Assistance, Mod=Moderate Assistance, Max=Maximal Assistance, Total=Total Assistance, NT=Not Tested    BALANCE: Good Fair+ Fair Fair- Poor NT Comments   Sitting Static [x] [] [] [] [] []    Sitting Dynamic [x] [] [] [] [] []              Standing Static [x] [] [] [] [] []    Standing Dynamic [] [x] [] [] [] []      GAIT: I Mod I S SBA CGA Min Mod Max Total  NT x2 Comments:   Level of Assistance [] [] [] [] [] [] [] [] [] [x] []    Distance NT    DME HHA    Gait Quality N/A    Weightbearing  Status N/A     I=Independent, Mod I=Modified Independent, S=Supervision, SBA=Standby Assistance, CGA=Contact Guard Assistance,   Min=Minimal Assistance, Mod=Moderate Assistance, Max=Maximal Assistance, Total=Total Assistance, NT=Not Tested    PLAN:   FREQUENCY/DURATION: PT Plan of Care: 3 times/week for duration of hospital stay or until stated goals are met, whichever comes first.  TREATMENT:     TREATMENT:   ($$ Therapeutic Activity: 23-37 mins  $$ Therapeutic Exercises: 8-22 mins    )  Co-Treatment PT/OT necessary due to patient's decreased overall endurance/tolerance levels, as well as need for high level skilled assistance to complete functional transfers/mobility and functional tasks  Therapeutic Activity (30 Minutes): Therapeutic activity included Transfer Training, Sitting balance  and Standing balance to improve functional Mobility, Strength and Activity tolerance. Therapeutic Exercise (15 Minutes): Therapeutic exercises noted below to improve functional activity tolerance, strength and mobility.            TREATMENT GRID:   Date:  10/5/21 Date:   Date:     Activity/Exercise Parameters Parameters Parameters   LAQ x10     Seated march x10     ABD/ADD x10                                   AFTER TREATMENT POSITION/PRECAUTIONS:  Chair, Needs within reach and RN notified    INTERDISCIPLINARY COLLABORATION:  RN/PCT, PT/PTA and OT/LANGLEY    TOTAL TREATMENT DURATION:  PT Patient Time In/Time Out  Time In: 1415  Time Out: [address] [place], [place]

## 2021-10-05 NOTE — PROGRESS NOTES
CM chart review. Patient continues to require Airvo at [facility]. PT/OT following. Per last PT note, no further skilled therapy needed at discharge. Patient is uninsured. DEC has screened for financial assistance. CM will continue to follow to assist with discharge needs that may arise.

## 2021-10-05 NOTE — PROGRESS NOTES
Samantha Vickers  Admission Date: 9/14/2021         Daily Progress Note: 10/5/2021    The patient's chart is reviewed and the patient is discussed with the staff. Background: 22 yo [facility] female admitted with acute respiratory failure due to Archana Humphreys. She was admitted on 9/14 with a 2 day history of dyspnea. She was positive for COVID. She was started on decadron and remdesivir. She initially required 6L NC but worsened required BiPAP FiO2 @ 100% and her resting saturation was 91% but dropped to the 70s with exertion. She received Actemra 9/16.  She made it very clear to several providers that she does not want intubation and her mother confirmed this as well. After long discussion on 9/21, category status changed to Category 1 and pt moved to tele obs ICU on 100% BIPAP. She was stable on airvo and transferred to floor 9/29. Subjective:   Pt is up in chair on 60L at 75% airvo, sat 92%. Pt complains of vaginal bleeding, blood tinged sputum, epistaxis, and just overall feeling weak secondary to bleeding and being on heparin gtt.      Current Facility-Administered Medications   Medication Dose Route Frequency    apixaban (ELIQUIS) tablet 10 mg  10 mg Oral BID    [Held by provider] apixaban (ELIQUIS) tablet 5 mg  5 mg Oral BID    insulin glargine (LANTUS) injection 15 Units  15 Units SubCUTAneous DAILY    insulin lispro (HUMALOG) injection 5 Units  5 Units SubCUTAneous TIDAC    insulin lispro (HUMALOG) injection   SubCUTAneous AC&HS    dexamethasone (DECADRON) 10 mg/mL injection 6 mg  6 mg IntraVENous Q24H    NUTRITIONAL SUPPORT ELECTROLYTE PRN ORDERS   Does Not Apply PRN    hydrALAZINE (APRESOLINE) 20 mg/mL injection 10 mg  10 mg IntraVENous Q6H PRN    morphine injection 1 mg  1 mg IntraVENous Q4H PRN    LORazepam (ATIVAN) injection 0.5 mg  0.5 mg IntraVENous Q6H PRN    phenol throat spray (CHLORASEPTIC) 1 Spray  1 Spray Oral PRN    pantoprazole (PROTONIX) tablet 40 mg  40 mg Oral ACB    sodium chloride (NS) flush 30 mL  30 mL InterCATHeter Q8H    sodium chloride (NS) flush 30 mL  30 mL InterCATHeter PRN    acetaminophen (TYLENOL) tablet 650 mg  650 mg Oral Q6H PRN    Or    acetaminophen (TYLENOL) suppository 650 mg  650 mg Rectal Q6H PRN    polyethylene glycol (MIRALAX) packet 17 g  17 g Oral DAILY PRN    ondansetron (ZOFRAN ODT) tablet 4 mg  4 mg Oral Q8H PRN    Or    ondansetron (ZOFRAN) injection 4 mg  4 mg IntraVENous Q6H PRN     Review of Systems  +vaginal bleeding  +blood tinged sputum  +epistaix   Constitutional: negative for fever, chills, sweats  Cardiovascular: negative for chest pain, palpitations, syncope, edema  Gastrointestinal:  negative for dysphagia, reflux, vomiting, diarrhea, abdominal pain, or melena  Neurologic:  negative for focal weakness, numbness, headache  Objective:     Vitals:    10/05/21 0435 10/05/21 0658 10/05/21 0800 10/05/21 1035   BP: (!) 140/94 (!) 140/78  108/71   Pulse: 100 (!) 109 (!) 108 (!) 115   Resp: 20 20 22 20   Temp: 98.3 °F (36.8 °C) 98.6 °F (37 °C)  97.6 °F (36.4 °C)   SpO2: 93% (!) 88% 97% 97%   Weight:       Height:           Intake/Output Summary (Last 24 hours) at 10/5/2021 1350  Last data filed at 10/5/2021 0940  Gross per 24 hour   Intake 840 ml   Output 350 ml   Net 490 ml     Physical Exam:   Constitution:  the patient is obeseand in no acute distress, on Airvo 60L at 75%  HEENT:  Sclera clear, pupils equal, oral mucosa moist  Respiratory: bilateral crackles  Cardiovascular:  RRR without M,G,R  Gastrointestinal: soft and non-tender; with positive bowel sounds. Musculoskeletal: warm without cyanosis. There is no lower extremity edema.   Skin:  no jaundice or rashes, no wounds   Neurologic: no gross neuro deficits     Psychiatric:  alert and oriented x 3    CXR: none today    LAB:  Recent Labs     10/04/21  1140 10/04/21  1027 10/03/21  0536   WBC  --  10.9 15.5*   HGB 8.6* 5.9* 8.0*   HCT 30.2* 21.1* 28.2*   PLT  --  195 261     Recent Labs     10/05/21  0423 10/04/21  1027 10/03/21  0536    139 138   K 3.9 3.1* 4.3    106 102   CO2 29 25 27   * 178* 122*   BUN 17 20 26*   CREA 0.63 0.58* 0.65   MG  --  1.5*  --    CA 9.3 7.7* 9.3   PHOS  --  3.6  --      No results for input(s): LAC, TROPHS, BNPNT, CRP in the last 72 hours. No lab exists for component: ESR  No results for input(s): PH, PCO2, PO2, HCO3, PHI, PCO2I, PO2I, HCO3I in the last 72 hours. No results for input(s): SDES, CULT in the last 72 hours. Assessment and Plan:  (Medical Decision Making)   Principal Problem:      Acute respiratory distress syndrome (ARDS) due to COVID-19 virus ([facility] [address] (9/14/2021)    Received remdesivir, toci 9/16, on dexa. On airvo 60L 75%. Off CPAP QHS last night and pt did well, mobilize with PT/OT     Active Problems:    DM w/o complication type II ([facility] [address] (9/14/2021)    SSI and lantus, per primary       HTN (hypertension) (9/14/2021)    BP improved today and controlled.        Acute respiratory failure with hypoxia ([facility] [address] (9/16/2021)    Received rocephin/azithromycin for superimposed infection. -on 75% fio2       Morbid obesity ([facility] [address] (9/18/2021)    Adds to complexity of care       Suspected sleep apnea (9/18/2021)    Likely OHS/restircitve disease with super morbid obesity       Thrombocytopenia ([address] (9/22/2021)    Platelets stable, HIT negative.       DVT (deep venous thrombosis) ([facility] [address] (9/23/2021)   on heparin gtt       ALLAN    Cr improving with fluid, no signs of overload so continue for today          More than 50% of the time documented was spent in face-to-face contact with the patient and in the care of the patient on the floor/unit where the patient is located. NEFTALY Grullon     Lungs clear to auscultation bilaterally. On air Vo 60 L 75%  Heart S1 and S2 audible, no murmers or rubs appreciated  Other     Respiratory status is overall improving. Continue to taper as tolerated.   She does have incentive spirometry but not using it as often as she should. Advised her she should use it every 15 minutes to improve her respiratory muscles. She is aware and understands. However, given the fact that she is having marked vaginal bleeding in a patient who normally has dysmenorrhea, would recommend she see GYN. Would consider stopping heparin for now. If GYN does not need to do any intervention and is okay with the degree of vaginal bleeding, can restart heparin. Otherwise may be a candidate for an IVC filter. Patient is aware and understands all of the above. Continue CPAP/BiPAP nightly. Would recommend outpatient polysomnogram with a split-night study. I have spoken with and examined the patient. I have reviewed the history, examination, assessment, and plan and agree with the above. Pearl Ross MD      This note was signed electronically. Errors are unfortunately her likely due to dictation software.

## 2021-10-05 NOTE — PROGRESS NOTES
Hourly rounding completed on this shift. Patient maintained good O2 sats overnight using airvo 60/80. No new complaints at this time. All needs met. Pt is currently resting in bed. Will give report to oncoming nurse.

## 2021-10-05 NOTE — PROGRESS NOTES
Comprehensive Nutrition Assessment    Type and Reason for Visit: Reassess    Nutrition Recommendations/Plan:   Meals and Snacks:  Continue current diet. Nutrition Supplement Therapy:   Medical food supplement therapy:  Continue Glucerna Shake three times per day (this provides 220 kcal and 10 grams protein per bottle)     Malnutrition Assessment:  Malnutrition Status: At risk for malnutrition (specify) (minimal po intake since 9/16 d/t increased O2 needs/need for BIPAP)    Nutrition Assessment:   Nutrition History: 9/20:Unable to obtain      Nutrition Background: H/O: DM type II, HTN, severe MO. Presented with cough, cold flu like symptoms fever and chills   Onset of symptoms 9/12. Admitted d/t hypoxic respiratory faikure d/t COVID  Daily Update:  Discussed with Tabitha Lewis RN. On Airvo, consuming % of meals, has outside foods in addition, varied use of ONS noted. Abdominal Status (last documented): Soft, Obese abdomen with Active  bowel sounds. Last BM 10/04/21. Nutrition Related Findings:   9/16: Triple lumen PICC placed 9/19: TPN of 1L/d 5%Dex/4.25%AA started, 9/20: TPN advanced to 1L/d of 14%DEX/8%AA, 9/23: TPN advanced to goal of1 .56 L/d (9/29) transitioned to Airvo, ate 100% of breakfast, stopping TPN. Current Nutrition Therapies:  ADULT DIET Regular; 3 carb choices (45 gm/meal);  Low Sodium (2 gm)  ADULT ORAL NUTRITION SUPPLEMENT Dinner, Breakfast, Lunch; Diabetic Supplement    Current Intake:   Average Meal Intake: % Average Supplement Intake: Unable to assess Additional Caloric Sources: eating outside foods as well per RN    Anthropometric Measures:  Height: 5' 9\" (175.3 cm)  Current Body Wt: 198.2 kg (436 lb 15.2 oz) (9/29), Weight source: Stated  BMI: 64.5, Obese class 3 (BMI 40.0 or greater)  Admission Body Weight: 461 lb 10.3 oz (standing scale)  Ideal Body Weight (lbs) (Calculated): 145 lbs (66 kg),    Usual Body Wt: 205.9 kg (454 lb) (per EMR 2019 to 2020), Percent weight change: 0.3          Edema: Generalized: No Edema (10/5/2021  7:15 AM)     Estimated Daily Nutrient Needs:  Energy (kcal/day): 9476-2208 (Kcal/kg (22-25), Weight Used: Ideal (65.9 kg))  Protein (g/day): 80-99 (1.2-1.5) Weight Used: (Ideal (66kg))  Fluid (ml/day):   ( (Minimize))    Nutrition Diagnosis:   No nutrition diagnosis at this time     Nutrition Interventions:   Food and/or Nutrient Delivery: Continue current diet     Coordination of Nutrition Care: Continue to monitor while inpatient    Goals:   Previous Goal Met: Goal(s) achieved  Active Goal: Maintain po intake to meet estimated needs    Nutrition Monitoring and Evaluation:      Food/Nutrient Intake Outcomes: Food and nutrient intake, Supplement intake  Physical Signs/Symptoms Outcomes: Biochemical data, GI status, Meal time behavior    Discharge Planning:     Too soon to determine    Devon Willoughby, TANNER, LDN   Contact: [phone]    Disaster Mode Active

## 2021-10-05 NOTE — PROGRESS NOTES
Patient called nurse to room showing that she had an episode both of hemoptysis and bloody nasal drainage. No active bleeding. Hospitalist informed and aware.

## 2021-10-05 NOTE — PROGRESS NOTES
Hospitalist Progress Note   Admit Date:  2021  5:26 AM   Name:  Mona Lozano   Age:  23 y.o. Sex:  female  :  1995   MRN:  [id number]   Room:  [facility]/    Presenting Complaint: Shortness of Breath    Reason(s) for Admission: Acute respiratory failure [facility]) [J96.00]     Hospital Course & Interval History:     Patient was admitted due to acute respiratory failure due to COVID-19 infection. She was admitted from . She was treated with Decadron and remdesivir. Initially she required oxygen via nasal cannula, but later she had to have BiPAP treatment she received Actemra on . She made it clear that she would not want intubation. Her mother confirmed with that. She has improved on treatment now on high flow oxygen and move out of ICU. CT of the chest is nondiagnostic for pulmonary embolism due to suboptimal opacification of the pulmonary arteries. Some peripheral consolidation in the paced segment right upper lobe and right lung base, there is possibility of pulmonary infarct. Patient is found to have left calf vein thrombosis. She was on Lovenox. She is now on Apixaban. Subjective (10/05/21):  21   She is complaining of feeling lightheaded. She does not have appetite. No headache. She is feeling weak. He does not eat much. Patient has been on heparin IV drip. No report of passing blood per rectum, or blood per urine. 10/1/21   Patient's blood pressure has improved compared to yesterday she denies feeling lightheaded today. She is on CPAP this morning and she wears it from last night. She has been in bed. Not much ambulation. Still on 60 L/min of oxygen. 10/2/21   Patient is feeling better. She is eating well this morning. Still requires 60 L/min oxygen but not as short of breath as of yesterday. She is talking better. No lightheadedness. 10/3/21   Patient is feeling the same. Afebrile. No chest pain. 10/4/2021  Patient is feeling better. Still on 60 L/min of oxygen. But she is more comfortable, sitting up in her bed. She is eating well. No fever, no chills. No reports of bleeding. 10/5/21   Patient reports no significant change on her condition. She is breathing better however. Eating better. No chest pain. No fever. Assessment & Plan:     Principal Problem:    Acute respiratory distress syndrome (ARDS) due to COVID-19 virus ([facility] [address] (9/14/2021)  Patient was on BiPAP. Now off BiPAP and still on high flow oxygen at 60 L/min. On Decadron 6 mg IV once a day. Patient completed remdesivir  She received Actemra as well  We will try to wean oxygen down, as tolerated. Blood pressure is improved. No lightheadedness. We will continue to monitor her symptoms and wean oxygen down. So far not able to wean down oxygen yet. We will continue to give PPI for GI prophylaxis. Active Problems:    DM w/o complication type II ([address] (9/14/2021)  Blood sugar is in 100s. Continue to cover with sliding scale with short acting insulin. Patient is on Lantus 15 units subcu once a day. HTN (hypertension) (9/14/2021)  Blood pressure is currently at 140/78 mmHg. Monitor. Acute respiratory failure with hypoxia ([address] (9/16/2021)  Improving from admission time. Patient is off BiPAP, on high flow oxygen. Morbid obesity ([address] (9/18/2021)     Suspected sleep apnea (9/18/2021)     Thrombocytopenia ([address] (9/22/2021)  Resolved      DVT (deep venous thrombosis) ([address] (9/23/2021)  On Apixaban      ALLAN (acute kidney injury) ([address] (9/30/2021)  Due to multiple factors, including hemodynamic instability. She is getting some IV fluid resuscitation now. Avoid potentially nephrotoxic agents. Renal function is now normalized. Dispo/Discharge Planning: To be determined    Diet:  ADULT DIET Regular; 3 carb choices (45 gm/meal);  Low Sodium (2 gm)  ADULT ORAL NUTRITION SUPPLEMENT Dinner, Breakfast, Lunch; Diabetic Supplement  DVT PPx: on Apixaban   Code status: Full Code    Hospital Problems as of 10/5/2021 Date Reviewed: 9/21/2021        Codes Class Noted - Resolved POA    ALLAN (acute kidney injury) ([facility] ICD-10-CM: N17.9  ICD-9-CM: 584.9  9/30/2021 - Present Unknown        DVT (deep venous thrombosis) ([facility] ICD-10-CM: I82.409  ICD-9-CM: 453.40  9/23/2021 - Present Unknown        Thrombocytopenia (HCC) ICD-10-CM: D69.6  ICD-9-CM: 287.5  9/22/2021 - Present Unknown        Morbid obesity ([facility] ICD-10-CM: E66.01  ICD-9-CM: 278.01  9/18/2021 - Present Unknown        Suspected sleep apnea ICD-10-CM: R29.818  ICD-9-CM: 781.99  9/18/2021 - Present Unknown        Acute respiratory failure with hypoxia [facility]) ICD-10-CM: J96.01  ICD-9-CM: 518.81  9/16/2021 - Present Yes        * (Principal) Acute respiratory distress syndrome (ARDS) due to COVID-19 virus [facility]) ICD-10-CM: U07.1, J80  ICD-9-CM: 242.13, 079.89  9/14/2021 - Present Unknown        DM w/o complication type II (HCC) (Chronic) ICD-10-CM: E11.9  ICD-9-CM: 250.00  9/14/2021 - Present Yes        HTN (hypertension) (Chronic) ICD-10-CM: I10  ICD-9-CM: 401.9  9/14/2021 - Present Yes        RESOLVED: Acute respiratory failure ([facility] ICD-10-CM: J96.00  ICD-9-CM: 518.81  9/14/2021 - 9/16/2021 Yes              Objective:     Patient Vitals for the past 24 hrs:   Temp Pulse Resp BP SpO2   10/05/21 0800 -- (!) 108 22 -- 97 %   10/05/21 0658 98.6 °F (37 °C) (!) 109 20 (!) 140/78 (!) 88 %   10/05/21 0435 98.3 °F (36.8 °C) 100 20 (!) 140/94 93 %   10/05/21 0137 -- -- -- -- 95 %   10/05/21 0034 98.5 °F (36.9 °C) (!) 112 22 130/88 97 %   10/04/21 2331 -- -- -- -- 96 %   10/04/21 1949 98.2 °F (36.8 °C) (!) 110 18 (!) 138/102 98 %   10/04/21 1558 98.7 °F (37.1 °C) (!) 118 18 132/83 97 %   10/04/21 1058 98.7 °F (37.1 °C) (!) 119 25 106/73 96 %     Oxygen Therapy  O2 Sat (%): 97 % (10/05/21 0800)  Pulse via Oximetry: 103 beats per minute (10/05/21 0137)  O2 Device: Heated; Hi flow nasal cannula (10/05/21 0800)  Skin Assessment: Clean, dry, & intact (10/03/21 3409)  Skin Protection for O2 Device: Yes (10/02/21 1640)  Orientation: Bilateral (10/02/21 1640)  Location: Cheek (10/02/21 1640)  O2 Flow Rate (L/min): 60 l/min (10/05/21 0800)  O2 Temperature: 87.8 °F (31 °C) (10/05/21 0137)  FIO2 (%): 75 % (10/05/21 0800)    Estimated body mass index is 64.53 kg/m² as calculated from the following:    Height as of this encounter: 5' 9\" (1.753 m). Weight as of this encounter: 198.2 kg (437 lb). Intake/Output Summary (Last 24 hours) at 10/5/2021 0948  Last data filed at 10/5/2021 0940  Gross per 24 hour   Intake 1080 ml   Output 350 ml   Net 730 ml         Physical Exam:     Visit Vitals  BP (!) 140/78 (BP 1 Location: Left upper arm, BP Patient Position: Sitting)   Pulse (!) 108   Temp 98.6 °F (37 °C)   Resp 22   Ht 5' 9\" (1.753 m)   Wt (!) 198.2 kg (437 lb)   SpO2 97%   BMI 64.53 kg/m²        General:    Well nourished. BMI of 65. Patient sitting up in bed, on high flow oxygen. Appears less shortness of breath with speaking. She is eating well. She is talking well. In good spirit. Head:  Normocephalic, atraumatic, pale   Eyes:  Sclerae appear normal.  Pupils equally round. ENT:  Nares appear normal, no drainage. Moist oral mucosa  Neck:  No restricted ROM. Trachea midline   CV:   RRR. No m/r/g. No jugular venous distension. Lungs:   Diminished breath sound. No wheezing, rhonchi, or rales. Shortness of breath. Abdomen: Bowel sounds present. Soft, nontender, nondistended. Extremities: No cyanosis or clubbing. No edema  Skin:     No rashes and normal coloration. Warm and dry. Neuro:  Cranial nerves II-XII grossly intact. Sensation intact. A&Ox3  Psych:  Normal mood and affect.       I have reviewed ordered lab tests and independently visualized imaging below:    Last 24hr Labs:  Recent Results (from the past 24 hour(s))   METABOLIC PANEL, BASIC Collection Time: 10/04/21 10:27 AM   Result Value Ref Range    Sodium 139 136 - 145 mmol/L    Potassium 3.1 (L) 3.5 - 5.1 mmol/L    Chloride 106 98 - 107 mmol/L    CO2 25 21 - 32 mmol/L    Anion gap 8 7 - 16 mmol/L    Glucose 178 (H) 65 - 100 mg/dL    BUN 20 6 - 23 MG/DL    Creatinine 0.58 (L) 0.6 - 1.0 MG/DL    GFR est AA >60 >60 ml/min/1.73m2    GFR est non-AA >60 >60 ml/min/1.73m2    Calcium 7.7 (L) 8.3 - 10.4 MG/DL   PHOSPHORUS    Collection Time: 10/04/21 10:27 AM   Result Value Ref Range    Phosphorus 3.6 2.5 - 4.5 MG/DL   MAGNESIUM    Collection Time: 10/04/21 10:27 AM   Result Value Ref Range    Magnesium 1.5 (L) 1.8 - 2.4 mg/dL   CBC WITH AUTOMATED DIFF    Collection Time: 10/04/21 10:27 AM   Result Value Ref Range    WBC 10.9 4.3 - 11.1 K/uL    RBC 2.93 (L) 4.05 - 5.2 M/uL    HGB 5.9 (LL) 11.7 - 15.4 g/dL    HCT 21.1 (L) 35.8 - 46.3 %    MCV 72.0 (L) 79.6 - 97.8 FL    MCH 20.1 (L) 26.1 - 32.9 PG    MCHC 28.0 (L) 31.4 - 35.0 g/dL    RDW 21.7 (H) 11.9 - 14.6 %    PLATELET 804 361 - 117 K/uL    MPV 10.7 9.4 - 12.3 FL    ABSOLUTE NRBC 0.03 0.0 - 0.2 K/uL    DF AUTOMATED      NEUTROPHILS 81 (H) 43 - 78 %    LYMPHOCYTES 10 (L) 13 - 44 %    MONOCYTES 5 4.0 - 12.0 %    EOSINOPHILS 2 0.5 - 7.8 %    BASOPHILS 0 0.0 - 2.0 %    IMMATURE GRANULOCYTES 1 0.0 - 5.0 %    ABS. NEUTROPHILS 8.8 (H) 1.7 - 8.2 K/UL    ABS. LYMPHOCYTES 1.1 0.5 - 4.6 K/UL    ABS. MONOCYTES 0.6 0.1 - 1.3 K/UL    ABS. EOSINOPHILS 0.3 0.0 - 0.8 K/UL    ABS. BASOPHILS 0.0 0.0 - 0.2 K/UL    ABS. IMM.  GRANS. 0.1 0.0 - 0.5 K/UL   GLUCOSE, POC    Collection Time: 10/04/21 10:58 AM   Result Value Ref Range    Glucose (POC) 207 (H) 65 - 100 mg/dL    Performed by Jeovanny Harvey    HGB & HCT    Collection Time: 10/04/21 11:40 AM   Result Value Ref Range    HGB 8.6 (L) 11.7 - 15.4 g/dL    HCT 30.2 (L) 35.8 - 46.3 %   GLUCOSE, POC    Collection Time: 10/04/21  3:59 PM   Result Value Ref Range    Glucose (POC) 313 (H) 65 - 100 mg/dL    Performed by Jeovanny Harvey GLUCOSE, POC    Collection Time: 10/04/21  8:09 PM   Result Value Ref Range    Glucose (POC) 205 (H) 65 - 100 mg/dL    Performed by Keukey Roll    METABOLIC PANEL, BASIC    Collection Time: 10/05/21  4:23 AM   Result Value Ref Range    Sodium 136 136 - 145 mmol/L    Potassium 3.9 3.5 - 5.1 mmol/L    Chloride 100 98 - 107 mmol/L    CO2 29 21 - 32 mmol/L    Anion gap 7 7 - 16 mmol/L    Glucose 125 (H) 65 - 100 mg/dL    BUN 17 6 - 23 MG/DL    Creatinine 0.63 0.6 - 1.0 MG/DL    GFR est AA >60 >60 ml/min/1.73m2    GFR est non-AA >60 >60 ml/min/1.73m2    Calcium 9.3 8.3 - 10.4 MG/DL   GLUCOSE, POC    Collection Time: 10/05/21  6:58 AM   Result Value Ref Range    Glucose (POC) 141 (H) 65 - 100 mg/dL    Performed by Maureen Adame        All Micro Results     Procedure Component Value Units Date/Time    CULTURE, BLOOD [[id number]] Collected: 09/14/21 0759    Order Status: Completed Specimen: Blood Updated: 09/19/21 0637     Special Requests: --        LEFT  HAND       Culture result: NO GROWTH 5 DAYS       CULTURE, BLOOD [[id number]] Collected: 09/14/21 0757    Order Status: Completed Specimen: Blood Updated: 09/19/21 0637     Special Requests: --        RIGHT  Antecubital       Culture result: NO GROWTH 5 DAYS       CULTURE, RESPIRATORY/SPUTUM/BRONCH Lorrayne Reveal STAIN [[id number]] Collected: 09/14/21 0915    Order Status: Canceled Specimen: Sputum     COVID-19 RAPID TEST [[id number]]  (Abnormal) Collected: 09/14/21 0535    Order Status: Completed Specimen: Nasopharyngeal Updated: 09/14/21 0558     Specimen source NASAL        COVID-19 rapid test Detected        Comment:      The specimen is POSITIVE for SARS-CoV-2, the novel coronavirus associated with COVID-19. This test has been authorized by the FDA under an Emergency Use Authorization (EUA) for use by authorized laboratories.         Fact sheet for Healthcare Providers: ConventionUpdate.co.nz  Fact sheet for Patients: Renetta.       Methodology: Isothermal Nucleic Acid Amplification  RESULTS VERIFIED, PHONED TO AND READ BACK BY   FERDINAND BENOIT AT 2231 ON 9/14/21 ACL               Other Studies:  No results found. Current Meds:  Current Facility-Administered Medications   Medication Dose Route Frequency    apixaban (ELIQUIS) tablet 10 mg  10 mg Oral BID    [START ON 10/8/2021] apixaban (ELIQUIS) tablet 5 mg  5 mg Oral BID    insulin glargine (LANTUS) injection 15 Units  15 Units SubCUTAneous DAILY    insulin lispro (HUMALOG) injection 5 Units  5 Units SubCUTAneous TIDAC    insulin lispro (HUMALOG) injection   SubCUTAneous AC&HS    dexamethasone (DECADRON) 10 mg/mL injection 6 mg  6 mg IntraVENous Q24H    NUTRITIONAL SUPPORT ELECTROLYTE PRN ORDERS   Does Not Apply PRN    hydrALAZINE (APRESOLINE) 20 mg/mL injection 10 mg  10 mg IntraVENous Q6H PRN    morphine injection 1 mg  1 mg IntraVENous Q4H PRN    LORazepam (ATIVAN) injection 0.5 mg  0.5 mg IntraVENous Q6H PRN    phenol throat spray (CHLORASEPTIC) 1 Spray  1 Spray Oral PRN    pantoprazole (PROTONIX) tablet 40 mg  40 mg Oral ACB    sodium chloride (NS) flush 30 mL  30 mL InterCATHeter Q8H    sodium chloride (NS) flush 30 mL  30 mL InterCATHeter PRN    acetaminophen (TYLENOL) tablet 650 mg  650 mg Oral Q6H PRN    Or    acetaminophen (TYLENOL) suppository 650 mg  650 mg Rectal Q6H PRN    polyethylene glycol (MIRALAX) packet 17 g  17 g Oral DAILY PRN    ondansetron (ZOFRAN ODT) tablet 4 mg  4 mg Oral Q8H PRN    Or    ondansetron (ZOFRAN) injection 4 mg  4 mg IntraVENous Q6H PRN       Signed:  Alvina Howell MD    Part of this note may have been written by using a voice dictation software. The note has been proof read but may still contain some grammatical/other typographical errors.

## 2021-10-05 NOTE — PROGRESS NOTES
ACUTE OT GOALS:  (Developed with and agreed upon by patient and/or caregiver.)  1) Patient will complete lower body bathing and dressing with MOD I and adaptive equipment as needed. CONTINUE TO ADDRESS (2021)  2) Patient will complete toileting with MOD I. - UPDATED 2021  3) Patient will complete functional transfers with  MOD I and adaptive equipment as needed. -  UPDATED 2021  4) Patient will tolerate at least 25 minutes of OT activity with 1-2 rest breaks while maintaining O2 sats >90%.  - CONTINUE TO ADDRESS (2021)  5) Patient will verbalize at least 3 energy conservation technique to utilize during ADL/IADL.  - CONTINUE TO ADDRESS (2021)     NEW GOALS (ADDED 2021)  1. Patient will complete functional activity with MOD I and adaptive equipment as needed. Timeframe: 7 visits     OCCUPATIONAL THERAPY: Daily Note OT Treatment Day # 6    Deena Fisher is a 23 y.o. female   PRIMARY DIAGNOSIS: Acute respiratory distress syndrome (ARDS) due to COVID-19 virus ([facility] [address]  Acute respiratory failure ([facility] [J96.00]       Payor: /   ASSESSMENT:     REHAB RECOMMENDATIONS: CURRENT LEVEL OF FUNCTION:  (Most Recently Demonstrated)   Recommendation to date pending progress:  [address] [place] [address] Therapy  Equipment:    To Be Determined Bathing:   Contact Guard Assistance (LB)  D[address]in [address]   Feeding/Grooming:   Standby Assistance  Toileting:   Not tested  Functional Mobility:   Contact Guard Assistance     ASSESSMENT:  Ms. Isai Wheeler is slowly progressing towards OT goals but remains very limited by her respiratory status. Today, pt was received sitting in chair on airvo (60L/75%) with O2 sats in the 90s. Completed sit>stand CGA--needed brief due to being on menstrual cycle. Completed LB bathing in sitting CGA,  michelle-area in standing CGA. Donned brief in sitting CGA, maxA to pull over hips in standing.  Multiple attempts at further mobility and ADLs but pt unable to maintain O2 sats. Dropped to upper 70s (even with dynamic sitting tasks) and required extended period of time in order to recover back to low 90s. Pt is motivated and wants to do more but stated feeling extremely fatigued. Ms. Darylene Lopes continues to demonstrate overall deficits in strength, balance, activity tolerance, and ADL performance. Continue OT efforts and POC in order to address functional deficits and OT goals stated above. SUBJECTIVE:   Ms. Darylene Lopes states, \"I want to get better\"    SOCIAL HISTORY/LIVING ENVIRONMENT:  Home Environment: Independent living  One/Two Story Residence: Other (Comment) (one story apartment; lives on 3rd floor)  Living Alone: Yes  Support Systems: Other Family Member(s)    OBJECTIVE:     PAIN: VITAL SIGNS: LINES/DRAINS:   Pre Treatment: Pain Screen  Pain Scale 1: Numeric (0 - 10)  Pain Intensity 1: 0  Post Treatment: no change  Vital Signs  O2 Sat (%): 93 %  O2 Device: Heated; Hi flow nasal cannula  O2 Flow Rate (L/min): 60 l/min  FIO2 (%): 75 % Continuous Pulse Oximetry  O2 Device: Heated, Hi flow nasal cannula, Humidifier     ACTIVITIES OF DAILY LIVING: I Mod I S SBA CGA Min Mod Max Total NT Comments   BASIC ADLs:              Bathing/ Showering [] [] [] [] [x] [] [] [] [] [] LB and michelle-area   Toileting [] [] [] [] [] [] [] [] [] [x]    Dressing [] [] [] [] [x] [] [] [] [] [] Donned socks--needed new pair, O2 sats dropped to low 80s-upper 70s with 1st attempt, therapist donned in order to conserve energy for other tasks during session   Feeding [] [] [] [] [] [] [] [] [] [x]    Grooming [] [] [] [] [] [] [] [] [] [x]    Personal Device Care [] [] [] [] [] [] [] [] [] [x]    Functional Mobility [] [] [] [] [x] [] [] [] [] [] Sit>stand, O2 sats dropping to upper 70s-low 80s with standing, couldn't tolerate further mobility    I=Independent, Mod I=Modified Independent, S=Supervision, SBA=Standby Assistance, CGA=Contact Guard Assistance,   Min=Minimal Assistance, Mod=Moderate Assistance, Max=Maximal Assistance, Total=Total Assistance, NT=Not Tested    MOBILITY: I Mod I S SBA CGA Min Mod Max Total  NT x2 Comments:   Supine to sit [] [] [] [] [] [] [] [] [] [x] [] Received in chair    Sit to supine [] [] [] [] [] [] [] [] [] [x] [] Left sitting in chair    Sit to stand [] [] [] [] [x] [] [] [] [] [] []    Bed to chair [] [] [] [] [] [] [] [] [] [x] []    I=Independent, Mod I=Modified Independent, S=Supervision, SBA=Standby Assistance, CGA=Contact Guard Assistance,   Min=Minimal Assistance, Mod=Moderate Assistance, Max=Maximal Assistance, Total=Total Assistance, NT=Not Tested    BALANCE: Good Fair+ Fair Fair- Poor NT Comments   Sitting Static [] [x] [] [] [] []    Sitting Dynamic [] [x] [] [] [] []              Standing Static [] [] [x] [] [] []    Standing Dynamic [] [] [x] [] [] []      PLAN:   FREQUENCY/DURATION: OT Plan of Care: 3 times/week for duration of hospital stay or until stated goals are met, whichever comes first.    TREATMENT:   TREATMENT:   ($$ Self Care/Home Management: 23-37 mins$$ Neuromuscular Re-Education: 8-22 mins   )  Co-Treatment PT/OT necessary due to patient's decreased overall endurance/tolerance levels, as well as need for high level skilled assistance to complete functional transfers/mobility and functional tasks  Self Care (25 Minutes): Self care including Lower Body Bathing, Lower Body Dressing and Energy Conservation Training to increase independence and decrease level of assistance required. Neuromuscular Re-education (20 Minutes): Neuromuscular Re-education included Balance Training, Coordination training, Postural training, Sitting balance training and Standing balance training to improve Balance, Coordination, Functional Mobility and Postural Control.     TREATMENT GRID:  N/A    AFTER TREATMENT POSITION/PRECAUTIONS:  Chair, Needs within reach and RN notified    INTERDISCIPLINARY COLLABORATION:  RN/PCT, PT/PTA and OT/LANGLEY    TOTAL TREATMENT DURATION:  OT Patient Time In/Time Out  Time In: 8832  Time Out: 1100 [facility], OT

## 2021-10-06 PROBLEM — N93.9 VAGINAL BLEEDING: Status: ACTIVE | Noted: 2021-01-01

## 2021-10-06 NOTE — PROGRESS NOTES
Occupational Therapy Note:    OT treatment attempted x2 today. Pt MARIA EUGENIA both times at x-ray. Will plan to check back another time/day as patient is available to complete OT treatment.      Thank you,    Mihai Erazo OTR/L

## 2021-10-06 NOTE — PROGRESS NOTES
Pt is alert and oriented x4. Pt in bed, resting. Bed in low position, wheels locked, call light within reach, instructed to call with any needs. Hourly rounds completed this shift. NAD noted. VSS. Pt has not c/o pain. PICC is SL, and dressing is clean, dry, and intact. Left message with PICC team regarding PICC placement on latest CXR results. Pt currently on Airvo 60L/60% with O2 sats in low 90's. Pt aware that urine sample needs to be collected for pregnancy test.  Report to be given to night shift nurse.

## 2021-10-06 NOTE — PROGRESS NOTES
PT treatment attempted x2 today. Pt MARIA EUGENIA both times at x-ray. Will plan to check back another time/day as patient is available to complete PT treatment.

## 2021-10-06 NOTE — PROGRESS NOTES
Problem: Risk for Spread of Infection  Goal: Prevent transmission of infectious organism to others  Description: Prevent the transmission of infectious organisms to other patients, staff members, and visitors. Outcome: Progressing Towards Goal     Problem: Patient Education:  Go to Education Activity  Goal: Patient/Family Education  Outcome: Progressing Towards Goal     Problem: Airway Clearance - Ineffective  Goal: Achieve or maintain patent airway  Outcome: Progressing Towards Goal     Problem: Gas Exchange - Impaired  Goal: Absence of hypoxia  Outcome: Progressing Towards Goal  Goal: Promote optimal lung function  Outcome: Progressing Towards Goal     Problem: Breathing Pattern - Ineffective  Goal: Ability to achieve and maintain a regular respiratory rate  Outcome: Progressing Towards Goal     Problem: Body Temperature -  Risk of, Imbalanced  Goal: Ability to maintain a body temperature within defined limits  Outcome: Progressing Towards Goal  Goal: Will regain or maintain usual level of consciousness  Outcome: Progressing Towards Goal  Goal: Complications related to the disease process, condition or treatment will be avoided or minimized  Outcome: Progressing Towards Goal     Problem: Nutrition Deficits  Goal: Optimize nutrtional status  Outcome: Progressing Towards Goal     Problem: Loneliness or Risk for Loneliness  Goal: Demonstrate positive use of time alone when socialization is not possible  Outcome: Progressing Towards Goal     Problem: Fatigue  Goal: Verbalize increase energy and improved vitality  Outcome: Progressing Towards Goal     Problem: Diabetes Self-Management  Goal: *Disease process and treatment process  Description: Define diabetes and identify own type of diabetes; list 3 options for treating diabetes.   Outcome: Progressing Towards Goal  Goal: *Incorporating nutritional management into lifestyle  Description: Describe effect of type, amount and timing of food on blood glucose; list 3 methods for planning meals. Outcome: Progressing Towards Goal  Goal: *Incorporating physical activity into lifestyle  Description: State effect of exercise on blood glucose levels. Outcome: Progressing Towards Goal  Goal: *Developing strategies to promote health/change behavior  Description: Define the ABC's of diabetes; identify appropriate screenings, schedule and personal plan for screenings. Outcome: Progressing Towards Goal  Goal: *Using medications safely  Description: State effect of diabetes medications on diabetes; name diabetes medication taking, action and side effects. Outcome: Progressing Towards Goal  Goal: *Monitoring blood glucose, interpreting and using results  Description: Identify recommended blood glucose targets  and personal targets. Outcome: Progressing Towards Goal  Goal: *Prevention, detection, treatment of acute complications  Description: List symptoms of hyper- and hypoglycemia; describe how to treat low blood sugar and actions for lowering  high blood glucose level. Outcome: Progressing Towards Goal  Goal: *Prevention, detection and treatment of chronic complications  Description: Define the natural course of diabetes and describe the relationship of blood glucose levels to long term complications of diabetes. Outcome: Progressing Towards Goal  Goal: *Developing strategies to address psychosocial issues  Description: Describe feelings about living with diabetes; identify support needed and support network  Outcome: Progressing Towards Goal  Goal: *Insulin pump training  Outcome: Progressing Towards Goal  Goal: *Sick day guidelines  Outcome: Progressing Towards Goal  Goal: *Patient Specific Goal (EDIT GOAL, INSERT TEXT)  Outcome: Progressing Towards Goal     Problem: Falls - Risk of  Goal: *Absence of Falls  Description: Document [place] Fall Risk and appropriate interventions in the flowsheet.   Outcome: Progressing Towards Goal  Note: Fall Risk Interventions:  Mobility Interventions: Communicate number of staff needed for ambulation/transfer, Patient to call before getting OOB         Medication Interventions: Patient to call before getting OOB, Teach patient to arise slowly    Elimination Interventions: Call light in reach, Elevated toilet seat, Patient to call for help with toileting needs, Toilet paper/wipes in reach, Toileting schedule/hourly rounds    History of Falls Interventions: Bed/chair exit alarm

## 2021-10-06 NOTE — PROGRESS NOTES
Andreas Gray  Admission Date: 9/14/2021         Daily Progress Note: 10/6/2021    The patient's chart is reviewed and the patient is discussed with the staff. Background: 24 yo Rw American female admitted with acute respiratory failure due to [address]. She was admitted on 9/14 with a 2 day history of dyspnea. She was positive for COVID. She was started on decadron and remdesivir. She initially required 6L NC but worsened required BiPAP FiO2 @ 100% and her resting saturation was 91% but dropped to the 70s with exertion. She received Actemra 9/16.  She made it very clear to several providers that she does not want intubation and her mother confirmed this as well. After long discussion on 9/21, category status changed to Category 1 and pt moved to tele obs ICU on 100% BIPAP. She was stable on airvo and transferred to floor 9/29. Pt was found to have L calf DVT on 9/22 and has been on heparin and transitioned to Eliquis. Pt has been having vaginal bleeding. Subjective:   Pt now on NRB to go down for VQ scan now. Pt has been on 60L @ 60%. Pt reports that blood tinged sputum and epistaxis has resolved. Her RN reports that the vaginal bleeding has improved.        Current Facility-Administered Medications   Medication Dose Route Frequency    budesonide-formoterol (SYMBICORT) 80-4.5 mcg inhaler  2 Puff Inhalation BID RT    albuterol (PROVENTIL HFA, VENTOLIN HFA, PROAIR HFA) inhaler 2 Puff  2 Puff Inhalation Q6H RT    apixaban (ELIQUIS) tablet 10 mg  10 mg Oral BID    [START ON 10/8/2021] apixaban (ELIQUIS) tablet 5 mg  5 mg Oral BID    insulin glargine (LANTUS) injection 15 Units  15 Units SubCUTAneous DAILY    insulin lispro (HUMALOG) injection 5 Units  5 Units SubCUTAneous TIDAC    insulin lispro (HUMALOG) injection   SubCUTAneous AC&HS    dexamethasone (DECADRON) 10 mg/mL injection 6 mg  6 mg IntraVENous Q24H    NUTRITIONAL SUPPORT ELECTROLYTE PRN ORDERS   Does Not Apply PRN    hydrALAZINE (APRESOLINE) 20 mg/mL injection 10 mg  10 mg IntraVENous Q6H PRN    morphine injection 1 mg  1 mg IntraVENous Q4H PRN    LORazepam (ATIVAN) injection 0.5 mg  0.5 mg IntraVENous Q6H PRN    phenol throat spray (CHLORASEPTIC) 1 Spray  1 Spray Oral PRN    pantoprazole (PROTONIX) tablet 40 mg  40 mg Oral ACB    sodium chloride (NS) flush 30 mL  30 mL InterCATHeter Q8H    sodium chloride (NS) flush 30 mL  30 mL InterCATHeter PRN    acetaminophen (TYLENOL) tablet 650 mg  650 mg Oral Q6H PRN    Or    acetaminophen (TYLENOL) suppository 650 mg  650 mg Rectal Q6H PRN    polyethylene glycol (MIRALAX) packet 17 g  17 g Oral DAILY PRN    ondansetron (ZOFRAN ODT) tablet 4 mg  4 mg Oral Q8H PRN    Or    ondansetron (ZOFRAN) injection 4 mg  4 mg IntraVENous Q6H PRN     Review of Systems  +cough  +dyspnea on exertion  Constitutional: negative for fever, chills, sweats  Cardiovascular: negative for chest pain, palpitations, syncope, edema  Gastrointestinal:  negative for dysphagia, reflux, vomiting, diarrhea, abdominal pain, or melena  Neurologic:  negative for focal weakness, numbness, headache  Objective:     Vitals:    10/06/21 0708 10/06/21 0742 10/06/21 1102 10/06/21 1200   BP: 120/72  128/69    Pulse: (!) 110  (!) 113 (!) 113   Resp: 18  18    Temp: 98.8 °F (37.1 °C)  98.6 °F (37 °C)    SpO2: 96% 100% 96%    Weight:       Height:           Intake/Output Summary (Last 24 hours) at 10/6/2021 1259  Last data filed at 10/6/2021 0806  Gross per 24 hour   Intake 840 ml   Output 400 ml   Net 440 ml     Physical Exam:   Constitution:  the patient is well developed and in no acute distress, on NRB  HEENT:  Sclera clear, pupils equal, oral mucosa moist  Respiratory: distant breath sounds   Cardiovascular:  RRR without M,G,R  Gastrointestinal: soft and non-tender; with positive bowel sounds. Musculoskeletal: warm without cyanosis. There is no lower extremity edema.   Skin:  no jaundice or rashes,   Neurologic: no gross neuro deficits     Psychiatric:  alert and oriented x 3    CXR: VQ scan pending    LAB:  Recent Labs     10/06/21  0808 10/04/21  1140 10/04/21  1027   WBC 16.2*  --  10.9   HGB 7.8* 8.6* 5.9*   HCT 27.8* 30.2* 21.1*     --  195     Recent Labs     10/06/21  0509 10/05/21  0423 10/04/21  1027    136 139   K 4.0 3.9 3.1*    100 106   CO2 29 29 25   * 125* 178*   BUN 14 17 20   CREA 0.65 0.63 0.58*   MG 1.9  --  1.5*   CA 9.0 9.3 7.7*   PHOS 4.6*  --  3.6     No results for input(s): LAC, TROPHS, BNPNT, CRP in the last 72 hours. No lab exists for component: ESR  No results for input(s): PH, PCO2, PO2, HCO3, PHI, PCO2I, PO2I, HCO3I in the last 72 hours. No results for input(s): SDES, CULT in the last 72 hours. Assessment and Plan:  (Medical Decision Making)   Principal Problem:    Acute respiratory distress syndrome (ARDS) due to COVID-19 virus ([address] (9/14/2021)    Wean FiO2 as tolerated    Active Problems:    DM w/o complication type II ([address] (9/14/2021)    SSI      HTN (hypertension) (9/14/2021)    Controlled       Acute respiratory failure with hypoxia ([address] (9/16/2021)    Wean FiO2 as tolerated, currently on NRB for VQ scan       Morbid obesity ([address] (9/18/2021)    BMI 64.5      Suspected sleep apnea (9/18/2021)    Will likely need a split night PSG after discharge       Thrombocytopenia ([address] (9/22/2021)    resolved      DVT (deep venous thrombosis) ([address] (9/23/2021)    L calf DVT, has been on Eliquis but she has been having significant vaginal bleeding, blood tinged sputum, and epistaxis. ALLAN (acute kidney injury) ([address] (9/30/2021)  resolved      Vaginal bleeding (10/6/2021)    Eliquis has been held, check VQ scan       Called mother Love Villanueva [phone] with update. She requested an update regarding VQ scan results.        More than 50% of the time documented was spent in face-to-face contact with the patient and in the care of the patient on the floor/unit where the patient is located.     Farrel Essex, PA

## 2021-10-06 NOTE — PROGRESS NOTES
[facility] Hospitalist Progress Note     Name:  Yesy Gallagher  Age:26 y.o. Sex:female   :  1995    MRN:  [id number]     Admit Date:  2021    Reason for Admission:  Acute respiratory failure [facility]) [J96.00]    Hospital Course/Interval history:     23year old morbid obese AAF admitted  for acute respiratory failure with hypoxia related to COVID 19 infection. She was found to have a left leg DVT, CT chest could not visualize pulmonary arteries. She was started on a Heparin drip then transitioned to Eliquis that had to be held due to bleeding. Subjective (10/06/21): Sitting on bedside commode. Hemoglobin 7.8 this am. Patient states not wanting to resume Eliquis. **Dena Green-mother-updated via phone. All questions answered. Assessment & Plan        Principal Problem:    Acute respiratory distress syndrome (ARDS) due to COVID-19 virus ([address] (2021)  Patient was on BiPAP. Now off BiPAP and still on high flow oxygen at 60 L/min. On Decadron 6 mg IV once a day. Patient completed remdesivir   received Actemra as well   wean oxygen down, as tolerated. Check VQ scan, CT chest could not rule out PE-noted tachycardic, requiring high flow  Pulmonology following       Active Problems:    DM w/o complication type II ([address] (2021)  Blood sugar is in 100s. Continue to cover with sliding scale with short acting insulin. Patient is on Lantus 15 units subcu once a day.        HTN (hypertension) (2021)  Controlled on current regimen       Morbid obesity ([address] (2021)      Suspected sleep apnea (2021)      Thrombocytopenia ([address] (2021)  Resolved       DVT (deep venous thrombosis) ([address] (2021)  Eliquis resumed today, vaginal bleeding spotting only at this time.   Will order Provera for now (do not want to add estrogen due to DVT)  VQ scan ordered since CT chest inconclusive  High risk of PE  Needs anticoagulation; risk benefit  Echo ordered 10/5  If unable to tolerate Eliquis, consider IVC filter for DVT left leg       ALLAN (acute kidney injury) ([facility] (9/30/2021)  resolved     AUB:  Vaginal bleeding slowing down today  Patient states history of irregular periods for \"years\", never seen by GYN  Will start Provera (do not want to add estrogen due to DVT)  Check intravaginal US  **Will need outpatient follow up with GYN, on airvo, COVID +       Dispo/Discharge Planning: To be determined     Diet:  ADULT DIET Regular; 3 carb choices (45 gm/meal); Low Sodium (2 gm)  ADULT ORAL NUTRITION SUPPLEMENT Dinner, Breakfast, Lunch; Diabetic Supplement  DVT PPx: Apixaban resumed today  Code status: Full Code           Review of Systems: 14 point review of systems is otherwise negative with the exception of the elements mentioned above. Objective:     Patient Vitals for the past 24 hrs:   Temp Pulse Resp BP SpO2   10/06/21 0742 -- -- -- -- 100 %   10/06/21 0708 98.8 °F (37.1 °C) (!) 110 18 120/72 96 %   10/06/21 0505 98.7 °F (37.1 °C) (!) 106 18 (!) 141/89 99 %   10/06/21 0500 -- -- -- -- 95 %   10/06/21 0125 -- -- -- -- 95 %   10/06/21 0033 98.4 °F (36.9 °C) (!) 107 18 138/79 100 %   10/05/21 2055 -- -- -- -- 92 %   10/05/21 1916 98.7 °F (37.1 °C) (!) 119 19 116/73 97 %   10/05/21 1602 98.9 °F (37.2 °C) (!) 119 20 136/81 96 %   10/05/21 1415 -- -- -- -- 93 %   10/05/21 1035 97.6 °F (36.4 °C) (!) 115 20 108/71 97 %     Oxygen Therapy  O2 Sat (%): 100 % (10/06/21 0742)  Pulse via Oximetry: 98 beats per minute (10/06/21 0742)  O2 Device: Heated; Hi flow nasal cannula;Humidifier (10/06/21 0710)  Skin Assessment: Clean, dry, & intact (10/03/21 6833)  Skin Protection for O2 Device: Yes (10/02/21 1640)  Orientation: Bilateral (10/02/21 1640)  Location: Cheek (10/02/21 1640)  O2 Flow Rate (L/min): 60 l/min (10/06/21 0742)  O2 Temperature: 87.8 °F (31 °C) (10/05/21 0137)  FIO2 (%): 60 % (10/06/21 0742)    Body mass index is 64.53 kg/m².     Physical Exam:   General:          Well nourished. BMI of 65. Patient sitting on bedside commode, on high flow oxygen. Appears less shortness of breath with speaking. Head:               Normocephalic, atraumatic, pale   Eyes:               Sclerae appear normal.  Pupils equally round. ENT:                Nares appear normal, no drainage. Moist oral mucosa  Neck:               No restricted ROM. Trachea midline   CV:                  RRR. No m/r/g. No jugular venous distension. Lungs:             Diminished breath sound. No wheezing, rhonchi, or rales. Shortness of breath. Abdomen: Bowel sounds present. Soft, nontender, nondistended. Extremities:     No cyanosis or clubbing. No edema  Skin:                No rashes and normal coloration. Warm and dry. Neuro:             Cranial nerves II-XII grossly intact. Sensation intact.   A&Ox3  Psych:             Normal mood and affect.         Data Review:  I have reviewed all labs, meds, and studies from the last 24 hours:    Labs:    Recent Results (from the past 24 hour(s))   GLUCOSE, POC    Collection Time: 10/05/21 10:36 AM   Result Value Ref Range    Glucose (POC) 206 (H) 65 - 100 mg/dL    Performed by [employer]    GLUCOSE, POC    Collection Time: 10/05/21  3:59 PM   Result Value Ref Range    Glucose (POC) 312 (H) 65 - 100 mg/dL    Performed by [employer]    GLUCOSE, POC    Collection Time: 10/05/21  9:04 PM   Result Value Ref Range    Glucose (POC) 245 (H) 65 - 100 mg/dL    Performed by [facility]    METABOLIC PANEL, BASIC    Collection Time: 10/06/21  5:09 AM   Result Value Ref Range    Sodium 137 136 - 145 mmol/L    Potassium 4.0 3.5 - 5.1 mmol/L    Chloride 101 98 - 107 mmol/L    CO2 29 21 - 32 mmol/L    Anion gap 7 7 - 16 mmol/L    Glucose 128 (H) 65 - 100 mg/dL    BUN 14 6 - 23 MG/DL    Creatinine 0.65 0.6 - 1.0 MG/DL    GFR est AA >60 >60 ml/min/1.73m2    GFR est non-AA >60 >60 ml/min/1.73m2    Calcium 9.0 8.3 - 10.4 MG/DL   PHOSPHORUS    Collection Time: 10/06/21  5:09 AM   Result Value Ref Range    Phosphorus 4.6 (H) 2.5 - 4.5 MG/DL   MAGNESIUM    Collection Time: 10/06/21  5:09 AM   Result Value Ref Range    Magnesium 1.9 1.8 - 2.4 mg/dL   GLUCOSE, POC    Collection Time: 10/06/21  7:11 AM   Result Value Ref Range    Glucose (POC) 221 (H) 65 - 100 mg/dL    Performed by Merry Espinoza    CBC WITH AUTOMATED DIFF    Collection Time: 10/06/21  8:08 AM   Result Value Ref Range    WBC 16.2 (H) 4.3 - 11.1 K/uL    RBC 3.86 (L) 4.05 - 5.2 M/uL    HGB 7.8 (L) 11.7 - 15.4 g/dL    HCT 27.8 (L) 35.8 - 46.3 %    MCV 72.0 (L) 79.6 - 97.8 FL    MCH 20.2 (L) 26.1 - 32.9 PG    MCHC 28.1 (L) 31.4 - 35.0 g/dL    RDW 21.9 (H) 11.9 - 14.6 %    PLATELET 523 656 - 599 K/uL    MPV 10.5 9.4 - 12.3 FL    ABSOLUTE NRBC 0.04 0.0 - 0.2 K/uL    DF AUTOMATED      NEUTROPHILS 62 43 - 78 %    LYMPHOCYTES 25 13 - 44 %    MONOCYTES 9 4.0 - 12.0 %    EOSINOPHILS 3 0.5 - 7.8 %    BASOPHILS 1 0.0 - 2.0 %    IMMATURE GRANULOCYTES 1 0.0 - 5.0 %    ABS. NEUTROPHILS 10.0 (H) 1.7 - 8.2 K/UL    ABS. LYMPHOCYTES 4.0 0.5 - 4.6 K/UL    ABS. MONOCYTES 1.5 (H) 0.1 - 1.3 K/UL    ABS. EOSINOPHILS 0.4 0.0 - 0.8 K/UL    ABS. BASOPHILS 0.1 0.0 - 0.2 K/UL    ABS. IMM.  GRANS. 0.2 0.0 - 0.5 K/UL       All Micro Results     Procedure Component Value Units Date/Time    CULTURE, BLOOD [[id number]] Collected: 09/14/21 0759    Order Status: Completed Specimen: Blood Updated: 09/19/21 0637     Special Requests: --        LEFT  HAND       Culture result: NO GROWTH 5 DAYS       CULTURE, BLOOD [[id number]] Collected: 09/14/21 0757    Order Status: Completed Specimen: Blood Updated: 09/19/21 0637     Special Requests: --        RIGHT  Antecubital       Culture result: NO GROWTH 5 DAYS       CULTURE, RESPIRATORY/SPUTUM/BRONCH Lavada Gambles STAIN [[id number]] Collected: 09/14/21 0915    Order Status: Canceled Specimen: Sputum     COVID-19 RAPID TEST [[id number]]  (Abnormal) Collected: 09/14/21 0535    Order Status: Completed Specimen: Nasopharyngeal Updated: 09/14/21 0558     Specimen source NASAL        COVID-19 rapid test Detected        Comment:      The specimen is POSITIVE for SARS-CoV-2, the novel coronavirus associated with COVID-19. This test has been authorized by the FDA under an Emergency Use Authorization (EUA) for use by authorized laboratories. Fact sheet for Healthcare Providers: "[employer]date.co.nz  Fact sheet for Patients: [employer]       Methodology: Isothermal Nucleic Acid Amplification  RESULTS VERIFIED, PHONED TO AND READ BACK BY   FERDINAND BENOIT AT 8484 ON 9/14/21 ACL               EKG Results     None          Other Studies:  No results found.     Current Meds:   Current Facility-Administered Medications   Medication Dose Route Frequency    budesonide-formoterol (SYMBICORT) 80-4.5 mcg inhaler  2 Puff Inhalation BID RT    albuterol (PROVENTIL HFA, VENTOLIN HFA, PROAIR HFA) inhaler 2 Puff  2 Puff Inhalation Q6H RT    [Held by provider] apixaban (ELIQUIS) tablet 10 mg  10 mg Oral BID    [Held by provider] apixaban (ELIQUIS) tablet 5 mg  5 mg Oral BID    insulin glargine (LANTUS) injection 15 Units  15 Units SubCUTAneous DAILY    insulin lispro (HUMALOG) injection 5 Units  5 Units SubCUTAneous TIDAC    insulin lispro (HUMALOG) injection   SubCUTAneous AC&HS    dexamethasone (DECADRON) 10 mg/mL injection 6 mg  6 mg IntraVENous Q24H    NUTRITIONAL SUPPORT ELECTROLYTE PRN ORDERS   Does Not Apply PRN    hydrALAZINE (APRESOLINE) 20 mg/mL injection 10 mg  10 mg IntraVENous Q6H PRN    morphine injection 1 mg  1 mg IntraVENous Q4H PRN    LORazepam (ATIVAN) injection 0.5 mg  0.5 mg IntraVENous Q6H PRN    phenol throat spray (CHLORASEPTIC) 1 Spray  1 Spray Oral PRN    pantoprazole (PROTONIX) tablet 40 mg  40 mg Oral ACB    sodium chloride (NS) flush 30 mL  30 mL InterCATHeter Q8H    sodium chloride (NS) flush 30 mL  30 mL InterCATHeter PRN    acetaminophen (TYLENOL) tablet 650 mg  650 mg Oral Q6H PRN    Or    acetaminophen (TYLENOL) suppository 650 mg  650 mg Rectal Q6H PRN    polyethylene glycol (MIRALAX) packet 17 g  17 g Oral DAILY PRN    ondansetron (ZOFRAN ODT) tablet 4 mg  4 mg Oral Q8H PRN    Or    ondansetron (ZOFRAN) injection 4 mg  4 mg IntraVENous Q6H PRN       Problem List:  Hospital Problems as of 10/6/2021 Date Reviewed: 10/5/2021        Codes Class Noted - Resolved POA    ALLAN (acute kidney injury) ([facility] ICD-10-CM: N17.9  ICD-9-CM: 584.9  9/30/2021 - Present Unknown        DVT (deep venous thrombosis) ([facility] ICD-10-CM: I82.409  ICD-9-CM: 453.40  9/23/2021 - Present Unknown        Thrombocytopenia ([facility] ICD-10-CM: D69.6  ICD-9-CM: 287.5  9/22/2021 - Present Unknown        Morbid obesity ([facility] ICD-10-CM: E66.01  ICD-9-CM: 278.01  9/18/2021 - Present Unknown        Suspected sleep apnea ICD-10-CM: R29.818  ICD-9-CM: 781.99  9/18/2021 - Present Unknown        Acute respiratory failure with hypoxia (H[facility]) ICD-10-CM: J96.01  ICD-9-CM: 518.81  9/16/2021 - Present Yes        * (Principal) Acute respiratory distress syndrome (ARDS) due to COVID-19 virus [facility]) ICD-10-CM: U07.1, J80  ICD-9-CM: 518.82, 079.89  9/14/2021 - Present Unknown        DM w/o complication type II (HCC) (Chronic) ICD-10-CM: E11.9  ICD-9-CM: 250.00  9/14/2021 - Present Yes        HTN (hypertension) (Chronic) ICD-10-CM: I10  ICD-9-CM: 401.9  9/14/2021 - Present Yes        RESOLVED: Acute respiratory failure ([facility] ICD-10-CM: J96.00  ICD-9-CM: 518.81  9/14/2021 - 9/16/2021 Yes                 Signed By: Zia Ervin NP   [facility] Hospitalist Service    October 6, 2021  5:15 PM

## 2021-10-07 NOTE — CONSULTS
Gynecology Consult    Name: Sinai Muhammad MRN: [id number] SSN: [id number]    YOB: 1995  Age: 23 y.o. Sex: female       Subjective:      Chief complaint:  Abnormal uterine bleeding    Consult is done by review of chart and discussing with patient's nurse and attending physician due to concerns for infectious nature of her diagnosis. Attending physician felt comfortable that the patient did not need to be seen. Corrine Kasper is a 23 y.o.  female who was admitted for respiratory distress and COVID-19 infection. She developed a DVT and was started on Eliquis and began having vaginal bleeding. Her Eliquis was stopped due to this bleeding and the bleeding lessened. She had also today was started on progestin in the form of Provera 10 mg a day. In discussing with the nurse she states the bleeding is not excessive. The patient is somewhat concerned that she has not had regular periods previously. The current method of family planning is none. History reviewed. No pertinent past medical history. History reviewed. No pertinent surgical history. OB History    No obstetric history on file.        Allergies   Allergen Reactions    Shellfish Derived Anaphylaxis     Current Facility-Administered Medications   Medication Dose Route Frequency Provider Last Rate Last Admin    medroxyPROGESTERone (PROVERA) tablet 10 mg  10 mg Oral DAILY Rodriguez Mention, NP   10 mg at 10/07/21 0846    budesonide-formoterol (SYMBICORT) 80-4.5 mcg inhaler  2 Puff Inhalation BID RT NEFTALY Sands   2 Puff at 10/07/21 0754    albuterol (PROVENTIL HFA, VENTOLIN HFA, PROAIR HFA) inhaler 2 Puff  2 Puff Inhalation Q6H RT NEFTALY Sands   2 Puff at 10/07/21 0754    apixaban (ELIQUIS) tablet 10 mg  10 mg Oral BID Alejandro Edward MD   10 mg at 10/05/21 0908    [START ON 10/8/2021] apixaban (ELIQUIS) tablet 5 mg  5 mg Oral BID Alejandro Edward MD        insulin glargine (LANTUS) injection 15 Units  15 Units SubCUTAneous DAILY Deanne Roland MD   15 Units at 10/07/21 0859    insulin lispro (HUMALOG) injection 5 Units  5 Units SubCUTAneous Sandra Stiles MD   5 Units at 10/07/21 0858    insulin lispro (HUMALOG) injection   SubCUTAneous AC&HS Deanne Roland MD   3 Units at 10/07/21 0859    dexamethasone (DECADRON) 10 mg/mL injection 6 mg  6 mg IntraVENous Q24H Mitcheal Finders C, NP   6 mg at 10/07/21 0846    NUTRITIONAL SUPPORT ELECTROLYTE PRN ORDERS   Does Not Apply PRN Janine Murrieta NP        hydrALAZINE (APRESOLINE) 20 mg/mL injection 10 mg  10 mg IntraVENous Q6H PRN Mitcheal Finders C, NP   10 mg at 09/28/21 0035    morphine injection 1 mg  1 mg IntraVENous Q4H PRN Mitcheal Finders C, NP   1 mg at 09/29/21 1217    LORazepam (ATIVAN) injection 0.5 mg  0.5 mg IntraVENous Q6H PRN Mitcheal Finders C, NP   0.5 mg at 09/30/21 1250    phenol throat spray (CHLORASEPTIC) 1 Spray  1 Spray Oral PRN Mitcheal Finders C, NP        pantoprazole (PROTONIX) tablet 40 mg  40 mg Oral ACB Irish Encarnacion, NP   40 mg at 10/07/21 0528    sodium chloride (NS) flush 30 mL  30 mL InterCATHeter Q8H Nicole Kingston, NP   30 mL at 10/07/21 0528    sodium chloride (NS) flush 30 mL  30 mL InterCATHeter PRN Mitcheal Finders C, NP   30 mL at 09/24/21 1416    acetaminophen (TYLENOL) tablet 650 mg  650 mg Oral Q6H PRN Irish Encarnacion, NP   650 mg at 09/30/21 3366    Or    acetaminophen (TYLENOL) suppository 650 mg  650 mg Rectal Q6H PRN Irish Encarnacion NP        polyethylene glycol (MIRALAX) packet 17 g  17 g Oral DAILY PRN Irish Encarnacion, NP        ondansetron (ZOFRAN ODT) tablet 4 mg  4 mg Oral Q8H PRN Irish Encarnacion, NP   4 mg at 09/30/21 8769    Or    ondansetron (ZOFRAN) injection 4 mg  4 mg IntraVENous Q6H PRN Irish Encarnacion, NP           Family History   Problem Relation Age of Onset    No Known Problems Mother     No Known Problems Father     No Known Problems Sister     No Known Problems Brother     No Known Problems Sister      Social History     Socioeconomic History    Marital status: SINGLE     Spouse name: Not on file    Number of children: Not on file    Years of education: Not on file    Highest education level: Not on file   Occupational History    Not on file   Tobacco Use    Smoking status: Not on file   Substance and Sexual Activity    Alcohol use: Not on file    Drug use: Not on file    Sexual activity: Not on file   Other Topics Concern    Not on file   Social History Narrative    Not on file     Social Determinants of Health     Financial Resource Strain:     Difficulty of Paying Living Expenses:    Food Insecurity:     Worried About Running Out of Food in the Last Year:     [address] in the Last Year:    Transportation Needs:     Lack of Transportation (Medical):      Lack of Transportation (Non-Medical):    Physical Activity:     Days of Exercise per Week:     Minutes of Exercise per Session:    Stress:     Feeling of Stress :    Social Connections:     Frequency of Communication with Friends and Family:     Frequency of Social Gatherings with Friends and Family:     Attends Confucianist Services:     Active Member of Clubs or Organizations:     Attends Club or Organization Meetings:     Marital Status:    Intimate Partner Violence:     Fear of Current or Ex-Partner:     Emotionally Abused:     Physically Abused:     Sexually Abused:            Objective:     Vitals:    10/07/21 0220 10/07/21 0432 10/07/21 0754 10/07/21 0759   BP:  (!) 119/57  133/60   Pulse:  (!) 117  (!) 110   Resp:  20  20   Temp:  98.5 °F (36.9 °C)  98.4 °F (36.9 °C)   SpO2: 94% 97% 100% 97%   Weight:       Height:         Lab Results   Component Value Date/Time    WBC 16.7 (H) 10/07/2021 04:13 AM    HGB 7.3 (L) 10/07/2021 04:13 AM    HCT 26.4 (L) 10/07/2021 04:13 AM    PLATELET 480 45/69/5963 04:13 AM    MCV 71.9 (L) 10/07/2021 04:13 AM     US Results (most recent):  Results from Hospital Encounter encounter on 09/14/21    US PELV NON OB W TV    Narrative  PELVIC ULTRASOUND. INDICATION:  Abnormal uterine bleeding. TECHNIQUE:  Transabdominal sector images through a urine distended bladder . Color flow and Doppler of the ovaries performed. FINDINGS:  The uterus measures 7.6 cm x 4.7 cm x 3 points cm. The endometrial  echo stripe is normal at 6 mm. Right ovary:  3.1 x 2.3 x 1.8 cm. There is blood flow in the right ovary. Left ovary:  Not definitely identified. There is a large, 10.3 x 8.7 cm complex  hypoechoic mass with internal echoes. No definite blood flow. Impression  Large, 10.3 x 8.7 cm complex hypoechoic mass with internal echoes  in the left pelvis, likely a hemorrhagic cyst. Ovary is not definitely  identified. Differential diagnosis includes: endometrioma, dermoid, less likely  abscess, cystic neoplasm or pregnancy related disease. Pregnancy test  recommended. Assessment:     Abnormal uterine bleeding with need for anticoagulation. Ovarian cyst.    Plan:       Patient's abnormal bleeding due to the Eliquis. Her history is consistent with anovulation and therefore this irregular bleeding is not unexpected. Her bleeding seems to be better now that she is off her Eliquis and started on the Provera. Recommend increasing her Provera dose to 20 mg a day due to her BMI. Hopefully this will keep her bleeding under control as Eliquis is restarted. I do not feel any other procedural treatment is appropriate at this time. Otherwise she can follow-up as an outpatient. Discussed all of this with Dr. Nola Perea and he is comfortable with the plan and proceeding and does not feel that we need to physically see the patient. Discussed with him that we are available for any questions or issues that may come up from a GYN point of view. Greater then 60 minutes was taken by reviewing patient's chart, outside records, coordinating care with nurses and consulting provider.

## 2021-10-07 NOTE — PROGRESS NOTES
Hourly rounds completed on patient, call light with in reach and bed low and locked. Pt remains on airvo 60L/60% with o2 sats above 90%. NAD noted during shift. Patient denies needs at this time. Will report to oncoming nurse.

## 2021-10-07 NOTE — PROGRESS NOTES
[facility] Hospitalist Progress Note     Name:  Tana Stallings  Age:26 y.o. Sex:female   :  1995    MRN:  [id number]     Admit Date:  2021    Reason for Admission:  Acute respiratory failure [facility]) [J96.00]    Hospital Course/Interval history:     23year old morbid obese AAF admitted  for acute respiratory failure with hypoxia related to COVID 19 infection. She was found to have a left leg DVT, CT chest could not visualize pulmonary arteries. She was started on a Heparin drip then transitioned to Eliquis that had to be held due to vaginal bleeding, slight hemoptysis and epistaxis    Subjective (10/06/21): Patient seen and examined at bedside this morning. Overnight, patient refused Eliquis due to bleeding and patient thinking this was causing her worsening shortness of breath and weakness. Patient states that she is no longer having any significant vaginal bleeding since being off Eliquis but did suffer from this as recent as yesterday. Discussed with OB/GYN after abdominal ultrasound completed and patient now started on Provera with OB/GYN recommendations to increase. Discussed with patient. Patient states she still is having significant weakness and fatigue, likely related to acute blood loss anemia. Informed her that she does not need blood at this point but if her hemoglobin were to drop at 7.0 or lower and she was still symptomatic, we could give her a unit of blood to assist.  Patient amenable to Eliquis ministration to prevent worsening of her DVT and PE. Patient denies any chest pain or pressure, lightheadedness, dizziness, palpitations, near-syncope or syncope. She was able to be weaned on air Vo to 50% O2 but still remains on 60 L  Via [facility]. **Dena Green-mother-updated via phone. All questions answered. Assessment & Plan        Principal Problem:    Acute respiratory distress syndrome (ARDS) due to COVID-19 virus ([facility] [address] (2021)  Patient was on BiPAP.   Now off BiPAP and still on high flow oxygen at 60 L/min, weaned to 50% O2  On Decadron 6 mg IV once a day. Patient completed remdesivir   received Actemra as well   wean oxygen down, as tolerated. VQ scan with low suspicion for PE, CT chest could not rule out PE-noted tachycardic, requiring high flow  Pulmonology following       Active Problems:    DM w/o complication type II ([facility] (9/14/2021)  Blood sugar is in 100s. Continue to cover with sliding scale with short acting insulin. Patient is on Lantus 15 units subcu once a day.        HTN (hypertension) (9/14/2021)  Controlled on current regimen       Morbid obesity ([facility] (9/18/2021)      Suspected sleep apnea (9/18/2021)      Thrombocytopenia ([facility] (9/22/2021)  Resolved       DV[facility] venous thrombosis) [facility] (9/23/2021)  Eliquis resumed today, vaginal bleeding spotting only at this time. Increase Provera per weight after discussion with OBGYN on 10/7 (do not want to add estrogen due to DVT)  VQ scan ordered since CT chest inconclusive - low probability of PE  Needs anticoagulation; risk benefit discussed with patient who agrees to Eliquis  Echo ordered 10/5  If unable to tolerate Eliquis after Provera started, consider IVC filter for DVT left leg       ALLAN (acute kidney injury) ([facility] [address] (9/30/2021)  resolved     AUB:  Vaginal bleeding slowing down   Patient states history of irregular periods for \"years\", never seen by GYN  Increase Provera per OBGYN(do not want to add estrogen due to DVT)  OBGYN consulted 10/7 - intravaginal US reviewed by OBGYN  **Will need outpatient follow up with GYN, on airvo, COVID +       Dispo/Discharge Planning: To be determined     Diet:  ADULT DIET Regular; 3 carb choices (45 gm/meal);  Low Sodium (2 gm)  ADULT ORAL NUTRITION SUPPLEMENT Dinner, Breakfast, Lunch; Diabetic Supplement  DVT PPx: Apixaban resumed today  Code status: Full Code           Review of Systems: 14 point review of systems is otherwise negative with the exception of the elements mentioned above. Objective:     Patient Vitals for the past 24 hrs:   Temp Pulse Resp BP SpO2   10/07/21 1417 -- -- -- -- 91 %   10/07/21 1338 -- -- -- -- 90 %   10/07/21 1204 98.1 °F (36.7 °C) (!) 121 20 119/63 (!) 88 %   10/07/21 0759 98.4 °F (36.9 °C) (!) 110 20 133/60 97 %   10/07/21 0754 -- -- -- -- 100 %   10/07/21 0432 98.5 °F (36.9 °C) (!) 117 20 (!) 119/57 97 %   10/07/21 0220 -- -- -- -- 94 %   10/06/21 2352 97.4 °F (36.3 °C) (!) 130 21 119/76 97 %   10/06/21 2055 -- -- -- -- 95 %   10/06/21 2016 98.2 °F (36.8 °C) (!) 123 19 139/77 98 %   10/06/21 1936 98.2 °F (36.8 °C) (!) 123 22 139/77 98 %   10/06/21 1751 -- -- -- -- 90 %   10/06/21 1743 97.8 °F (36.6 °C) (!) 121 18 126/87 (!) 88 %   10/06/21 1600 -- (!) 116 -- -- --     Oxygen Therapy  O2 Sat (%): 91 % (10/07/21 1417)  Pulse via Oximetry: 102 beats per minute (10/07/21 1417)  O2 Device: Heated; Hi flow nasal cannula (10/07/21 1338)  Skin Assessment: Clean, dry, & intact (10/07/21 0710)  Skin Protection for O2 Device: Yes (10/02/21 1640)  Orientation: Bilateral (10/02/21 1640)  Location: Cheek (10/02/21 1640)  O2 Flow Rate (L/min): 60 l/min (10/07/21 1417)  O2 Temperature: 87.8 °F (31 °C) (10/07/21 0220)  FIO2 (%): 50 % (10/07/21 1417)    Body mass index is 60.84 kg/m². Physical Exam:   General:          Well nourished. BMI of 65. Patient sitting in bedside chair, on high flow oxygen. no significant SOB with speaking. Head:               Normocephalic, atraumatic, pale   Eyes:               Sclerae appear normal.  Pupils equally round. ENT:                Nares appear normal, no drainage. Moist oral mucosa  Neck:               No restricted ROM. Trachea midline   CV:                  RRR. No m/r/g. No jugular venous distension. Lungs:             Diminished breath sound. No wheezing, rhonchi, or rales. Shortness of breath. Abdomen: Bowel sounds present. Soft, nontender, nondistended.   Extremities: No cyanosis or clubbing. No edema  Skin:                No rashes and normal coloration. Warm and dry. Neuro:             Cranial nerves II-XII grossly intact. Sensation intact.   A&Ox3  Psych:             Normal mood and affect.         Data Review:  I have reviewed all labs, meds, and studies from the last 24 hours:    Labs:    Recent Results (from the past 24 hour(s))   GLUCOSE, POC    Collection Time: 10/06/21  5:39 PM   Result Value Ref Range    Glucose (POC) 212 (H) 65 - 100 mg/dL    Performed by Iker Vaughn    GLUCOSE, POC    Collection Time: 10/06/21  9:12 PM   Result Value Ref Range    Glucose (POC) 255 (H) 65 - 100 mg/dL    Performed by [facility]    METABOLIC PANEL, BASIC    Collection Time: 10/07/21  4:13 AM   Result Value Ref Range    Sodium 137 136 - 145 mmol/L    Potassium 3.8 3.5 - 5.1 mmol/L    Chloride 103 98 - 107 mmol/L    CO2 28 21 - 32 mmol/L    Anion gap 6 (L) 7 - 16 mmol/L    Glucose 189 (H) 65 - 100 mg/dL    BUN 18 6 - 23 MG/DL    Creatinine 0.78 0.6 - 1.0 MG/DL    GFR est AA >60 >60 ml/min/1.73m2    GFR est non-AA >60 >60 ml/min/1.73m2    Calcium 8.6 8.3 - 10.4 MG/DL   CRP, HIGH SENSITIVITY    Collection Time: 10/07/21  4:13 AM   Result Value Ref Range    CRP, High sensitivity 8.0 mg/L   D DIMER    Collection Time: 10/07/21  4:13 AM   Result Value Ref Range    D DIMER 2.42 (H) <0.56 ug/ml(FEU)   CBC WITH AUTOMATED DIFF    Collection Time: 10/07/21  4:13 AM   Result Value Ref Range    WBC 16.7 (H) 4.3 - 11.1 K/uL    RBC 3.67 (L) 4.05 - 5.2 M/uL    HGB 7.3 (L) 11.7 - 15.4 g/dL    HCT 26.4 (L) 35.8 - 46.3 %    MCV 71.9 (L) 79.6 - 97.8 FL    MCH 19.9 (L) 26.1 - 32.9 PG    MCHC 27.7 (L) 31.4 - 35.0 g/dL    RDW 21.8 (H) 11.9 - 14.6 %    PLATELET 191 643 - 403 K/uL    MPV 10.7 9.4 - 12.3 FL    ABSOLUTE NRBC 0.05 0.0 - 0.2 K/uL    DF AUTOMATED      NEUTROPHILS 64 43 - 78 %    LYMPHOCYTES 24 13 - 44 %    MONOCYTES 9 4.0 - 12.0 %    EOSINOPHILS 2 0.5 - 7.8 %    BASOPHILS 1 0.0 - 2.0 % IMMATURE GRANULOCYTES 1 0.0 - 5.0 %    ABS. NEUTROPHILS 10.7 (H) 1.7 - 8.2 K/UL    ABS. LYMPHOCYTES 4.0 0.5 - 4.6 K/UL    ABS. MONOCYTES 1.5 (H) 0.1 - 1.3 K/UL    ABS. EOSINOPHILS 0.3 0.0 - 0.8 K/UL    ABS. BASOPHILS 0.1 0.0 - 0.2 K/UL    ABS. IMM. GRANS. 0.2 0.0 - 0.5 K/UL   GLUCOSE, POC    Collection Time: 10/07/21  7:41 AM   Result Value Ref Range    Glucose (POC) 154 (H) 65 - 100 mg/dL    Performed by [employer]    HCG URINE, QL    Collection Time: 10/07/21  9:57 AM   Result Value Ref Range    HCG urine, QL Negative NEG     GLUCOSE, POC    Collection Time: 10/07/21 10:57 AM   Result Value Ref Range    Glucose (POC) 291 (H) 65 - 100 mg/dL    Performed by [employer]    GLUCOSE, POC    Collection Time: 10/07/21  3:26 PM   Result Value Ref Range    Glucose (POC) 285 (H) 65 - 100 mg/dL    Performed by [employer]        All Micro Results     Procedure Component Value Units Date/Time    CULTURE, BLOOD [[id number]] Collected: 09/14/21 0759    Order Status: Completed Specimen: Blood Updated: 09/19/21 0637     Special Requests: --        LEFT  HAND       Culture result: NO GROWTH 5 DAYS       CULTURE, BLOOD [[id number]] Collected: 09/14/21 0757    Order Status: Completed Specimen: Blood Updated: 09/19/21 0637     Special Requests: --        RIGHT  Antecubital       Culture result: NO GROWTH 5 DAYS       CULTURE, RESPIRATORY/SPUTUM/BRONCH Dannial Hane STAIN [[id number]] Collected: 09/14/21 0915    Order Status: Canceled Specimen: Sputum     COVID-19 RAPID TEST [[id number]]  (Abnormal) Collected: 09/14/21 0535    Order Status: Completed Specimen: Nasopharyngeal Updated: 09/14/21 0558     Specimen source NASAL        COVID-19 rapid test Detected        Comment:      The specimen is POSITIVE for SARS-CoV-2, the novel coronavirus associated with COVID-19. This test has been authorized by the FDA under an Emergency Use Authorization (EUA) for use by authorized laboratories.         Fact sheet for Healthcare Providers: ConventionUpdate.co.nz  Fact sheet for Patients: ConventionUpdate.co.nz       Methodology: Isothermal Nucleic Acid Amplification  RESULTS VERIFIED, PHONED TO AND READ BACK BY   FERDINAND BENOIT AT 1361 ON 9/14/21 ACL               EKG Results     None          Other Studies:  US PELV NON OB W TV    Result Date: 10/6/2021  PELVIC ULTRASOUND. INDICATION:  Abnormal uterine bleeding. TECHNIQUE:  Transabdominal sector images through a urine distended bladder . Color flow and Doppler of the ovaries performed. FINDINGS:  The uterus measures 7.6 cm x 4.7 cm x 3 points cm. The endometrial echo stripe is normal at 6 mm. Right ovary:  3.1 x 2.3 x 1.8 cm. There is blood flow in the right ovary. Left ovary:  Not definitely identified. There is a large, 10.3 x 8.7 cm complex hypoechoic mass with internal echoes. No definite blood flow. Large, 10.3 x 8.7 cm complex hypoechoic mass with internal echoes in the left pelvis, likely a hemorrhagic cyst. Ovary is not definitely identified. Differential diagnosis includes: endometrioma, dermoid, less likely abscess, cystic neoplasm or pregnancy related disease. Pregnancy test recommended.        Current Meds:   Current Facility-Administered Medications   Medication Dose Route Frequency    [START ON 10/8/2021] medroxyPROGESTERone (PROVERA) tablet 20 mg  20 mg Oral DAILY    budesonide-formoterol (SYMBICORT) 80-4.5 mcg inhaler  2 Puff Inhalation BID RT    albuterol (PROVENTIL HFA, VENTOLIN HFA, PROAIR HFA) inhaler 2 Puff  2 Puff Inhalation Q6H RT    apixaban (ELIQUIS) tablet 10 mg  10 mg Oral BID    [START ON 10/8/2021] apixaban (ELIQUIS) tablet 5 mg  5 mg Oral BID    insulin glargine (LANTUS) injection 15 Units  15 Units SubCUTAneous DAILY    insulin lispro (HUMALOG) injection 5 Units  5 Units SubCUTAneous TIDAC    insulin lispro (HUMALOG) injection   SubCUTAneous AC&HS    dexamethasone (DECADRON) 10 mg/mL injection 6 mg  6 mg IntraVENous Q24H    NUTRITIONAL SUPPORT ELECTROLYTE PRN ORDERS   Does Not Apply PRN    hydrALAZINE (APRESOLINE) 20 mg/mL injection 10 mg  10 mg IntraVENous Q6H PRN    morphine injection 1 mg  1 mg IntraVENous Q4H PRN    LORazepam (ATIVAN) injection 0.5 mg  0.5 mg IntraVENous Q6H PRN    phenol throat spray (CHLORASEPTIC) 1 Spray  1 Spray Oral PRN    pantoprazole (PROTONIX) tablet 40 mg  40 mg Oral ACB    sodium chloride (NS) flush 30 mL  30 mL InterCATHeter Q8H    sodium chloride (NS) flush 30 mL  30 mL InterCATHeter PRN    acetaminophen (TYLENOL) tablet 650 mg  650 mg Oral Q6H PRN    Or    acetaminophen (TYLENOL) suppository 650 mg  650 mg Rectal Q6H PRN    polyethylene glycol (MIRALAX) packet 17 g  17 g Oral DAILY PRN    ondansetron (ZOFRAN ODT) tablet 4 mg  4 mg Oral Q8H PRN    Or    ondansetron (ZOFRAN) injection 4 mg  4 mg IntraVENous Q6H PRN       Problem List:  Hospital Problems as of 10/7/2021 Date Reviewed: 10/6/2021        Codes Class Noted - Resolved POA    Vaginal bleeding ICD-10-CM: N93.9  ICD-9-CM: 623.8  10/6/2021 - Present Unknown        ALLAN (acute kidney injury) ([facility] ICD-10-CM: N17.9  ICD-9-CM: 584.9  9/30/2021 - Present Unknown        DVT (deep venous thrombosis) (HCC) ICD-10-CM: I82.409  ICD-9-CM: 453.40  9/23/2021 - Present Unknown        Thrombocytopenia ([facility] ICD-10-CM: D69.6  ICD-9-CM: 287.5  9/22/2021 - Present Unknown        Morbid obesity ([facility] ICD-10-CM: E66.01  ICD-9-CM: 278.01  9/18/2021 - Present Unknown        Suspected sleep apnea ICD-10-CM: R29.818  ICD-9-CM: 781.99  9/18/2021 - Present Unknown        Acute respiratory failure with hypoxia (HCC) ICD-10-CM: J96.01  ICD-9-CM: 518.81  9/16/2021 - Present Yes        * (Principal) Acute respiratory distress syndrome (ARDS) due to COVID-19 virus [facility]) ICD-10-CM: U07.1, J80  ICD-9-CM: 552.58, 079.89  9/14/2021 - Present Unknown        DM w/o complication type II (HCC) (Chronic) ICD-10-CM: E11.9  ICD-9-CM: 250.00  9/14/2021 - Present Yes        HTN (hypertension) (Chronic) ICD-10-CM: I10  ICD-9-CM: 401.9  9/14/2021 - Present Yes        RESOLVED: Acute respiratory failure ([facility] [address] ICD-10-CM: J96.00  ICD-9-CM: 518.81  9/14/2021 - 9/16/2021 Yes                 Signed By: Annette Soto MD   [facility] Hospitalist Service    October 7, 2021  5:15 PM

## 2021-10-07 NOTE — PROGRESS NOTES
Yesy Cancel  Admission Date: 9/14/2021         Daily Progress Note: 10/7/2021    The patient's chart is reviewed and the patient is discussed with the staff. Background: 22 [facility] admitted with acute respiratory failure due to [address]. She was admitted on 9/14 with a 2 day history of dyspnea. She was positive for COVID. She was started on decadron and remdesivir. She initially required 6L NC but worsened required BiPAP FiO2 @ 100% and her resting saturation was 91% but dropped to the 70s with exertion. She received Actemra 9/16.  She made it very clear to several providers that she does not want intubation and her mother confirmed this as well. After long discussion on 9/21, category status changed to Category 1 and pt moved to tele obs ICU on 100% BIPAP. She was stable on airvo and transferred to floor 9/29. Pt was found to have L calf DVT on 9/22 and has been on heparin and transitioned to Eliquis. Pt has been having vaginal bleeding. Pt had a VQ scan with low probability of PE. Pt had vaginal U/S with findings of cyst, HCG negative. GYN recommended pt resume Eliquis and start provera. Subjective:     Pt sitting up in the chair on 60L at 50%. Pt is coughing. She feels weak.      Current Facility-Administered Medications   Medication Dose Route Frequency    [START ON 10/8/2021] medroxyPROGESTERone (PROVERA) tablet 20 mg  20 mg Oral DAILY    budesonide-formoterol (SYMBICORT) 80-4.5 mcg inhaler  2 Puff Inhalation BID RT    albuterol (PROVENTIL HFA, VENTOLIN HFA, PROAIR HFA) inhaler 2 Puff  2 Puff Inhalation Q6H RT    apixaban (ELIQUIS) tablet 10 mg  10 mg Oral BID    [START ON 10/8/2021] apixaban (ELIQUIS) tablet 5 mg  5 mg Oral BID    insulin glargine (LANTUS) injection 15 Units  15 Units SubCUTAneous DAILY    insulin lispro (HUMALOG) injection 5 Units  5 Units SubCUTAneous TIDAC    insulin lispro (HUMALOG) injection   SubCUTAneous AC&HS    dexamethasone (DECADRON) 10 mg/mL injection 6 mg  6 mg IntraVENous Q24H    NUTRITIONAL SUPPORT ELECTROLYTE PRN ORDERS   Does Not Apply PRN    hydrALAZINE (APRESOLINE) 20 mg/mL injection 10 mg  10 mg IntraVENous Q6H PRN    morphine injection 1 mg  1 mg IntraVENous Q4H PRN    LORazepam (ATIVAN) injection 0.5 mg  0.5 mg IntraVENous Q6H PRN    phenol throat spray (CHLORASEPTIC) 1 Spray  1 Spray Oral PRN    pantoprazole (PROTONIX) tablet 40 mg  40 mg Oral ACB    sodium chloride (NS) flush 30 mL  30 mL InterCATHeter Q8H    sodium chloride (NS) flush 30 mL  30 mL InterCATHeter PRN    acetaminophen (TYLENOL) tablet 650 mg  650 mg Oral Q6H PRN    Or    acetaminophen (TYLENOL) suppository 650 mg  650 mg Rectal Q6H PRN    polyethylene glycol (MIRALAX) packet 17 g  17 g Oral DAILY PRN    ondansetron (ZOFRAN ODT) tablet 4 mg  4 mg Oral Q8H PRN    Or    ondansetron (ZOFRAN) injection 4 mg  4 mg IntraVENous Q6H PRN     Review of Systems  +cough  +dyspnea on exertion  Constitutional: negative for fever, chills, sweats  Cardiovascular: negative for chest pain, palpitations, syncope, edema  Gastrointestinal:  negative for dysphagia, reflux, vomiting, diarrhea, abdominal pain, or melena  Neurologic:  negative for focal weakness, numbness, headache  Objective:     Vitals:    10/07/21 0432 10/07/21 0754 10/07/21 0759 10/07/21 1204   BP: (!) 119/57  133/60 119/63   Pulse: (!) 117  (!) 110 (!) 121   Resp: 20  20 20   Temp: 98.5 °F (36.9 °C)  98.4 °F (36.9 °C) 98.1 °F (36.7 °C)   SpO2: 97% 100% 97% (!) 88%   Weight:       Height:           Intake/Output Summary (Last 24 hours) at 10/7/2021 1345  Last data filed at 10/6/2021 1819  Gross per 24 hour   Intake 240 ml   Output --   Net 240 ml     Physical Exam:   Constitution:  the patient is well developed and in no acute distress, on Airvo 60L at 50%  HEENT:  Sclera clear, pupils equal, oral mucosa moist  Respiratory: distant breath sounds   Cardiovascular:  RRR without M,G,R  Gastrointestinal: soft and non-tender; with positive bowel sounds. Musculoskeletal: warm without cyanosis. There is no lower extremity edema. Skin:  no jaundice or rashes,   Neurologic: no gross neuro deficits     Psychiatric:  alert and oriented x 3    CXR:     LAB:  Recent Labs     10/07/21  0413 10/06/21  0808   WBC 16.7* 16.2*   HGB 7.3* 7.8*   HCT 26.4* 27.8*    279     Recent Labs     10/07/21  0413 10/06/21  0509 10/05/21  0423    137 136   K 3.8 4.0 3.9    101 100   CO2 28 29 29   * 128* 125*   BUN 18 14 17   CREA 0.78 0.65 0.63   MG  --  1.9  --    CA 8.6 9.0 9.3   PHOS  --  4.6*  --      No results for input(s): LAC, TROPHS, BNPNT, CRP in the last 72 hours. No lab exists for component: ESR  No results for input(s): PH, PCO2, PO2, HCO3, PHI, PCO2I, PO2I, HCO3I in the last 72 hours. No results for input(s): SDES, CULT in the last 72 hours. Assessment and Plan:  (Medical Decision Making)   Principal Problem:    Acute respiratory distress syndrome (ARDS) due to COVID-19 virus ([facility] (9/14/2021)    Wean FiO2 as tolerated    Active Problems:    DM w/o complication type II ([facility] (9/14/2021)    SSI      HTN (hypertension) (9/14/2021)    Controlled       Acute respiratory failure with hypoxia ([facility] (9/16/2021)    Wean FiO2 as tolerated      Morbid obesity ([facility] (9/18/2021)    BMI 60.8      Suspected sleep apnea (9/18/2021)    Will likely need a split night PSG after discharge       Thrombocytopenia ([facility] (9/22/2021)    resolved      DV[facility]deep venous thrombosis) [facility] (9/23/2021)    L calf DVT, has been on Eliquis but she has been having significant vaginal bleeding, blood tinged sputum, and epistaxis.        ALLAN (acute kidney injury) ([facility] (9/30/2021)  resolved      Vaginal bleeding (10/6/2021)    Eliquis resumed, GYN started provera        More than 50% of the time documented was spent in face-to-face contact with the patient and in the care of the patient on the floor/unit where the patient is located. NEFTALY Bernardo     I have spoken with and examined the patient. I agree with the above assessment and plan as documented. Gen: Obese F in NAD  Lungs:  Decreased BS  Heart:  RRR with no Murmur/Rubs/Gallops  Abd: obese  Ext: Tr edema    Pt up in chair on 50%/60L. Sat running 94-96%. Doing PT and standing and walking in room at times. In good spirits and no complaints. Continue to wean oxygen as able and mobilize frequently. Improved.      Raj Sandoval MD

## 2021-10-07 NOTE — PROGRESS NOTES
ACUTE PHYSICAL THERAPY GOALS:  (Developed with and agreed upon by patient and/or caregiver. )  LTG:  (1.)Ms. Julieta Soto will move from supine to sit and sit to supine , scoot up and down and roll side to side in bed with INDEPENDENT within 7 treatment day(s). (2.)Ms. Julieta Soto will transfer from bed to chair and chair to bed with INDEPENDENT using the least restrictive device within 7 treatment day(s). (3.)Ms. Julieta Soto will ambulate with INDEPENDENT for 100 feet with the least restrictive device within 7 treatment day(s). (4.)Ms. Julieta Soto will tolerate at least 23 min of dynamic standing activity to assist standing ADLs with the least restrictive device within 7 treatment days. (5.)Ms. Julieta Soto will climb at least 12 steps with MODIFIED INDEPENDENCE with the least restrictive device within 7 treatment days. PHYSICAL THERAPY: Daily Note and AM Treatment Day # 3    Sinai Muhammad is a 23 y.o. female   PRIMARY DIAGNOSIS: Acute respiratory distress syndrome (ARDS) due to COVID-19 virus ([facility]  Acute respiratory failure (HCC) [J96.00]         ASSESSMENT:     REHAB RECOMMENDATIONS: CURRENT LEVEL OF FUNCTION:  (Most Recently Demonstrated)   Recommendation to date pending progress:  Setting:   No further skilled therapy  vs HHPT based on continued progress  Equipment:    None Bed Mobility:   Not tested  Sit to Stand:   Not tested  Transfers:   Not tested  Gait/Mobility:   Not tested     ASSESSMENT:  Ms. Julieta Soto presents seated up in recliner and is agreeable to therapy. Patient c/o the bottom of her feet hurting therefore exercises only this treatment session. Tolerated well however the patient needed frequent breaks to allow her saturations to increase, as the patient was in the high 70's to 94% during the session. Patient is left in the recliner with needs within reach. Good session. Patient is a delight to work with.   Pt continues to benefit from skilled PT due to decreased activity tolerance and overall strength and mobility limited greatly by SpO2 levels and increased O2 needs. Continue PT efforts.      SUBJECTIVE:   Ms. Ellis De La O states, \"Good morning\"    SOCIAL HISTORY/ LIVING ENVIRONMENT: see eval  Home Environment: Independent living  One/Two Story Residence: Other (Comment) (one story apartment; lives on 3rd floor)  Living Alone: Yes  Support Systems: Other Family Member(s)  OBJECTIVE:     PAIN: VITAL SIGNS: LINES/DRAINS:   Pre Treatment: Pain Screen  Pain Scale 1: Numeric (0 - 10)  Pain Intensity 1:  (no number given)  Pain Location 1: Foot (bottom of)  Post Treatment: 0/10   Continuous Pulse Oximetry  O2 Device: Heated, Hi flow nasal cannula     MOBILITY: I Mod I S SBA CGA Min Mod Max Total  NT x2 Comments:   Bed Mobility    Rolling [] [] [] [] [] [] [] [] [] [x] []    Supine to Sit [] [] [] [] [] [] [] [] [] [x] []    Scooting [] [] [] [] [] [] [] [] [] [x] []    Sit to Supine [] [] [] [] [] [] [] [] [] [x] []    Transfers    Sit to Stand [] [] [] [] [] [] [] [] [] [x] []    Bed to Chair [] [] [] [] [] [] [] [] [] [x] []    Stand to Sit [] [] [] [] [] [] [] [] [] [x] []    I=Independent, Mod I=Modified Independent, S=Supervision, SBA=Standby Assistance, CGA=Contact Guard Assistance,   Min=Minimal Assistance, Mod=Moderate Assistance, Max=Maximal Assistance, Total=Total Assistance, NT=Not Tested    BALANCE: Good Fair+ Fair Fair- Poor NT Comments   Sitting Static [x] [] [] [] [] []    Sitting Dynamic [x] [] [] [] [] []              Standing Static [] [] [] [] [] [x]    Standing Dynamic [] [] [] [] [] [x]      GAIT: I Mod I S SBA CGA Min Mod Max Total  NT x2 Comments:   Level of Assistance [] [] [] [] [] [] [] [] [] [x] []    Distance NT    DME HHA    Gait Quality N/A    Weightbearing  Status N/A     I=Independent, Mod I=Modified Independent, S=Supervision, SBA=Standby Assistance, CGA=Contact Guard Assistance,   Min=Minimal Assistance, Mod=Moderate Assistance, Max=Maximal Assistance, Total=Total Assistance, NT=Not Tested    PLAN:   FREQUENCY/DURATION: PT Plan of Care: 3 times/week for duration of hospital stay or until stated goals are met, whichever comes first.  TREATMENT:     TREATMENT:   ($$ Therapeutic Exercises: 23-37 mins    )  Therapeutic Exercise (23 Minutes): Therapeutic exercises noted below to improve functional activity tolerance, strength and mobility.            TREATMENT GRID:   Date:  10/5/21 Date:  10/07/21 Date:     Activity/Exercise Parameters Parameters Parameters   LAQ x10 10    Seated march x10 10    ABD/ADD x10 10    Ankle pumps  10                            AFTER TREATMENT POSITION/PRECAUTIONS:  Chair, Needs within reach and RN notified    INTERDISCIPLINARY COLLABORATION:  RN/PCT and PT/PTA    TOTAL TREATMENT DURATION:  PT Patient Time In/Time Out  Time In: 1054  Time Out: [address], PTA

## 2021-10-07 NOTE — PROGRESS NOTES
ACUTE OT GOALS:  (Developed with and agreed upon by patient and/or caregiver.)  1) Patient will complete total body bathing and dressing with MOD I and adaptive equipment as needed. 2) Patient will complete toileting with SUPERVISION. 3) Patient will complete functional transfers with SUPERVISION and adaptive equipment as needed. 4) Patient will tolerate at least 25 minutes of OT activity with 1-2 rest breaks while maintaining O2 sats >90%. 5) Patient will verbalize at least 3 energy conservation technique to utilize during ADL/IADL. Timeframe: 7 visits     NEW ADDED GOALS AT RE-EVALUATION 10/7/21  1. Patient will complete grooming ADL in standing with SUPERVISION. 2. Patient will tolerate 10 minutes BUE exercises to increase strength for safe, functional transfers.     OCCUPATIONAL THERAPY ASSESSMENT: Daily Note and Re-evaluation OT Treatment Day # 1    Kay Mendoza is a 23 y.o. female   PRIMARY DIAGNOSIS: Acute respiratory distress syndrome (ARDS) due to COVID-19 virus ([facility]  Acute respiratory failure ([facility] [J96.00]       Reason for Referral:    ICD-10: Treatment Diagnosis: Generalized Muscle Weakness (M62.81)  Other lack of cordination (R27.8)  Difficulty in walking, Not elsewhere classified (R26.2)  INPATIENT: Payor: /   ASSESSMENT:     REHAB RECOMMENDATIONS:   Recommendation to date pending progress:  Setting:   No further skilled therapy vs. Meaghan Ceja pending respiratory status  Equipment:    3 in 1 Bedside Commode Bariatric   Shower Chair     PRIOR LEVEL OF FUNCTION:  (Prior to Hospitalization)  INITIAL/CURRENT LEVEL OF FUNCTION:  (Based on today's evaluation)   Bathing:   Independent  Dressing:   Independent  Feeding/Grooming:   Independent  Toileting:   Independent  Functional Mobility:   Independent Bathing:   Minimal Assistance  Dressing:  [facility] Standby Assistance  Feeding/Grooming:   Set Up  Toileting:   Contact Guard Assistance  Functional Mobility:  [address] ASSESSMENT:  Ms. Dennie Bucker is a 22 y/o female presents with acute respiratory failure due to COVID 19. Seen today for re-evaluation 10/7/21. Today pt presents with decreased activity tolerance, strength, balance and increased O2 needs impacting ADLs. Pt has improved in her O2 needs since initial evaluation and no longer requiring BIPAP. Pt is currently on 50L 60% and sats 93% at rest. Pt completed functional transfers in prep for ADLs, toileting and grooming ADLs in grid below. Discussed with pt importance of applying energy conservation techniques and pursed lip breathing during ADLs, pt verbalized understanding. Pt O2 sats ranged 84-93% throughout, pt able to reach 90% after therapist directed pursed lip breathing, and seated RB from activity. Pt is progressing toward goals, and is currently limited by O2 needs. Goals still appropriate from initial evaluation, new added goals for continuation of care. Pt would continue to benefit from skilled OT services to address OT goals and plan of care. SUBJECTIVE:   Ms. Dennie Bucker states, [place] energy is low. \"    SOCIAL HISTORY/LIVING ENVIRONMENT: lives alone in an apartment on the 3rd level (no elevator access), walk in shower, independent for all ADLs and IADLs, no DME  Home Environment: Independent living  One/Two Story Residence: Other (Comment) (one story apartment; lives on 3rd floor)  Living Alone: Yes  Support Systems: Other Family Member(s)    OBJECTIVE:     PAIN: VITAL SIGNS: LINES/DRAINS:   Pre Treatment: Pain Screen  Pain Scale 1: Numeric (0 - 10)  Pain Intensity 1: 0  Post Treatment: 0 Vital Signs  O2 Sat (%): 90 %  O2 Device: Heated; Hi flow nasal cannula  O2 Flow Rate (L/min): 60 l/min  FIO2 (%): 50 % Continuous Pulse Oximetry  O2 Device: Heated, Hi flow nasal cannula     GROSS EVALUATION:  BUEs Within Functional Limits Abnormal/ Functional Abnormal/ Non-Functional (see comments) Not Tested Comments:   AROM [x] [] [] []    PROM [] [] [] [x]    Strength [x] [] [] []    Balance [] [x] [] [] SBA-CGA   Posture [x] [] [] []    Sensation [x] [] [] []    Coordination [x] [] [] []    Tone [] [] [] [x]    Edema [] [] [] [x]    Activity Tolerance [] [x] [] [] 50L 60% airvo    [] [] [] []      COGNITION/  PERCEPTION: Intact Impaired   (see comments) Comments:   Orientation [x] []    Vision [x] []    Hearing [x] []    Judgment/ Insight [x] []    Attention [x] []    Memory [x] []    Command Following [x] []    Emotional Regulation [x] []     [] []      ACTIVITIES OF DAILY LIVING: I Mod I S SBA CGA Min Mod Max Total NT Comments   BASIC ADLs:              Bathing/ Showering [] [] [] [] [] [] [] [] [] [x]    Toileting [] [] [] [] [x] [] [] [] [] [] For transfer to [facility] and during michelle care   Dressing [] [] [] [] [] [] [] [] [] [x]     Feeding [] [] [] [] [] [] [] [] [] [x]    Grooming [] [] [x] [] [] [] [] [] [] [] Set up brushing teeth in chair   Personal Device Care [] [] [] [] [] [] [] [] [] [x]    Functional Mobility [] [] [] [x] [x] [] [] [] [] [] No AD   I=Independent, Mod I=Modified Independent, S=Supervision, SBA=Standby Assistance, CGA=Contact Guard Assistance,   Min=Minimal Assistance, Mod=Moderate Assistance, Max=Maximal Assistance, Total=Total Assistance, NT=Not Tested    MOBILITY: I Mod I S SBA CGA Min Mod Max Total  NT x2 Comments: pt received sitting in chair   Supine to sit [] [] [] [] [] [] [] [] [] [x] []    Sit to supine [] [] [] [] [] [] [] [] [] [x] []    Sit to stand [] [] [] [x] [] [] [] [] [] [] [] No AD   Bed to chair [] [] [] [] [x] [] [] [] [] [] [] No AD   I=Independent, Mod I=Modified Independent, S=Supervision, SBA=Standby Assistance, CGA=Contact Guard Assistance,   Min=Minimal Assistance, Mod=Moderate Assistance, Max=Maximal Assistance, Total=Total Assistance, NT=Not Tested    [address] [id number] AM-PAC 6 Clicks   Daily Activity Inpatient Short Form        How much help from another person does the patient currently need. ..  Total A Lot A Little None 1.  Putting on and taking off regular lower body clothing? [] 1   [] 2   [x] 3   [] 4   2. Bathing (including washing, rinsing, drying)? [] 1   [] 2   [x] 3   [] 4   3. Toileting, which includes using toilet, bedpan or urinal?   [] 1   [] 2   [x] 3   [] 4   4. Putting on and taking off regular upper body clothing? [] 1   [] 2   [] 3   [x] 4   5. Taking care of personal grooming such as brushing teeth? [] 1   [] 2   [] 3   [x] 4   6. Eating meals? [] 1   [] 2   [] 3   [x] 4   © 2007, Trustees of [address] [place] [address] [address] [id number], under license to [employer]. All rights reserved     Score:  Initial: 19 completed on 9/20/21 Most Recent: 21 (Date: 10/7/21 )   Interpretation of Tool:  Represents activities that are increasingly more difficult (i.e. Bed mobility, Transfers, Gait). PLAN:   FREQUENCY/DURATION: OT Plan of Care: 3 times/week for duration of hospital stay or until stated goals are met, whichever comes first.    PROBLEM LIST:   (Skilled intervention is medically necessary to address:)  1. Decreased ADL/Functional Activities  2. Decreased Activity Tolerance  3. Decreased Balance  4. Decreased Transfer Abilities   INTERVENTIONS PLANNED:   (Benefits and precautions of occupational therapy have been discussed with the patient.)  1. Self Care Training  2. Therapeutic Activity  3. Therapeutic Exercise/HEP  4. Neuromuscular Re-education  5. Education     TREATMENT:     EVALUATION: Re-Evaluation : (Untimed Charge)    TREATMENT:   ($$ Self Care/Home Management: 23-37 mins    )  Self Care (30 Minutes): Self care including Toileting, Grooming, Energy Conservation Training and toileting hygiene and functional transfers in prep for ADLs to increase independence and decrease level of assistance required.     TREATMENT GRID:  N/A    AFTER TREATMENT POSITION/PRECAUTIONS:  Chair, Needs within reach and RN notified    INTERDISCIPLINARY COLLABORATION:  RN/PCT, PT/PTA and OT/LANGLEY    TOTAL TREATMENT DURATION:  OT Patient Time In/Time Out  Time In: 1338  Time Out: [address], [place]

## 2021-10-07 NOTE — PROGRESS NOTES
Pt is up in the chair at this time. O2 sats ranging in mid 90's. Pt is on airvo 60/50. No c/o pain or discomfort at this time. Tele is reading sinus tachy. Pt states she is still menstrating but not a lot like before. She refused her Eliquis this morning. Pt educated on the importance of taking the medication but wants more clarification. She is under the impression that the medication is slowing her recovery progress down. Will continue to monitor and provide care.

## 2021-10-07 NOTE — PROGRESS NOTES
Pt continues to sit in the chair with airvo 60/50 sats ranging in mid to high 90's. Pt c/o discomfort to the back of her legs and mid lower back. Excoriation noted to the mid lower back and dry skin peeling on the back of both legs. Pt educated to rotate pressure off of affected areas. Insulin coverage provided for lunch and dinner meals due to BG. Will continue to monitor and provide care.

## 2021-10-08 PROBLEM — N17.9 AKI (ACUTE KIDNEY INJURY) (HCC): Status: RESOLVED | Noted: 2021-01-01 | Resolved: 2021-01-01

## 2021-10-08 PROBLEM — D69.6 THROMBOCYTOPENIA (HCC): Status: RESOLVED | Noted: 2021-01-01 | Resolved: 2021-01-01

## 2021-10-08 PROBLEM — E66.01 MORBID OBESITY (HCC): Chronic | Status: ACTIVE | Noted: 2021-01-01

## 2021-10-08 NOTE — PROGRESS NOTES
Reviewing chart for Pulmonary to see if pt is eligible to come off of precautions. Lamont Lucero notified and will assess the chart.

## 2021-10-08 NOTE — PROGRESS NOTES
Yobani Sibley  Admission Date: 9/14/2021         Daily Progress Note: 10/8/2021    Length of stay: 24    The patient's chart is reviewed and the patient is discussed with the staff. Background: 22 yo [facility] American female admitted with acute respiratory failure due to Raljessica Marsh. She was admitted on 9/14 with a 2 day history of dyspnea. She was positive for COVID. She was started on decadron and remdesivir. She initially required 6L NC but worsened required BiPAP FiO2 @ 100% and her resting saturation was 91% but dropped to the 70s with exertion. She received Actemra 9/16.  She made it very clear to several providers that she does not want intubation and her mother confirmed this as well. After long discussion on 9/21, category status changed to Category 1 and pt moved to tele obs ICU on 100% BIPAP. She was stable on airvo and transferred to floor 9/29. Pt was found to have L calf DVT on 9/22 and has been on heparin and transitioned to Eliquis. Pt has been having vaginal bleeding. Pt had a VQ scan with low probability of PE. Pt had vaginal U/S with findings of cyst, HCG negative. GYN recommended pt resume Eliquis and start provera. Subjective:     Weaning oxygen on airvo, 60 lpm, 60% with sat of 100%  24 days out, ?  Need precautions at this point     Current Facility-Administered Medications   Medication Dose Route Frequency    0.9% sodium chloride infusion 250 mL  250 mL IntraVENous PRN    medroxyPROGESTERone (PROVERA) tablet 20 mg  20 mg Oral DAILY    budesonide-formoterol (SYMBICORT) 80-4.5 mcg inhaler  2 Puff Inhalation BID RT    albuterol (PROVENTIL HFA, VENTOLIN HFA, PROAIR HFA) inhaler 2 Puff  2 Puff Inhalation Q6H RT    apixaban (ELIQUIS) tablet 5 mg  5 mg Oral BID    insulin glargine (LANTUS) injection 15 Units  15 Units SubCUTAneous DAILY    insulin lispro (HUMALOG) injection 5 Units  5 Units SubCUTAneous TIDAC    insulin lispro (HUMALOG) injection   SubCUTAneous AC&HS    dexamethasone (DECADRON) 10 mg/mL injection 6 mg  6 mg IntraVENous Q24H    NUTRITIONAL SUPPORT ELECTROLYTE PRN ORDERS   Does Not Apply PRN    hydrALAZINE (APRESOLINE) 20 mg/mL injection 10 mg  10 mg IntraVENous Q6H PRN    morphine injection 1 mg  1 mg IntraVENous Q4H PRN    LORazepam (ATIVAN) injection 0.5 mg  0.5 mg IntraVENous Q6H PRN    phenol throat spray (CHLORASEPTIC) 1 Spray  1 Spray Oral PRN    pantoprazole (PROTONIX) tablet 40 mg  40 mg Oral ACB    sodium chloride (NS) flush 30 mL  30 mL InterCATHeter Q8H    sodium chloride (NS) flush 30 mL  30 mL InterCATHeter PRN    acetaminophen (TYLENOL) tablet 650 mg  650 mg Oral Q6H PRN    Or    acetaminophen (TYLENOL) suppository 650 mg  650 mg Rectal Q6H PRN    polyethylene glycol (MIRALAX) packet 17 g  17 g Oral DAILY PRN    ondansetron (ZOFRAN ODT) tablet 4 mg  4 mg Oral Q8H PRN    Or    ondansetron (ZOFRAN) injection 4 mg  4 mg IntraVENous Q6H PRN     Review of Systems  +cough  +dyspnea on exertion  Constitutional: negative for fever, chills, sweats  Cardiovascular: negative for chest pain, palpitations, syncope, edema  Gastrointestinal:  negative for dysphagia, reflux, vomiting, diarrhea, abdominal pain, or melena  Neurologic:  negative for focal weakness, numbness, headache  Objective:     Vitals:    10/08/21 1055 10/08/21 1307 10/08/21 1315 10/08/21 1330   BP: 133/68 123/74 126/67 129/74   Pulse: (!) 120 (!) 114 (!) 110 (!) 110   Resp: 25 16 24 20   Temp: 98.4 °F (36.9 °C) 99.3 °F (37.4 °C) 99 °F (37.2 °C) 98.9 °F (37.2 °C)   SpO2: 95% 100% 100% 98%   Weight:       Height:           Intake/Output Summary (Last 24 hours) at 10/8/2021 1355  Last data filed at 10/8/2021 1217  Gross per 24 hour   Intake 950 ml   Output --   Net 950 ml     Physical Exam:   Constitution:  the patient is super morbidly obese  HEENT:  Sclera clear, pupils equal, oral mucosa moist  Respiratory: distant breath sounds   Cardiovascular:  RRR without M,G,R  Gastrointestinal: soft and non-tender; with positive bowel sounds. Musculoskeletal: warm without cyanosis. There is no lower extremity edema. Skin:  no jaundice or rashes,   Neurologic: no gross neuro deficits     Psychiatric:  alert and oriented x 3    CXR:     LAB:  Recent Labs     10/08/21  0448 10/07/21  0413 10/06/21  0808   WBC 14.8* 16.7* 16.2*   HGB 6.6* 7.3* 7.8*   HCT 24.1* 26.4* 27.8*    282 279     Recent Labs     10/08/21  0448 10/07/21  0413 10/06/21  0509    137 137   K 3.8 3.8 4.0    103 101   CO2 27 28 29   * 189* 128*   BUN 19 18 14   CREA 0.76 0.78 0.65   MG 1.8  --  1.9   CA 8.0* 8.6 9.0   PHOS 3.0  --  4.6*       Assessment and Plan:  (Medical Decision Making)       Acute respiratory distress syndrome (ARDS) due to COVID-19 virus ([facility] [address] (9/14/2021)  Wean FiO2 as tolerated  mobilize      DM w/o complication type II ([address] (9/14/2021)    SSI,stop steroids- on since September       HTN (hypertension) (9/14/2021)    Controlled       Acute respiratory failure with hypoxia ([address] (9/16/2021)    Weaning FiO2 as tolerated      Morbid obesity ([address] (9/18/2021)    BMI 60.8, high risk for poor outcomes      Suspected sleep apnea (9/18/2021)    Will likely need a split night PSG after discharge       DVT (deep venous thrombosis) (HCC) (9/23/2021)    L calf DVT, has been on Eliquis but she has been having significant vaginal bleeding, blood tinged sputum, and epistaxis. hgb down today again 6.6, may need IVC filter. ALLAN (acute kidney injury) ([facility] [address] (9/30/2021)  resolved      Vaginal bleeding (10/6/2021)  Eliquis resumed, GYN started provera  hgb 6.6, transfused- hold eliquis       More than 50% of the time documented was spent in face-to-face contact with the patient and in the care of the patient on the floor/unit where the patient is located. Promise Mcginnis, NP     I have spoken with and examined the patient. I agree with the above assessment and plan as documented.     Gen: Morbidly obese and in no acute distress. Sat 95-98% on 65%/60L  Lungs:  Decreased BS bilaterally  Heart:  RRR with no Murmur/Rubs/Gallops  Abd: obese  Ext: no edema    Comfortable on 65%. OOB to chair. Getting another blood transfusion for hgb 6.6 but pt denies bleeding. Holding Eliquis for now but only for a day or 2 with pt on Provera. Probably needs IVC filter if additional transfusions required beyond today. Will plan to follow up on Monday unless needed sooner. Call if that is the case.      Nallely Call MD

## 2021-10-08 NOTE — PROGRESS NOTES
ACUTE PHYSICAL THERAPY GOALS:  (Developed with and agreed upon by patient and/or caregiver. )  LTG:  (1.)Ms. Ольга Millan will move from supine to sit and sit to supine , scoot up and down and roll side to side in bed with INDEPENDENT within 7 treatment day(s). goal met 10/08/21  (2.)Ms. Ольга Millan will transfer from bed to chair and chair to bed with INDEPENDENT using the least restrictive device within 7 treatment day(s). goal met 10/08/21  (3.)Ms. Ольга Millan will ambulate with INDEPENDENT for 100 feet with the least restrictive device within 7 treatment day(s). (4.)Ms. Ольга Millan will tolerate at least 23 min of dynamic standing activity to assist standing ADLs with the least restrictive device within 7 treatment days. (5.)Ms. Ольга Millan will climb at least 12 steps with MODIFIED INDEPENDENCE with the least restrictive device within 7 treatment days. PHYSICAL THERAPY: Daily Note and AM Treatment Day # 3    Kaylee Bolden is a 23 y.o. female   PRIMARY DIAGNOSIS: Acute respiratory distress syndrome (ARDS) due to COVID-19 virus ([facility] [address]  Acute respiratory failure (HCC) [J96.00]         ASSESSMENT:     REHAB RECOMMENDATIONS: CURRENT LEVEL OF FUNCTION:  (Most Recently Demonstrated)   Recommendation to date pending progress:  Setting:   No further skilled therapy  vs HHPT based on continued progress  Equipment:    None Bed Mobility:   Modified Independent  Sit to Stand:   Supervision  Transfers:   Supervision  Gait/Mobility:  Ana Cristina Land     ASSESSMENT:  Ms. Ольга Millan presents supine in bed  and is agreeable to therapy. Patient is on Airvo 60L/50% saturations 93% but drop with activity but returns quickly wit proper breathing. Bed mobility is modified independent. Sitting balance good edge of bed good no support. Sit to stand with supervision and patient walks to the recliner with stand by assist.  After a rest break the patient is able to assist with self care.   Patient does have moments where her saturations decrease but still recovers well. Good session and the patient is doing very well. She is pleasant and cooperative and a dorys to work with. Patient is left in the recliner with needs within reach. Pt continues to benefit from skilled PT due to decreased activity tolerance and overall strength and mobility limited greatly by SpO2 levels and increased O2 needs. Continue PT efforts.      SUBJECTIVE:   Ms. Annabel Nguyen states, \"Good morning\"    SOCIAL HISTORY/ LIVING ENVIRONMENT: see eval  Home Environment: Independent living  One/Two Story Residence: Other (Comment) (one story apartment; lives on 3rd floor)  Living Alone: Yes  Support Systems: Other Family Member(s)  OBJECTIVE:     PAIN: VITAL SIGNS: LINES/DRAINS:   Pre Treatment: Pain Screen  Pain Scale 1: Numeric (0 - 10)  Pain Intensity 1:  (no number given)  Pain Location 1: Back  Post Treatment: 0/10   Continuous Pulse Oximetry  O2 Device: Hi flow nasal cannula, Heated     MOBILITY: I Mod I S SBA CGA Min Mod Max Total  NT x2 Comments:   Bed Mobility    Rolling [] [x] [] [] [] [] [] [] [] [] []    Supine to Sit [] [x] [] [] [] [] [] [] [] [] []    Scooting [] [x] [] [] [] [] [] [] [] [] []    Sit to Supine [] [] [] [] [] [] [] [] [] [x] []    Transfers    Sit to Stand [] [] [x] [] [] [] [] [] [] [] []    Bed to Chair [] [] [x] [] [] [] [] [] [] [] []    Stand to Sit [] [] [x] [] [] [] [] [] [] [] []    I=Independent, Mod I=Modified Independent, S=Supervision, SBA=Standby Assistance, CGA=Contact Guard Assistance,   Min=Minimal Assistance, Mod=Moderate Assistance, Max=Maximal Assistance, Total=Total Assistance, NT=Not Tested    BALANCE: Good Fair+ Fair Fair- Poor NT Comments   Sitting Static [x] [] [] [] [] []    Sitting Dynamic [x] [] [] [] [] []              Standing Static [x] [] [] [] [] []    Standing Dynamic [x] [] [] [] [] []      GAIT: I Mod I S SBA CGA Min Mod Max Total  NT x2 Comments:   Level of Assistance [] [] [] [x] [] [] [] [] [] [] [] Stand by assist provided if necessary    Distance 5 feet     DME None    Gait Quality N/A    Weightbearing  Status N/A     I=Independent, Mod I=Modified Independent, S=Supervision, SBA=Standby Assistance, CGA=Contact Guard Assistance,   Min=Minimal Assistance, Mod=Moderate Assistance, Max=Maximal Assistance, Total=Total Assistance, NT=Not Tested    PLAN:   FREQUENCY/DURATION: PT Plan of Care: 3 times/week for duration of hospital stay or until stated goals are met, whichever comes first.  TREATMENT:     TREATMENT:   ($$ Therapeutic Activity: 38-52 mins    )  Therapeutic Activity (42 Minutes): Therapeutic activity included Rolling, Supine to Sit, Scooting, Transfer Training, Ambulation on level ground, Sitting balance  and Standing balance to improve functional Mobility, Strength and Activity tolerance.           TREATMENT GRID:   Date:  10/5/21 Date:  10/07/21 Date:     Activity/Exercise Parameters Parameters Parameters   LAQ x10 10    Seated march x10 10    ABD/ADD x10 10    Ankle pumps  10                            AFTER TREATMENT POSITION/PRECAUTIONS:  Chair, Needs within reach and RN notified    INTERDISCIPLINARY COLLABORATION:  RN/PCT and PT/PTA    TOTAL TREATMENT DURATION:  PT Patient Time In/Time Out  Time In: 0929  Time Out: [address] [facility], PTA

## 2021-10-08 NOTE — PROGRESS NOTES
Hourly rounds completed on patient, call light with in reach and bed low and locked. Pt remains on 60L/50% airvo, NAD noted during shift. Patient denies needs at this time. Will report to oncoming nurse.

## 2021-10-08 NOTE — PROGRESS NOTES
[facility] Hospitalist Progress Note     Name:  Shivani Quiñones  Age:26 y.o. Sex:female   :  1995    MRN:  [id number]     Admit Date:  2021    Reason for Admission:  Acute respiratory failure [facility]) [J96.00]    Hospital Course/Interval history:     23year old morbid obese AAF admitted  for acute respiratory failure with hypoxia related to COVID 19 infection. She was found to have a left leg DVT, CT chest could not visualize pulmonary arteries. She was started on a Heparin drip then transitioned to Eliquis that had to be held due to vaginal bleeding, slight hemoptysis and epistaxis      As per prior documentation:  \"Patient seen and examined at bedside this morning. Overnight, patient refused Eliquis due to bleeding and patient thinking this was causing her worsening shortness of breath and weakness. Patient states that she is no longer having any significant vaginal bleeding since being off Eliquis but did suffer from this as recent as yesterday. Discussed with OB/GYN after abdominal ultrasound completed and patient now started on Provera with OB/GYN recommendations to increase. Discussed with patient. Patient states she still is having significant weakness and fatigue, likely related to acute blood loss anemia. Informed her that she does not need blood at this point but if her hemoglobin were to drop at 7.0 or lower and she was still symptomatic, we could give her a unit of blood to assist.  Patient amenable to Eliquis ministration to prevent worsening of her DVT and PE. Patient denies any chest pain or pressure, lightheadedness, dizziness, palpitations, near-syncope or syncope. She was able to be weaned on air Vo to 50% O2 but still remains on 60 L  Via [facility]. \"    Subjective :  10/8/2021  Patient seen and evaluated. New patient for me today. No further episodes of bleeding for several days per the patient. However her hemoglobin and hematocrit did drop to 6.6 this morning.   She is currently receiving 1 unit of packed red blood cells. Eliquis now on hold. She remains on high flow oxygen via nasal cannula  She is attempting to prone. She is out of bed to chair  She is able to get up and down to the bedside commode on her own. **Dena Green-mother-updated via phone. All questions answered. Assessment & Plan        Principal Problem:    Acute respiratory distress syndrome (ARDS) due to COVID-19 virus ([address] (9/14/2021)  Patient was on BiPAP. Now off BiPAP and still on high flow oxygen at 60 L/min, weaned to 50% O2  On Decadron 6 mg IV once a day. Patient completed remdesivir   received Actemra as well   wean oxygen down, as tolerated. VQ scan with low suspicion for PE, CT chest could not rule out PE-noted tachycardic, requiring high flow  Pulmonology following      10/8/2021  Patient remains on high flow oxygen  We will continue to wean as tolerated         Active Problems:    DM w/o complication type II ([address] (9/14/2021)  10/8/2021    Blood sugar is in 100s. Continue to cover with sliding scale with short acting insulin. Patient is on Lantus 15 units subcu once a day.    We will increase her Premeal insulin to 7 units 3 times daily AC       HTN (hypertension) (9/14/2021)  Controlled on current regimen       Morbid obesity ([address] (9/18/2021)      Suspected sleep apnea (9/18/2021)      Thrombocytopenia ([address] (9/22/2021)  Resolved       DVT (deep venous thrombosis) ([facility] (9/23/2021)  10/8/2021   Eliquis resumed October 7, 2021, she denies any further episodes of bleeding  Continue Provera per OB/GYN recommendations (do not want to add estrogen due to DVT)  VQ scan ordered since CT chest inconclusive - low probability of PE  Needs anticoagulation; risk benefit discussed with patient who agrees to Eliquis  Echo ordered 10/5  If unable to tolerate Eliquis after Provera started, will consider IVC filter for DVT left leg       ALLAN (acute kidney injury) ([facility] (9/30/2021)  resolved AUB:  Vaginal bleeding slowing down   Patient states history of irregular periods for \"years\", never seen by GYN  Increase Provera per OBGYN(do not want to add estrogen due to DVT)  OBGYN consulted 10/7 - intravaginal US reviewed by OBGYN  **Will need outpatient follow up with GYN, on airvo, COVID +    Anemia-acute blood loss anemia secondary to vaginal bleeding  She is receiving 1 unit of packed red blood cells. Eliquis on hold for now  Will monitor for any further episodes of bleeding  Consider IVC filter if clinically indicated       Dispo/Discharge Planning: To be determined     Diet:  ADULT DIET Regular; 3 carb choices (45 gm/meal); Low Sodium (2 gm)  ADULT ORAL NUTRITION SUPPLEMENT Dinner, Breakfast, Lunch; Diabetic Supplement  DVT PPx: Apixaban resumed today  Code status: Full Code           Review of Systems: 14 point review of systems is otherwise negative with the exception of the elements mentioned above.     Objective:     Patient Vitals for the past 24 hrs:   Temp Pulse Resp BP SpO2   10/08/21 1530 98.2 °F (36.8 °C) (!) 112 24 (!) 140/90 100 %   10/08/21 1513 -- -- -- -- 94 %   10/08/21 1430 98.5 °F (36.9 °C) (!) 121 24 120/78 92 %   10/08/21 1330 98.9 °F (37.2 °C) (!) 110 20 129/74 98 %   10/08/21 1315 99 °F (37.2 °C) (!) 110 24 126/67 100 %   10/08/21 1307 99.3 °F (37.4 °C) (!) 114 16 123/74 100 %   10/08/21 1055 98.4 °F (36.9 °C) (!) 120 25 133/68 95 %   10/08/21 0934 -- -- -- -- 94 %   10/08/21 0653 98.6 °F (37 °C) (!) 113 20 (!) 149/93 95 %   10/08/21 0349 98.3 °F (36.8 °C) (!) 120 20 131/70 92 %   10/08/21 0215 -- -- -- -- 91 %   10/08/21 0004 98.3 °F (36.8 °C) (!) 122 22 128/71 91 %   10/07/21 2100 -- -- -- -- 97 %   10/07/21 2008 98.7 °F (37.1 °C) (!) 123 20 125/82 93 %     Oxygen Therapy  O2 Sat (%): 100 % (10/08/21 1530)  Pulse via Oximetry: 82 beats per minute (10/08/21 1513)  O2 Device: Hi flow nasal cannula (10/08/21 1513)  Skin Assessment: Clean, dry, & intact (10/07/21 0710)  Skin Protection for O2 Device: Yes (10/02/21 1640)  Orientation: Bilateral (10/02/21 1640)  Location: Cheek (10/02/21 1640)  O2 Flow Rate (L/min): 60 l/min (10/08/21 1513)  O2 Temperature: 87.8 °F (31 °C) (10/08/21 1513)  FIO2 (%): 65 % (10/08/21 1513)    Body mass index is 63.18 kg/m². Physical Exam:   General:          Well nourished. BMI of 65. Patient sitting in bedside chair, on high flow oxygen. no significant SOB with speaking. Head:               Normocephalic, atraumatic, pale   Eyes:               Sclerae appear normal.  Pupils equally round. ENT:                Nares appear normal, no drainage. Moist oral mucosa  Neck:               No restricted ROM. Trachea midline   CV:                  RRR. No m/r/g. No jugular venous distension. Lungs:             Diminished breath sound. No wheezing, rhonchi, or rales. Shortness of breath. Abdomen: Bowel sounds present. Soft, nontender, nondistended. Extremities:     No cyanosis or clubbing. No edema  Skin:                No rashes and normal coloration. Warm and dry. Neuro:             Cranial nerves II-XII grossly intact. Sensation intact.   A&Ox3  Psych:             Normal mood and affect.         Data Review:  I have reviewed all labs, meds, and studies from the last 24 hours:    Labs:    Recent Results (from the past 24 hour(s))   GLUCOSE, POC    Collection Time: 10/07/21  8:48 PM   Result Value Ref Range    Glucose (POC) 273 (H) 65 - 100 mg/dL    Performed by [facility]    METABOLIC PANEL, BASIC    Collection Time: 10/08/21  4:48 AM   Result Value Ref Range    Sodium 139 136 - 145 mmol/L    Potassium 3.8 3.5 - 5.1 mmol/L    Chloride 106 98 - 107 mmol/L    CO2 27 21 - 32 mmol/L    Anion gap 6 (L) 7 - 16 mmol/L    Glucose 191 (H) 65 - 100 mg/dL    BUN 19 6 - 23 MG/DL    Creatinine 0.76 0.6 - 1.0 MG/DL    GFR est AA >60 >60 ml/min/1.73m2    GFR est non-AA >60 >60 ml/min/1.73m2    Calcium 8.0 (L) 8.3 - 10.4 MG/DL   PHOSPHORUS Collection Time: 10/08/21  4:48 AM   Result Value Ref Range    Phosphorus 3.0 2.5 - 4.5 MG/DL   MAGNESIUM    Collection Time: 10/08/21  4:48 AM   Result Value Ref Range    Magnesium 1.8 1.8 - 2.4 mg/dL   CBC WITH AUTOMATED DIFF    Collection Time: 10/08/21  4:48 AM   Result Value Ref Range    WBC 14.8 (H) 4.3 - 11.1 K/uL    RBC 3.33 (L) 4.05 - 5.2 M/uL    HGB 6.6 (LL) 11.7 - 15.4 g/dL    HCT 24.1 (L) 35.8 - 46.3 %    MCV 72.4 (L) 79.6 - 97.8 FL    MCH 19.8 (L) 26.1 - 32.9 PG    MCHC 27.4 (L) 31.4 - 35.0 g/dL    RDW 21.9 (H) 11.9 - 14.6 %    PLATELET 854 711 - 332 K/uL    MPV 10.5 9.4 - 12.3 FL    ABSOLUTE NRBC 0.05 0.0 - 0.2 K/uL    DF AUTOMATED      NEUTROPHILS 67 43 - 78 %    LYMPHOCYTES 22 13 - 44 %    MONOCYTES 9 4.0 - 12.0 %    EOSINOPHILS 1 0.5 - 7.8 %    BASOPHILS 0 0.0 - 2.0 %    IMMATURE GRANULOCYTES 1 0.0 - 5.0 %    ABS. NEUTROPHILS 9.9 (H) 1.7 - 8.2 K/UL    ABS. LYMPHOCYTES 3.2 0.5 - 4.6 K/UL    ABS. MONOCYTES 1.3 0.1 - 1.3 K/UL    ABS. EOSINOPHILS 0.1 0.0 - 0.8 K/UL    ABS. BASOPHILS 0.0 0.0 - 0.2 K/UL    ABS. IMM. GRANS. 0.2 0.0 - 0.5 K/UL   RBC, ALLOCATE    Collection Time: 10/08/21  5:45 AM   Result Value Ref Range    HISTORY CHECKED?  Historical check performed    GLUCOSE, POC    Collection Time: 10/08/21  6:54 AM   Result Value Ref Range    Glucose (POC) 159 (H) 65 - 100 mg/dL    Performed by [address]    Collection Time: 10/08/21 10:04 AM   Result Value Ref Range    Crossmatch Expiration 10/11/2021,2359     [facility]     Antibody screen NEG     Unit number [id number]     Blood component type RC LR     Unit division 00     Status of unit ISSUED     Crossmatch result Compatible    GLUCOSE, POC    Collection Time: 10/08/21 10:56 AM   Result Value Ref Range    Glucose (POC) 321 (H) 65 - 100 mg/dL    Performed by Ladarius Malave    GLUCOSE, POC    Collection Time: 10/08/21  4:00 PM   Result Value Ref Range    Glucose (POC) 342 (H) 65 - 100 mg/dL    Performed by Ladarius Malave All Micro Results     Procedure Component Value Units Date/Time    CULTURE, RESPIRATORY/SPUTUM/BRONCH Rasta Hou [[id number]]     Order Status: Sent Specimen: Sputum     CULTURE, BLOOD [[id number]] Collected: 09/14/21 0759    Order Status: Completed Specimen: Blood Updated: 09/19/21 0637     Special Requests: --        LEFT  HAND       Culture result: NO GROWTH 5 DAYS       CULTURE, BLOOD [[id number]] Collected: 09/14/21 0757    Order Status: Completed Specimen: Blood Updated: 09/19/21 0637     Special Requests: --        RIGHT  Antecubital       Culture result: NO GROWTH 5 DAYS       CULTURE, RESPIRATORY/SPUTUM/BRONCH Willard Samir STAIN [[id number]] Collected: 09/14/21 0915    Order Status: Canceled Specimen: Sputum     COVID-19 RAPID TEST [[id number]]  (Abnormal) Collected: 09/14/21 0535    Order Status: Completed Specimen: Nasopharyngeal Updated: 09/14/21 0558     Specimen source NASAL        COVID-19 rapid test Detected        Comment:      The specimen is POSITIVE for SARS-CoV-2, the novel coronavirus associated with COVID-19. This test has been authorized by the FDA under an Emergency Use Authorization (EUA) for use by authorized laboratories. Fact sheet for Healthcare Providers: ConventionUpdate.co.nz  Fact sheet for Patients: ConventionUpdate.co.nz       Methodology: Isothermal Nucleic Acid Amplification  RESULTS VERIFIED, PHONED TO AND READ BACK BY   FERDINAND BENOIT AT 9078 ON 9/14/21 ACL               EKG Results     None          Other Studies:  No results found.     Current Meds:   Current Facility-Administered Medications   Medication Dose Route Frequency    0.9% sodium chloride infusion 250 mL  250 mL IntraVENous PRN    medroxyPROGESTERone (PROVERA) tablet 20 mg  20 mg Oral DAILY    budesonide-formoterol (SYMBICORT) 80-4.5 mcg inhaler  2 Puff Inhalation BID RT    albuterol (PROVENTIL HFA, VENTOLIN HFA, PROAIR HFA) inhaler 2 Puff  2 Puff Inhalation Q6H RT    [Held by provider] apixaban (ELIQUIS) tablet 5 mg  5 mg Oral BID    insulin glargine (LANTUS) injection 15 Units  15 Units SubCUTAneous DAILY    insulin lispro (HUMALOG) injection 5 Units  5 Units SubCUTAneous TIDAC    insulin lispro (HUMALOG) injection   SubCUTAneous AC&HS    NUTRITIONAL SUPPORT ELECTROLYTE PRN ORDERS   Does Not Apply PRN    hydrALAZINE (APRESOLINE) 20 mg/mL injection 10 mg  10 mg IntraVENous Q6H PRN    morphine injection 1 mg  1 mg IntraVENous Q4H PRN    LORazepam (ATIVAN) injection 0.5 mg  0.5 mg IntraVENous Q6H PRN    phenol throat spray (CHLORASEPTIC) 1 Spray  1 Spray Oral PRN    pantoprazole (PROTONIX) tablet 40 mg  40 mg Oral ACB    sodium chloride (NS) flush 30 mL  30 mL InterCATHeter Q8H    sodium chloride (NS) flush 30 mL  30 mL InterCATHeter PRN    acetaminophen (TYLENOL) tablet 650 mg  650 mg Oral Q6H PRN    Or    acetaminophen (TYLENOL) suppository 650 mg  650 mg Rectal Q6H PRN    polyethylene glycol (MIRALAX) packet 17 g  17 g Oral DAILY PRN    ondansetron (ZOFRAN ODT) tablet 4 mg  4 mg Oral Q8H PRN    Or    ondansetron (ZOFRAN) injection 4 mg  4 mg IntraVENous Q6H PRN       Problem List:  Hospital Problems as of 10/8/2021 Date Reviewed: 10/6/2021        Codes Class Noted - Resolved POA    Vaginal bleeding ICD-10-CM: N93.9  ICD-9-CM: 623.8  10/6/2021 - Present Yes        DVT (deep venous thrombosis) (HCC) ICD-10-CM: I82.409  ICD-9-CM: 453.40  9/23/2021 - Present Yes        Morbid obesity (HCC) (Chronic) ICD-10-CM: E66.01  ICD-9-CM: 278.01  9/18/2021 - Present Yes        Suspected sleep apnea ICD-10-CM: R29.818  ICD-9-CM: 781.99  9/18/2021 - Present Yes        * (Principal) Acute respiratory distress syndrome (ARDS) due to COVID-19 virus [facility]) ICD-10-CM: U07.1, J80  ICD-9-CM: 518.82, 079.89  9/14/2021 - Present Yes        DM w/o complication type II (HCC) (Chronic) ICD-10-CM: E11.9  ICD-9-CM: 250.00  9/14/2021 - Present Yes        HTN (hypertension) (Chronic) ICD-10-CM: I10  ICD-9-CM: 401.9  9/14/2021 - Present Yes        RESOLVED: ALLAN (acute kidney injury) ([facility] ICD-10-CM: N17.9  ICD-9-CM: 584.9  9/30/2021 - 10/8/2021 Yes        RESOLVED: Thrombocytopenia ([facility] ICD-10-CM: D69.6  ICD-9-CM: 287.5  9/22/2021 - 10/8/2021 Yes        RESOLVED: Acute respiratory failure ([facility] ICD-10-CM: J96.00  ICD-9-CM: 518.81  9/14/2021 - 9/16/2021 Yes                 Signed By: Ana Mena MD   [facility] Hospitalist Service    October 8, 2021  5:15 PM

## 2021-10-08 NOTE — PROGRESS NOTES
Pt is A/O x 4, sitting up in chair with blood transfusing at 175ml/hr. Pt is tolerating well at this time. No s/s of transfusion reaction. Vital signs stable. Pt continues on Arivo 60/50 with sats ranging in the high 90's. No c/o pain or discomfort. Will continue to monitor and provide care.

## 2021-10-09 NOTE — PROGRESS NOTES
[facility]ist Progress Note     Name:  Rogelio Villareal  Age:26 y.o. Sex:female   :  1995    MRN:  [id number]     Admit Date:  2021    Reason for Admission:  Acute respiratory failure [facility]) [J96.00]    Hospital Course/Interval history:     23year old morbid obese AAF admitted  for acute respiratory failure with hypoxia related to COVID 19 infection. She was found to have a left leg DVT, CT chest could not visualize pulmonary arteries. She was started on a Heparin drip then transitioned to Eliquis that had to be held due to vaginal bleeding, slight hemoptysis and epistaxis      As per prior documentation:  \"Patient seen and examined at bedside this morning. Overnight, patient refused Eliquis due to bleeding and patient thinking this was causing her worsening shortness of breath and weakness. Patient states that she is no longer having any significant vaginal bleeding since being off Eliquis but did suffer from this as recent as yesterday. Discussed with OB/GYN after abdominal ultrasound completed and patient now started on Provera with OB/GYN recommendations to increase. Discussed with patient. Patient states she still is having significant weakness and fatigue, likely related to acute blood loss anemia. Informed her that she does not need blood at this point but if her hemoglobin were to drop at 7.0 or lower and she was still symptomatic, we could give her a unit of blood to assist.  Patient amenable to Eliquis ministration to prevent worsening of her DVT and PE. Patient denies any chest pain or pressure, lightheadedness, dizziness, palpitations, near-syncope or syncope. She was able to be weaned on air Vo to 50% O2 but still remains on 60 L  Via [facility]NC. \"    Subjective :  10/9/2021  Patient seen and evaluated. She remains on 60 L heated high flow oxygen via Airvo  No further episodes of bleeding for several days per the patient.   However her hemoglobin and hematocrit did drop to 7.3 this morning after being transfused 1 unit of packed red blood cells yesterday with a repeat hemoglobin of 8.1 after transfusion. Repeat H&H is pending  Eliquis remains on hold. She remains on high flow oxygen via nasal cannula  She is attempting to prone. She is out of bed to chair  She is able to get up and down to the bedside commode on her own. She is frustrated by being in the hospital all of this time and wants to know when she can get home. All questions answered    Dena Green-mother-updated via phone. All questions answered. Assessment & Plan        Principal Problem:    Acute respiratory distress syndrome (ARDS) due to COVID-19 virus ([facility] (9/14/2021)  Patient was on BiPAP. Now off BiPAP and still on high flow oxygen at 60 L/min, weaned to 50% O2  On Decadron 6 mg IV once a day. Patient completed remdesivir   received Actemra as well   wean oxygen down, as tolerated. VQ scan with low suspicion for PE, CT chest could not rule out PE-noted tachycardic, requiring high flow  Pulmonology following      10/9/2021  Patient remains on high flow oxygen  We will continue to wean as tolerated         Active Problems:    DM w/o complication type II ([address] (9/14/2021)  10/9/2021    Blood sugar is in 100s. Continue to cover with sliding scale with short acting insulin. Patient is on Lantus 15 units subcu once a day.    Her Premeal insulin was increased to to 7 units 3 times daily [facility] on October 8, 2021  We will continue to monitor blood sugars and titrate as indicated       HTN (hypertension) (9/14/2021)  10/9/2021    Fairly well controlled on current regimen  We will titrate medications as indicated       Morbid obesity ([address] (9/18/2021)  10/9/2021    No active issues however this is complicating her care and adding complexity to her case as it is associated with a more prolonged and severe course of COVID-19 with increased morbidity and mortality      Suspected sleep apnea (9/18/2021)  10/9/2021    She will need outpatient sleep studies        Thrombocytopenia ([facility] [address] (9/22/2021)  Resolved       DVT (deep venous thrombosis) ([facility] [address] (9/23/2021)  10/9/2021   Eliquis resumed October 7, 2021, she denies any further episodes of bleeding  Given drop in hemoglobin it was held yesterday by pulmonology. She was transfused 1 unit of blood and her hemoglobin reji appropriately but now trending down  We will send a repeat  We will also send hemolysis labs  Continue to hold Eliquis for now  Continue Provera per OB/GYN recommendations (do not want to add estrogen due to DVT)  VQ scan ordered since CT chest inconclusive - low probability of PE  Needs anticoagulation; risk benefit discussed with patient who agrees to Eliquis; will remain status soon as we are sure she is not clinically bleeding  Echo ordered 10/5  If unable to tolerate Eliquis after Provera started, will consider IVC filter for DVT left leg       ALLAN (acute kidney injury) ([facility] [address] (9/30/2021)  resolved     AUB:  10/9/2021    Vaginal bleeding has currently resolved per the patient  Patient states history of irregular periods for \"years\", never seen by GYN  Increase Provera per OBGYN(do not want to add estrogen due to DVT)  OBGYN consulted 10/7 - intravaginal US reviewed by OBGYN  **Will need outpatient follow up with GYN, on airvo, COVID +    Anemia-acute blood loss anemia secondary to vaginal bleeding  She is status post 1 unit of packed red blood cells where her hemoglobin reji appropriately  We will send a repeat hemoglobin if it remains stable we will reinstate her Eliquis   Eliquis on hold for now  Will monitor for any further episodes of bleeding  Consider IVC filter if clinically indicated       Dispo/Discharge Planning: To be determined     Diet:  ADULT DIET Regular; 3 carb choices (45 gm/meal);  Low Sodium (2 gm)  ADULT ORAL NUTRITION SUPPLEMENT Dinner, Breakfast, Lunch; Diabetic Supplement  DVT PPx: Apixaban resumed today  Code status: Full Code           Review of Systems: 14 point review of systems is otherwise negative with the exception of the elements mentioned above. Objective:     Patient Vitals for the past 24 hrs:   Temp Pulse Resp BP SpO2   10/09/21 1129 98.8 °F (37.1 °C) (!) 101 20 (!) 136/98 97 %   10/09/21 0836 -- -- -- -- 94 %   10/09/21 0737 98.6 °F (37 °C) (!) 108 18 118/80 94 %   10/09/21 0315 98.9 °F (37.2 °C) (!) 110 22 138/85 95 %   10/09/21 0027 98.2 °F (36.8 °C) (!) 105 22 105/69 93 %   10/08/21 2005 -- -- -- -- 93 %   10/08/21 1936 98 °F (36.7 °C) (!) 116 24 97/66 97 %   10/08/21 1530 98.2 °F (36.8 °C) (!) 112 24 (!) 140/90 100 %   10/08/21 1513 -- -- -- -- 94 %   10/08/21 1430 98.5 °F (36.9 °C) (!) 121 24 120/78 92 %   10/08/21 1330 98.9 °F (37.2 °C) (!) 110 20 129/74 98 %   10/08/21 1315 99 °F (37.2 °C) (!) 110 24 126/67 100 %   10/08/21 1307 99.3 °F (37.4 °C) (!) 114 16 123/74 100 %     Oxygen Therapy  O2 Sat (%): 97 % (10/09/21 1129)  Pulse via Oximetry: 122 beats per minute (10/09/21 0836)  O2 Device: Heated; Hi flow nasal cannula (10/09/21 0836)  Skin Assessment: Clean, dry, & intact (10/08/21 2005)  Skin Protection for O2 Device: Yes (10/02/21 1640)  Orientation: Bilateral (10/02/21 1640)  Location: Cheek (10/02/21 1640)  O2 Flow Rate (L/min): 60 l/min (10/09/21 0836)  O2 Temperature: 87.8 °F (31 °C) (10/09/21 0836)  FIO2 (%): 65 % (10/09/21 0836)    Body mass index is 63.18 kg/m². Physical Exam:   General:          Well nourished. BMI of 63.18  Patient sitting in bedside chair, on high flow oxygen. no significant SOB with speaking. Head:               Normocephalic, atraumatic, pale   Eyes:               Sclerae appear normal.  Pupils equally round. ENT:                Nares appear normal, no drainage. Moist oral mucosa  Neck:               No restricted ROM. Trachea midline   CV:                  RRR. No m/r/g. No jugular venous distension. Lungs:             Diminished breath sound.   No wheezing, rhonchi, or rales. Shortness of breath. Abdomen: Bowel sounds present. Soft, nontender, nondistended. Extremities:     No cyanosis or clubbing. No edema  Skin:                No rashes and normal coloration. Warm and dry. Neuro:             Cranial nerves II-XII grossly intact. Sensation intact.   A&Ox3  Psych:             Normal mood and affect.         Data Review:  I have reviewed all labs, meds, and studies from the last 24 hours:    Labs:    Recent Results (from the past 24 hour(s))   GLUCOSE, POC    Collection Time: 10/08/21  4:00 PM   Result Value Ref Range    Glucose (POC) 342 (H) 65 - 100 mg/dL    Performed by [facility]    GLUCOSE, POC    Collection Time: 10/08/21  9:20 PM   Result Value Ref Range    Glucose (POC) 407 (H) 65 - 100 mg/dL    Performed by [facility]    HGB & HCT    Collection Time: 10/08/21  9:49 PM   Result Value Ref Range    HGB 8.1 (L) 11.7 - 15.4 g/dL    HCT 28.9 (L) 35.8 - 82.2 %   METABOLIC PANEL, BASIC    Collection Time: 10/09/21  5:14 AM   Result Value Ref Range    Sodium 139 136 - 145 mmol/L    Potassium 4.0 3.5 - 5.1 mmol/L    Chloride 107 98 - 107 mmol/L    CO2 26 21 - 32 mmol/L    Anion gap 6 (L) 7 - 16 mmol/L    Glucose 273 (H) 65 - 100 mg/dL    BUN 19 6 - 23 MG/DL    Creatinine 0.76 0.6 - 1.0 MG/DL    GFR est AA >60 >60 ml/min/1.73m2    GFR est non-AA >60 >60 ml/min/1.73m2    Calcium 8.4 8.3 - 10.4 MG/DL   CBC WITH AUTOMATED DIFF    Collection Time: 10/09/21  5:14 AM   Result Value Ref Range    WBC 14.2 (H) 4.3 - 11.1 K/uL    RBC 3.71 (L) 4.05 - 5.2 M/uL    HGB 7.3 (L) 11.7 - 15.4 g/dL    HCT 26.7 (L) 35.8 - 46.3 %    MCV 72.0 (L) 79.6 - 97.8 FL    MCH 19.7 (L) 26.1 - 32.9 PG    MCHC 27.3 (L) 31.4 - 35.0 g/dL    RDW 21.4 (H) 11.9 - 14.6 %    PLATELET 775 122 - 521 K/uL    MPV 11.3 9.4 - 12.3 FL    ABSOLUTE NRBC 0.07 0.0 - 0.2 K/uL    DF AUTOMATED      NEUTROPHILS 67 43 - 78 %    LYMPHOCYTES 21 13 - 44 %    MONOCYTES 10 4.0 - 12.0 % EOSINOPHILS 0 (L) 0.5 - 7.8 %    BASOPHILS 0 0.0 - 2.0 %    IMMATURE GRANULOCYTES 2 0.0 - 5.0 %    ABS. NEUTROPHILS 9.4 (H) 1.7 - 8.2 K/UL    ABS. LYMPHOCYTES 3.0 0.5 - 4.6 K/UL    ABS. MONOCYTES 1.4 (H) 0.1 - 1.3 K/UL    ABS. EOSINOPHILS 0.0 0.0 - 0.8 K/UL    ABS. BASOPHILS 0.0 0.0 - 0.2 K/UL    ABS. IMM. GRANS. 0.2 0.0 - 0.5 K/UL   LD    Collection Time: 10/09/21  5:14 AM   Result Value Ref Range     (H) 100 - 190 U/L   RETICULOCYTE COUNT    Collection Time: 10/09/21  5:14 AM   Result Value Ref Range    Reticulocyte count 3.7 (H) 0.3 - 2.0 %    Absolute Retic Cnt. 0.1303 (H) 0.026 - 0.095 M/ul    Immature Retic Fraction 27.8 (H) 3.0 - 15.9 %    Retic Hgb Conc. 16 (L) 29 - 35 pg   PATHOLOGIST REVIEW SMEARS    Collection Time: 10/09/21  5:14 AM   Result Value Ref Range    PATHOLOGIST REVIEW PENDING    HEPATIC FUNCTION PANEL    Collection Time: 10/09/21  5:14 AM   Result Value Ref Range    Protein, total 6.0 (L) 6.3 - 8.2 g/dL    Albumin 2.5 (L) 3.5 - 5.0 g/dL    Globulin 3.5 2.3 - 3.5 g/dL    A-G Ratio 0.7 (L) 1.2 - 3.5      Bilirubin, total 0.3 0.2 - 1.1 MG/DL    Bilirubin, direct <0.1 <0.4 MG/DL    Alk. phosphatase 84 50 - 136 U/L    AST (SGOT) 10 (L) 15 - 37 U/L    ALT (SGPT) 52 12 - 65 U/L   GLUCOSE, POC    Collection Time: 10/09/21  7:41 AM   Result Value Ref Range    Glucose (POC) 214 (H) 65 - 100 mg/dL    Performed by [facility]. GLUCOSE, POC    Collection Time: 10/09/21 11:33 AM   Result Value Ref Range    Glucose (POC) 214 (H) 65 - 100 mg/dL    Performed by [facility].         All Micro Results     Procedure Component Value Units Date/Time    CULTURE, RESPIRATORY/SPUTUM/BRONCH Benetta Balling [[id number]]     Order Status: Sent Specimen: Sputum     CULTURE, BLOOD [[id number]] Collected: 09/14/21 0759    Order Status: Completed Specimen: Blood Updated: 09/19/21 0637     Special Requests: --        LEFT  HAND       Culture result: NO GROWTH 5 DAYS       CULTURE, BLOOD [[id number]] Collected: 09/14/21 0757    Order Status: Completed Specimen: Blood Updated: 09/19/21 0637     Special Requests: --        RIGHT  Antecubital       Culture result: NO GROWTH 5 DAYS       CULTURE, RESPIRATORY/SPUTUM/BRONCH Jenni Ly STAIN [[id number]] Collected: 09/14/21 0915    Order Status: Canceled Specimen: Sputum     COVID-19 RAPID TEST [[id number]]  (Abnormal) Collected: 09/14/21 0535    Order Status: Completed Specimen: Nasopharyngeal Updated: 09/14/21 0558     Specimen source NASAL        COVID-19 rapid test Detected        Comment:      The specimen is POSITIVE for SARS-CoV-2, the novel coronavirus associated with COVID-19. This test has been authorized by the FDA under an Emergency Use Authorization (EUA) for use by authorized laboratories. Fact sheet for Healthcare Providers: ConventionUpdate.co.nz  Fact sheet for Patients: Edtripsdate.co.nz       Methodology: Isothermal Nucleic Acid Amplification  RESULTS VERIFIED, PHONED TO AND READ BACK BY   FERDINAND BENOIT AT 4820 ON 9/14/21 ACL               EKG Results     None          Other Studies:  No results found.     Current Meds:   Current Facility-Administered Medications   Medication Dose Route Frequency    0.9% sodium chloride infusion 250 mL  250 mL IntraVENous PRN    insulin lispro (HUMALOG) injection 7 Units  7 Units SubCUTAneous TIDAC    medroxyPROGESTERone (PROVERA) tablet 20 mg  20 mg Oral DAILY    budesonide-formoterol (SYMBICORT) 80-4.5 mcg inhaler  2 Puff Inhalation BID RT    albuterol (PROVENTIL HFA, VENTOLIN HFA, PROAIR HFA) inhaler 2 Puff  2 Puff Inhalation Q6H RT    [Held by provider] apixaban (ELIQUIS) tablet 5 mg  5 mg Oral BID    insulin glargine (LANTUS) injection 15 Units  15 Units SubCUTAneous DAILY    insulin lispro (HUMALOG) injection   SubCUTAneous AC&HS    NUTRITIONAL SUPPORT ELECTROLYTE PRN ORDERS   Does Not Apply PRN    hydrALAZINE (APRESOLINE) 20 mg/mL injection 10 mg  10 mg IntraVENous Q6H PRN    morphine injection 1 mg  1 mg IntraVENous Q4H PRN    LORazepam (ATIVAN) injection 0.5 mg  0.5 mg IntraVENous Q6H PRN    phenol throat spray (CHLORASEPTIC) 1 Spray  1 Spray Oral PRN    pantoprazole (PROTONIX) tablet 40 mg  40 mg Oral ACB    sodium chloride (NS) flush 30 mL  30 mL InterCATHeter Q8H    sodium chloride (NS) flush 30 mL  30 mL InterCATHeter PRN    acetaminophen (TYLENOL) tablet 650 mg  650 mg Oral Q6H PRN    Or    acetaminophen (TYLENOL) suppository 650 mg  650 mg Rectal Q6H PRN    polyethylene glycol (MIRALAX) packet 17 g  17 g Oral DAILY PRN    ondansetron (ZOFRAN ODT) tablet 4 mg  4 mg Oral Q8H PRN    Or    ondansetron (ZOFRAN) injection 4 mg  4 mg IntraVENous Q6H PRN       Problem List:  Hospital Problems as of 10/9/2021 Date Reviewed: 10/6/2021        Codes Class Noted - Resolved POA    Vaginal bleeding ICD-10-CM: N93.9  ICD-9-CM: 623.8  10/6/2021 - Present Yes        DVT (deep venous thrombosis) (HCC) ICD-10-CM: I82.409  ICD-9-CM: 453.40  9/23/2021 - Present Yes        Morbid obesity (HCC) (Chronic) ICD-10-CM: E66.01  ICD-9-CM: 278.01  9/18/2021 - Present Yes        Suspected sleep apnea ICD-10-CM: R29.818  ICD-9-CM: 781.99  9/18/2021 - Present Yes        * (Principal) Acute respiratory distress syndrome (ARDS) due to COVID-19 virus [facility]) ICD-10-CM: U07.1, J80  ICD-9-CM: 518.82, 079.89  9/14/2021 - Present Yes        DM w/o complication type II (HCC) (Chronic) ICD-10-CM: E11.9  ICD-9-CM: 250.00  9/14/2021 - Present Yes        HTN (hypertension) (Chronic) ICD-10-CM: I10  ICD-9-CM: 401.9  9/14/2021 - Present Yes        RESOLVED: ALLAN (acute kidney injury) ([address] ICD-10-CM: N17.9  ICD-9-CM: 584.9  9/30/2021 - 10/8/2021 Yes        RESOLVED: Thrombocytopenia ([facility] ICD-10-CM: D69.6  ICD-9-CM: 287.5  9/22/2021 - 10/8/2021 Yes        RESOLVED: Acute respiratory failure ([facility] ICD-10-CM: J96.00  ICD-9-CM: 518.81  9/14/2021 - 9/16/2021 Yes                 Signed By: Corrine Mckenna MD [facility]    October 9, 2021  5:15 PM

## 2021-10-09 NOTE — PROGRESS NOTES
Pt blood sugar was 384. MD notified via [employer]. No new orders received. Sliding scale given per MAR.

## 2021-10-10 NOTE — PROGRESS NOTES
Hourly rounds completed on patient. Patient is resting in bed, tolerating the Airvo at 50/55% o2 sat at 92%. Patient denies any needs at this time. Will report to oncoming nurse.

## 2021-10-10 NOTE — PROGRESS NOTES
Problem: Airway Clearance - Ineffective  Goal: Achieve or maintain patent airway  Outcome: Progressing Towards Goal     Problem: Gas Exchange - Impaired  Goal: Absence of hypoxia  Outcome: Progressing Towards Goal

## 2021-10-10 NOTE — PROGRESS NOTES
[facility] Hospitalist Progress Note     Name:  Deena Fisher  Age:26 y.o. Sex:female   :  1995    MRN:  [id number]     Admit Date:  2021    Reason for Admission:  Acute respiratory failure [facility]) [J96.00]    Hospital Course/Interval history:     23year old morbid obese AAF admitted  for acute respiratory failure with hypoxia related to COVID 19 infection. She was found to have a left leg DVT, CT chest could not visualize pulmonary arteries. She was started on a Heparin drip then transitioned to Eliquis that had to be held due to vaginal bleeding, slight hemoptysis and epistaxis      As per prior documentation:  \"Patient seen and examined at bedside this morning. Overnight, patient refused Eliquis due to bleeding and patient thinking this was causing her worsening shortness of breath and weakness. Patient states that she is no longer having any significant vaginal bleeding since being off Eliquis but did suffer from this as recent as yesterday. Discussed with OB/GYN after abdominal ultrasound completed and patient now started on Provera with OB/GYN recommendations to increase. Discussed with patient. Patient states she still is having significant weakness and fatigue, likely related to acute blood loss anemia. Informed her that she does not need blood at this point but if her hemoglobin were to drop at 7.0 or lower and she was still symptomatic, we could give her a unit of blood to assist.  Patient amenable to Eliquis ministration to prevent worsening of her DVT and PE. Patient denies any chest pain or pressure, lightheadedness, dizziness, palpitations, near-syncope or syncope. She was able to be weaned on air Vo to 50% O2 but still remains on 60 L  Via HFNC. \"    Subjective :  10/10/2021  Patient seen and evaluated.    She remains on 50 L heated high flow oxygen via Airvo  No further episodes of bleeding per the patient  Repeat H&H is pending  Eliquis has been reinstated   she remains on high flow oxygen via nasal cannula  She is attempting to prone. She is out of bed to chair  She is able to get up and down to the bedside commode on her own. She is bored being here in the hospital for this extended. Of time    [facility] via phone. All questions answered. Assessment & Plan        Principal Problem:    Acute respiratory distress syndrome (ARDS) due to COVID-19 virus ([address] (9/14/2021)  Patient was on BiPAP. Now off BiPAP and still on high flow oxygen at 60 L/min, weaned to 50% O2  On Decadron 6 mg IV once a day. Patient completed remdesivir   received Actemra as well   wean oxygen down, as tolerated. VQ scan with low suspicion for PE, CT chest could not rule out PE-noted tachycardic, requiring high flow  Pulmonology following      10/10/2021  Patient remains on high flow oxygen  We will continue to wean as tolerated         Active Problems:    DM w/o complication type II ([address] (9/14/2021)  10/10/2021    Blood sugar is in 100s. Continue to cover with sliding scale with short acting insulin. Patient is on Lantus 15 units subcu once a day. Her Premeal insulin was increased to to 7 units 3 times daily [facility] on October 8, 2021.   We will increase her Premeal insulin to 8 units 3 times daily AC  We will continue to monitor blood sugars and titrate as indicated       HTN (hypertension) (9/14/2021)  10/10/2021    Fairly well controlled on current regimen  We will titrate medications as indicated       Morbid obesity ([address] (9/18/2021)  10/10/2021    No active issues however this is complicating her care and adding complexity to her case as it is associated with a more prolonged and severe course of COVID-19 with increased morbidity and mortality      Suspected sleep apnea (9/18/2021)  10/10/2021    She will need outpatient sleep studies        Thrombocytopenia ([address] (9/22/2021)  Resolved       DVT (deep venous thrombosis) ([address] (9/23/2021)  10/10/2021   Eliquis resumed October 7, 2021, she denies any further episodes of bleeding  Given drop in hemoglobin it was held yesterday by pulmonology. She was transfused 1 unit of blood and her hemoglobin reji appropriately but now trending down  Eliquis has been  reinstated  Continue Provera per OB/GYN recommendations (do not want to add estrogen due to DVT)  VQ scan ordered since CT chest inconclusive - low probability of PE  Needs anticoagulation; risk benefit discussed with patient who agrees to Eliquis; will remain status soon as we are sure she is not clinically bleeding  Echo ordered 10/5  If unable to tolerate Eliquis after Provera started, will consider IVC filter for DVT left leg       ALLAN (acute kidney injury) ([facility] (9/30/2021)  resolved     AUB:  10/10/2021    Vaginal bleeding has currently resolved per the patient  Patient states history of irregular periods for \"years\", never seen by GYN  Increase Provera per OBGYN(do not want to add estrogen due to DVT)  OBGYN consulted 10/7 - intravaginal US reviewed by OBGYN  **Will need outpatient follow up with GYN, on airvo, COVID +    Anemia-acute blood loss anemia secondary to vaginal bleeding  She is status post 1 unit of packed red blood cells where her hemoglobin reji appropriately  She has not had any further episodes of bleeding  Will monitor for any further episodes of bleeding  Eliquis has been reinstated as above  Consider IVC filter if clinically indicated       Dispo/Discharge Planning: To be determined based on her coming off of air Vo and being weaned down to an acceptable level of oxygen via nasal cannula     Diet:  ADULT DIET Regular; 3 carb choices (45 gm/meal); Low Sodium (2 gm)  ADULT ORAL NUTRITION SUPPLEMENT Dinner, Breakfast, Lunch; Diabetic Supplement  DVT PPx: Apixaban resumed today  Code status: Full Code           Review of Systems: 14 point review of systems is otherwise negative with the exception of the elements mentioned above.     Objective: Patient Vitals for the past 24 hrs:   Temp Pulse Resp BP SpO2   10/10/21 0658 99 °F (37.2 °C) (!) 116 18 120/83 90 %   10/10/21 0359 98.6 °F (37 °C) -- 18 (!) 104/58 93 %   10/10/21 0245 -- -- -- -- 97 %   10/09/21 2350 98.3 °F (36.8 °C) 68 18 113/61 97 %   10/09/21 2128 -- -- -- -- 92 %   10/09/21 1958 99.1 °F (37.3 °C) (!) 123 21 132/80 90 %   10/09/21 1637 98.5 °F (36.9 °C) (!) 123 22 101/62 (!) 87 %   10/09/21 1436 -- -- -- -- 95 %   10/09/21 1129 98.8 °F (37.1 °C) (!) 101 20 (!) 136/98 97 %   10/09/21 0836 -- -- -- -- 94 %     Oxygen Therapy  O2 Sat (%): 90 % (10/10/21 0658)  Pulse via Oximetry: 107 beats per minute (10/10/21 0245)  O2 Device: Heated; Hi flow nasal cannula (10/10/21 0245)  Skin Assessment: Clean, dry, & intact (10/08/21 2005)  Skin Protection for O2 Device: Yes (10/02/21 1640)  Orientation: Bilateral (10/02/21 1640)  Location: Cheek (10/02/21 1640)  O2 Flow Rate (L/min): 50 l/min (10/10/21 0245)  O2 Temperature: 87.8 °F (31 °C) (10/09/21 1436)  FIO2 (%): 55 % (10/10/21 0245)    Body mass index is 63.18 kg/m². Physical Exam:   General:          Well nourished. BMI of 63.18  Patient sitting in bedside chair, on high flow oxygen. no significant SOB with speaking. Head:               Normocephalic, atraumatic, pale   Eyes:               Sclerae appear normal.  Pupils equally round. ENT:                Nares appear normal, no drainage. Moist oral mucosa  Neck:               No restricted ROM. Trachea midline   CV:                  RRR. No m/r/g. No jugular venous distension. Lungs:             Diminished breath sound. No wheezing, rhonchi, or rales. Shortness of breath. Abdomen: Bowel sounds present. Soft, nontender, nondistended. Extremities:     No cyanosis or clubbing. No edema  Skin:                No rashes and normal coloration. Warm and dry. Neuro:             Cranial nerves II-XII grossly intact. Sensation intact.   A&Ox3  Psych:             Normal mood and affect.         Data Review:  I have reviewed all labs, meds, and studies from the last 24 hours:    Labs:    Recent Results (from the past 24 hour(s))   GLUCOSE, POC    Collection Time: 10/09/21 11:33 AM   Result Value Ref Range    Glucose (POC) 214 (H) 65 - 100 mg/dL    Performed by [employer] HGB & HCT    Collection Time: 10/09/21 12:18 PM   Result Value Ref Range    HGB 7.8 (L) 11.7 - 15.4 g/dL    HCT 28.3 (L) 35.8 - 46.3 %   GLUCOSE, POC    Collection Time: 10/09/21  4:44 PM   Result Value Ref Range    Glucose (POC) 266 (H) 65 - 100 mg/dL    Performed by [facility]     GLUCOSE, POC    Collection Time: 10/09/21  8:41 PM   Result Value Ref Range    Glucose (POC) 285 (H) 65 - 100 mg/dL    Performed by [facility]    METABOLIC PANEL, BASIC    Collection Time: 10/10/21  4:46 AM   Result Value Ref Range    Sodium 138 136 - 145 mmol/L    Potassium 3.7 3.5 - 5.1 mmol/L    Chloride 107 98 - 107 mmol/L    CO2 27 21 - 32 mmol/L    Anion gap 4 (L) 7 - 16 mmol/L    Glucose 151 (H) 65 - 100 mg/dL    BUN 17 6 - 23 MG/DL    Creatinine 0.70 0.6 - 1.0 MG/DL    GFR est AA >60 >60 ml/min/1.73m2    GFR est non-AA >60 >60 ml/min/1.73m2    Calcium 8.4 8.3 - 10.4 MG/DL   GLUCOSE, POC    Collection Time: 10/10/21  6:54 AM   Result Value Ref Range    Glucose (POC) 136 (H) 65 - 100 mg/dL    Performed by [employer]        All Micro Results     Procedure Component Value Units Date/Time    CULTURE, RESPIRATORY/SPUTUM/BRONCH Christian Delon STAIN [[id number]]     Order Status: Sent Specimen: Sputum     CULTURE, BLOOD [[id number]] Collected: 09/14/21 0756    Order Status: Completed Specimen: Blood Updated: 09/19/21 0637     Special Requests: --        LEFT  HAND       Culture result: NO GROWTH 5 DAYS       CULTURE, BLOOD [[id number]] Collected: 09/14/21 8448    Order Status: Completed Specimen: Blood Updated: 09/19/21 0637     Special Requests: --        RIGHT  Antecubital       Culture result: NO GROWTH 5 DAYS       CULTURE, RESPIRATORY/SPUTUM/BRONCH Arch Pickles STAIN [[id number]] Collected: 09/14/21 0915    Order Status: Canceled Specimen: Sputum     COVID-19 RAPID TEST [[id number]]  (Abnormal) Collected: 09/14/21 0535    Order Status: Completed Specimen: Nasopharyngeal Updated: 09/14/21 0558     Specimen source NASAL        COVID-19 rapid test Detected        Comment:      The specimen is POSITIVE for SARS-CoV-2, the novel coronavirus associated with COVID-19. This test has been authorized by the FDA under an Emergency Use Authorization (EUA) for use by authorized laboratories. Fact sheet for Healthcare Providers: [employer]  Fact sheet for Patients: [employer]       Methodology: Isothermal Nucleic Acid Amplification  RESULTS VERIFIED, PHONED TO AND READ BACK BY   FERDINAND BENOIT AT 0157 ON 9/14/21 ACL               EKG Results     None          Other Studies:  No results found.     Current Meds:   Current Facility-Administered Medications   Medication Dose Route Frequency    0.9% sodium chloride infusion 250 mL  250 mL IntraVENous PRN    insulin lispro (HUMALOG) injection 7 Units  7 Units SubCUTAneous TIDAC    medroxyPROGESTERone (PROVERA) tablet 20 mg  20 mg Oral DAILY    budesonide-formoterol (SYMBICORT) 80-4.5 mcg inhaler  2 Puff Inhalation BID RT    albuterol (PROVENTIL HFA, VENTOLIN HFA, PROAIR HFA) inhaler 2 Puff  2 Puff Inhalation Q6H RT    apixaban (ELIQUIS) tablet 5 mg  5 mg Oral BID    insulin glargine (LANTUS) injection 15 Units  15 Units SubCUTAneous DAILY    insulin lispro (HUMALOG) injection   SubCUTAneous AC&HS    NUTRITIONAL SUPPORT ELECTROLYTE PRN ORDERS   Does Not Apply PRN    hydrALAZINE (APRESOLINE) 20 mg/mL injection 10 mg  10 mg IntraVENous Q6H PRN    morphine injection 1 mg  1 mg IntraVENous Q4H PRN    LORazepam (ATIVAN) injection 0.5 mg  0.5 mg IntraVENous Q6H PRN    phenol throat spray (CHLORASEPTIC) 1 Spray  1 Spray Oral PRN    pantoprazole (PROTONIX) tablet 40 mg  40 mg Oral ACB    sodium chloride (NS) flush 30 mL  30 mL InterCATHeter Q8H    sodium chloride (NS) flush 30 mL  30 mL InterCATHeter PRN    acetaminophen (TYLENOL) tablet 650 mg  650 mg Oral Q6H PRN    Or    acetaminophen (TYLENOL) suppository 650 mg  650 mg Rectal Q6H PRN    polyethylene glycol (MIRALAX) packet 17 g  17 g Oral DAILY PRN    ondansetron (ZOFRAN ODT) tablet 4 mg  4 mg Oral Q8H PRN    Or    ondansetron (ZOFRAN) injection 4 mg  4 mg IntraVENous Q6H PRN       Problem List:  Hospital Problems as of 10/10/2021 Date Reviewed: 10/6/2021        Codes Class Noted - Resolved POA    Vaginal bleeding ICD-10-CM: N93.9  ICD-9-CM: 623.8  10/6/2021 - Present Yes        DVT (deep venous thrombosis) ([facility] ICD-10-CM: I82.409  ICD-9-CM: 453.40  9/23/2021 - Present Yes        Morbid obesity (HCC) (Chronic) ICD-10-CM: E66.01  ICD-9-CM: 278.01  9/18/2021 - Present Yes        Suspected sleep apnea ICD-10-CM: R29.818  ICD-9-CM: 781.99  9/18/2021 - Present Yes        * (Principal) Acute respiratory distress syndrome (ARDS) due to COVID-19 virus [facility]) ICD-10-CM: U07.1, J80  ICD-9-CM: 518.82, 079.89  9/14/2021 - Present Yes        DM w/o complication type II (HCC) (Chronic) ICD-10-CM: E11.9  ICD-9-CM: 250.00  9/14/2021 - Present Yes        HTN (hypertension) (Chronic) ICD-10-CM: I10  ICD-9-CM: 401.9  9/14/2021 - Present Yes        RESOLVED: ALLAN (acute kidney injury) ([facility] ICD-10-CM: N17.9  ICD-9-CM: 584.9  9/30/2021 - 10/8/2021 Yes        RESOLVED: Thrombocytopenia ([facility] ICD-10-CM: D69.6  ICD-9-CM: 287.5  9/22/2021 - 10/8/2021 Yes        RESOLVED: Acute respiratory failure ([facility] ICD-10-CM: J96.00  ICD-9-CM: 518.81  9/14/2021 - 9/16/2021 Yes                 Signed By: Karime Mckeon MD   [facility] Hospitalist Service    October 10, 2021  5:15 PM

## 2021-10-11 NOTE — PROGRESS NOTES
02 SAT 82 on airvo 50 liters/50%02. Finger probe changed without improvement so NRB put on and RT to come check on her.

## 2021-10-11 NOTE — PROGRESS NOTES
Patient on airvo 50L/50%. Called lab and was told patient's hemaglobin sent can not be found. Resent new lab sample at 1610. Per echo, can not be done until heart rate is lower.

## 2021-10-11 NOTE — PROGRESS NOTES
Remains on airvo 50 liters/55% 02. De-SATs with activity. Not comfortable in bed; slept up in recliner. Hourly rounds in progress. Denies needs or pain at this time. Call light and personal items within reach. Will continue to monitor and give report to oncoming day shift nurse.

## 2021-10-11 NOTE — PROGRESS NOTES
CM chart review; discussed in IDT rounds. Weaning oxygen. Patient is uninsured. CM spoke with her by phone to determine if out of pocket cost for home oxygen would feasible. Patient states the $100/mo out of pocket cost would be affordable should home oxygen be needed. PT/OT are following and recommend no needs vs HH pending progress. Patient states she would be open to [facility] with [facility], if needed, pending cost. She has been provided with a financial assistance application and CM encouraged her to submit christina as soon as possible to assist with needs; patient verbalized understanding. Patient with f/u with her PCP, NEFTALY Hauser for outpatient follow-up. CM will follow to assist with discharge needs.

## 2021-10-11 NOTE — PROGRESS NOTES
[facility]ist Progress Note     Name:  Gilda Steven  Age:26 y.o. Sex:female   :  1995    MRN:  [id number]     Admit Date:  2021    Reason for Admission:  Acute respiratory failure [facility]) [J96.00]    Hospital Course/Interval history:     23year old morbid obese AAF admitted  for acute respiratory failure with hypoxia related to COVID 19 infection. She was found to have a left leg DVT, CT chest could not visualize pulmonary arteries. She was started on a Heparin drip then transitioned to Eliquis that had to be held due to vaginal bleeding, slight hemoptysis and epistaxis      As per prior documentation:  \"Patient seen and examined at bedside this morning. Overnight, patient refused Eliquis due to bleeding and patient thinking this was causing her worsening shortness of breath and weakness. Patient states that she is no longer having any significant vaginal bleeding since being off Eliquis but did suffer from this as recent as yesterday. Discussed with OB/GYN after abdominal ultrasound completed and patient now started on Provera with OB/GYN recommendations to increase. Discussed with patient. Patient states she still is having significant weakness and fatigue, likely related to acute blood loss anemia. Informed her that she does not need blood at this point but if her hemoglobin were to drop at 7.0 or lower and she was still symptomatic, we could give her a unit of blood to assist.  Patient amenable to Eliquis ministration to prevent worsening of her DVT and PE. Patient denies any chest pain or pressure, lightheadedness, dizziness, palpitations, near-syncope or syncope. She was able to be weaned on air Vo to 50% O2 but still remains on 60 L  Via [facility]NC. \"    Subjective :  10/11/2021  Patient seen and evaluated. She remains on 50 L heated high flow oxygen via Airvo  No further episodes of bleeding per the patient  Her hematocrit and hemoglobin have trended down.   But with no clear source and no evidence of intravascular hemolysis. Suspect is within lab error  Repeat H&H is pending  Eliquis is on hold   Chest x-ray done today shows gross cardiomegaly. The patient had a 2D echo ordered from October 5, 2021 which is yet to be done. She is off precautions. She is attempting to prone. She is out of bed to chair  She is able to get up and down to the bedside commode on her own. She is bored being here in the hospital for this extended. Of time    [facility] via phone. All questions answered. Assessment & Plan        Principal Problem:    Acute respiratory distress syndrome (ARDS) due to COVID-19 virus ([facility] (9/14/2021)  Patient was on BiPAP. Now off BiPAP and still on high flow oxygen at 60 L/min, weaned to 50% O2  On Decadron 6 mg IV once a day. Patient completed remdesivir   received Actemra as well   wean oxygen down, as tolerated. VQ scan with low suspicion for PE, CT chest could not rule out PE-noted tachycardic, requiring high flow  Pulmonology following      10/11/2021  Patient remains on high flow oxygen  We will continue to wean as tolerated  We will give Lasix 20 IV x1    Cardiomegaly with concern for CHF   we will check a 2D echo  And a BNP         Active Problems:    DM w/o complication type II ([facility] (9/14/2021)  10/11/2021    Blood sugar is in 100s. Continue to cover with sliding scale with short acting insulin. Patient is on Lantus 15 units subcu once a day. Her Premeal insulin was increased to to 7 units 3 times daily [facility] on October 8, 2021.   And was then can increase to 8 units 3 times daily [facility] on October 10, 2021  We will continue to monitor blood sugars and titrate as indicated       HTN (hypertension) (9/14/2021)  10/11/2021    Fairly well controlled on current regimen  We will titrate medications as indicated       Morbid obesity ([facility] (9/18/2021)  10/11/2021    No active issues however this is complicating her care and adding complexity to her case as it is associated with a more prolonged and severe course of COVID-19 with increased morbidity and mortality      Suspected sleep apnea (9/18/2021)  10/11/2021    She will need outpatient sleep studies        Thrombocytopenia ([address] (9/22/2021)  Resolved       DVT (deep venous thrombosis) ([address] (9/23/2021)  10/11/2021   Eliquis resumed October 7, 2021, she denies any further episodes of bleeding  Given drop in hemoglobin it was held yesterday by pulmonology. She was transfused 1 unit of blood and her hemoglobin reji appropriately but now trending down  Eliquis has been  reinstated  Continue Provera per OB/GYN recommendations (do not want to add estrogen due to DVT)  VQ scan ordered since CT chest inconclusive - low probability of PE  Needs anticoagulation; risk benefit discussed with patient who agrees to Eliquis; will remain status soon as we are sure she is not clinically bleeding  Echo ordered 10/5-but unable to be done as the patient was on precautions. I had nursing call echo department and they stated that they were able to do it with the patient's heart rate above 100.   If unable to tolerate Eliquis after Provera started, will consider IVC filter for DVT left leg       ALLAN (acute kidney injury) ([facility] (9/30/2021)  resolved     AUB:  10/11/2021    Vaginal bleeding has currently resolved per the patient  Patient states history of irregular periods for \"years\", never seen by GYN  Increase Provera per OBGYN(do not want to add estrogen due to DVT)  OBGYN consulted 10/7 - intravaginal US reviewed by OBGYN  **Will need outpatient follow up with GYN, on airvo, COVID +    Anemia-acute blood loss anemia secondary to vaginal bleeding  She is status post 1 unit of packed red blood cells where her hemoglobin reji appropriately  She has not had any further episodes of bleeding  Will monitor for any further episodes of bleeding  Eliquis has been reinstated as above  Consider IVC filter if clinically indicated  We will follow up repeat H&H       Dispo/Discharge Planning: To be determined based on her coming off of air Vo and being weaned down to an acceptable level of oxygen via nasal cannula     Diet:  ADULT DIET Regular; 3 carb choices (45 gm/meal); Low Sodium (2 gm)  ADULT ORAL NUTRITION SUPPLEMENT Dinner, Breakfast, Lunch; Diabetic Supplement  DVT PPx: Apixaban resumed today  Code status: Full Code           Review of Systems: 14 point review of systems is otherwise negative with the exception of the elements mentioned above. Objective:     Patient Vitals for the past 24 hrs:   Temp Pulse Resp BP SpO2   10/11/21 0423 100.3 °F (37.9 °C) (!) 126 20 119/73 92 %   10/11/21 0106 -- -- -- -- 95 %   10/10/21 2339 98.7 °F (37.1 °C) (!) 124 20 117/67 94 %   10/10/21 2010 -- -- -- -- 99 %   10/10/21 2006 98.9 °F (37.2 °C) (!) 137 20 131/79 91 %   10/10/21 1454 98.9 °F (37.2 °C) (!) 115 20 121/66 93 %   10/10/21 1424 -- (!) 117 -- -- 90 %   10/10/21 1137 98.6 °F (37 °C) (!) 120 20 (!) 122/51 93 %   10/10/21 0810 -- (!) 124 -- -- 96 %     Oxygen Therapy  O2 Sat (%): 92 % (10/11/21 0423)  Pulse via Oximetry: 124 beats per minute (10/10/21 2010)  O2 Device: Hi flow nasal cannula; Heated (10/11/21 0423)  Skin Assessment: Clean, dry, & intact (10/08/21 2005)  Skin Protection for O2 Device: Yes (10/02/21 1640)  Orientation: Bilateral (10/02/21 1640)  Location: Cheek (10/02/21 1640)  O2 Flow Rate (L/min): 50 l/min (10/11/21 0423)  O2 Temperature: 87.8 °F (31 °C) (10/10/21 1424)  FIO2 (%): 55 % (10/11/21 0423)    Body mass index is 63.18 kg/m². Physical Exam:   General:          Well nourished. BMI of 63.18  Patient sitting in bedside chair, on high flow oxygen. no significant SOB with speaking. Head:               Normocephalic, atraumatic, pale   Eyes:               Sclerae appear normal.  Pupils equally round. ENT:                Nares appear normal, no drainage.   Moist oral mucosa  Neck:               No restricted ROM. Trachea midline   CV:                  RRR. No m/r/g. No jugular venous distension. Lungs:             Diminished breath sound. No wheezing, rhonchi, or rales. Shortness of breath. Abdomen: Bowel sounds present. Soft, nontender, nondistended. Extremities:     No cyanosis or clubbing. Bilateral pitting edema   Skin:                No rashes and normal coloration. Warm and dry. Neuro:             Cranial nerves II-XII grossly intact. Sensation intact. A&Ox3  Psych:             Normal mood and affect.         Data Review:  I have reviewed all labs, meds, and studies from the last 24 hours:    Labs:    Recent Results (from the past 24 hour(s))   GLUCOSE, POC    Collection Time: 10/10/21 10:51 AM   Result Value Ref Range    Glucose (POC) 226 (H) 65 - 100 mg/dL    Performed by Erika Nunez    GLUCOSE, POC    Collection Time: 10/10/21  3:52 PM   Result Value Ref Range    Glucose (POC) 145 (H) 65 - 100 mg/dL    Performed by Erika Nunez    CBC WITH AUTOMATED DIFF    Collection Time: 10/10/21  4:30 PM   Result Value Ref Range    WBC 12.3 (H) 4.3 - 11.1 K/uL    RBC 3.58 (L) 4.05 - 5.2 M/uL    HGB 7.3 (L) 11.7 - 15.4 g/dL    HCT 26.0 (L) 35.8 - 46.3 %    MCV 72.6 (L) 79.6 - 97.8 FL    MCH 20.4 (L) 26.1 - 32.9 PG    MCHC 28.1 (L) 31.4 - 35.0 g/dL    RDW 21.8 (H) 11.9 - 14.6 %    PLATELET 205 767 - 032 K/uL    MPV 10.8 9.4 - 12.3 FL    ABSOLUTE NRBC 0.10 0.0 - 0.2 K/uL    DF AUTOMATED      NEUTROPHILS 63 43 - 78 %    LYMPHOCYTES 21 13 - 44 %    MONOCYTES 8 4.0 - 12.0 %    EOSINOPHILS 6 0.5 - 7.8 %    BASOPHILS 0 0.0 - 2.0 %    IMMATURE GRANULOCYTES 2 0.0 - 5.0 %    ABS. NEUTROPHILS 7.8 1.7 - 8.2 K/UL    ABS. LYMPHOCYTES 2.6 0.5 - 4.6 K/UL    ABS. MONOCYTES 1.0 0.1 - 1.3 K/UL    ABS. EOSINOPHILS 0.7 0.0 - 0.8 K/UL    ABS. BASOPHILS 0.0 0.0 - 0.2 K/UL    ABS. IMM.  GRANS. 0.2 0.0 - 0.5 K/UL   GLUCOSE, POC    Collection Time: 10/10/21  7:57 PM   Result Value Ref Range    Glucose (POC) 262 (H) 65 - 100 mg/dL    Performed by [facility]    METABOLIC PANEL, BASIC    Collection Time: 10/11/21  5:52 AM   Result Value Ref Range    Sodium 138 136 - 145 mmol/L    Potassium 3.9 3.5 - 5.1 mmol/L    Chloride 106 98 - 107 mmol/L    CO2 27 21 - 32 mmol/L    Anion gap 5 (L) 7 - 16 mmol/L    Glucose 187 (H) 65 - 100 mg/dL    BUN 12 6 - 23 MG/DL    Creatinine 0.69 0.6 - 1.0 MG/DL    GFR est AA >60 >60 ml/min/1.73m2    GFR est non-AA >60 >60 ml/min/1.73m2    Calcium 8.4 8.3 - 10.4 MG/DL   PHOSPHORUS    Collection Time: 10/11/21  5:52 AM   Result Value Ref Range    Phosphorus 4.9 (H) 2.5 - 4.5 MG/DL   MAGNESIUM    Collection Time: 10/11/21  5:52 AM   Result Value Ref Range    Magnesium 1.8 1.8 - 2.4 mg/dL   CBC WITH AUTOMATED DIFF    Collection Time: 10/11/21  5:52 AM   Result Value Ref Range    WBC 10.8 4.3 - 11.1 K/uL    RBC 3.54 (L) 4.05 - 5.2 M/uL    HGB 7.0 (L) 11.7 - 15.4 g/dL    HCT 25.1 (L) 35.8 - 46.3 %    MCV 70.9 (L) 79.6 - 97.8 FL    MCH 19.8 (L) 26.1 - 32.9 PG    MCHC 27.9 (L) 31.4 - 35.0 g/dL    RDW 22.2 (H) 11.9 - 14.6 %    PLATELET 377 749 - 953 K/uL    MPV 10.6 9.4 - 12.3 FL    ABSOLUTE NRBC 0.06 0.0 - 0.2 K/uL    DF AUTOMATED      NEUTROPHILS 62 43 - 78 %    LYMPHOCYTES 22 13 - 44 %    MONOCYTES 8 4.0 - 12.0 %    EOSINOPHILS 6 0.5 - 7.8 %    BASOPHILS 0 0.0 - 2.0 %    IMMATURE GRANULOCYTES 2 0.0 - 5.0 %    ABS. NEUTROPHILS 6.7 1.7 - 8.2 K/UL    ABS. LYMPHOCYTES 2.4 0.5 - 4.6 K/UL    ABS. MONOCYTES 0.8 0.1 - 1.3 K/UL    ABS. EOSINOPHILS 0.7 0.0 - 0.8 K/UL    ABS. BASOPHILS 0.0 0.0 - 0.2 K/UL    ABS. IMM.  GRANS. 0.2 0.0 - 0.5 K/UL       All Micro Results     Procedure Component Value Units Date/Time    CULTURE, RESPIRATORY/SPUTUM/BRONCH [facility] [[id number]] Collected: 10/08/21 1415    Order Status: Canceled Specimen: Sputum     CULTURE, BLOOD [[id number]] Collected: 09/14/21 6389    Order Status: Completed Specimen: Blood Updated: 09/19/21 4484     Special Requests: -- LEFT  HAND       Culture result: NO GROWTH 5 DAYS       CULTURE, BLOOD [[id number]] Collected: 09/14/21 0757    Order Status: Completed Specimen: Blood Updated: 09/19/21 0637     Special Requests: --        RIGHT  Antecubital       Culture result: NO GROWTH 5 DAYS       CULTURE, RESPIRATORY/SPUTUM/BRONCH Gregory [facility]ombe STAIN [[id number]] Collected: 09/14/21 0915    Order Status: Canceled Specimen: Sputum     COVID-19 RAPID TEST [[id number]]  (Abnormal) Collected: 09/14/21 0535    Order Status: Completed Specimen: Nasopharyngeal Updated: 09/14/21 0558     Specimen source NASAL        COVID-19 rapid test Detected        Comment:      The specimen is POSITIVE for SARS-CoV-2, the novel coronavirus associated with COVID-19. This test has been authorized by the FDA under an Emergency Use Authorization (EUA) for use by authorized laboratories. Fact sheet for Healthcare Providers: [employer]  Fact sheet for Patients: "[employer]co.nz       Methodology: Isothermal Nucleic Acid Amplification  RESULTS VERIFIED, PHONED TO AND READ BACK BY   FERDINAND BENOIT AT 0306 ON 9/14/21 ACL               EKG Results     None          Other Studies:  No results found.     Current Meds:   Current Facility-Administered Medications   Medication Dose Route Frequency    0.9% sodium chloride infusion 250 mL  250 mL IntraVENous PRN    insulin lispro (HUMALOG) injection 7 Units  7 Units SubCUTAneous TIDAC    medroxyPROGESTERone (PROVERA) tablet 20 mg  20 mg Oral DAILY    budesonide-formoterol (SYMBICORT) 80-4.5 mcg inhaler  2 Puff Inhalation BID RT    albuterol (PROVENTIL HFA, VENTOLIN HFA, PROAIR HFA) inhaler 2 Puff  2 Puff Inhalation Q6H RT    [Held by provider] apixaban (ELIQUIS) tablet 5 mg  5 mg Oral BID    insulin glargine (LANTUS) injection 15 Units  15 Units SubCUTAneous DAILY    insulin lispro (HUMALOG) injection   SubCUTAneous AC&HS    NUTRITIONAL SUPPORT ELECTROLYTE PRN ORDERS Does Not Apply PRN    hydrALAZINE (APRESOLINE) 20 mg/mL injection 10 mg  10 mg IntraVENous Q6H PRN    morphine injection 1 mg  1 mg IntraVENous Q4H PRN    LORazepam (ATIVAN) injection 0.5 mg  0.5 mg IntraVENous Q6H PRN    phenol throat spray (CHLORASEPTIC) 1 Spray  1 Spray Oral PRN    pantoprazole (PROTONIX) tablet 40 mg  40 mg Oral ACB    sodium chloride (NS) flush 30 mL  30 mL InterCATHeter Q8H    sodium chloride (NS) flush 30 mL  30 mL InterCATHeter PRN    acetaminophen (TYLENOL) tablet 650 mg  650 mg Oral Q6H PRN    Or    acetaminophen (TYLENOL) suppository 650 mg  650 mg Rectal Q6H PRN    polyethylene glycol (MIRALAX) packet 17 g  17 g Oral DAILY PRN    ondansetron (ZOFRAN ODT) tablet 4 mg  4 mg Oral Q8H PRN    Or    ondansetron (ZOFRAN) injection 4 mg  4 mg IntraVENous Q6H PRN       Problem List:  Hospital Problems as of 10/11/2021 Date Reviewed: 10/6/2021        Codes Class Noted - Resolved POA    Vaginal bleeding ICD-10-CM: N93.9  ICD-9-CM: 623.8  10/6/2021 - Present Yes        DVT (deep venous thrombosis) (HCC) ICD-10-CM: I82.409  ICD-9-CM: 453.40  9/23/2021 - Present Yes        Morbid obesity (HCC) (Chronic) ICD-10-CM: E66.01  ICD-9-CM: 278.01  9/18/2021 - Present Yes        Suspected sleep apnea ICD-10-CM: R29.818  ICD-9-CM: 781.99  9/18/2021 - Present Yes        * (Principal) Acute respiratory distress syndrome (ARDS) due to COVID-19 virus [facility]) ICD-10-CM: U07.1, J80  ICD-9-CM: 518.82, 079.89  9/14/2021 - Present Yes        DM w/o complication type II (HCC) (Chronic) ICD-10-CM: E11.9  ICD-9-CM: 250.00  9/14/2021 - Present Yes        HTN (hypertension) (Chronic) ICD-10-CM: I10  ICD-9-CM: 401.9  9/14/2021 - Present Yes        RESOLVED: ALLAN (acute kidney injury) ([facility] ICD-10-CM: N17.9  ICD-9-CM: 584.9  9/30/2021 - 10/8/2021 Yes        RESOLVED: Thrombocytopenia ([facility] ICD-10-CM: D69.6  ICD-9-CM: 287.5  9/22/2021 - 10/8/2021 Yes        RESOLVED: Acute respiratory failure ([facility] ICD-10-CM: J96.00  ICD-9-CM: 518.81  9/14/2021 - 9/16/2021 Yes                 Signed By: Jude Swain MD   [facility] Hospitalist Service    October 11, 2021  5:15 PM

## 2021-10-11 NOTE — PROGRESS NOTES
ACUTE OT GOALS:  (Developed with and agreed upon by patient and/or caregiver.)  1) Patient will complete total body bathing and dressing with MOD I and adaptive equipment as needed. 2) Patient will complete toileting with SUPERVISION. 3) Patient will complete functional transfers with SUPERVISION and adaptive equipment as needed. 4) Patient will tolerate at least 25 minutes of OT activity with 1-2 rest breaks while maintaining O2 sats >90%. 5) Patient will verbalize at least 3 energy conservation technique to utilize during ADL/IADL.      NEW ADDED GOALS AT RE-EVALUATION 10/7/21  1. Patient will complete grooming ADL in standing with SUPERVISION. 2. Patient will tolerate 10 minutes BUE exercises to increase strength for safe, functional transfers. Timeframe: 7 visits   OCCUPATIONAL THERAPY: Daily Note OT Treatment Day # 2    Porsche Jimenez is a 23 y.o. female   PRIMARY DIAGNOSIS: Acute respiratory distress syndrome (ARDS) due to COVID-19 virus ([facility]  Acute respiratory failure ([facility] [J96.00]       Payor: /   ASSESSMENT:     REHAB RECOMMENDATIONS: CURRENT LEVEL OF FUNCTION:  (Most Recently Demonstrated)   Recommendation to date pending progress:  Setting:   No further skilled therapy  vs. [facility] pending respiratory status  Equipment:    3 in 1 Bedside Commode   Shower Chair Bathing:   Not tested  Dressing:   Not tested  Feeding/Grooming:   Set Up  Toileting:   Not tested  Functional Mobility:   Standby Assistance     ASSESSMENT:  Ms. Javier Coronado is a 20 y/o female presents with acute respiratory failure due to COVID 19 and is no longer on precautions. Pt received resting in bedside chair on 50L 55% Airvo and sats 89% at rest. Pt completed functional transfers in prep for ADLs and grooming ADLs in grid below. Pt noted to desat when initially completing sit<>stand transfer and required seated RB for low O2 sat (78%) and high HR (140 bpm).  Pt ambulated 6ft in room with SBA and was noticeably SOB after activity. Pt noted to stay in 87-89% range with resting, wondering what pt baseline O2 sat is prior to C19. Pt O2 sats fluctuated 68-89% throughout treatment with frequent rest breaks and increased time to increase O2 sats. Pt making slow progress toward goals and is limited by O2 needs. Continue POC. SUBJECTIVE:   Ms. Alicia Gallo states, Hien Render I buy this Airvo machine so I can go home? .\"    SOCIAL HISTORY/LIVING ENVIRONMENT: lives alone in an apartment on the 3rd level (no elevator access), walk in shower, independent for all ADLs and IADLs, no DME  Home Environment: Independent living  One/Two Story Residence: Other (Comment) (one story apartment; lives on 3rd floor)  Living Alone: Yes  Support Systems: Other Family Member(s)    OBJECTIVE:     PAIN: VITAL SIGNS: LINES/DRAINS:   Pre Treatment: Pain Screen  Pain Scale 1: Numeric (0 - 10)  Pain Intensity 1: 0  Post Treatment: 0 Vital Signs  O2 Sat (%): (!) 89 %  O2 Device: Heated; Hi flow nasal cannula  O2 Flow Rate (L/min): 50 l/min  FIO2 (%): 55 % Continuous Pulse Oximetry  O2 Device: Heated, Hi flow nasal cannula     ACTIVITIES OF DAILY LIVING: I Mod I S SBA CGA Min Mod Max Total NT Comments   BASIC ADLs:              Bathing/ Showering [] [] [] [] [] [] [] [] [] [x]    Toileting [] [] [] [] [] [] [] [] [] [x]    Dressing [] [] [] [] [] [] [] [] [] [x]    Feeding [] [] [] [] [] [] [] [] [] [x]    Grooming [] [] [x] [] [] [] [] [] [] [] Set up washing face and brushing teeth sitting unsupported in chair    Personal Device Care [x] [] [] [] [] [] [] [] [] [] Phone use   Functional Mobility [] [] [] [x] [] [] [] [] [] [] No AD   I=Independent, Mod I=Modified Independent, S=Supervision, SBA=Standby Assistance, CGA=Contact Guard Assistance,   Min=Minimal Assistance, Mod=Moderate Assistance, Max=Maximal Assistance, Total=Total Assistance, NT=Not Tested    MOBILITY: I Mod I S SBA CGA Min Mod Max Total  NT x2 Comments: pt received in bedside chair   Supine to sit [] [] [] [] [] [] [] [] [] [x] []    Sit to supine [] [] [] [] [] [] [] [] [] [x] []    Sit to stand [] [] [] [x] [] [] [] [] [] [] [] No AD   Bed to chair [] [] [] [x] [] [] [] [] [] [] [] No AD, amb 6ft   I=Independent, Mod I=Modified Independent, S=Supervision, SBA=Standby Assistance, CGA=Contact Guard Assistance,   Min=Minimal Assistance, Mod=Moderate Assistance, Max=Maximal Assistance, Total=Total Assistance, NT=Not Tested    BALANCE: Good Fair+ Fair Fair- Poor NT Comments   Sitting Static [x] [] [] [] [] [] In chair   Sitting Dynamic [x] [] [] [] [] [] Unsupported in chair             Standing Static [] [x] [] [] [] [] SBA   Standing Dynamic [] [x] [] [] [] [] SBA     PLAN:   FREQUENCY/DURATION: OT Plan of Care: 3 times/week for duration of hospital stay or until stated goals are met, whichever comes first.    TREATMENT:   TREATMENT:   ($$ Self Care/Home Management: 23-37 mins$$ Therapeutic Activity: 8-22 mins   )  Therapeutic Activity (13 Minutes): Therapeutic activity included Scooting, Transfer Training, Ambulation on level ground, Sitting balance  and Standing balance to improve functional Mobility, Strength and Activity tolerance. Self Care (25 Minutes): Self care including Grooming, Energy Conservation Training and functional transfers in prep for ADLs to increase independence and decrease level of assistance required.     TREATMENT GRID:  N/A    AFTER TREATMENT POSITION/PRECAUTIONS:  Chair, Needs within reach and RN notified    INTERDISCIPLINARY COLLABORATION:  RN/PCT and OT/LANGLEY    TOTAL TREATMENT DURATION:  OT Patient Time In/Time Out  Time In: 6189  Time Out: [address], OT

## 2021-10-11 NOTE — PROGRESS NOTES
Yesy Cancel  Admission Date: 9/14/2021         Daily Progress Note: 10/11/2021    Length of stay: 27    The patient's chart is reviewed and the patient is discussed with the staff. Background: 24 yo [facility] American female admitted with acute respiratory failure due to [address]. She was admitted on 9/14 with a 2 day history of dyspnea. She was positive for COVID. She was started on decadron and remdesivir. She initially required 6L NC but worsened required BiPAP FiO2 @ 100% and her resting saturation was 91% but dropped to the 70s with exertion. She received Actemra 9/16.  She made it very clear to several providers that she does not want intubation and her mother confirmed this as well. After long discussion on 9/21, category status changed to Category 1 and pt moved to tele obs ICU on 100% BIPAP. She was stable on airvo and transferred to floor 9/29. Pt was found to have L calf DVT on 9/22 and has been on heparin and transitioned to Eliquis. Pt has been having vaginal bleeding. Pt had a VQ scan with low probability of PE. Pt had vaginal U/S with findings of cyst, HCG negative. GYN recommended pt resume Eliquis and start provera. Subjective:     Weaning oxygen. Was on airvo, 60 lpm, 60%, now on 13 lpm NC. Needs more with mobility. Up to the chair.         Current Facility-Administered Medications   Medication Dose Route Frequency    0.9% sodium chloride infusion 250 mL  250 mL IntraVENous PRN    insulin lispro (HUMALOG) injection 7 Units  7 Units SubCUTAneous TIDAC    medroxyPROGESTERone (PROVERA) tablet 20 mg  20 mg Oral DAILY    budesonide-formoterol (SYMBICORT) 80-4.5 mcg inhaler  2 Puff Inhalation BID RT    albuterol (PROVENTIL HFA, VENTOLIN HFA, PROAIR HFA) inhaler 2 Puff  2 Puff Inhalation Q6H RT    [Held by provider] apixaban (ELIQUIS) tablet 5 mg  5 mg Oral BID    insulin glargine (LANTUS) injection 15 Units  15 Units SubCUTAneous DAILY    insulin lispro (HUMALOG) injection   SubCUTAneous AC&HS    NUTRITIONAL SUPPORT ELECTROLYTE PRN ORDERS   Does Not Apply PRN    hydrALAZINE (APRESOLINE) 20 mg/mL injection 10 mg  10 mg IntraVENous Q6H PRN    morphine injection 1 mg  1 mg IntraVENous Q4H PRN    LORazepam (ATIVAN) injection 0.5 mg  0.5 mg IntraVENous Q6H PRN    phenol throat spray (CHLORASEPTIC) 1 Spray  1 Spray Oral PRN    pantoprazole (PROTONIX) tablet 40 mg  40 mg Oral ACB    sodium chloride (NS) flush 30 mL  30 mL InterCATHeter Q8H    sodium chloride (NS) flush 30 mL  30 mL InterCATHeter PRN    acetaminophen (TYLENOL) tablet 650 mg  650 mg Oral Q6H PRN    Or    acetaminophen (TYLENOL) suppository 650 mg  650 mg Rectal Q6H PRN    polyethylene glycol (MIRALAX) packet 17 g  17 g Oral DAILY PRN    ondansetron (ZOFRAN ODT) tablet 4 mg  4 mg Oral Q8H PRN    Or    ondansetron (ZOFRAN) injection 4 mg  4 mg IntraVENous Q6H PRN     Review of Systems  +cough  +dyspnea on exertion  Constitutional: negative for fever, chills, sweats  Cardiovascular: negative for chest pain, palpitations, syncope, edema  Gastrointestinal:  negative for dysphagia, reflux, vomiting, diarrhea, abdominal pain, or melena  Neurologic:  negative for focal weakness, numbness, headache  Objective:     Vitals:    10/11/21 0916 10/11/21 1036 10/11/21 1037 10/11/21 1132   BP:    120/74   Pulse:    (!) 120   Resp:    20   Temp:    98.2 °F (36.8 °C)   SpO2: 100% (!) 84% 92% 93%   Weight:       Height:           Intake/Output Summary (Last 24 hours) at 10/11/2021 1311  Last data filed at 10/11/2021 0423  Gross per 24 hour   Intake 690 ml   Output --   Net 690 ml     Physical Exam:   Constitution:  the patient is morbidly obese  HEENT:  Sclera clear, pupils equal, oral mucosa moist  Respiratory: distant breath sounds, no cough  Cardiovascular:  RRR without M,G,R  Gastrointestinal: soft and non-tender; with positive bowel sounds. Musculoskeletal: warm without cyanosis.  There is no lower extremity edema.  Skin:  no jaundice or rashes,   Neurologic: no gross neuro deficits     Psychiatric:  alert and oriented x 3    CXR:     LAB:  Recent Labs     10/11/21  0552 10/10/21  1630 10/09/21  1218 10/09/21  0514 10/09/21  0514   WBC 10.8 12.3*  --   --  14.2*   HGB 7.0* 7.3* 7.8*   < > 7.3*   HCT 25.1* 26.0* 28.3*   < > 26.7*    267  --   --  284    < > = values in this interval not displayed. Recent Labs     10/11/21  0552 10/10/21  0446 10/09/21  0514    138 139   K 3.9 3.7 4.0    107 107   CO2 27 27 26   * 151* 273*   BUN 12 17 19   CREA 0.69 0.70 0.76   MG 1.8  --   --    CA 8.4 8.4 8.4   PHOS 4.9*  --   --    ALB  --   --  2.5*   AST  --   --  10*   ALT  --   --  52   AP  --   --  84       Assessment and Plan:  (Medical Decision Making)       Acute respiratory distress syndrome (ARDS) due to COVID-19 virus ([facility] (9/14/2021)  Weaning O2  mobilize      DM w/o complication type II ([facility] (9/14/2021)    SSI,stop steroids- on since September       HTN (hypertension) (9/14/2021)    Controlled       Acute respiratory failure with hypoxia (HCC) (9/16/2021)    Weaning FiO2 as tolerated      Morbid obesity ([facility] (9/18/2021)    BMI 60.8, high risk for poor outcomes      Suspected sleep apnea (9/18/2021)    Will likely need a split night PSG after discharge       DVT (deep venous thrombosis) ([facility]) (9/23/2021)    L calf DVT, has been on Eliquis but she has been having significant vaginal bleeding, blood tinged sputum, and epistaxis. hgb down today to 7.0.  may need IVC filter. ALLAN (acute kidney injury) ([facility] (9/30/2021)  resolved      Vaginal bleeding (10/6/2021)  Eliquis resumed, GYN started provera  hgb 6.6 Friday, transfused- down to 7.0 today. Probably needs IVC filter if additional transfusions required beyond today.        More than 50% of the time documented was spent in face-to-face contact with the patient and in the care of the patient on the floor/unit where the patient is located. Now down to 13 lpm, weaning. BiPAP Q HS with suspected OHS. Weight loss needed. O/P w/u for ANJEL/ OHS after discharge. Mobilize as tolerated. Robert Carroll NP    I have spoken with and examined the patient. I agree with the above assessment and plan as documented.     Gen: awake, up in a chair   Lungs:  Coarse   Heart:  RRR with no Murmur/Rubs/Gallops  Abd:soft ,NT, ND  Ext: no edema     On 50 % airvo , continue to wean will sign off ,call if needed     Stephanie Delcid MD

## 2021-10-12 PROBLEM — D50.0 IRON DEFICIENCY ANEMIA DUE TO CHRONIC BLOOD LOSS: Status: ACTIVE | Noted: 2021-01-01

## 2021-10-12 NOTE — PROGRESS NOTES
[facility] Hospitalist Progress Note     Name:  Shivani Quiñones  Age:26 y.o. Sex:female   :  1995    MRN:  [id number]     Admit Date:  2021    Reason for Admission:  Acute respiratory failure [facility]) [J96.00]    Hospital Course/Interval history:     23year old morbid obese AAF admitted  for acute respiratory failure with hypoxia related to COVID 19 infection. She was found to have a left leg DVT, CT chest could not visualize pulmonary arteries. She was started on a Heparin drip then transitioned to Eliquis that had to be held due to vaginal bleeding, slight hemoptysis and epistaxis      As per prior documentation:  \"Patient seen and examined at bedside this morning. Overnight, patient refused Eliquis due to bleeding and patient thinking this was causing her worsening shortness of breath and weakness. Patient states that she is no longer having any significant vaginal bleeding since being off Eliquis but did suffer from this as recent as yesterday. Discussed with OB/GYN after abdominal ultrasound completed and patient now started on Provera with OB/GYN recommendations to increase. Discussed with patient. Patient states she still is having significant weakness and fatigue, likely related to acute blood loss anemia. Informed her that she does not need blood at this point but if her hemoglobin were to drop at 7.0 or lower and she was still symptomatic, we could give her a unit of blood to assist.  Patient amenable to Eliquis ministration to prevent worsening of her DVT and PE. Patient denies any chest pain or pressure, lightheadedness, dizziness, palpitations, near-syncope or syncope. She was able to be weaned on air Vo to 50% O2 but still remains on 60 L  Via HFNC. \"    Subjective :  10/12/2021  Patient seen and evaluated.    She is currently on a nonrebreather facemask in order for her to be able to go and get her repeat CT scan   Otherwise she remains on 50 L heated high flow oxygen via Airvo  No further episodes of bleeding per the patient  However her hemoglobin hematocrit continue to trend down with no clear source of bleeding or intravascular hemolysis. However she remains short of breath and tachycardic. She is currently not on anticoagulation. She is very upset over staff coming into her room last night and turning up her oxygen when she did not feel it was necessary. Long discussion with her with regards to the discretionary role of respiratory therapy and nursing staff. Adjusting oxygen levels based on readings are on her pulse oximetry in order to keep her safe. She states that her oxygen levels have always been low in the past and in the 80s even prior to her having Covid secondary to her obesity  We discussed her drop in hemoglobin with plans for blood transfusion today. Also discussed that she is unable to have her echo secondary to her persistent elevated heart rate will make it difficult for them to see images. We discussed plans for repeat CT chest to evaluate for PE, prior to placing an IVC filter. She is understandably overwhelmed. She is understandably very frustrated about being here in the hospital with no significant improvement in her lungs as she wants to be discharged. She is attempting to prone. She is out of bed to chair  She is able to get up and down to the bedside commode on her own. Dena Green-mother-updated via phone. All questions answered. Assessment & Plan        Principal Problem:  10/12/2021      Acute respiratory distress syndrome (ARDS) due to COVID-19 virus ([facility] [address] (9/14/2021)  Patient was on BiPAP. Now off BiPAP and still on high flow oxygen at 60 L/min, weaned to 50% O2  On Decadron 6 mg IV once a day. Patient completed remdesivir   received Actemra as well   wean oxygen down, as tolerated.   VQ scan with low suspicion for PE, CT chest could not rule out PE-noted tachycardic, requiring high flow  Pulmonology following      10/12/2021  Patient remains on high flow oxygen  We will continue to wean as tolerated  She is status post Lasix IV x1  She is very frustrated with the lack of improvement in her oxygen requirements. We again discussed sometimes prolonged and difficult course in some COVID-19 patients and that obesity is associated with no longer and more complicated hospital course. And that we have to give the longest time to heal and for her O2 requirements to decline. However no one can give a definitive date when this will be. Given that she has been off and on anticoagulation with concerns for bleeding. We will get a repeat CT of the chest to rule out a PE prior to having IVC filter placed  Review of her chart although she did have one aberrant reading of her pulse ox in November of last year when she was seen in the ER initially. But all subsequent imaging readings were normal after that. And all readings prior to that were abnormal.  So possibly was secondary to her poor waveform but she has not consistently hypoxic prior to this admission. Cardiomegaly with concern for CHF   Echo is pending   BNP is within normal limits       Active Problems:    DM w/o complication type II ([address] (9/14/2021)  10/12/2021    Blood sugar is in 100s-200s  Continue to cover with sliding scale with short acting insulin. Patient is on Lantus 15 units subcu once a day. Her Premeal insulin was increased to to 7 units 3 times daily [facility] on October 8, 2021.   We will increase to 8 units 3 times daily AC today  We will continue to monitor blood sugars and titrate as indicated       HTN (hypertension) (9/14/2021)  10/12/2021    Fairly well controlled on current regimen  We will titrate medications as indicated       Morbid obesity ([address] (9/18/2021)  10/12/2021    No active issues however this is complicating her care and adding complexity to her case as it is associated with a more prolonged and severe course of COVID-19 with increased morbidity and mortality      Suspected sleep apnea (9/18/2021)  10/12/2021    She will need outpatient sleep studies        Thrombocytopenia ([facility] [address] (9/22/2021)  Resolved       DVT (deep venous thrombosis) ([facility] (9/23/2021)  10/12/2021   Eliquis resumed October 7, 2021, she denies any further episodes of bleeding  Given recurrent drops in her hemoglobin and has mostly been on hold   she was transfused 1 unit of blood previously with plan for transfusion of an additional unit of blood today   continue Provera per OB/GYN recommendations (do not want to add estrogen due to DVT)  VQ scan previously ordered since CT chest inconclusive - low probability of PE  Given the persistently low hemoglobin levels will consult IR for IVC filter continue to hold anticoagulation for now. If her hemoglobin remains stable would consider reinstating oral anticoagulation  Echo ordered 10/5-but unable to be done as the patient was on precautions. They have now stated that her tachycardia is too much for them to get an accurate study. She will get blood today. If heart rate does not improve with that we will consider rate control medications.         ALLAN (acute kidney injury) ([facility] [address] (9/30/2021)  resolved     AUB:  10/12/2021    Vaginal bleeding has currently resolved per the patient  Patient states history of irregular periods for \"years\", never seen by GYN  Increase Provera per OBGYN(do not want to add estrogen due to DVT)  OBGYN consulted 10/7 - intravaginal US reviewed by OBGYN  **Will need outpatient follow up with GYN, on airvo, COVID +    Anemia-acute blood loss anemia secondary to vaginal bleeding  She is status post 1 unit of packed red blood cells where her hemoglobin reji appropriately  She has not had any further episodes of bleeding  She has not had any further episodes of bleeding that have been noted by her or the staff.     However her hemoglobin continues to drop   We will transfuse her 2 units of packed red blood cells today with hopes of improving her dyspnea and her tachycardia in order for her to have a 2D echo. Also plan for IVC filter  She is iron deficient I will place her on supplements. With plans for iron infusion in the near future      Dispo/Discharge Planning: To be determined based on her coming off of air Vo and being weaned down to an acceptable level of oxygen via nasal cannula     Diet:  ADULT DIET Regular; 3 carb choices (45 gm/meal); Low Sodium (2 gm)  ADULT ORAL NUTRITION SUPPLEMENT Dinner, Breakfast, Lunch; Diabetic Supplement  DVT PPx: Apixaban resumed today  Code status: Full Code           Review of Systems: 14 point review of systems is otherwise negative with the exception of the elements mentioned above. Objective:     Patient Vitals for the past 24 hrs:   Temp Pulse Resp BP SpO2   10/12/21 1459 -- -- -- -- 93 %   10/12/21 0817 -- -- -- -- 91 %   10/12/21 0702 98.6 °F (37 °C) (!) 111 20 127/73 97 %   10/12/21 0448 98.9 °F (37.2 °C) (!) 122 22 110/78 93 %   10/12/21 0149 -- -- -- -- 95 %   10/11/21 2351 -- -- -- -- 97 %   10/11/21 2343 99 °F (37.2 °C) (!) 125 21 (!) 99/57 (!) 87 %   10/11/21 2037 -- -- -- -- 94 %   10/11/21 2011 99.2 °F (37.3 °C) (!) 126 20 (!) 124/92 94 %   10/11/21 1528 99.1 °F (37.3 °C) (!) 121 24 137/80 99 %     Oxygen Therapy  O2 Sat (%): 93 % (10/12/21 1459)  Pulse via Oximetry: 114 beats per minute (10/12/21 1459)  O2 Device: Heated;Humidifier; Hi flow nasal cannula (10/12/21 1459)  Skin Assessment: Clean, dry, & intact (10/08/21 2005)  Skin Protection for O2 Device: Yes (10/02/21 1640)  Orientation: Bilateral (10/02/21 1640)  Location: Cheek (10/02/21 1640)  O2 Flow Rate (L/min): 50 l/min (10/12/21 1459)  O2 Temperature: 87.8 °F (31 °C) (10/12/21 1459)  FIO2 (%): 60 % (10/12/21 1459)    Body mass index is 65.51 kg/m². Physical Exam:   General:          Well nourished. BMI of 63.18  Patient sitting in bedside chair, on high flow oxygen.   no significant SOB with speaking. Head:               Normocephalic, atraumatic, pale   Eyes:               Sclerae appear normal.  Pupils equally round. ENT:                Nares appear normal, no drainage. Moist oral mucosa  Neck:               No restricted ROM. Trachea midline   CV:                  RRR. No m/r/g. No jugular venous distension. Lungs:             Diminished breath sound. No wheezing, rhonchi, or rales. Shortness of breath. Abdomen: Bowel sounds present. Soft, nontender, nondistended. Extremities:     No cyanosis or clubbing. Bilateral pitting edema   Skin:                No rashes and normal coloration. Warm and dry. Neuro:             Cranial nerves II-XII grossly intact. Sensation intact.   A&Ox3  Psych:             Normal mood and affect.         Data Review:  I have reviewed all labs, meds, and studies from the last 24 hours:    Labs:    Recent Results (from the past 24 hour(s))   HGB & HCT    Collection Time: 10/11/21  4:00 PM   Result Value Ref Range    HGB 7.2 (L) 11.7 - 15.4 g/dL    HCT 25.9 (L) 35.8 - 46.3 %   GLUCOSE, POC    Collection Time: 10/11/21  4:03 PM   Result Value Ref Range    Glucose (POC) 172 (H) 65 - 100 mg/dL    Performed by Sugey Pruett    NT-PRO BNP    Collection Time: 10/11/21  5:25 PM   Result Value Ref Range    NT pro- (H) 5 - 125 PG/ML   GLUCOSE, POC    Collection Time: 10/11/21  9:13 PM   Result Value Ref Range    Glucose (POC) 219 (H) 65 - 100 mg/dL    Performed by Lauren Lugo    GLUCOSE, POC    Collection Time: 10/12/21  7:06 AM   Result Value Ref Range    Glucose (POC) 161 (H) 65 - 100 mg/dL    Performed by [facility]    METABOLIC PANEL, BASIC    Collection Time: 10/12/21  7:40 AM   Result Value Ref Range    Sodium 140 136 - 145 mmol/L    Potassium 4.1 3.5 - 5.1 mmol/L    Chloride 105 98 - 107 mmol/L    CO2 29 21 - 32 mmol/L    Anion gap 6 (L) 7 - 16 mmol/L    Glucose 138 (H) 65 - 100 mg/dL    BUN 10 6 - 23 MG/DL    Creatinine 0.49 (L) 0.6 - 1.0 MG/DL    GFR est AA >60 >60 ml/min/1.73m2    GFR est non-AA >60 >60 ml/min/1.73m2    Calcium 7.3 (L) 8.3 - 10.4 MG/DL   CBC WITH AUTOMATED DIFF    Collection Time: 10/12/21  7:40 AM   Result Value Ref Range    WBC 9.8 4.3 - 11.1 K/uL    RBC 3.41 (L) 4.05 - 5.2 M/uL    HGB 6.7 (LL) 11.7 - 15.4 g/dL    HCT 24.5 (L) 35.8 - 46.3 %    MCV 71.8 (L) 79.6 - 97.8 FL    MCH 19.6 (L) 26.1 - 32.9 PG    MCHC 27.3 (L) 31.4 - 35.0 g/dL    RDW 21.9 (H) 11.9 - 14.6 %    PLATELET 381 378 - 688 K/uL    MPV 10.5 9.4 - 12.3 FL    ABSOLUTE NRBC 0.05 0.0 - 0.2 K/uL    DF AUTOMATED      NEUTROPHILS 68 43 - 78 %    LYMPHOCYTES 18 13 - 44 %    MONOCYTES 7 4.0 - 12.0 %    EOSINOPHILS 6 0.5 - 7.8 %    BASOPHILS 0 0.0 - 2.0 %    IMMATURE GRANULOCYTES 1 0.0 - 5.0 %    ABS. NEUTROPHILS 6.6 1.7 - 8.2 K/UL    ABS. LYMPHOCYTES 1.7 0.5 - 4.6 K/UL    ABS. MONOCYTES 0.7 0.1 - 1.3 K/UL    ABS. EOSINOPHILS 0.6 0.0 - 0.8 K/UL    ABS. BASOPHILS 0.0 0.0 - 0.2 K/UL    ABS. IMM. GRANS. 0.1 0.0 - 0.5 K/UL   FERRITIN    Collection Time: 10/12/21  7:40 AM   Result Value Ref Range    Ferritin 36 8 - 388 NG/ML   TRANSFERRIN SATURATION    Collection Time: 10/12/21  7:40 AM   Result Value Ref Range    Iron 6 (L) 35 - 150 ug/dL    TIBC 150 (L) 250 - 450 ug/dL    Transferrin Saturation 4 (L) >20 %   RBC, ALLOCATE    Collection Time: 10/12/21 11:00 AM   Result Value Ref Range    HISTORY CHECKED?  Historical check performed    TYPE & SCREEN    Collection Time: 10/12/21 11:30 AM   Result Value Ref Range    Crossmatch Expiration 10/15/2021,2359     ABO/Rh(D) O POSITIVE     Antibody screen NEG     Comment       OKAY PER SANTO TO ACCEPT CHART LABEL FOR THIS DRAW 10.12.21 KJ  TYSC COLLECTED BY [address] RN       Unit number [id number]     Blood component type [facility]     Unit division 00     Status of unit ALLOCATED     Crossmatch result Compatible     Unit number [id number]     Blood component type [facility]     Unit division 00     Status of unit ALLOCATED Crossmatch result Compatible    GLUCOSE, POC    Collection Time: 10/12/21 12:01 PM   Result Value Ref Range    Glucose (POC) 148 (H) 65 - 100 mg/dL    Performed by [facility]        All Micro Results     Procedure Component Value Units Date/Time    CULTURE, RESPIRATORY/SPUTUM/BRONCH Meeta Bull STAIN [[id number]] Collected: 10/08/21 1415    Order Status: Canceled Specimen: Sputum     CULTURE, BLOOD [[id number]] Collected: 09/14/21 0759    Order Status: Completed Specimen: Blood Updated: 09/19/21 0637     Special Requests: --        LEFT  HAND       Culture result: NO GROWTH 5 DAYS       CULTURE, BLOOD [[id number]] Collected: 09/14/21 0757    Order Status: Completed Specimen: Blood Updated: 09/19/21 0637     Special Requests: --        RIGHT  Antecubital       Culture result: NO GROWTH 5 DAYS       CULTURE, RESPIRATORY/SPUTUM/BRONCH Meeta Bull STAIN [[id number]] Collected: 09/14/21 0915    Order Status: Canceled Specimen: Sputum     COVID-19 RAPID TEST [[id number]]  (Abnormal) Collected: 09/14/21 0535    Order Status: Completed Specimen: Nasopharyngeal Updated: 09/14/21 0558     Specimen source NASAL        COVID-19 rapid test Detected        Comment:      The specimen is POSITIVE for SARS-CoV-2, the novel coronavirus associated with COVID-19. This test has been authorized by the FDA under an Emergency Use Authorization (EUA) for use by authorized laboratories.         Fact sheet for Healthcare Providers: ConventionUpdate.co.nz  Fact sheet for Patients: ConventionUpdate.co.nz       Methodology: Isothermal Nucleic Acid Amplification  RESULTS VERIFIED, PHONED TO AND READ BACK BY   FERDINAND BENOIT AT 1299 ON 9/14/21 ACL               EKG Results     None          Other Studies:  CT CHEST PULMONARY EMBOLISM    Result Date: 10/12/2021  CTA OF THE CHEST - PE STUDY HISTORY: Shortness of breath COMPARISON: None TECHNIQUE: A helical acquisition was performed through the chest utilizing 2.15BX slice thickness during the infusion of 100 cc of Isovue-370. 3-D post-processed images were created on an independent workstation. Multiplanar reformats were obtained. The exam was focused on the pulmonary arteries. Dose reduction techniques used: Automated exposure control, adjustment of the mAs and/or kVp according to patient's size, and iterative reconstruction techniques. FINDINGS: *  PULMONARY VESSELS: No evidence of pulmonary embolism. *  PLEURA / PERICARDIUM: Within normal limits. *  AVI / MEDIASTINUM: Within normal limits. *  LUNGS: Bilateral groundglass opacities. *  TRACHEOBRONCHIAL TREE: Within normal limits. *  AORTA: Within normal limits. *  CORONARY ARTERIES: No coronary artery calcification is seen. *  CHEST WALL/AXILLA: Within normal limits. *  VISUALIZED UPPER ABDOMEN: Within normal limits. *  SPINE / BONES: Within normal limits. *  ADDITIONAL COMMENTS: None. No evidence of pulmonary embolism. Bilateral Covid pneumonia.  Date of Dictation: 10/12/2021 11:51 AM      Current Meds:   Current Facility-Administered Medications   Medication Dose Route Frequency    metoprolol tartrate (LOPRESSOR) tablet 12.5 mg  12.5 mg Oral Q12H    0.9% sodium chloride infusion 250 mL  250 mL IntraVENous PRN    0.9% sodium chloride infusion 250 mL  250 mL IntraVENous PRN    insulin lispro (HUMALOG) injection 7 Units  7 Units SubCUTAneous TIDAC    medroxyPROGESTERone (PROVERA) tablet 20 mg  20 mg Oral DAILY    budesonide-formoterol (SYMBICORT) 80-4.5 mcg inhaler  2 Puff Inhalation BID RT    albuterol (PROVENTIL HFA, VENTOLIN HFA, PROAIR HFA) inhaler 2 Puff  2 Puff Inhalation Q6H RT    apixaban (ELIQUIS) tablet 5 mg  5 mg Oral BID    insulin glargine (LANTUS) injection 15 Units  15 Units SubCUTAneous DAILY    insulin lispro (HUMALOG) injection   SubCUTAneous AC&HS    NUTRITIONAL SUPPORT ELECTROLYTE PRN ORDERS   Does Not Apply PRN    hydrALAZINE (APRESOLINE) 20 mg/mL injection 10 mg  10 mg IntraVENous Q6H PRN    morphine injection 1 mg  1 mg IntraVENous Q4H PRN    LORazepam (ATIVAN) injection 0.5 mg  0.5 mg IntraVENous Q6H PRN    phenol throat spray (CHLORASEPTIC) 1 Spray  1 Spray Oral PRN    pantoprazole (PROTONIX) tablet 40 mg  40 mg Oral ACB    sodium chloride (NS) flush 30 mL  30 mL InterCATHeter Q8H    sodium chloride (NS) flush 30 mL  30 mL InterCATHeter PRN    acetaminophen (TYLENOL) tablet 650 mg  650 mg Oral Q6H PRN    Or    acetaminophen (TYLENOL) suppository 650 mg  650 mg Rectal Q6H PRN    polyethylene glycol (MIRALAX) packet 17 g  17 g Oral DAILY PRN    ondansetron (ZOFRAN ODT) tablet 4 mg  4 mg Oral Q8H PRN    Or    ondansetron (ZOFRAN) injection 4 mg  4 mg IntraVENous Q6H PRN       Problem List:  Hospital Problems as of 10/12/2021 Date Reviewed: 10/6/2021        Codes Class Noted - Resolved POA    Vaginal bleeding ICD-10-CM: N93.9  ICD-9-CM: 623.8  10/6/2021 - Present Yes        DVT (deep venous thrombosis) (HCC) ICD-10-CM: I82.409  ICD-9-CM: 453.40  9/23/2021 - Present Yes        Morbid obesity (HCC) (Chronic) ICD-10-CM: E66.01  ICD-9-CM: 278.01  9/18/2021 - Present Yes        Suspected sleep apnea ICD-10-CM: R29.818  ICD-9-CM: 781.99  9/18/2021 - Present Yes        * (Principal) Acute respiratory distress syndrome (ARDS) due to COVID-19 virus [facility]) ICD-10-CM: U07.1, J80  ICD-9-CM: 518.82, 079.89  9/14/2021 - Present Yes        DM w/o complication type II (HCC) (Chronic) ICD-10-CM: E11.9  ICD-9-CM: 250.00  9/14/2021 - Present Yes        HTN (hypertension) (Chronic) ICD-10-CM: I10  ICD-9-CM: 401.9  9/14/2021 - Present Yes        RESOLVED: ALLAN (acute kidney injury) ([facility] [address] ICD-10-CM: N17.9  ICD-9-CM: 584.9  9/30/2021 - 10/8/2021 Yes        RESOLVED: Thrombocytopenia ([facility] ICD-10-CM: D69.6  ICD-9-CM: 287.5  9/22/2021 - 10/8/2021 Yes        RESOLVED: Acute respiratory failure ([facility] ICD-10-CM: J96.00  ICD-9-CM: 518.81  9/14/2021 - 9/16/2021 Yes                 Signed By: Duyen Donis MD   [employer] [facility]    October 12, 2021  5:15 PM

## 2021-10-12 NOTE — PROGRESS NOTES
Patient resting in bed in no acute distress, call bell within reach, all personal belongings are within reach. 2/2 PRBC administering, endorse to night nurse.

## 2021-10-12 NOTE — PROGRESS NOTES
Physical Therapy:    Pt chart reviewed and treatment attempted. Pt is being transferred off the floor for CT at this time. PT will attempt at a later time as pt is able and schedule allows. PM: PT attempted to see pt, she is discouraged and fatigued today with low HgB levels and politely declined therapy. PT will attempt tomorrow as pt is able.        Thank you,  Lenore Aguilera, PT, DPT

## 2021-10-12 NOTE — PROGRESS NOTES
Problem: Diabetes Self-Management  Goal: *Disease process and treatment process  Description: Define diabetes and identify own type of diabetes; list 3 options for treating diabetes. Outcome: Progressing Towards Goal  Goal: *Monitoring blood glucose, interpreting and using results  Description: Identify recommended blood glucose targets  and personal targets. Outcome: Progressing Towards Goal     Problem: Patient Education: Go to Patient Education Activity  Goal: Patient/Family Education  Outcome: Progressing Towards Goal     Problem: Falls - Risk of  Goal: *Absence of Falls  Description: Document David Barreto Fall Risk and appropriate interventions in the flowsheet. Outcome: Progressing Towards Goal  Note: Fall Risk Interventions:  Mobility Interventions: Patient to call before getting OOB         Medication Interventions: Patient to call before getting OOB    Elimination Interventions: Call light in reach    History of Falls Interventions: Bed/chair exit alarm         Problem: Patient Education: Go to Patient Education Activity  Goal: Patient/Family Education  Outcome: Progressing Towards Goal     Problem: Pressure Injury - Risk of  Goal: *Prevention of pressure injury  Description: Document Evangelista Scale and appropriate interventions in the flowsheet.   Outcome: Progressing Towards Goal  Note: Pressure Injury Interventions:  Sensory Interventions: Chair cushion    Moisture Interventions: Absorbent underpads, Limit adult briefs    Activity Interventions: Pressure redistribution bed/mattress(bed type)    Mobility Interventions: Pressure redistribution bed/mattress (bed type)    Nutrition Interventions: Document food/fluid/supplement intake, Discuss nutritional consult with provider    Friction and Shear Interventions: Minimize layers                Problem: Patient Education: Go to Patient Education Activity  Goal: Patient/Family Education  Outcome: Progressing Towards Goal

## 2021-10-12 NOTE — ROUTINE PROCESS
Bedside shift change report given to Genesis  by [facility] RN. Report included the following information SBAR, Kardex, Intake/Output, MAR and Recent Results.

## 2021-10-12 NOTE — PROGRESS NOTES
Comprehensive Nutrition Assessment    Type and Reason for Visit: Reassess    Nutrition Recommendations/Plan:   Meals and Snacks:  Continue current diet. Allow one packet salt per meal as desired. Encouraged to make CHO exchanges for foods of preference. Nutrition Supplement Therapy:   Medical food supplement therapy:  Discontinue not utilizing      Malnutrition Assessment:  Malnutrition Status: At risk for malnutrition (specify) (po improved, prolonged hospitaliztion/mobility)    Nutrition Assessment:   Nutrition History: 9/20:Unable to obtain      Nutrition Background: H/O: DM type II, HTN, severe MO. Presented with cough, cold flu like symptoms fever and chills   Onset of symptoms 9/12. Admitted d/t hypoxic respiratory faikure d/t COVID  Daily Update:  Pt reports frustrations with prolonged hospitalization. She indicates she continues to eat despite growing tired of food selections. She has appropriate questions re available substitutions within diet order. Nutrition Related Findings:   9/16: Triple lumen PICC placed 9/19: TPN of 1L/d 5%Dex/4.25%AA started, 9/20: TPN advanced to 1L/d of 14%DEX/8%AA, 9/23: TPN advanced to goal of1 .56 L/d (9/29) transitioned to Airvo, ate 100% of breakfast, stopping TPN. Oral diet since 9/29. Current Nutrition Therapies:  ADULT DIET Regular; 3 carb choices (45 gm/meal);  Low Sodium (2 gm)  ADULT ORAL NUTRITION SUPPLEMENT Dinner, Breakfast, Lunch; Diabetic Supplement    Current Intake:   Average Meal Intake: % Average Supplement Intake: None ordered Additional Caloric Sources: has a few snack items from outside    Anthropometric Measures:  Height: 5' 9\" (175.3 cm)  Current Body Wt: 201.2 kg (443 lb 9.1 oz) (10/12), Weight source: Bed scale  BMI: 65.5, Obese class 3 (BMI 40.0 or greater)  Admission Body Weight: 461 lb 10.3 oz (standing scale)  Ideal Body Weight (lbs) (Calculated): 145 lbs (66 kg),    Usual Body Wt: 205.9 kg (454 lb) (per EMR 2019 to 2020), Percent weight change: 0.3          Edema: LLE: 3+;Pitting (10/12/2021  9:22 AM)  RLE: 3+;Pitting (10/12/2021  9:22 AM)     Estimated Daily Nutrient Needs:  Energy (kcal/day): 4849-0784 (Kcal/kg (22-25), Weight Used: Ideal (65.9 kg))  Protein (g/day): 80-99 (1.2-1.5) Weight Used: (Ideal (66kg))  Fluid (ml/day):   (1 ml/kcal)    Nutrition Diagnosis:   · Food & nutrition-related knowledge deficit related to  (diet restrictions) as evidenced by  (pt questions on menu selections at RD visit)    Nutrition Interventions:   Food and/or Nutrient Delivery: Modify current diet, Discontinue oral nutrition supplement     Coordination of Nutrition Care: Continue to monitor while inpatient, Interdisciplinary rounds    Goals:   Previous Goal Met: Goal(s) achieved  Active Goal: Maintain po intake to meet estimated needs    Nutrition Monitoring and Evaluation:      Food/Nutrient Intake Outcomes: Food and nutrient intake  Physical Signs/Symptoms Outcomes: Biochemical data, GI status, Meal time behavior    Discharge Planning:    No discharge needs at this time    Devon Huerta, RD, LDN   Contact: [phone]    Disaster Mode Active

## 2021-10-13 NOTE — PROGRESS NOTES
[facility] Hospitalist Progress Note     Name:  Angel Serna  Age:26 y.o. Sex:female   :  1995    MRN:  [id number]     Admit Date:  2021    Reason for Admission:  Acute respiratory failure [facility]) [J96.00]    Hospital Course/Interval history:     23year old morbid obese AAF admitted  for acute respiratory failure with hypoxia related to COVID 19 infection. She was found to have a left leg DVT, CT chest could not visualize pulmonary arteries. She was started on a Heparin drip then transitioned to Eliquis that had to be held due to vaginal bleeding, slight hemoptysis and epistaxis      As per prior documentation:  \"Patient seen and examined at bedside this morning. Overnight, patient refused Eliquis due to bleeding and patient thinking this was causing her worsening shortness of breath and weakness. Patient states that she is no longer having any significant vaginal bleeding since being off Eliquis but did suffer from this as recent as yesterday. Discussed with OB/GYN after abdominal ultrasound completed and patient now started on Provera with OB/GYN recommendations to increase. Discussed with patient. Patient states she still is having significant weakness and fatigue, likely related to acute blood loss anemia. Informed her that she does not need blood at this point but if her hemoglobin were to drop at 7.0 or lower and she was still symptomatic, we could give her a unit of blood to assist.  Patient amenable to Eliquis ministration to prevent worsening of her DVT and PE. Patient denies any chest pain or pressure, lightheadedness, dizziness, palpitations, near-syncope or syncope. She was able to be weaned on air Vo to 50% O2 but still remains on 60 L  Via HFNC. \"    Subjective :  10/13/2021  Patient seen and evaluated. Otherwise she remains on 60 L heated high flow oxygen via Airvo.   She needs a nonrebreather facemask for when she is talking as she desats into the 76s when she is talking  No further episodes of bleeding per the patient  She was given 2 units of packed red blood cells yesterday. She remains short of breath and tachycardic. She is currently not on anticoagulation. She previously stated that her oxygen levels have always been low in the past and in the 80s even prior to her having Covid secondary to her obesity. Review of the chart shows that this was not the case. We discussed her drop in hemoglobin with plans for blood transfusion today. She is unable to have her echo secondary to her persistent elevated heart rate which will make it difficult for the echo technicians to see her images. Repeat CT chest showed no evidence of a PE. Interventional radiology consulted for the placement of the filter and they would like to repeat her Doppler ultrasounds to assess the clot given her multiple comorbidities and severe obesity. She is disappointed about the procedure being canceled. She is understandably very frustrated about being here in the hospital with no significant improvement in her lungs as she wants to be discharged. She is attempting to prone. She is out of bed to chair  She is able to get up and down to the bedside commode on her own. Denaalberto Green-mother-updated via phone. All questions answered. Assessment & Plan        Principal Problem:  10/13/2021      Acute respiratory distress syndrome (ARDS) due to COVID-19 virus ([facility] [address] (9/14/2021)  Patient was on BiPAP. Now off BiPAP and still on high flow oxygen at 60 L/min, weaned to 50% O2  On Decadron 6 mg IV once a day. Patient completed remdesivir   received Actemra as well   wean oxygen down, as tolerated.   VQ scan with low suspicion for PE, CT chest could not rule out PE-noted tachycardic, requiring high flow  Pulmonology following      10/13/2021  Patient remains on high flow oxygen  We will continue to wean as tolerated  She is status post Lasix IV x1  She is very frustrated with the lack of improvement in her oxygen requirements. We again discussed sometimes prolonged and difficult course in some COVID-19 patients and that obesity is associated with no longer and more complicated hospital course. And that we have to give her rounds time to heal and for her O2 requirements to decline. However no one can give a definitive date when this will be. Given that she has been off and on anticoagulation with concerns for bleeding. CT for possible PE was repeated and was negative. IR was consulted for IVC filter and have requested a repeat Doppler study before proceeding as above. As previously documented:  \"Review of her chart although she did have one aberrant reading of her pulse ox in November of last year when she was seen in the ER initially. But all subsequent imaging readings were normal after that. And all readings prior to that were abnormal.  So possibly was secondary to her poor waveform but she has not consistently hypoxic prior to this admission. \"    Cardiomegaly with concern for CHF   Echo is pending   BNP is within normal limits  10/13/2021  Echo remains pending       Active Problems:    DM w/o complication type II ([address] (9/14/2021)  10/13/2021    Blood sugar is in 100s-200s  Continue to cover with sliding scale with short acting insulin. Patient is on Lantus 15 units subcu once a day.    Her Premeal insulin was increased to to 7 units 3 times daily  on October 8, 2021.  &  to 8 units 3 times daily [facility] [place] on October 12, 2021  We will continue to monitor blood sugars and titrate as indicated       HTN (hypertension) (9/14/2021)  10/13/2021    Fairly well controlled on current regimen  We will titrate medications as indicated       Morbid obesity ([address] (9/18/2021)  10/13/2021    No active issues however this is complicating her care and adding complexity to her case as it is associated with a more prolonged and severe course of COVID-19 with increased morbidity and mortality      Suspected sleep apnea (9/18/2021)  10/13/2021    She will need outpatient sleep studies        Thrombocytopenia ([facility] [address] (9/22/2021)  Resolved       DVT (deep venous thrombosis) ([facility] [address] (9/23/2021)  10/13/2021   Eliquis resumed October 7, 2021, she denies any further episodes of bleeding  Given recurrent drops in her hemoglobin and has mostly been on hold   she was transfused 1 unit of blood previously with plan for transfusion of an additional unit of blood today   continue Provera per OB/GYN recommendations (do not want to add estrogen due to DVT)  VQ scan previously ordered since CT chest inconclusive - low probability of PE  Given the persistently low hemoglobin levels will consult IR for IVC filter continue to hold anticoagulation for now. If her hemoglobin remains stable would consider reinstating oral anticoagulation  Echo ordered 10/5-but unable to be done as the patient was on precautions there is still not able to be obtained secondary to her tachycardia. She will likely require rate control medications in order to get the 2D echo.       ALLAN (acute kidney injury) ([facility] (9/30/2021)  resolved     AUB:  10/13/2021    Vaginal bleeding has currently resolved per the patient  Patient states history of irregular periods for \"years\", never seen by GYN  Increase Provera per OBGYN(do not want to add estrogen due to DVT)  OBGYN consulted 10/7 - intravaginal US reviewed by OBGYN  **Will need outpatient follow up with GYN, on airvo, COVID +    Anemia-acute blood loss anemia secondary to vaginal bleeding  She is status post 1 unit of packed red blood cells where her hemoglobin reji appropriately  She was also given an additional 2 units of packed red blood cells on October 12, 2021. She has not had any further episodes of bleeding  She has not had any further episodes of bleeding that have been noted by her or the staff.     However her hemoglobin continues to drop   Clinically indicated after repeat Doppler ultrasound  She is iron deficient and needs supplemental iron      Dispo/Discharge Planning: To be determined based on her coming off of air Vo and being weaned down to an acceptable level of oxygen via nasal cannula     Diet:  ADULT DIET Regular; 3 carb choices (45 gm/meal); Low Sodium (2 gm)  ADULT ORAL NUTRITION SUPPLEMENT Dinner, Breakfast, Lunch; Diabetic Supplement  DVT PPx: Apixaban resumed today  Code status: Full Code           Review of Systems: 14 point review of systems is otherwise negative with the exception of the elements mentioned above. Objective:     Patient Vitals for the past 24 hrs:   Temp Pulse Resp BP SpO2   10/13/21 0659 -- (!) 117 20 128/65 94 %   10/13/21 0448 98.6 °F (37 °C) (!) 114 18 118/75 98 %   10/13/21 0230 -- -- -- -- 94 %   10/12/21 2335 98.7 °F (37.1 °C) (!) 113 20 125/81 92 %   10/12/21 2132 98.8 °F (37.1 °C) (!) 125 20 125/66 90 %   10/12/21 2101 -- -- -- -- 91 %   10/12/21 1915 99.6 °F (37.6 °C) (!) 122 20 (!) 72/44 (!) 89 %   10/12/21 1900 97.7 °F (36.5 °C) 66 18 126/62 98 %   10/12/21 1809 98.4 °F (36.9 °C) (!) 120 20 121/74 91 %   10/12/21 1700 98.4 °F (36.9 °C) (!) 120 20 118/64 96 %   10/12/21 1557 98.4 °F (36.9 °C) (!) 113 20 103/68 --   10/12/21 1541 99.1 °F (37.3 °C) (!) 113 20 105/77 94 %   10/12/21 1523 98.8 °F (37.1 °C) (!) 111 20 (!) 100/58 96 %   10/12/21 1459 -- -- -- -- 93 %   10/12/21 0817 -- -- -- -- 91 %     Oxygen Therapy  O2 Sat (%): 94 % (10/13/21 0659)  Pulse via Oximetry: 116 beats per minute (10/13/21 0230)  O2 Device: Hi flow nasal cannula; Heated (10/13/21 0437)  Skin Assessment: Clean, dry, & intact (10/12/21 2101)  Skin Protection for O2 Device: Yes (10/02/21 1640)  Orientation: Bilateral (10/02/21 1640)  Location: Cheek (10/02/21 1640)  O2 Flow Rate (L/min): 50 l/min (10/13/21 0437)  O2 Temperature: 87.8 °F (31 °C) (10/12/21 1459)  FIO2 (%): 60 % (10/13/21 0437)    Body mass index is 65.51 kg/m². Physical Exam:   General:          Well nourished.   BMI of 63.18  Patient sitting in bedside chair, on high flow oxygen. no significant SOB with speaking. Head:               Normocephalic, atraumatic, pale   Eyes:               Sclerae appear normal.  Pupils equally round. ENT:                Nares appear normal, no drainage. Moist oral mucosa  Neck:               No restricted ROM. Trachea midline   CV:                  RRR. No m/r/g. No jugular venous distension. Lungs:             Diminished breath sound. No wheezing, rhonchi, or rales. Shortness of breath. Abdomen: Bowel sounds present. Soft, nontender, nondistended. Extremities:     No cyanosis or clubbing. Bilateral pitting edema   Skin:                No rashes and normal coloration. Warm and dry. Neuro:             Cranial nerves II-XII grossly intact. Sensation intact.   A&Ox3  Psych:             Normal mood and affect.         Data Review:  I have reviewed all labs, meds, and studies from the last 24 hours:    Labs:    Recent Results (from the past 24 hour(s))   METABOLIC PANEL, BASIC    Collection Time: 10/12/21  7:40 AM   Result Value Ref Range    Sodium 140 136 - 145 mmol/L    Potassium 4.1 3.5 - 5.1 mmol/L    Chloride 105 98 - 107 mmol/L    CO2 29 21 - 32 mmol/L    Anion gap 6 (L) 7 - 16 mmol/L    Glucose 138 (H) 65 - 100 mg/dL    BUN 10 6 - 23 MG/DL    Creatinine 0.49 (L) 0.6 - 1.0 MG/DL    GFR est AA >60 >60 ml/min/1.73m2    GFR est non-AA >60 >60 ml/min/1.73m2    Calcium 7.3 (L) 8.3 - 10.4 MG/DL   CBC WITH AUTOMATED DIFF    Collection Time: 10/12/21  7:40 AM   Result Value Ref Range    WBC 9.8 4.3 - 11.1 K/uL    RBC 3.41 (L) 4.05 - 5.2 M/uL    HGB 6.7 (LL) 11.7 - 15.4 g/dL    HCT 24.5 (L) 35.8 - 46.3 %    MCV 71.8 (L) 79.6 - 97.8 FL    MCH 19.6 (L) 26.1 - 32.9 PG    MCHC 27.3 (L) 31.4 - 35.0 g/dL    RDW 21.9 (H) 11.9 - 14.6 %    PLATELET 957 088 - 849 K/uL    MPV 10.5 9.4 - 12.3 FL    ABSOLUTE NRBC 0.05 0.0 - 0.2 K/uL    DF AUTOMATED      NEUTROPHILS 68 43 - 78 %    LYMPHOCYTES 18 13 - 44 %    MONOCYTES 7 4.0 - 12.0 %    EOSINOPHILS 6 0.5 - 7.8 %    BASOPHILS 0 0.0 - 2.0 %    IMMATURE GRANULOCYTES 1 0.0 - 5.0 %    ABS. NEUTROPHILS 6.6 1.7 - 8.2 K/UL    ABS. LYMPHOCYTES 1.7 0.5 - 4.6 K/UL    ABS. MONOCYTES 0.7 0.1 - 1.3 K/UL    ABS. EOSINOPHILS 0.6 0.0 - 0.8 K/UL    ABS. BASOPHILS 0.0 0.0 - 0.2 K/UL    ABS. IMM. GRANS. 0.1 0.0 - 0.5 K/UL   FERRITIN    Collection Time: 10/12/21  7:40 AM   Result Value Ref Range    Ferritin 36 8 - 388 NG/ML   TRANSFERRIN SATURATION    Collection Time: 10/12/21  7:40 AM   Result Value Ref Range    Iron 6 (L) 35 - 150 ug/dL    TIBC 150 (L) 250 - 450 ug/dL    Transferrin Saturation 4 (L) >20 %   RBC, ALLOCATE    Collection Time: 10/12/21 11:00 AM   Result Value Ref Range    HISTORY CHECKED?  Historical check performed    TYPE & SCREEN    Collection Time: 10/12/21 11:30 AM   Result Value Ref Range    Crossmatch Expiration 10/15/2021,2359     ABO/Rh(D) O POSITIVE     Antibody screen NEG     Comment       OKAY PER SANTO TO ACCEPT CHART LABEL FOR THIS DRAW 10.12.21 KJ  TYSC COLLECTED BY [id number] RN       Unit number [id number]     Blood component type [facility]     Unit division 00     Status of unit ISSUED     Crossmatch result Compatible     Unit number [id number]     Blood component type [facility]     Unit division 00     Status of unit ISSUED     Crossmatch result Compatible    GLUCOSE, POC    Collection Time: 10/12/21 12:01 PM   Result Value Ref Range    Glucose (POC) 148 (H) 65 - 100 mg/dL    Performed by Kiana Barnett    GLUCOSE, POC    Collection Time: 10/12/21  3:59 PM   Result Value Ref Range    Glucose (POC) 159 (H) 65 - 100 mg/dL    Performed by Iker Vaughn    GLUCOSE, POC    Collection Time: 10/12/21  9:03 PM   Result Value Ref Range    Glucose (POC) 192 (H) 65 - 100 mg/dL    Performed by Syd    HGB & HCT    Collection Time: 10/13/21  3:12 AM   Result Value Ref Range    HGB 8.2 (L) 11.7 - 15.4 g/dL    HCT 29.2 (L) 35.8 - 46.3 %   GLUCOSE, POC    Collection Time: 10/13/21  7:00 AM   Result Value Ref Range    Glucose (POC) 181 (H) 65 - 100 mg/dL    Performed by Jaime Esteves        All Micro Results     Procedure Component Value Units Date/Time    CULTURE, RESPIRATORY/SPUTUM/BRONCH Jerrica Nasir STAIN [[id number]] Collected: 10/08/21 1415    Order Status: Canceled Specimen: Sputum     CULTURE, BLOOD [[id number]] Collected: 09/14/21 0759    Order Status: Completed Specimen: Blood Updated: 09/19/21 0637     Special Requests: --        LEFT  HAND       Culture result: NO GROWTH 5 DAYS       CULTURE, BLOOD [[id number]] Collected: 09/14/21 0757    Order Status: Completed Specimen: Blood Updated: 09/19/21 0637     Special Requests: --        RIGHT  Antecubital       Culture result: NO GROWTH 5 DAYS       CULTURE, RESPIRATORY/SPUTUM/BRONCH Jerrica Nasir STAIN [[id number]] Collected: 09/14/21 0915    Order Status: Canceled Specimen: Sputum     COVID-19 RAPID TEST [[id number]]  (Abnormal) Collected: 09/14/21 0535    Order Status: Completed Specimen: Nasopharyngeal Updated: 09/14/21 0558     Specimen source NASAL        COVID-19 rapid test Detected        Comment:      The specimen is POSITIVE for SARS-CoV-2, the novel coronavirus associated with COVID-19. This test has been authorized by the FDA under an Emergency Use Authorization (EUA) for use by authorized laboratories.         Fact sheet for Healthcare Providers: ConventionUpdate.co.nz  Fact sheet for Patients: ConventionUpdate.co.nz       Methodology: Isothermal Nucleic Acid Amplification  RESULTS VERIFIED, PHONED TO AND READ BACK BY   FERDINAND BENOIT AT 5907 ON 9/14/21 ACL               EKG Results     None          Other Studies:  CT CHEST PULMONARY EMBOLISM    Result Date: 10/12/2021  CTA OF THE CHEST - PE STUDY HISTORY: Shortness of breath COMPARISON: None TECHNIQUE: A helical acquisition was performed through the chest utilizing 4.77ON slice thickness during the infusion of 100 cc of Isovue-370. 3-D post-processed images were created on an independent workstation. Multiplanar reformats were obtained. The exam was focused on the pulmonary arteries. Dose reduction techniques used: Automated exposure control, adjustment of the mAs and/or kVp according to patient's size, and iterative reconstruction techniques. FINDINGS: *  PULMONARY VESSELS: No evidence of pulmonary embolism. *  PLEURA / PERICARDIUM: Within normal limits. *  AVI / MEDIASTINUM: Within normal limits. *  LUNGS: Bilateral groundglass opacities. *  TRACHEOBRONCHIAL TREE: Within normal limits. *  AORTA: Within normal limits. *  CORONARY ARTERIES: No coronary artery calcification is seen. *  CHEST WALL/AXILLA: Within normal limits. *  VISUALIZED UPPER ABDOMEN: Within normal limits. *  SPINE / BONES: Within normal limits. *  ADDITIONAL COMMENTS: None. No evidence of pulmonary embolism. Bilateral Covid pneumonia.  Date of Dictation: 10/12/2021 11:51 AM      Current Meds:   Current Facility-Administered Medications   Medication Dose Route Frequency    [Held by provider] metoprolol tartrate (LOPRESSOR) tablet 12.5 mg  12.5 mg Oral Q12H    0.9% sodium chloride infusion 250 mL  250 mL IntraVENous PRN    insulin lispro (HUMALOG) injection 8 Units  8 Units SubCUTAneous TIDAC    iron dextran (INFED) 25 mg in 0.9% sodium chloride 50 mL ivpb  25 mg IntraVENous ONCE    0.9% sodium chloride infusion 250 mL  250 mL IntraVENous PRN    medroxyPROGESTERone (PROVERA) tablet 20 mg  20 mg Oral DAILY    budesonide-formoterol (SYMBICORT) 80-4.5 mcg inhaler  2 Puff Inhalation BID RT    albuterol (PROVENTIL HFA, VENTOLIN HFA, PROAIR HFA) inhaler 2 Puff  2 Puff Inhalation Q6H RT    [Held by provider] apixaban (ELIQUIS) tablet 5 mg  5 mg Oral BID    insulin glargine (LANTUS) injection 15 Units  15 Units SubCUTAneous DAILY    insulin lispro (HUMALOG) injection   SubCUTAneous AC&HS    NUTRITIONAL SUPPORT ELECTROLYTE PRN ORDERS   Does Not Apply PRN  hydrALAZINE (APRESOLINE) 20 mg/mL injection 10 mg  10 mg IntraVENous Q6H PRN    morphine injection 1 mg  1 mg IntraVENous Q4H PRN    LORazepam (ATIVAN) injection 0.5 mg  0.5 mg IntraVENous Q6H PRN    phenol throat spray (CHLORASEPTIC) 1 Spray  1 Spray Oral PRN    pantoprazole (PROTONIX) tablet 40 mg  40 mg Oral ACB    sodium chloride (NS) flush 30 mL  30 mL InterCATHeter Q8H    sodium chloride (NS) flush 30 mL  30 mL InterCATHeter PRN    acetaminophen (TYLENOL) tablet 650 mg  650 mg Oral Q6H PRN    Or    acetaminophen (TYLENOL) suppository 650 mg  650 mg Rectal Q6H PRN    polyethylene glycol (MIRALAX) packet 17 g  17 g Oral DAILY PRN    ondansetron (ZOFRAN ODT) tablet 4 mg  4 mg Oral Q8H PRN    Or    ondansetron (ZOFRAN) injection 4 mg  4 mg IntraVENous Q6H PRN       Problem List:  Hospital Problems as of 10/13/2021 Date Reviewed: 10/6/2021        Codes Class Noted - Resolved POA    Iron deficiency anemia due to chronic blood loss ICD-10-CM: D50.0  ICD-9-CM: 280.0  10/12/2021 - Present Yes        Vaginal bleeding ICD-10-CM: N93.9  ICD-9-CM: 623.8  10/6/2021 - Present Yes        DVT (deep venous thrombosis) (HCC) ICD-10-CM: I82.409  ICD-9-CM: 453.40  9/23/2021 - Present Yes        Morbid obesity (HCC) (Chronic) ICD-10-CM: E66.01  ICD-9-CM: 278.01  9/18/2021 - Present Yes        Suspected sleep apnea ICD-10-CM: R29.818  ICD-9-CM: 781.99  9/18/2021 - Present Yes        * (Principal) Acute respiratory distress syndrome (ARDS) due to COVID-19 virus [facility]) ICD-10-CM: U07.1, J80  ICD-9-CM: 518.82, 079.89  9/14/2021 - Present Yes        DM w/o complication type II (HCC) (Chronic) ICD-10-CM: E11.9  ICD-9-CM: 250.00  9/14/2021 - Present Yes        HTN (hypertension) (Chronic) ICD-10-CM: I10  ICD-9-CM: 401.9  9/14/2021 - Present Yes        RESOLVED: ALLAN (acute kidney injury) ([facility] ICD-10-CM: N17.9  ICD-9-CM: 584.9  9/30/2021 - 10/8/2021 Yes        RESOLVED: Thrombocytopenia (HCC) ICD-10-CM: D69.6  ICD-9-CM: 287.5 9/22/2021 - 10/8/2021 Yes        RESOLVED: Acute respiratory failure ([facility] [address] ICD-10-CM: J96.00  ICD-9-CM: 518.81  9/14/2021 - 9/16/2021 Yes                 Signed By: Donya Hernandez MD   [facility] Hospitalist Service    October 13, 2021  5:15 PM

## 2021-10-13 NOTE — PROGRESS NOTES
On airvo 50 liters/60%. Using NRB often for everytime she moves she de-SATs to 70s-80s. Up ad anshul, minimal assist.  hgb 8.2 this am after 2 units PRBCs yesterday. Hourly rounds in progress. Resting in recliner. Denies needs or pain at this time. Call light and personal items within reach. Will continue to monitor and give report to oncoming day shift nurse.

## 2021-10-13 NOTE — CONSULTS
IR consulted to evaluate for IVC filter placement. Briefly, this is a 22 yo female with extreme obesity (BMI 66) and acute hypoxic respiratory failure 2/2 COVID-19 infection. She also has hx of recent left cald vein DVT on 9/22/21. Recent CT PE exam and VQ scan without convincing evidence of PE. Extensive GGOs in both lungs, most consistent with atypical pneumonia. Currently off anticoagulation due to hgb downtrend with some vaginal hemorrhage and epitaxis. At this point I would recommend repeat US to evaluate her leg DVT. If there is no worsening with extension above the knee, I would recommend against IVC filter. IVC filter if left in place can actually become thrombogenic. She would be at high risk for complications such as IVC occlusion if a filter is left in long term. Filter retrieval may be challenging given her body habitus.     Laureen Duane, MD  Interventional Radiology

## 2021-10-14 NOTE — PROGRESS NOTES
[facility] Hospitalist Progress Note     Name:  Sinai Muhammad  Age:26 y.o. Sex:female   :  1995    MRN:  [id number]     Admit Date:  2021    Reason for Admission:  Acute respiratory failure [facility]) [J96.00]    Hospital Course/Interval history:     23year old morbid obese AAF admitted  for acute respiratory failure with hypoxia related to COVID 19 infection. She was found to have a left leg DVT, CT chest could not visualize pulmonary arteries. She was started on a Heparin drip then transitioned to Eliquis that had to be held due to vaginal bleeding, slight hemoptysis and epistaxis      As per prior documentation:  \"Patient seen and examined at bedside this morning. Overnight, patient refused Eliquis due to bleeding and patient thinking this was causing her worsening shortness of breath and weakness. Patient states that she is no longer having any significant vaginal bleeding since being off Eliquis but did suffer from this as recent as yesterday. Discussed with OB/GYN after abdominal ultrasound completed and patient now started on Provera with OB/GYN recommendations to increase. Discussed with patient. Patient states she still is having significant weakness and fatigue, likely related to acute blood loss anemia. Informed her that she does not need blood at this point but if her hemoglobin were to drop at 7.0 or lower and she was still symptomatic, we could give her a unit of blood to assist.  Patient amenable to Eliquis ministration to prevent worsening of her DVT and PE. Patient denies any chest pain or pressure, lightheadedness, dizziness, palpitations, near-syncope or syncope. She was able to be weaned on air Vo to 50% O2 but still remains on 60 L  Via HFNC. \"    Subjective :  10/14/2021  Patient seen and evaluated. She remains on 60 L heated high flow oxygen via Airvo.   She needs a nonrebreather facemask for when she is talking as she desats into the 76s when she is talking  No further episodes of bleeding per the patient  She was given 2 units of packed red blood cells yesterday. She remains short of breath and tachycardic. She is currently not on anticoagulation. She previously stated that her oxygen levels have always been low in the past and in the 80s even prior to her having Covid secondary to her obesity. Review of the chart shows that this was not the case. Repeat CT chest showed no evidence of a PE. Repeat Doppler ultrasound showed no evidence of a clot. So she will not be getting an IVC filter   She is understandably very frustrated about being here in the hospital with no significant improvement in her lungs as she wants to be discharged however she seems more accepting today. She is attempting to prone. She is out of bed to chair  She is able to get up and down to the bedside commode on her own. Dena Green-mother-updated via phone. All questions answered. Assessment & Plan        Principal Problem:  10/14/2021      Acute respiratory distress syndrome (ARDS) due to COVID-19 virus ([address] (9/14/2021)  Patient was on BiPAP. Now off BiPAP and still on high flow oxygen at 60 L/min, weaned to 50% O2  On Decadron 6 mg IV once a day. Patient completed remdesivir   received Actemra as well   wean oxygen down, as tolerated. VQ scan with low suspicion for PE, CT chest could not rule out PE-noted tachycardic, requiring high flow  Pulmonology following      10/14/2021  Patient remains on high flow oxygen  She is status post Lasix IV x1  CT for possible PE was repeated and was negative. IR was consulted for IVC filter and have requested a repeat Doppler study before proceeding as above. As previously documented:  \"Review of her chart although she did have one aberrant reading of her pulse ox in November of last year when she was seen in the ER initially. But all subsequent imaging readings were normal after that.   And all readings prior to that were abnormal.  So possibly was secondary to her poor waveform but she has not consistently hypoxic prior to this admission. \"  We will continue current medical management and wean O2 as tolerated    Cardiomegaly with concern for CHF   Echo is pending   BNP is within normal limits  10/14/2021  Echo within normal limits  She was given Lopressor 25 mg p.o. to slow her heart rate to facilitate that study     Active Problems:    DM w/o complication type II ([address] (9/14/2021)  10/14/2021    Blood sugar is in 100s-200s  Continue to cover with sliding scale with short acting insulin. Patient is on Lantus 15 units subcu once a day.    Her Premeal insulin was increased to to 7 units 3 times daily  on October 8, 2021.  &  to 8 units 3 times daily [facility] on October 12, 2021  We will continue to monitor blood sugars and titrate as indicated       HTN (hypertension) (9/14/2021)  10/14/2021    Fairly well controlled on current regimen  We will titrate medications as indicated       Morbid obesity ([address] (9/18/2021)  10/14/2021    No active issues however this is complicating her care and adding complexity to her case as it is associated with a more prolonged and severe course of COVID-19 with increased morbidity and mortality      Suspected sleep apnea (9/18/2021)  10/14/2021    She will need outpatient sleep studies  Try her on CPAP tonight        Thrombocytopenia ([address] (9/22/2021)  Resolved       DVT (deep venous thrombosis) ([facility] (9/23/2021)  10/14/2021   Eliquis resumed October 7, 2021, she denies any further episodes of bleeding  Given recurrent drops in her hemoglobin and has mostly been on hold   she was transfused 1 unit of blood previously with plan for transfusion of an additional unit of blood today   continue Provera per OB/GYN recommendations (do not want to add estrogen due to DVT)  VQ scan previously ordered since CT chest inconclusive - low probability of PE  Given the persistently low hemoglobin levels will consult IR for IVC filter continue to hold anticoagulation for now. If her hemoglobin remains stable would consider reinstating oral anticoagulation         ALLAN (acute kidney injury) ([facility] (9/30/2021)  resolved     AUB:  10/14/2021    Vaginal bleeding has currently resolved per the patient  Patient states history of irregular periods for \"years\", never seen by GYN  Increase Provera per OBGYN(do not want to add estrogen due to DVT)  OBGYN consulted 10/7 - intravaginal US reviewed by OBGYN  **Will need outpatient follow up with GYN, on airvo, COVID +    Anemia-acute blood loss anemia secondary to vaginal bleeding  She is status post 1 unit of packed red blood cells where her hemoglobin reji appropriately  She was also given an additional 2 units of packed red blood cells on October 12, 2021. She has not had any further episodes of bleeding  She has not had any further episodes of bleeding that have been noted by her or the staff. However her hemoglobin continues to drop   Clinically indicated after repeat Doppler ultrasound  She is iron deficient and needs supplemental iron-she is agreeable      Dispo/Discharge Planning: To be determined based on her coming off of air Vo and being weaned down to an acceptable level of oxygen via nasal cannula     Diet:  ADULT DIET Regular; 3 carb choices (45 gm/meal); Low Sodium (2 gm)  ADULT ORAL NUTRITION SUPPLEMENT Dinner, Breakfast, Lunch; Diabetic Supplement  DVT PPx: Apixaban resumed today  Code status: Full Code           Review of Systems: 14 point review of systems is otherwise negative with the exception of the elements mentioned above.     Objective:     Patient Vitals for the past 24 hrs:   Temp Pulse Resp BP SpO2   10/14/21 0506 98.5 °F (36.9 °C) (!) 117 26 129/69 92 %   10/14/21 0241 -- -- -- -- 92 %   10/14/21 0004 98.9 °F (37.2 °C) (!) 128 24 136/85 96 %   10/13/21 2011 98.1 °F (36.7 °C) (!) 124 24 121/72 90 %   10/13/21 1938 -- -- -- -- 90 %   10/13/21 1558 98.9 °F (37.2 °C) (!) 123 22 125/69 95 %   10/13/21 1532 -- -- -- -- 97 %   10/13/21 1134 -- (!) 115 25 (!) 133/91 98 %   10/13/21 0847 -- -- -- -- 93 %     Oxygen Therapy  O2 Sat (%): 92 % (10/14/21 0506)  Pulse via Oximetry: 112 beats per minute (10/14/21 0241)  O2 Device: Heated;Endotracheal tube (10/13/21 1938)  Skin Assessment: Clean, dry, & intact (10/13/21 1938)  Skin Protection for O2 Device: Yes (10/02/21 1640)  Orientation: Bilateral (10/02/21 1640)  Location: Cheek (10/02/21 1640)  O2 Flow Rate (L/min): 60 l/min (10/14/21 0241)  O2 Temperature: 87.8 °F (31 °C) (10/13/21 1938)  FIO2 (%): 85 % (10/14/21 0241)    Body mass index is 65.51 kg/m². Physical Exam:   General:          Well nourished. BMI of 63.18  Patient sitting in bedside chair, on high flow oxygen. no significant SOB with speaking. Head:               Normocephalic, atraumatic, pale   Eyes:               Sclerae appear normal.  Pupils equally round. ENT:                Nares appear normal, no drainage. Moist oral mucosa  Neck:               No restricted ROM. Trachea midline   CV:                  RRR. No m/r/g. No jugular venous distension. Lungs:             Diminished breath sound. No wheezing, rhonchi, or rales. Shortness of breath. Abdomen: Bowel sounds present. Soft, nontender, nondistended. Extremities:     No cyanosis or clubbing. Bilateral pitting edema   Skin:                No rashes and normal coloration. Warm and dry. Neuro:             Cranial nerves II-XII grossly intact. Sensation intact.   A&Ox3  Psych:             Normal mood and affect.         Data Review:  I have reviewed all labs, meds, and studies from the last 24 hours:    Labs:    Recent Results (from the past 24 hour(s))   GLUCOSE, POC    Collection Time: 10/13/21 11:34 AM   Result Value Ref Range    Glucose (POC) 169 (H) 65 - 100 mg/dL    Performed by Eri Officer    GLUCOSE, POC    Collection Time: 10/13/21  3:56 PM   Result Value Ref Range Glucose (POC) 175 (H) 65 - 100 mg/dL    Performed by Laurent German    METABOLIC PANEL, BASIC    Collection Time: 10/13/21  4:25 PM   Result Value Ref Range    Sodium 140 136 - 145 mmol/L    Potassium 3.9 3.5 - 5.1 mmol/L    Chloride 107 98 - 107 mmol/L    CO2 29 21 - 32 mmol/L    Anion gap 4 (L) 7 - 16 mmol/L    Glucose 175 (H) 65 - 100 mg/dL    BUN 8 6 - 23 MG/DL    Creatinine 0.71 0.6 - 1.0 MG/DL    GFR est AA >60 >60 ml/min/1.73m2    GFR est non-AA >60 >60 ml/min/1.73m2    Calcium 8.3 8.3 - 10.4 MG/DL   PHOSPHORUS    Collection Time: 10/13/21  4:25 PM   Result Value Ref Range    Phosphorus 3.5 2.5 - 4.5 MG/DL   MAGNESIUM    Collection Time: 10/13/21  4:25 PM   Result Value Ref Range    Magnesium 1.8 1.8 - 2.4 mg/dL   CBC WITH AUTOMATED DIFF    Collection Time: 10/13/21  4:25 PM   Result Value Ref Range    WBC 8.9 4.3 - 11.1 K/uL    RBC 4.03 (L) 4.05 - 5.2 M/uL    HGB 8.5 (L) 11.7 - 15.4 g/dL    HCT 29.9 (L) 35.8 - 46.3 %    MCV 74.2 (L) 79.6 - 97.8 FL    MCH 21.1 (L) 26.1 - 32.9 PG    MCHC 28.4 (L) 31.4 - 35.0 g/dL    RDW 22.9 (H) 11.9 - 14.6 %    PLATELET 866 633 - 829 K/uL    MPV 10.4 9.4 - 12.3 FL    ABSOLUTE NRBC 0.05 0.0 - 0.2 K/uL    DF AUTOMATED      NEUTROPHILS 68 43 - 78 %    LYMPHOCYTES 19 13 - 44 %    MONOCYTES 8 4.0 - 12.0 %    EOSINOPHILS 4 0.5 - 7.8 %    BASOPHILS 0 0.0 - 2.0 %    IMMATURE GRANULOCYTES 1 0.0 - 5.0 %    ABS. NEUTROPHILS 6.1 1.7 - 8.2 K/UL    ABS. LYMPHOCYTES 1.7 0.5 - 4.6 K/UL    ABS. MONOCYTES 0.7 0.1 - 1.3 K/UL    ABS. EOSINOPHILS 0.4 0.0 - 0.8 K/UL    ABS. BASOPHILS 0.0 0.0 - 0.2 K/UL    ABS. IMM.  GRANS. 0.1 0.0 - 0.5 K/UL   GLUCOSE, POC    Collection Time: 10/13/21  8:48 PM   Result Value Ref Range    Glucose (POC) 151 (H) 65 - 100 mg/dL    Performed by [facility]    METABOLIC PANEL, BASIC    Collection Time: 10/14/21  6:14 AM   Result Value Ref Range    Sodium 140 136 - 145 mmol/L    Potassium 3.7 3.5 - 5.1 mmol/L    Chloride 107 98 - 107 mmol/L    CO2 29 21 - 32 mmol/L    Anion gap 4 (L) 7 - 16 mmol/L    Glucose 143 (H) 65 - 100 mg/dL    BUN 6 6 - 23 MG/DL    Creatinine 0.53 (L) 0.6 - 1.0 MG/DL    GFR est AA >60 >60 ml/min/1.73m2    GFR est non-AA >60 >60 ml/min/1.73m2    Calcium 8.3 8.3 - 10.4 MG/DL   GLUCOSE, POC    Collection Time: 10/14/21  7:09 AM   Result Value Ref Range    Glucose (POC) 148 (H) 65 - 100 mg/dL    Performed by Alexia        All Micro Results     Procedure Component Value Units Date/Time    CULTURE, RESPIRATORY/SPUTUM/BRONCH Christian Jernigan STAIN [[id number]] Collected: 10/08/21 1415    Order Status: Canceled Specimen: Sputum     CULTURE, BLOOD [[id number]] Collected: 09/14/21 0759    Order Status: Completed Specimen: Blood Updated: 09/19/21 0637     Special Requests: --        LEFT  HAND       Culture result: NO GROWTH 5 DAYS       CULTURE, BLOOD [[id number]] Collected: 09/14/21 0757    Order Status: Completed Specimen: Blood Updated: 09/19/21 0637     Special Requests: --        RIGHT  Antecubital       Culture result: NO GROWTH 5 DAYS       CULTURE, RESPIRATORY/SPUTUM/BRONCH Christian Jenrigan STAIN [[id number]] Collected: 09/14/21 0915    Order Status: Canceled Specimen: Sputum     COVID-19 RAPID TEST [[id number]]  (Abnormal) Collected: 09/14/21 0535    Order Status: Completed Specimen: Nasopharyngeal Updated: 09/14/21 0558     Specimen source NASAL        COVID-19 rapid test Detected        Comment:      The specimen is POSITIVE for SARS-CoV-2, the novel coronavirus associated with COVID-19. This test has been authorized by the FDA under an Emergency Use Authorization (EUA) for use by authorized laboratories.         Fact sheet for Healthcare Providers: ConventionUpdate.co.nz  Fact sheet for Patients: ConventionUpdate.co.nz       Methodology: Isothermal Nucleic Acid Amplification  RESULTS VERIFIED, PHONED TO AND READ BACK BY   FERDINAND BENOIT AT 0136 ON 9/14/21 ACL               EKG Results     None          Other Studies:  DUPLEX LOWER EXT VENOUS BILAT    Result Date: 10/13/2021  BILATERAL LOWER EXTREMITY VENOUS DUPLEX ULTRASOUND. HISTORY:  Pain. TECHNIQUE: Direct skin-contact high resolution grayscale images, color-flow Doppler and duplex waveform analysis. FINDINGS: There is compressibility, color-flow assignment and augmentation of the venous waveform with calf compression in the common femoral, superficial femoral and popliteal veins. Upper calf veins are unremarkable     Negative for sonographic evidence of deep venous thrombosis bilateral lower extremity.        Current Meds:   Current Facility-Administered Medications   Medication Dose Route Frequency    0.9% sodium chloride infusion 250 mL  250 mL IntraVENous PRN    insulin lispro (HUMALOG) injection 8 Units  8 Units SubCUTAneous TIDAC    0.9% sodium chloride infusion 250 mL  250 mL IntraVENous PRN    medroxyPROGESTERone (PROVERA) tablet 20 mg  20 mg Oral DAILY    budesonide-formoterol (SYMBICORT) 80-4.5 mcg inhaler  2 Puff Inhalation BID RT    albuterol (PROVENTIL HFA, VENTOLIN HFA, PROAIR HFA) inhaler 2 Puff  2 Puff Inhalation Q6H RT    [Held by provider] apixaban (ELIQUIS) tablet 5 mg  5 mg Oral BID    insulin glargine (LANTUS) injection 15 Units  15 Units SubCUTAneous DAILY    insulin lispro (HUMALOG) injection   SubCUTAneous AC&HS    NUTRITIONAL SUPPORT ELECTROLYTE PRN ORDERS   Does Not Apply PRN    hydrALAZINE (APRESOLINE) 20 mg/mL injection 10 mg  10 mg IntraVENous Q6H PRN    morphine injection 1 mg  1 mg IntraVENous Q4H PRN    LORazepam (ATIVAN) injection 0.5 mg  0.5 mg IntraVENous Q6H PRN    phenol throat spray (CHLORASEPTIC) 1 Spray  1 Spray Oral PRN    pantoprazole (PROTONIX) tablet 40 mg  40 mg Oral ACB    sodium chloride (NS) flush 30 mL  30 mL InterCATHeter Q8H    sodium chloride (NS) flush 30 mL  30 mL InterCATHeter PRN    acetaminophen (TYLENOL) tablet 650 mg  650 mg Oral Q6H PRN    Or    acetaminophen (TYLENOL) suppository 650 mg  650 mg Rectal Q6H PRN    polyethylene glycol (MIRALAX) packet 17 g  17 g Oral DAILY PRN    ondansetron (ZOFRAN ODT) tablet 4 mg  4 mg Oral Q8H PRN    Or    ondansetron (ZOFRAN) injection 4 mg  4 mg IntraVENous Q6H PRN       Problem List:  Hospital Problems as of 10/14/2021 Date Reviewed: 10/6/2021        Codes Class Noted - Resolved POA    Iron deficiency anemia due to chronic blood loss ICD-10-CM: D50.0  ICD-9-CM: 280.0  10/12/2021 - Present Yes        Vaginal bleeding ICD-10-CM: N93.9  ICD-9-CM: 623.8  10/6/2021 - Present Yes        DVT (deep venous thrombosis) ([facility] ICD-10-CM: I82.409  ICD-9-CM: 453.40  9/23/2021 - Present Yes        Morbid obesity (HCC) (Chronic) ICD-10-CM: E66.01  ICD-9-CM: 278.01  9/18/2021 - Present Yes        Suspected sleep apnea ICD-10-CM: R29.818  ICD-9-CM: 781.99  9/18/2021 - Present Yes        * (Principal) Acute respiratory distress syndrome (ARDS) due to COVID-19 virus [facility]) ICD-10-CM: U07.1, J80  ICD-9-CM: 518.82, 079.89  9/14/2021 - Present Yes        DM w/o complication type II (HCC) (Chronic) ICD-10-CM: E11.9  ICD-9-CM: 250.00  9/14/2021 - Present Yes        HTN (hypertension) (Chronic) ICD-10-CM: I10  ICD-9-CM: 401.9  9/14/2021 - Present Yes        RESOLVED: ALLAN (acute kidney injury) ([facility] ICD-10-CM: N17.9  ICD-9-CM: 584.9  9/30/2021 - 10/8/2021 Yes        RESOLVED: Thrombocytopenia ([facility] ICD-10-CM: D69.6  ICD-9-CM: 287.5  9/22/2021 - 10/8/2021 Yes        RESOLVED: Acute respiratory failure ([facility] ICD-10-CM: J96.00  ICD-9-CM: 518.81  9/14/2021 - 9/16/2021 Yes                 Signed By: Claudean Badger, MD   [facility] Hospitalist Service    October 14, 2021  5:15 PM

## 2021-10-14 NOTE — PROGRESS NOTES
Problem: Airway Clearance - Ineffective  Goal: Achieve or maintain patent airway  Outcome: Progressing Towards Goal     Problem: Gas Exchange - Impaired  Goal: Absence of hypoxia  Outcome: Progressing Towards Goal  Goal: Promote optimal lung function  Outcome: Progressing Towards Goal     Problem: Breathing Pattern - Ineffective  Goal: Ability to achieve and maintain a regular respiratory rate  Outcome: Progressing Towards Goal     Problem: Body Temperature -  Risk of, Imbalanced  Goal: Ability to maintain a body temperature within defined limits  Outcome: Progressing Towards Goal  Goal: Will regain or maintain usual level of consciousness  Outcome: Progressing Towards Goal  Goal: Complications related to the disease process, condition or treatment will be avoided or minimized  Outcome: Progressing Towards Goal     Problem: Risk for Fluid Volume Deficit  Goal: Maintain normal heart rhythm  Outcome: Progressing Towards Goal  Goal: Maintain absence of muscle cramping  Outcome: Progressing Towards Goal  Goal: Maintain normal serum potassium, sodium, calcium, phosphorus, and pH  Outcome: Progressing Towards Goal     Problem: Loneliness or Risk for Loneliness  Goal: Demonstrate positive use of time alone when socialization is not possible  Outcome: Progressing Towards Goal     Problem: Fatigue  Goal: Verbalize increase energy and improved vitality  Outcome: Progressing Towards Goal     Problem: Patient Education: Go to Patient Education Activity  Goal: Patient/Family Education  Outcome: Progressing Towards Goal     Problem: Diabetes Self-Management  Goal: *Disease process and treatment process  Description: Define diabetes and identify own type of diabetes; list 3 options for treating diabetes. Outcome: Progressing Towards Goal  Goal: *Incorporating nutritional management into lifestyle  Description: Describe effect of type, amount and timing of food on blood glucose; list 3 methods for planning meals.   Outcome: Progressing Towards Goal  Goal: *Incorporating physical activity into lifestyle  Description: State effect of exercise on blood glucose levels. Outcome: Progressing Towards Goal  Goal: *Developing strategies to promote health/change behavior  Description: Define the ABC's of diabetes; identify appropriate screenings, schedule and personal plan for screenings. Outcome: Progressing Towards Goal  Goal: *Using medications safely  Description: State effect of diabetes medications on diabetes; name diabetes medication taking, action and side effects. Outcome: Progressing Towards Goal  Goal: *Monitoring blood glucose, interpreting and using results  Description: Identify recommended blood glucose targets  and personal targets. Outcome: Progressing Towards Goal  Goal: *Prevention, detection, treatment of acute complications  Description: List symptoms of hyper- and hypoglycemia; describe how to treat low blood sugar and actions for lowering  high blood glucose level. Outcome: Progressing Towards Goal  Goal: *Prevention, detection and treatment of chronic complications  Description: Define the natural course of diabetes and describe the relationship of blood glucose levels to long term complications of diabetes.   Outcome: Progressing Towards Goal  Goal: *Developing strategies to address psychosocial issues  Description: Describe feelings about living with diabetes; identify support needed and support network  Outcome: Progressing Towards Goal  Goal: *Insulin pump training  Outcome: Progressing Towards Goal  Goal: *Sick day guidelines  Outcome: Progressing Towards Goal  Goal: *Patient Specific Goal (EDIT GOAL, INSERT TEXT)  Outcome: Progressing Towards Goal     Problem: Patient Education: Go to Patient Education Activity  Goal: Patient/Family Education  Outcome: Progressing Towards Goal     Problem: Falls - Risk of  Goal: *Absence of Falls  Description: Document [place] Fall Risk and appropriate interventions in the flowsheet. Outcome: Progressing Towards Goal  Note: Fall Risk Interventions:  Mobility Interventions: Patient to call before getting OOB         Medication Interventions: Patient to call before getting OOB    Elimination Interventions: Call light in reach, Patient to call for help with toileting needs    History of Falls Interventions: Bed/chair exit alarm         Problem: Patient Education: Go to Patient Education Activity  Goal: Patient/Family Education  Outcome: Progressing Towards Goal     Problem: Pressure Injury - Risk of  Goal: *Prevention of pressure injury  Description: Document Evangelista Scale and appropriate interventions in the flowsheet.   Outcome: Progressing Towards Goal     Problem: Patient Education: Go to Patient Education Activity  Goal: Patient/Family Education  Outcome: Progressing Towards Goal     Problem: Patient Education: Go to Patient Education Activity  Goal: Patient/Family Education  Outcome: Progressing Towards Goal     Problem: Patient Education: Go to Patient Education Activity  Goal: Patient/Family Education  Outcome: Progressing Towards Goal

## 2021-10-14 NOTE — CONSULTS
Department of Interventional Radiology  [phone]        Follow Up Consult Note     Patient: Shelley Yeung MRN: [id number]  SSN: [id number]    YOB: 1995  Age: 23 y.o. Sex: female           Repeat Venous US 10/13/21 demonstrates no DVT. Specifically, there is no thrombus in the left PTV or Peroneal V, as was seen on the prior examination from 9/22/21. Although she has had three Chest CT scans, and a V/Q scan, a PE has never been definitively identified. Of course, of this information must be viewed w the understanding that all of the studies are limitted by her large body habitus. Having said that, in the absence of definite DVT or PE, I recommend against IVC Filter placement. However, if the situation should change, please call/re-consult. Thank you.     Melissa Sol MD              Social History     Tobacco Use    Smoking status: Not on file   Substance Use Topics    Alcohol use: Not on file      Allergies   Allergen Reactions    Shellfish Derived Anaphylaxis     Current Facility-Administered Medications   Medication Dose Route Frequency Provider Last Rate Last Admin    0.9% sodium chloride infusion 250 mL  250 mL IntraVENous PRN Fe Berger MD        insulin lispro (HUMALOG) injection 8 Units  8 Units SubCUTAneous TIDAC Fe Berger MD   8 Units at 10/14/21 0827    0.9% sodium chloride infusion 250 mL  250 mL IntraVENous PRN Paige Cisneros MD        medroxyPROGESTERone (PROVERA) tablet 20 mg  20 mg Oral DAILY Ebony Maxwell MD   20 mg at 10/14/21 0826    budesonide-formoterol (SYMBICORT) 80-4.5 mcg inhaler  2 Puff Inhalation BID RT Huseyin Public L, PA   2 Puff at 10/14/21 0848    albuterol (PROVENTIL HFA, VENTOLIN HFA, PROAIR HFA) inhaler 2 Puff  2 Puff Inhalation Q6H RT Huseyin Public L, PA   2 Puff at 10/14/21 0847    [Held by provider] apixaban (ELIQUIS) tablet 5 mg  5 mg Oral BID Mar Conde MD   5 mg at 10/10/21 1743    insulin glargine (LANTUS) injection 15 Units  15 Units SubCUTAneous DAILY Brandon Lowe MD   15 Units at 10/14/21 0827    insulin lispro (HUMALOG) injection   SubCUTAneous AC&HS Brandon Lowe MD   3 Units at 10/13/21 2142    NUTRITIONAL SUPPORT ELECTROLYTE PRN ORDERS   Does Not Apply PRN Christy Leal NP        hydrALAZINE (APRESOLINE) 20 mg/mL injection 10 mg  10 mg IntraVENous Q6H PRN Rolin Mihir C, NP   10 mg at 09/28/21 0035    morphine injection 1 mg  1 mg IntraVENous Q4H PRN Rolin [place] C, NP   1 mg at 09/29/21 1217    LORazepam (ATIVAN) injection 0.5 mg  0.5 mg IntraVENous Q6H PRN Rolin [place] C, NP   0.5 mg at 09/30/21 1250    phenol throat spray (CHLORASEPTIC) 1 Spray  1 [place] Oral PRN Christy Leal NP        pantoprazole (PROTONIX) tablet 40 mg  40 mg Oral ACB Anitra Valles NP   40 mg at 10/14/21 0610    sodium chloride (NS) flush 30 mL  30 mL InterCATHeter Q8H [place] Nicole C, NP   30 mL at 10/14/21 0612    sodium chloride (NS) flush 30 mL  30 mL InterCATHeter PRN Rolin Mihir C, NP   30 mL at 09/24/21 1416    acetaminophen (TYLENOL) tablet 650 mg  650 mg Oral Q6H PRN Anitra Valles NP   650 mg at 10/13/21 2319    Or    acetaminophen (TYLENOL) suppository 650 mg  650 mg Rectal Q6H PRN Anitra Valles NP        polyethylene glycol (MIRALAX) packet 17 g  17 g Oral DAILY PRN Anitra Valles NP        ondansetron (ZOFRAN ODT) tablet 4 mg  4 mg Oral Q8H PRN Anitra Valles NP   4 mg at 09/30/21 5216    Or    ondansetron (ZOFRAN) injection 4 mg  4 mg IntraVENous Q6H PRN Anitra Valles NP           Medications Prior to Admission   Medication Sig    metFORMIN (GLUCOPHAGE) 1,000 mg tablet Take 1,000 mg by mouth daily.  hydroCHLOROthiazide (MICROZIDE) 12.5 mg capsule Take 12.5 mg by mouth daily.  furosemide (LASIX) 20 mg tablet Take 20 mg by mouth daily.  rosuvastatin (Crestor) 10 mg tablet Take 10 mg by mouth nightly.     methocarbamol (ROBAXIN-750) 750 mg tablet Take 1 Tab by mouth four (4) times daily as needed (for spasm/pain).  lisinopril (PRINIVIL) 10 mg tablet Take 1 Tab by mouth daily.  ibuprofen (MOTRIN) 800 mg tablet Take 1 Tab by mouth every eight (8) hours as needed for Pain.  (Patient not taking: Reported on 9/14/2021)        Allergies   Allergen Reactions    Shellfish Derived Anaphylaxis       Objective:     Physical Exam:  Vitals:    10/14/21 0506 10/14/21 0813 10/14/21 0817 10/14/21 0848   BP: 129/69 112/74 109/73    Pulse: (!) 117 (!) 116 (!) 116    Resp: 26 28 22    Temp: 98.5 °F (36.9 °C) 98.9 °F (37.2 °C) 98.9 °F (37.2 °C)    SpO2: 92% 92% 92% 93%   Weight:       Height:          Pain Assessment  Pain Intensity 1: 0 (10/14/21 0811)  Pain Location 1: Back  Pain Intervention(s) 1: Medication (see MAR) (morphine to be given)  Patient Stated Pain Goal: 0      Assessment:     Hospital Problems  Date Reviewed: 10/6/2021        Codes Class Noted POA    Iron deficiency anemia due to chronic blood loss ICD-10-CM: D50.0  ICD-9-CM: 280.0  10/12/2021 Yes        Vaginal bleeding ICD-10-CM: N93.9  ICD-9-CM: 623.8  10/6/2021 Yes        DVT (deep venous thrombosis) ([email or web address] ICD-10-CM: I82.409  ICD-9-CM: 453.40  9/23/2021 Yes        Morbid obesity (HCC) (Chronic) ICD-10-CM: E66.01  ICD-9-CM: 278.01  9/18/2021 Yes        Suspected sleep apnea ICD-10-CM: R29.818  ICD-9-CM: 781.99  9/18/2021 Yes        * (Principal) Acute respiratory distress syndrome (ARDS) due to COVID-19 virus [facility]) ICD-10-CM: U07.1, J80  ICD-9-CM: 518.82, 079.89  9/14/2021 Yes        DM w/o complication type II (HCC) (Chronic) ICD-10-CM: E11.9  ICD-9-CM: 250.00  9/14/2021 Yes        HTN (hypertension) (Chronic) ICD-10-CM: I10  ICD-9-CM: 401.9  9/14/2021 Yes

## 2021-10-14 NOTE — PROGRESS NOTES
ACUTE PHYSICAL THERAPY GOALS:  (Developed with and agreed upon by patient and/or caregiver. )  LTG:  (1.)Ms. Darylene Lopes will move from supine to sit and sit to supine , scoot up and down and roll side to side in bed with INDEPENDENT within 7 treatment day(s). goal met 10/08/21  (2.)Ms. Darylene Lopes will transfer from bed to chair and chair to bed with INDEPENDENT using the least restrictive device within 7 treatment day(s). goal met 10/08/21  (3.)Ms. Darylene Lopes will ambulate with INDEPENDENT for 100 feet with the least restrictive device within 7 treatment day(s). (4.)Ms. Darylene Lopes will tolerate at least 23 min of dynamic standing activity to assist standing ADLs with the least restrictive device within 7 treatment days. (5.)Ms. Darylene Lopes will climb at least 12 steps with MODIFIED INDEPENDENCE with the least restrictive device within 7 treatment days. PHYSICAL THERAPY: Daily Note and AM Treatment Day # 4    Mic Gaffney is a 23 y.o. female   PRIMARY DIAGNOSIS: Acute respiratory distress syndrome (ARDS) due to COVID-19 virus ([facility] [address]  Acute respiratory failure (HCC) [J96.00]         ASSESSMENT:     REHAB RECOMMENDATIONS: CURRENT LEVEL OF FUNCTION:  (Most Recently Demonstrated)   Recommendation to date pending progress:  Setting:   No further skilled therapy  vs HHPT based on continued progress  Equipment:    None Bed Mobility:   Not tested  Sit to Stand:   Supervision  Transfers:   Supervision  Gait/Mobility:  Meaghan Alexandra Assistance     ASSESSMENT:  Ms. Darylene Lopes presents seated up in chair upon entering room. She is in much better spirits today and agreeable to participation in co-treatment for increased activity tolerance. Pt performed standing and seated self care with OT throughout, working on improved balance and activity tolerance while her SpO2 levels were monitored closely. Education provided on need for activity pacing upon return home. Pt on 50L/85% with SpO2 dropping gradually to 73% with ambulation. Additional rest breaks needed and increased time for SpO2 to increase after mobility. She ambulated 10' in the room with SBA and a chair follow, limited by her O2 tubing. She made small amounts of progress today with decreased overall fatigue. Continue PT efforts. SUBJECTIVE:   Ms. Luisito Betancur states, \"I am feeling much better today now that they told me I don't need a filter\"    SOCIAL HISTORY/ LIVING ENVIRONMENT: see eval  Home Environment: Independent living  One/Two Story Residence: Other (Comment) (one story apartment; lives on 3rd floor)  Living Alone: Yes  Support Systems: Other Family Member(s)  OBJECTIVE:     PAIN: VITAL SIGNS: LINES/DRAINS:   Pre Treatment: Pain Screen  Pain Scale 1: Numeric (0 - 10)  Pain Intensity 1: 0  Post Treatment: 0/10 Vital Signs  Pulse (Heart Rate): (!) 120  O2 Sat (%): 91 %  O2 Device: Heated; Hi flow nasal cannula  O2 Flow Rate (L/min): 50 l/min  FIO2 (%): 85 % Continuous Pulse Oximetry  O2 Device: Heated, Hi flow nasal cannula     MOBILITY: I Mod I S SBA CGA Min Mod Max Total  NT x2 Comments:   Bed Mobility    Rolling [] [] [] [] [] [] [] [] [] [x] []    Supine to Sit [] [] [] [] [] [] [] [] [] [x] []    Scooting [] [] [] [] [] [] [] [] [] [x] []    Sit to Supine [] [] [] [] [] [] [] [] [] [x] []    Transfers    Sit to Stand [] [] [x] [] [] [] [] [] [] [] []    Bed to Chair [] [] [] [] [] [] [] [] [] [x] []    Stand to Sit [] [] [x] [] [] [] [] [] [] [] []    I=Independent, Mod I=Modified Independent, S=Supervision, SBA=Standby Assistance, CGA=Contact Guard Assistance,   Min=Minimal Assistance, Mod=Moderate Assistance, Max=Maximal Assistance, Total=Total Assistance, NT=Not Tested    BALANCE: Good Fair+ Fair Fair- Poor NT Comments   Sitting Static [x] [] [] [] [] []    Sitting Dynamic [x] [] [] [] [] []              Standing Static [x] [] [] [] [] []    Standing Dynamic [x] [] [] [] [] []      GAIT: I Mod I S SBA CGA Min Mod Max Total  NT x2 Comments:   Level of Assistance [] [] [] [x] [] [] [] [] [] [] [] Stand by assist provided if necessary    Distance 10 feet     DME None    Gait Quality N/A    Weightbearing  Status N/A     I=Independent, Mod I=Modified Independent, S=Supervision, SBA=Standby Assistance, CGA=Contact Guard Assistance,   Min=Minimal Assistance, Mod=Moderate Assistance, Max=Maximal Assistance, Total=Total Assistance, NT=Not Tested    PLAN:   FREQUENCY/DURATION: PT Plan of Care: 3 times/week for duration of hospital stay or until stated goals are met, whichever comes first.  TREATMENT:     TREATMENT:   ($$ Therapeutic Activity: 53-67 mins    )  Therapeutic Activity (60 Minutes): Therapeutic activity included Transfer Training, Ambulation on level ground, Sitting balance  and Standing balance to improve functional Mobility, Strength and Activity tolerance.           TREATMENT GRID:   Date:  10/5/21 Date:  10/07/21 Date:     Activity/Exercise Parameters Parameters Parameters   LAQ x10 10    Seated march x10 10    ABD/ADD x10 10    Ankle pumps  10                            AFTER TREATMENT POSITION/PRECAUTIONS:  Chair and Needs within reach    INTERDISCIPLINARY COLLABORATION:  RN/PCT, PT/PTA and OT/LANGLEY    TOTAL TREATMENT DURATION:  PT Patient Time In/Time Out  Time In: 1033  Time Out: [facility]

## 2021-10-14 NOTE — PROGRESS NOTES
Problem: Breathing Pattern - Ineffective  Goal: Ability to achieve and maintain a regular respiratory rate  Outcome: Progressing Towards Goal     Problem: Body Temperature -  Risk of, Imbalanced  Goal: Will regain or maintain usual level of consciousness  Outcome: Progressing Towards Goal     Problem: Loneliness or Risk for Loneliness  Goal: Demonstrate positive use of time alone when socialization is not possible  Outcome: Progressing Towards Goal     Problem: Fatigue  Goal: Verbalize increase energy and improved vitality  Outcome: Progressing Towards Goal     Problem: Patient Education: Go to Patient Education Activity  Goal: Patient/Family Education  Outcome: Progressing Towards Goal     Problem: Diabetes Self-Management  Goal: *Disease process and treatment process  Description: Define diabetes and identify own type of diabetes; list 3 options for treating diabetes. Outcome: Progressing Towards Goal     Problem: Patient Education: Go to Patient Education Activity  Goal: Patient/Family Education  Outcome: Progressing Towards Goal     Problem: Falls - Risk of  Goal: *Absence of Falls  Description: Document Mihai Burciaga Fall Risk and appropriate interventions in the flowsheet.   Outcome: Progressing Towards Goal  Note: Fall Risk Interventions:  Mobility Interventions: Patient to call before getting OOB         Medication Interventions: Patient to call before getting OOB, Teach patient to arise slowly    Elimination Interventions: Call light in reach    History of Falls Interventions: Bed/chair exit alarm         Problem: Patient Education: Go to Patient Education Activity  Goal: Patient/Family Education  Outcome: Progressing Towards Goal

## 2021-10-14 NOTE — PROGRESS NOTES
CM chart review. Patient back on Airvo 50L/85%. CM following to assist with discharge needs when stable. Patient is uninsured, but states out of pocket cost for home oxygen would be affordable if needed.

## 2021-10-14 NOTE — PROGRESS NOTES
ACUTE OT GOALS:  (Developed with and agreed upon by patient and/or caregiver.)  1) Patient will complete total body bathing and dressing with MOD I and adaptive equipment as needed. 2) Patient will complete toileting with SUPERVISION. 3) Patient will complete functional transfers with SUPERVISION and adaptive equipment as needed. 4) Patient will tolerate at least 25 minutes of OT activity with 1-2 rest breaks while maintaining O2 sats >90%. 5) Patient will verbalize at least 3 energy conservation technique to utilize during ADL/IADL.      NEW ADDED GOALS AT RE-EVALUATION 10/7/21  1. Patient will complete grooming ADL in standing with SUPERVISION. 2. Patient will tolerate 10 minutes BUE exercises to increase strength for safe, functional transfers. OCCUPATIONAL THERAPY: Daily Note OT Treatment Day # 3    Rogelio Villareal is a 23 y.o. female   PRIMARY DIAGNOSIS: Acute respiratory distress syndrome (ARDS) due to COVID-19 virus ([facility] [address]  Acute respiratory failure ([facility] [address] [J96.00]       Payor: /   ASSESSMENT:     REHAB RECOMMENDATIONS: CURRENT LEVEL OF FUNCTION:  (Most Recently Demonstrated)   Recommendation to date pending progress:  Setting:   No further skilled therapy vs HHOT pending respiratory status   Equipment:    3 in 1 Bedside Commode Bathing:   Standby Assistance UB/LB/michelle-care, maxA for back and bottom  Dressing:   Standby Assistance  Feeding/Grooming:  Ashley Barroso Standby Assistance  Toileting:   Not tested  Functional Mobility:   Standby Assistance     ASSESSMENT:  Ms. Frankie Connelly is progressing well towards OT goals but remains very limited by her respiratory status. Today, pt was received sitting in the chair on airvo (50L/85%) with O2 sats in the low 90s. Completed sit>stand and short walk to sink SBA. Grooming tasks and UB/LB bathing sitting at sink SBA. Kolleen Bottom in standing SBA with maxA for back and bottom. SBA for UB dressing. O2 sats noted to decrease to 70s with minimal exertion.  Required very frequent rest breaks for extended time period in order to recover back to the low 90s. Pt noted to require VCs for rest breaks today when O2 sats decreased--pt wanting to push forward vs pace herself. Education provided on importance of pacing activities and energy conservation strategies during completion of ADLs. Verbalized understanding and different techniques she can use once she returns home. Ms. Ольга Millan continues to demonstrate overall deficits in strength, balance, activity tolerance, and ADL performance. Continue OT efforts and POC in order to address functional deficits and OT goals stated above. SUBJECTIVE:   Ms. Ольга Millan states, \"I want to run out of this place. \"    SOCIAL HISTORY/LIVING ENVIRONMENT:   Home Environment: Independent living  One/Two Story Residence: Other (Comment) (one story apartment; lives on 3rd floor)  Living Alone: Yes  Support Systems: Other Family Member(s)    OBJECTIVE:     PAIN: VITAL SIGNS: LINES/DRAINS:   Pre Treatment: Pain Screen  Pain Scale 1: Numeric (0 - 10)  Pain Intensity 1: 0  Post Treatment: no change  Vital Signs  Pulse (Heart Rate): (!) 120  O2 Sat (%): 91 %  O2 Device: Heated; Hi flow nasal cannula  O2 Flow Rate (L/min): 50 l/min  FIO2 (%): 85 % Continuous Pulse Oximetry  O2 Device: Heated, Hi flow nasal cannula     ACTIVITIES OF DAILY LIVING: I Mod I S SBA CGA Min Mod Max Total NT Comments   BASIC ADLs:              Bathing/ Showering [] [] [] [x] [] [] [] [x] [] [] SBA for UB in sitting, LB and michelle-care in standing, maxA for back and bottom   Toileting [] [] [] [] [] [] [] [] [] [x]    Dressing [] [] [] [x] [] [] [] [] [] [] Doffed and donned gown    Feeding [] [] [] [] [] [] [] [] [] [x]    Grooming [] [] [] [x] [] [] [] [] [] [] Washed face sitting at sink   Personal Device Care [] [] [] [] [] [] [] [] [] [x]    Functional Mobility [] [] [] [x] [] [] [] [] [] [] No AD   I=Independent, Mod I=Modified Independent, S=Supervision, SBA=Standby Assistance, CGA=Contact Guard Assistance,   Min=Minimal Assistance, Mod=Moderate Assistance, Max=Maximal Assistance, Total=Total Assistance, NT=Not Tested    MOBILITY: I Mod I S SBA CGA Min Mod Max Total  NT x2 Comments:   Supine to sit [] [] [] [] [] [] [] [] [] [x] [] Received in chair    Sit to supine [] [] [] [] [] [] [] [] [] [x] [] Left sitting in chair    Sit to stand [] [] [] [x] [] [] [] [] [] [] []    Bed to chair [] [] [] [] [] [] [] [] [] [] [] SBA for limited mobility in room due to respiratory status    I=Independent, Mod I=Modified Independent, S=Supervision, SBA=Standby Assistance, CGA=Contact Guard Assistance,   Min=Minimal Assistance, Mod=Moderate Assistance, Max=Maximal Assistance, Total=Total Assistance, NT=Not Tested    BALANCE: Good Fair+ Fair Fair- Poor NT Comments   Sitting Static [x] [] [] [] [] []    Sitting Dynamic [] [x] [] [] [] []              Standing Static [] [x] [] [] [] []    Standing Dynamic [] [x] [] [] [] []      PLAN:   FREQUENCY/DURATION: OT Plan of Care: 3 times/week for duration of hospital stay or until stated goals are met, whichever comes first.    TREATMENT:   TREATMENT:   ($$ Self Care/Home Management: 38-52 mins$$ Therapeutic Activity: 8-22 mins   )  Co-Treatment PT/OT necessary due to patient's decreased overall endurance/tolerance levels, as well as need for high level skilled assistance to complete functional transfers/mobility and functional tasks  Self Care (40 Minutes): Self care including Upper Body Bathing, Lower Body Bathing, Upper Body Dressing, Grooming and Energy Conservation Training to increase independence and decrease level of assistance required. Neuromuscular Re-education (20 Minutes): Neuromuscular Re-education included Balance Training, Coordination training, Postural training, Sitting balance training and Standing balance training to improve Balance, Coordination, Functional Mobility and Postural Control.     TREATMENT GRID:  N/A    AFTER TREATMENT POSITION/PRECAUTIONS:  Chair, Needs within reach and RN notified    INTERDISCIPLINARY COLLABORATION:  RN/PCT, PT/PTA and OT/LANGLEY    TOTAL TREATMENT DURATION:  OT Patient Time In/Time Out  Time In: 1033  Time Out: 17[facility], [place], OT

## 2021-10-15 NOTE — PROGRESS NOTES
Josué Nevarez Hospitalist Progress Note     Name:  Balta Latif  Age:26 y.o. Sex:female   :  1995    MRN:  [id number]     Admit Date:  2021    Reason for Admission:  Acute respiratory failure [facility]) [J96.00]    Hospital Course/Interval history:     23year old morbid obese AAF admitted  for acute respiratory failure with hypoxia related to COVID 19 infection. She was found to have a left leg DVT, CT chest could not visualize pulmonary arteries. She was started on a Heparin drip then transitioned to Eliquis that had to be held due to vaginal bleeding, slight hemoptysis and epistaxis      As per prior documentation:  \"Patient seen and examined at bedside this morning. Overnight, patient refused Eliquis due to bleeding and patient thinking this was causing her worsening shortness of breath and weakness. Patient states that she is no longer having any significant vaginal bleeding since being off Eliquis but did suffer from this as recent as yesterday. Discussed with OB/GYN after abdominal ultrasound completed and patient now started on Provera with OB/GYN recommendations to increase. Discussed with patient. Patient states she still is having significant weakness and fatigue, likely related to acute blood loss anemia. Informed her that she does not need blood at this point but if her hemoglobin were to drop at 7.0 or lower and she was still symptomatic, we could give her a unit of blood to assist.  Patient amenable to Eliquis ministration to prevent worsening of her DVT and PE. Patient denies any chest pain or pressure, lightheadedness, dizziness, palpitations, near-syncope or syncope. She was able to be weaned on air Vo to 50% O2 but still remains on 60 L  Via [facility]NC. \"    Subjective :  10/15/2021  Patient seen and evaluated. I was called by the ICU Marylou requesting an ABG secondary to the patient being acutely hypoxic in the 70s on air Vo and talking on the phone.   Patient was not using nonrebreather as recommended. However once nonrebreather was placed her sats remained in the low 80s. ABG showed that the patient had significant hypoxia with a PO2 in the 40s this  She was placed on CPAP sats did trend up to 90  Chest x-ray ordered-shows diffuse worsening bilateral infiltrates  She is currently not on anticoagulation secondary to chronically dropping hemoglobin. She is in no distress but becomes anxious when we discussed the possibility of transfer to the ICU      As per prior documentation:  She previously stated that her oxygen levels have always been low in the past and in the 80s even prior to her having Covid secondary to her obesity. Review of the chart shows that this was not the case. Repeat CT chest showed no evidence of a PE. Repeat Doppler ultrasound showed no evidence of a clot. So she did not get an IVC filter   She is understandably very frustrated about being here in the hospital with no significant improvement in her lungs as she wants to be discharged however she has been more accepting over the last few days. She is attempting to prone. She is out of bed to chair  She is able to get up and down to the bedside commode on her own. Dena Green-mother-updated via phone. All questions answered. Assessment & Plan        Principal Problem:  10/15/2021      Acute respiratory distress syndrome (ARDS) due to COVID-19 virus ([facility] [address] (9/14/2021)  Patient was on BiPAP. Now off BiPAP and still on high flow oxygen at 60 L/min, weaned to 50% O2  On Decadron 6 mg IV once a day. Patient completed remdesivir   received Actemra as well   wean oxygen down, as tolerated.   VQ scan with low suspicion for PE, CT chest could not rule out PE-noted tachycardic, requiring high flow  Pulmonology following      10/15/2021  Now on CPAP  We will get repeat chest x-ray  Lasix  Ask pulmonology to reevaluate her for acutely worsening hypoxic respiratory failure  CT for possible PE was repeated on October 12, 2021 and was negative. Repeat Dopplers done were negative for DVTs and IVC filter was not placed     as previously documented:  \"Review of her chart that shows that although she did have one aberrant reading of her pulse ox in November of last year when she was seen in the ER initially. But all subsequent imaging readings were normal after that. And all readings prior to that were normal.  So possibly was secondary to her poor waveform but she has not consistently hypoxic prior to this admission. \"  We will continue current medical management and wean O2 as tolerated    Cardiomegaly with concern for CHF   10/15/2021  Echo within normal limits       Active Problems:    DM w/o complication type II ([address] (9/14/2021)  10/15/2021    Blood sugar is in 100s-200s  Continue to cover with sliding scale with short acting insulin. Patient is on Lantus 15 units subcu once a day.    Her Premeal insulin was increased to to 7 units 3 times daily  on October 8, 2021.  &  to 8 units 3 times daily [facility] on October 12, 2021  We will continue to monitor blood sugars and titrate as indicated       HTN (hypertension) (9/14/2021)  10/15/2021    Fairly well controlled on current regimen  We will titrate medications as indicated       Morbid obesity ([address] (9/18/2021)  10/15/2021    No active issues however this is complicating her care and adding complexity to her case as it is associated with a more prolonged and severe course of COVID-19 with increased morbidity and mortality      Suspected sleep apnea (9/18/2021)  10/15/2021    She was tried on CPAP last night which she kept down but states she was unable to sleep        Thrombocytopenia ([address] (9/22/2021)  Resolved       DVT (deep venous thrombosis) ([address] (9/23/2021)  10/15/2021   Eliquis resumed October 7, 2021, she denies any further episodes of bleeding  Given recurrent drops in her hemoglobin and has mostly been on hold   she was transfused 1 unit of blood previously with plan for transfusion of an additional unit of blood today   continue Provera per OB/GYN recommendations (do not want to add estrogen due to DVT)  VQ scan previously ordered since CT chest inconclusive - low probability of PE  Given the persistently low hemoglobin levels will consult IR for IVC filter continue to hold anticoagulation for now. If her hemoglobin remains stable would consider reinstating oral anticoagulation         ALLAN (acute kidney injury) ([facility] [address] (9/30/2021)  resolved     AUB:  10/15/2021    Vaginal bleeding has currently resolved per the patient  Patient states history of irregular periods for \"years\", never seen by GYN  Increase Provera per OBGYN(do not want to add estrogen due to DVT)  OBGYN consulted 10/7 - intravaginal US reviewed by OBGYN  **Will need outpatient follow up with GYN, on airvo, COVID +    Anemia-acute blood loss anemia secondary to vaginal bleeding  She is status post 1 unit of packed red blood cells where her hemoglobin reji appropriately  She was also given an additional 2 units of packed red blood cells on October 12, 2021. She has not had any further episodes of bleeding  She has not had any further episodes of bleeding that have been noted by her or the staff. However her hemoglobin continues to drop   Getting IV iron replacement therapy   Given her persistent tachycardia and increased work of breathing-we will request an additional 2 units          Dispo/Discharge Planning: To be determined based on her coming off of air Vo and being weaned down to an acceptable level of oxygen via nasal cannula     Diet:  ADULT DIET Regular; 3 carb choices (45 gm/meal); Low Sodium (2 gm)  ADULT ORAL NUTRITION SUPPLEMENT Dinner, Breakfast, Lunch; Diabetic Supplement  DVT PPx: Apixaban resumed today  Code status: Full Code           Review of Systems: 14 point review of systems is otherwise negative with the exception of the elements mentioned above.     Objective: Patient Vitals for the past 24 hrs:   Temp Pulse Resp BP SpO2   10/15/21 0729 99.1 °F (37.3 °C) (!) 131 22 116/81 94 %   10/15/21 0722 -- -- -- -- (!) 89 %   10/15/21 0614 99.9 °F (37.7 °C) (!) 127 22 115/81 90 %   10/15/21 0250 -- -- -- -- 90 %   10/15/21 0110 97.7 °F (36.5 °C) (!) 127 20 124/85 91 %   10/15/21 0019 -- -- -- -- 90 %   10/14/21 2226 99.6 °F (37.6 °C) -- -- -- --   10/14/21 2110 -- -- -- -- 92 %   10/14/21 2020 (!) 100.6 °F (38.1 °C) (!) 128 20 128/62 93 %   10/14/21 1607 -- -- -- -- 95 %   10/14/21 1539 -- -- -- -- 92 %   10/14/21 1514 99.1 °F (37.3 °C) (!) 122 -- 122/60 (!) 88 %   10/14/21 1404 -- -- -- 101/83 --   10/14/21 1300 -- (!) 120 -- -- 91 %   10/14/21 1033 -- (!) 120 -- -- 91 %   10/14/21 1011 99 °F (37.2 °C) (!) 110 -- 101/83 95 %   10/14/21 0928 -- (!) 106 -- -- --   10/14/21 0848 -- -- -- -- 93 %     Oxygen Therapy  O2 Sat (%): 94 % (10/15/21 0729)  Pulse via Oximetry: 108 beats per minute (10/15/21 0722)  O2 Device: Heated; Hi flow nasal cannula; Non-rebreather mask (10/15/21 7552)  Skin Assessment: Clean, dry, & intact (10/13/21 1938)  Skin Protection for O2 Device: Yes (10/02/21 1640)  Orientation: Bilateral (10/02/21 1640)  Location: Cheek (10/02/21 1640)  O2 Flow Rate (L/min): 60 l/min (10/15/21 0722)  O2 Temperature: 87.8 °F (31 °C) (10/15/21 0722)  FIO2 (%): 100 % (10/15/21 0722)    Body mass index is 65.42 kg/m². Physical Exam:   General:          Well nourished. BMI of 63.18  Patient sitting in bedside chair, on high flow oxygen. no significant SOB with speaking. Head:               Normocephalic, atraumatic, pale   Eyes:               Sclerae appear normal.  Pupils equally round. ENT:                Nares appear normal, no drainage. Moist oral mucosa  Neck:               No restricted ROM. Trachea midline   CV:                  RRR. No m/r/g. No jugular venous distension. Lungs:             Diminished in the bases No wheezing, rhonchi, or rales.     Difficult exam secondary to her obesity  Abdomen: Bowel sounds present. Soft, nontender, nondistended. Extremities:     No cyanosis or clubbing. Bilateral pitting edema   Skin:                No rashes and normal coloration. Warm and dry. Neuro:             Cranial nerves II-XII grossly intact. Sensation intact.   A&Ox3  Psych:             Normal mood and affect.         Data Review:  I have reviewed all labs, meds, and studies from the last 24 hours:    Labs:    Recent Results (from the past 24 hour(s))   GLUCOSE, POC    Collection Time: 10/14/21 11:59 AM   Result Value Ref Range    Glucose (POC) 285 (H) 65 - 100 mg/dL    Performed by [facility]    ECHO ADULT COMPLETE    Collection Time: 10/14/21  2:05 PM   Result Value Ref Range    LV ED Vol A2C 124.00 cm3    LV ED Vol A4C 110.00 cm3    LV ES Vol A2C 52.50 cm3    LV ES Vol A4C 40.80 cm3    IVSd 1.22 (A) 0.60 - 0.90 cm    LVIDd 4.79 3.90 - 5.30 cm    LVIDs 3.38 cm    LVOT d 2.10 cm    LVOT VTI 20.80 cm    LVOT Peak Gradient 7.00 mmHg    LVPWd 1.22 (A) 0.60 - 0.90 cm    LV E' Lateral Velocity 11.20 cm/s    LV E' Septal Velocity 10.30 cm/s    Left Atrium Minor Axis 2.17 cm    Left Atrium Major Axis 6.29 cm    LA Area 2C 16.40 cm2    LA Area 4C 18.80 cm2    Aortic Valve Systolic Mean Gradient 9.81 mmHg    AoV VTI 23.20 cm    Aortic Valve Systolic Peak Velocity 649.18 cm/s    AoV PG 12.00 mmHg    Aortic Valve Area by Continuity of VTI 3.10 cm2    Aortic Valve Area by Continuity of Peak Velocity 2.63 cm2    Ao Root D 2.90 cm    MV A Rusty 105.00 cm/s    MV E Rusty 84.60 cm/s    Tapse 2.43 (A) 1.50 - 2.00 cm    TR Max Velocity 323.00 cm/s    Triscuspid Valve Regurgitation Peak Gradient 42.00 mmHg    MV E/A 0.81     LV Mass .6 67.0 - 162.0 g    LV Mass AL Index 77.1 43.0 - 95.0 g/m2    E/E' lateral 7.55     E/E' septal 8.21     RVSP 50.0 mmHg    E/E' ratio (averaged) 7.88     Est. RA Pressure 8.0 mmHg    ELMA/BSA Pk Rusty 0.9 cm2/m2    ELMA/BSA VTI 1.1 cm2/m2 GLUCOSE, POC    Collection Time: 10/14/21  3:52 PM   Result Value Ref Range    Glucose (POC) 217 (H) 65 - 100 mg/dL    Performed by [facility]    GLUCOSE, POC    Collection Time: 10/14/21 10:06 PM   Result Value Ref Range    Glucose (POC) 224 (H) 65 - 100 mg/dL    Performed by [facility]    METABOLIC PANEL, BASIC    Collection Time: 10/15/21  5:23 AM   Result Value Ref Range    Sodium 136 136 - 145 mmol/L    Potassium 3.9 3.5 - 5.1 mmol/L    Chloride 105 98 - 107 mmol/L    CO2 27 21 - 32 mmol/L    Anion gap 4 (L) 7 - 16 mmol/L    Glucose 186 (H) 65 - 100 mg/dL    BUN 5 (L) 6 - 23 MG/DL    Creatinine 0.57 (L) 0.6 - 1.0 MG/DL    GFR est AA >60 >60 ml/min/1.73m2    GFR est non-AA >60 >60 ml/min/1.73m2    Calcium 8.4 8.3 - 10.4 MG/DL   MAGNESIUM    Collection Time: 10/15/21  5:23 AM   Result Value Ref Range    Magnesium 1.9 1.8 - 2.4 mg/dL   PHOSPHORUS    Collection Time: 10/15/21  5:23 AM   Result Value Ref Range    Phosphorus 3.7 2.5 - 4.5 MG/DL       All Micro Results     Procedure Component Value Units Date/Time    CULTURE, RESPIRATORY/SPUTUM/BRONCH Linda Alosa STAIN [[id number]] Collected: 10/08/21 1415    Order Status: Canceled Specimen: Sputum     CULTURE, BLOOD [[id number]] Collected: 09/14/21 0759    Order Status: Completed Specimen: Blood Updated: 09/19/21 0637     Special Requests: --        LEFT  HAND       Culture result: NO GROWTH 5 DAYS       CULTURE, BLOOD [[id number]] Collected: 09/14/21 0757    Order Status: Completed Specimen: Blood Updated: 09/19/21 0637     Special Requests: --        RIGHT  Antecubital       Culture result: NO GROWTH 5 DAYS       CULTURE, RESPIRATORY/SPUTUM/BRONCH Linda Alosa STAIN [[id number]] Collected: 09/14/21 0915    Order Status: Canceled Specimen: Sputum     COVID-19 RAPID TEST [[id number]]  (Abnormal) Collected: 09/14/21 0535    Order Status: Completed Specimen: Nasopharyngeal Updated: 09/14/21 0558     Specimen source NASAL        COVID-19 rapid test Detected        Comment: The specimen is POSITIVE for SARS-CoV-2, the novel coronavirus associated with COVID-19. This test has been authorized by the FDA under an Emergency Use Authorization (EUA) for use by authorized laboratories. Fact sheet for Healthcare Providers: ConventionUpdate.co.nz  Fact sheet for Patients: ConventionUpdate.co.nz       Methodology: Isothermal Nucleic Acid Amplification  RESULTS VERIFIED, PHONED TO AND READ BACK BY   FERDINAND BENOIT AT 1507 ON 9/14/21 ACL               EKG Results     None          Other Studies:  ECHO ADULT COMPLETE    Result Date: 10/14/2021  · LV: Estimated LVEF is 60 - 65%. Normal cavity size, systolic function (ejection fraction normal) and diastolic function. Mild concentric hypertrophy. Wall motion: normal. · Contrast used: DEFINITY.         Current Meds:   Current Facility-Administered Medications   Medication Dose Route Frequency    ferric gluconate (FERRLECIT) 250 mg in 0.9% sodium chloride 250 mL IVPB  250 mg IntraVENous DAILY    lip protectant (BLISTEX) ointment 1 Each  1 Each Topical PRN    0.9% sodium chloride infusion 250 mL  250 mL IntraVENous PRN    insulin lispro (HUMALOG) injection 8 Units  8 Units SubCUTAneous TIDAC    0.9% sodium chloride infusion 250 mL  250 mL IntraVENous PRN    medroxyPROGESTERone (PROVERA) tablet 20 mg  20 mg Oral DAILY    budesonide-formoterol (SYMBICORT) 80-4.5 mcg inhaler  2 Puff Inhalation BID RT    albuterol (PROVENTIL HFA, VENTOLIN HFA, PROAIR HFA) inhaler 2 Puff  2 Puff Inhalation Q6H RT    [Held by provider] apixaban (ELIQUIS) tablet 5 mg  5 mg Oral BID    insulin glargine (LANTUS) injection 15 Units  15 Units SubCUTAneous DAILY    insulin lispro (HUMALOG) injection   SubCUTAneous AC&HS    NUTRITIONAL SUPPORT ELECTROLYTE PRN ORDERS   Does Not Apply PRN    hydrALAZINE (APRESOLINE) 20 mg/mL injection 10 mg  10 mg IntraVENous Q6H PRN    morphine injection 1 mg  1 mg IntraVENous Q4H PRN    LORazepam (ATIVAN) injection 0.5 mg  0.5 mg IntraVENous Q6H PRN    phenol throat spray (CHLORASEPTIC) 1 Spray  1 Spray Oral PRN    pantoprazole (PROTONIX) tablet 40 mg  40 mg Oral ACB    sodium chloride (NS) flush 30 mL  30 mL InterCATHeter Q8H    sodium chloride (NS) flush 30 mL  30 mL InterCATHeter PRN    acetaminophen (TYLENOL) tablet 650 mg  650 mg Oral Q6H PRN    Or    acetaminophen (TYLENOL) suppository 650 mg  650 mg Rectal Q6H PRN    polyethylene glycol (MIRALAX) packet 17 g  17 g Oral DAILY PRN    ondansetron (ZOFRAN ODT) tablet 4 mg  4 mg Oral Q8H PRN    Or    ondansetron (ZOFRAN) injection 4 mg  4 mg IntraVENous Q6H PRN       Problem List:  Hospital Problems as of 10/15/2021 Date Reviewed: 10/6/2021        Codes Class Noted - Resolved POA    Iron deficiency anemia due to chronic blood loss ICD-10-CM: D50.0  ICD-9-CM: 280.0  10/12/2021 - Present Yes        Vaginal bleeding ICD-10-CM: N93.9  ICD-9-CM: 623.8  10/6/2021 - Present Yes        DVT (deep venous thrombosis) (HCC) ICD-10-CM: I82.409  ICD-9-CM: 453.40  9/23/2021 - Present Yes        Morbid obesity (HCC) (Chronic) ICD-10-CM: E66.01  ICD-9-CM: 278.01  9/18/2021 - Present Yes        Suspected sleep apnea ICD-10-CM: R29.818  ICD-9-CM: 781.99  9/18/2021 - Present Yes        * (Principal) Acute respiratory distress syndrome (ARDS) due to COVID-19 virus [facility]) ICD-10-CM: U07.1, J80  ICD-9-CM: 518.82, 079.89  9/14/2021 - Present Yes        DM w/o complication type II (HCC) (Chronic) ICD-10-CM: E11.9  ICD-9-CM: 250.00  9/14/2021 - Present Yes        HTN (hypertension) (Chronic) ICD-10-CM: I10  ICD-9-CM: 401.9  9/14/2021 - Present Yes        RESOLVED: ALLAN (acute kidney injury) ([facility] ICD-10-CM: N17.9  ICD-9-CM: 584.9  9/30/2021 - 10/8/2021 Yes        RESOLVED: Thrombocytopenia ([facility] ICD-10-CM: D69.6  ICD-9-CM: 287.5  9/22/2021 - 10/8/2021 Yes        RESOLVED: Acute respiratory failure ([facility] ICD-10-CM: J96.00  ICD-9-CM: 518.81  9/14/2021 - 9/16/2021 Yes Signed By: Jade Clements MD   [facility] Hospitalist Service    October 15, 2021  5:15 PM

## 2021-10-15 NOTE — PROGRESS NOTES
Pt is in bed resting. Airvo 60/100, Nonrebreather 15%, sats ranging between high 70's to mid 80's while at rest. Pt is A/O x 4. Respiratory informed and verified pts condition. Will contact [place] in relation to patients condition. Will continue to monitor and provide care.

## 2021-10-15 NOTE — PROGRESS NOTES
PT note:     RN and respiratory requesting HOLD patient at this time due to decline in respiratory status. Patient is sat-ing below 88% at rest on max airvo. Will check back on patient at a later date/time as schedule allows and is appropriate.       Lisbeth Segal, ARTHUR

## 2021-10-15 NOTE — PROGRESS NOTES
TRANSFER - OUT REPORT:    Verbal report given to [facility], RN on Tana Stallings  being transferred to CCU for change in patient condition(CCU)       Report consisted of patients Situation, Background, Assessment and   Recommendations(SBAR). Information from the following report(s) SBAR was reviewed with the receiving nurse. Lines:   PICC Triple Lumen 35/84/67 Right;Cephalic (Active)   Central Line Being Utilized Yes 10/15/21 0705   Criteria for Appropriate Use Limited/no vessel suitable for conventional peripheral access 10/15/21 0705   Site Assessment Clean, dry, & intact 10/15/21 0705   Phlebitis Assessment 0 10/15/21 0705   Infiltration Assessment 0 10/15/21 0705   Arm Circumference (cm) 54 cm 09/28/21 1606   Date of Last Dressing Change 10/12/21 10/13/21 1940   Dressing Status Clean, dry, & intact 10/15/21 0705   External Catheter Length (cm) 3 centimeters 10/15/21 0705   Dressing Type Disk with Chlorhexadine gluconate (CHG) 10/15/21 0705   Action Taken Blood drawn 10/15/21 0705   Hub Color/Line Status Red 10/15/21 0705   Positive Blood Return (Site #1) Yes 10/15/21 0705   Hub Color/Line Status White 10/15/21 0705   Positive Blood Return (Site #2) Yes 10/15/21 0705   Hub Color/Line Status Warren Person 10/15/21 0705   Positive Blood Return (Site #3) Yes 10/15/21 0705   Alcohol Cap Used No 10/14/21 2226        Opportunity for questions and clarification was provided.       Patient transported with:   Registered Nurse

## 2021-10-15 NOTE — PROGRESS NOTES
Pt c/o chest pain, prn pain med admnistered. Respirations 28 bpm, O2 sats high 70's mid 80's, -140's. Provider notified face to face. Will continue to monitor and provide care.

## 2021-10-15 NOTE — PROGRESS NOTES
Critical Care Outreach Nurse Progress Report:    Subjective: In to assess pt secondary to RT and nursing concern. MEWS Score: 5 (10/15/21 0729)    Vitals:    10/15/21 0614 10/15/21 0722 10/15/21 0729 10/15/21 1113   BP: 115/81  116/81 128/78   Pulse: (!) 127  (!) 131 (!) 126   Resp: 22  22 25   Temp: 99.9 °F (37.7 °C)  99.1 °F (37.3 °C) 99.1 °F (37.3 °C)   SpO2: 90% (!) 89% 94% 92%   Weight:       Height:            LAB DATA:    Recent Labs     10/15/21  0523 10/14/21  0614 10/13/21  1625    140 140   K 3.9 3.7 3.9    107 107   CO2 27 29 29   AGAP 4* 4* 4*   * 143* 175*   BUN 5* 6 8   CREA 0.57* 0.53* 0.71   GFRAA >60 >60 >60   GFRNA >60 >60 >60   CA 8.4 8.3 8.3   MG 1.9  --  1.8   PHOS 3.7  --  3.5        Recent Labs     10/15/21  0953 10/13/21  1625 10/13/21  0312   WBC  --  8.9  --    HGB 7.8* 8.5* 8.2*   HCT 27.9* 29.9* 29.2*   PLT  --  233  --         Objective:     Pain Intensity 1: 0 (10/14/21 2020)  Pain Location 1: Back  Pain Intervention(s) 1: Medication (see MAR) (morphine to be given)  Patient Stated Pain Goal: 0    Assessment: Patient is sitting up in bed on her phone, complaints of chest tightness but no SOB. O2 sat is 88% on 60L/100% and nonrebreather. HR in the 120s. ABG ordered. MD contacted, 1 unit of blood ordered for tachycardia and anemia. Primary RN and RT notified. Plan: Escalate care as needed.

## 2021-10-15 NOTE — PROGRESS NOTES
10/15/2021  Pt has demonstrated a decline in medical status with transfer to the unit. Will discontinue OT at this time. Please re-consult if patient is appropriate to participate.   Thank you,  Héctor Gave, OT

## 2021-10-15 NOTE — PROGRESS NOTES
Problem: Airway Clearance - Ineffective  Goal: Achieve or maintain patent airway  Outcome: Progressing Towards Goal     Problem: Gas Exchange - Impaired  Goal: Absence of hypoxia  Outcome: Progressing Towards Goal  Goal: Promote optimal lung function  Outcome: Progressing Towards Goal     Problem: Breathing Pattern - Ineffective  Goal: Ability to achieve and maintain a regular respiratory rate  Outcome: Progressing Towards Goal     Problem: Body Temperature -  Risk of, Imbalanced  Goal: Ability to maintain a body temperature within defined limits  Outcome: Progressing Towards Goal  Goal: Will regain or maintain usual level of consciousness  Outcome: Progressing Towards Goal  Goal: Complications related to the disease process, condition or treatment will be avoided or minimized  Outcome: Progressing Towards Goal     Problem: Risk for Fluid Volume Deficit  Goal: Maintain normal heart rhythm  Outcome: Progressing Towards Goal  Goal: Maintain absence of muscle cramping  Outcome: Progressing Towards Goal  Goal: Maintain normal serum potassium, sodium, calcium, phosphorus, and pH  Outcome: Progressing Towards Goal     Problem: Loneliness or Risk for Loneliness  Goal: Demonstrate positive use of time alone when socialization is not possible  Outcome: Progressing Towards Goal     Problem: Fatigue  Goal: Verbalize increase energy and improved vitality  Outcome: Progressing Towards Goal     Problem: Patient Education: Go to Patient Education Activity  Goal: Patient/Family Education  Outcome: Progressing Towards Goal     Problem: Diabetes Self-Management  Goal: *Disease process and treatment process  Description: Define diabetes and identify own type of diabetes; list 3 options for treating diabetes. Outcome: Progressing Towards Goal  Goal: *Incorporating nutritional management into lifestyle  Description: Describe effect of type, amount and timing of food on blood glucose; list 3 methods for planning meals.   Outcome: Progressing Towards Goal  Goal: *Incorporating physical activity into lifestyle  Description: State effect of exercise on blood glucose levels. Outcome: Progressing Towards Goal  Goal: *Developing strategies to promote health/change behavior  Description: Define the ABC's of diabetes; identify appropriate screenings, schedule and personal plan for screenings. Outcome: Progressing Towards Goal  Goal: *Using medications safely  Description: State effect of diabetes medications on diabetes; name diabetes medication taking, action and side effects. Outcome: Progressing Towards Goal  Goal: *Monitoring blood glucose, interpreting and using results  Description: Identify recommended blood glucose targets  and personal targets. Outcome: Progressing Towards Goal  Goal: *Prevention, detection, treatment of acute complications  Description: List symptoms of hyper- and hypoglycemia; describe how to treat low blood sugar and actions for lowering  high blood glucose level. Outcome: Progressing Towards Goal  Goal: *Prevention, detection and treatment of chronic complications  Description: Define the natural course of diabetes and describe the relationship of blood glucose levels to long term complications of diabetes.   Outcome: Progressing Towards Goal  Goal: *Developing strategies to address psychosocial issues  Description: Describe feelings about living with diabetes; identify support needed and support network  Outcome: Progressing Towards Goal  Goal: *Insulin pump training  Outcome: Progressing Towards Goal  Goal: *Sick day guidelines  Outcome: Progressing Towards Goal  Goal: *Patient Specific Goal (EDIT GOAL, INSERT TEXT)  Outcome: Progressing Towards Goal     Problem: Patient Education: Go to Patient Education Activity  Goal: Patient/Family Education  Outcome: Progressing Towards Goal     Problem: Falls - Risk of  Goal: *Absence of Falls  Description: Document [place] Fall Risk and appropriate interventions in the flowsheet. Outcome: Progressing Towards Goal  Note: Fall Risk Interventions:  Mobility Interventions: Patient to call before getting OOB         Medication Interventions: Patient to call before getting OOB    Elimination Interventions: Call light in reach    History of Falls Interventions: Bed/chair exit alarm         Problem: Patient Education: Go to Patient Education Activity  Goal: Patient/Family Education  Outcome: Progressing Towards Goal     Problem: Pressure Injury - Risk of  Goal: *Prevention of pressure injury  Description: Document Evangelista Scale and appropriate interventions in the flowsheet.   Outcome: Progressing Towards Goal     Problem: Patient Education: Go to Patient Education Activity  Goal: Patient/Family Education  Outcome: Progressing Towards Goal     Problem: Patient Education: Go to Patient Education Activity  Goal: Patient/Family Education  Outcome: Progressing Towards Goal     Problem: Patient Education: Go to Patient Education Activity  Goal: Patient/Family Education  Outcome: Progressing Towards Goal

## 2021-10-15 NOTE — PROGRESS NOTES
Pt is in bed resting. CPAP on with O2 sats reading in the mid 80's HR around 130-140's via monitor. Pt states she is a little anxious. PRN meds administered. Pt also stated she was chilly and requested another blanket. Another blanket provided. Will continue to monitor and provide care.

## 2021-10-15 NOTE — PROGRESS NOTES
10/15/21 0019   Oxygen Therapy   O2 Sat (%) 90 %   Pulse via Oximetry 126 beats per minute   O2 Device CPAP mask   FIO2 (%) 75 %   Respiratory   Respiratory (WDL) X   CPAP/BIPAP   CPAP/BIPAP Start/Stop On   Device Mode CPAP   $$ CPAP Daily Yes   Mask Type and Size Full face; Medium   Skin Condition intact   PIP Observed 16 cm H20   EPAP (cm H2O) 12 cm H2O   Vt Spont (ml) 794 ml   Ve Observed (l/min) 23.2 l/min   Total RR (Spontaneous) 29 breaths per minute   Insp Rise Time (sec) 3   Leak (Estimated) 26 L/min   Pt's Home Machine No   Biomedical Check Performed Yes   Settings Verified Yes   Alarm Settings   High Pressure 30   Low Pressure 5   Apnea 20   Low Ve 3   High Rate 50   Low Rate 8   Pulmonary Toilet   Pulmonary Toilet H. O.B elevated

## 2021-10-15 NOTE — PROGRESS NOTES
Pt is in bed talking on the phone via facetime with CPAP on. PT educated on the importance of keeping on CPAP even while on the phone. Pt understands and agrees. Insulin coverage given for breakfast and lunch. Will continue to monitor and provide care.

## 2021-10-15 NOTE — PROGRESS NOTES
Rogelio Villareal  Admission Date: 9/14/2021         Daily Progress Note: 10/15/2021    The patient's chart is reviewed and the patient is discussed with the staff. Background: 23yo  female admitted with acute respiratory failure due to Basil Delfina. She was admitted on 9/14 with a 2 day history of dyspnea. She received remdesivier and decadron. She initially required 6L NC but worsened and required BiPAP 100% and her resting saturation was 91% but dropped to 70% with exertion. Received Actemra 9/16. Patient was moved to tele obs ICU on BiPAP nad on 9/29 was able to transfer to floor on airvo. She was found to have L calf DVT on 9/22 and was on heparin and then transitioned to eliquis. She was having vaginal bleeding and has required multiple blood transfusions. V/Q scan with low probability of PE. Vaginal U/S with findings of cyst, HCG negative. GYN recommended pt resume eliquis and start provera. CT chest could not r/o PE. She was able to wean O2. Given bleeding issues she was evaluated for IVC filter but was not candidate. We had originally signed off but were re-consulted given worsening hypoxia. ABG 7.4/43.4/46/27. She was placed on CPAP and then BiPAP 100% with O2 sat remaining in 70s. She is tachycardic in 130-140s and tachypneic. She has received lasix. CXR with worsening infiltrates. Repeat dopplers 10/13 negative for previously seen DVT. Subjective:     Patient states she is having chest pain that she describes as soreness. She has had cough and some SOB. She denies any other issues. She is supposed to receive 1U PRBC today.      Current Facility-Administered Medications   Medication Dose Route Frequency    0.9% sodium chloride infusion 250 mL  250 mL IntraVENous PRN    morphine injection 1 mg  1 mg IntraVENous Q3H PRN    [Held by provider] ferric gluconate (FERRLECIT) 250 mg in 0.9% sodium chloride 250 mL IVPB  250 mg IntraVENous DAILY    lip protectant (BLISTEX) ointment 1 Each  1 Each Topical PRN    0.9% sodium chloride infusion 250 mL  250 mL IntraVENous PRN    insulin lispro (HUMALOG) injection 8 Units  8 Units SubCUTAneous TIDAC    0.9% sodium chloride infusion 250 mL  250 mL IntraVENous PRN    medroxyPROGESTERone (PROVERA) tablet 20 mg  20 mg Oral DAILY    budesonide-formoterol (SYMBICORT) 80-4.5 mcg inhaler  2 Puff Inhalation BID RT    albuterol (PROVENTIL HFA, VENTOLIN HFA, PROAIR HFA) inhaler 2 Puff  2 Puff Inhalation Q6H RT    [Held by provider] apixaban (ELIQUIS) tablet 5 mg  5 mg Oral BID    insulin glargine (LANTUS) injection 15 Units  15 Units SubCUTAneous DAILY    insulin lispro (HUMALOG) injection   SubCUTAneous AC&HS    NUTRITIONAL SUPPORT ELECTROLYTE PRN ORDERS   Does Not Apply PRN    hydrALAZINE (APRESOLINE) 20 mg/mL injection 10 mg  10 mg IntraVENous Q6H PRN    morphine injection 1 mg  1 mg IntraVENous Q4H PRN    LORazepam (ATIVAN) injection 0.5 mg  0.5 mg IntraVENous Q6H PRN    phenol throat spray (CHLORASEPTIC) 1 Spray  1 Spray Oral PRN    pantoprazole (PROTONIX) tablet 40 mg  40 mg Oral ACB    sodium chloride (NS) flush 30 mL  30 mL InterCATHeter Q8H    sodium chloride (NS) flush 30 mL  30 mL InterCATHeter PRN    acetaminophen (TYLENOL) tablet 650 mg  650 mg Oral Q6H PRN    Or    acetaminophen (TYLENOL) suppository 650 mg  650 mg Rectal Q6H PRN    polyethylene glycol (MIRALAX) packet 17 g  17 g Oral DAILY PRN    ondansetron (ZOFRAN ODT) tablet 4 mg  4 mg Oral Q8H PRN    Or    ondansetron (ZOFRAN) injection 4 mg  4 mg IntraVENous Q6H PRN     Review of Systems  Chest tightness, SOB  Constitutional: negative for fever, chills, sweats  Cardiovascular: negative for  palpitations, syncope, edema  Gastrointestinal:  negative for dysphagia, reflux, vomiting, diarrhea, abdominal pain, or melena  Neurologic:  negative for focal weakness, numbness, headache  Objective:     Vitals:    10/15/21 8542 10/15/21 0722 10/15/21 0729 10/15/21 1113 BP: 115/81  116/81 128/78   Pulse: (!) 127  (!) 131 (!) 126   Resp: 22  22 25   Temp: 99.9 °F (37.7 °C)  99.1 °F (37.3 °C) 99.1 °F (37.3 °C)   SpO2: 90% (!) 89% 94% 92%   Weight:       Height:         No intake or output data in the 24 hours ending 10/15/21 1413  Physical Exam:   Constitution:  the patient is well developed and in no acute distress  HEENT:  Sclera clear, pupils equal, oral mucosa moist  Respiratory: diminished, on BiPAP 100%  Cardiovascular:  RRR without M,G,R  Gastrointestinal: soft and non-tender; with positive bowel sounds. Musculoskeletal: warm without cyanosis. There is trace lower extremity edema. Skin:  no jaundice or rashes, no wounds   Neurologic: no gross neuro deficits     Psychiatric:  alert and oriented x 3    CXR: 10/15      LAB:  Recent Labs     10/15/21  0953 10/13/21  1625 10/13/21  0312   WBC  --  8.9  --    HGB 7.8* 8.5* 8.2*   HCT 27.9* 29.9* 29.2*   PLT  --  233  --      Recent Labs     10/15/21  0523 10/14/21  0614 10/13/21  1625    140 140   K 3.9 3.7 3.9    107 107   CO2 27 29 29   * 143* 175*   BUN 5* 6 8   CREA 0.57* 0.53* 0.71   MG 1.9  --  1.8   CA 8.4 8.3 8.3   PHOS 3.7  --  3.5     No results for input(s): LAC, TROPHS, BNPNT, CRP in the last 72 hours. No lab exists for component: ESR  Recent Labs     10/15/21  1213   PHI 7.40   PCO2I 43.4   PO2I 46*   HCO3I 27.0*     No results for input(s): SDES, CULT in the last 72 hours. Assessment and Plan:  (Medical Decision Making)   Principal Problem:    Acute respiratory distress syndrome (ARDS) due to COVID-19 virus ([facility] [address] (9/14/2021)    Now worsening with SpO2 in 70s on BiPAP 100%. Will restart decadron. Change medication to IV as able. High risk for decompensation. Will transfer to ICU. Check repeat D-Dimer and nt BNP. CBC, Lactic, Procal all ordered by hospitalist. Change inhalers to nebs while on BiPAP. Will check blood cultures and urine cultures.      Active Problems:    DM w/o complication type II ([facility] [address] (9/14/2021)    Lantus, SSI per primary      HTN (hypertension) (9/14/2021)    Controlled      Morbid obesity ([facility] [address] (9/18/2021)    Adds to complexity of care      Suspected sleep apnea (9/18/2021)    Will need split night PSG after discharge      DVT (deep venous thrombosis) ([facility] [address] (9/23/2021)    Not seen on repeat doppler. Eliquis on hold. Vaginal bleeding (10/6/2021)    GYN started provera. No further vaginal bleeding per RN. Prophy: protonix, eliquis on hold per primary    Patient's mother Ashley Joe at [phone] updated. More than 50% of the time documented was spent in face-to-face contact with the patient and in the care of the patient on the floor/unit where the patient is located. Lenka Mom, NP     I have spoken with and examined the patient. I agree with the above assessment and plan as documented. Constitution:  the patient is well developed and in no acute distress  HEENT:  Sclera clear, pupils equal, oral mucosa moist  Respiratory: diminished, on BiPAP 100%  Cardiovascular:  RRR without M,G,R  Gastrointestinal: soft and non-tender; with positive bowel sounds. Musculoskeletal: warm without cyanosis. There is trace lower extremity edema. Skin:  no jaundice or rashes, no wounds   Neurologic: no gross neuro deficits     Psychiatric:  alert and oriented x 3      Will restart BIPAP, decadron, BD and full dose Lovenox. Repeat other studies. High risk for decompensation.  Discussed with nursing and RT    Jonnie Michael MD

## 2021-10-16 PROBLEM — D64.9 ANEMIA: Status: ACTIVE | Noted: 2021-01-01

## 2021-10-16 PROBLEM — R50.9 FEVER: Status: ACTIVE | Noted: 2021-01-01

## 2021-10-16 NOTE — PROGRESS NOTES
Sylvia Keys  Admission Date: 9/14/2021         Daily Progress Note: 10/16/2021    The patient's chart is reviewed and the patient is discussed with the staff. Background: 25yo  female admitted with acute respiratory failure due to [address]. She was admitted on 9/14 with a 2 day history of dyspnea. She received remdesivier and decadron. She initially required 6L NC but worsened and required BiPAP 100% and her resting saturation was 91% but dropped to 70% with exertion. Received Actemra 9/16. Patient was moved to tele obs ICU on BiPAP nad on 9/29 was able to transfer to floor on airvo. She was found to have L calf DVT on 9/22 and was on heparin and then transitioned to eliquis. She was having vaginal bleeding and has required multiple blood transfusions. V/Q scan with low probability of PE. Vaginal U/S with findings of cyst, HCG negative. GYN recommended pt resume eliquis and start provera. CT chest could not r/o PE. She was able to wean O2. Given bleeding issues she was evaluated for IVC filter but was not candidate. We had originally signed off but were re-consulted given worsening hypoxia. ABG 7.4/43.4/46/27. She was placed on CPAP and then BiPAP 100% with O2 sat remaining in 70s. She is tachycardic in 130-140s and tachypneic. She has received lasix. CXR with worsening infiltrates. Repeat dopplers 10/13 negative for previously seen DVT. Subjective: In the last 2h her O2 sat has been in the 70% range and her work of breathing has been high. She would like her mother to visit before considering intubation. She reports that she fully understands the risks of death involved with this decision. Spoke with her mother who is driving in from 5.0F away. Tm 103.6F. Procal is low, BNP relatively low. CXR with bilateral infiltrates.   On heparin infusion given chest pain and concern for PE    Current Facility-Administered Medications   Medication Dose Route Frequency    dexmedeTOMidine in 0.9 % NaCl (PRECEDEX) 400 mcg/100 mL (4 mcg/mL) infusion soln  0.1-1.5 mcg/kg/hr IntraVENous TITRATE    0.9% sodium chloride infusion 250 mL  250 mL IntraVENous PRN    morphine injection 1 mg  1 mg IntraVENous Q3H PRN    dexamethasone (DECADRON) 10 mg/mL injection 6 mg  6 mg IntraVENous Q24H    budesonide (PULMICORT) 500 mcg/2 ml nebulizer suspension  500 mcg Nebulization BID RT    famotidine (PF) (PEPCID) 20 mg in 0.9% sodium chloride 10 mL injection  20 mg IntraVENous Q12H    heparin 25,000 units in dextrose 500 mL infusion  18-36 Units/kg/hr (Adjusted) IntraVENous TITRATE    albuterol (PROVENTIL VENTOLIN) nebulizer solution 2.5 mg  2.5 mg Nebulization Q6H RT    [Held by provider] ferric gluconate (FERRLECIT) 250 mg in 0.9% sodium chloride 250 mL IVPB  250 mg IntraVENous DAILY    lip protectant (BLISTEX) ointment 1 Each  1 Each Topical PRN    0.9% sodium chloride infusion 250 mL  250 mL IntraVENous PRN    insulin lispro (HUMALOG) injection 8 Units  8 Units SubCUTAneous TIDAC    0.9% sodium chloride infusion 250 mL  250 mL IntraVENous PRN    medroxyPROGESTERone (PROVERA) tablet 20 mg  20 mg Oral DAILY    [Held by provider] budesonide-formoterol (SYMBICORT) 80-4.5 mcg inhaler  2 Puff Inhalation BID RT    [Held by provider] albuterol (PROVENTIL HFA, VENTOLIN HFA, PROAIR HFA) inhaler 2 Puff  2 Puff Inhalation Q6H RT    insulin glargine (LANTUS) injection 15 Units  15 Units SubCUTAneous DAILY    insulin lispro (HUMALOG) injection   SubCUTAneous AC&HS    NUTRITIONAL SUPPORT ELECTROLYTE PRN ORDERS   Does Not Apply PRN    hydrALAZINE (APRESOLINE) 20 mg/mL injection 10 mg  10 mg IntraVENous Q6H PRN    morphine injection 1 mg  1 mg IntraVENous Q4H PRN    LORazepam (ATIVAN) injection 0.5 mg  0.5 mg IntraVENous Q6H PRN    phenol throat spray (CHLORASEPTIC) 1 Spray  1 Spray Oral PRN    sodium chloride (NS) flush 30 mL  30 mL InterCATHeter Q8H    sodium chloride (NS) flush 30 mL  30 mL InterCATHeter PRN    acetaminophen (TYLENOL) tablet 650 mg  650 mg Oral Q6H PRN    Or    acetaminophen (TYLENOL) suppository 650 mg  650 mg Rectal Q6H PRN    polyethylene glycol (MIRALAX) packet 17 g  17 g Oral DAILY PRN    ondansetron (ZOFRAN ODT) tablet 4 mg  4 mg Oral Q8H PRN    Or    ondansetron (ZOFRAN) injection 4 mg  4 mg IntraVENous Q6H PRN     Review of Systems  Chest tightness, SOB  Constitutional: negative for fever, chills, sweats  Cardiovascular: negative for  palpitations, syncope, edema  Gastrointestinal:  negative for dysphagia, reflux, vomiting, diarrhea, abdominal pain, or melena  Neurologic:  negative for focal weakness, numbness, headache  Objective:     Vitals:    10/16/21 0601 10/16/21 0616 10/16/21 0631 10/16/21 0701   BP: 123/64   124/72   Pulse: (!) 121 (!) 120 (!) 121 (!) 119   Resp: (!) 34 (!) 36 (!) 39 (!) 44   Temp:    99.5 °F (37.5 °C)   SpO2: (!) 82% (!) 73% (!) 74% (!) 74%   Weight:       Height:           Intake/Output Summary (Last 24 hours) at 10/16/2021 0743  Last data filed at 10/16/2021 0547  Gross per 24 hour   Intake 1561.7 ml   Output 4050 ml   Net -2488.3 ml     Physical Exam:   Constitution:  the patient is well developed and in no acute distress  HEENT:  Sclera clear, pupils equal, oral mucosa moist  Respiratory: diminished, on BiPAP 100%  Cardiovascular:  RRR without M,G,R  Gastrointestinal: soft and non-tender; with positive bowel sounds. Musculoskeletal: warm without cyanosis. There is trace lower extremity edema.   Skin:  no jaundice or rashes, no wounds   Neurologic: no gross neuro deficits     Psychiatric:  alert and oriented x 3    CXR: 10/15      LAB:  Recent Labs     10/16/21  0519 10/15/21  1450 10/15/21  0953 10/13/21  1625 10/13/21  1625   WBC 12.1* 13.3*  --   --  8.9   HGB 9.4* 8.4* 7.8*   < > 8.5*   HCT 32.2* 30.3* 27.9*   < > 29.9*    254  --   --  233   PCT  --  0.14  --   --   --    LAC  --  1.2  --   --   --     < > = values in this interval not displayed. Recent Labs     10/16/21  0326 10/15/21  0523 10/14/21  0614 10/13/21  1625 10/13/21  1625    136 140   < > 140   K 3.9 3.9 3.7   < > 3.9    105 107   < > 107   CO2 31 27 29   < > 29   * 186* 143*   < > 175*   BUN 8 5* 6   < > 8   CREA 0.55* 0.57* 0.53*   < > 0.71   MG  --  1.9  --   --  1.8   CA 8.5 8.4 8.3   < > 8.3   PHOS  --  3.7  --   --  3.5    < > = values in this interval not displayed. Recent Labs     10/15/21  2144 10/15/21  1450   LAC  --  1.2   BNPNT  --  544*   CRP 21.5*  --      Recent Labs     10/15/21  1213   PHI 7.40   PCO2I 43.4   PO2I 46*   HCO3I 27.0*     Recent Labs     10/15/21  2022   CULT NO GROWTH AFTER 10 HOURS  NO GROWTH AFTER 10 HOURS     Assessment and Plan:  (Medical Decision Making)   Principal Problem:    Acute respiratory distress syndrome (ARDS) due to COVID-19 virus ([facility] [address] (9/14/2021)  Decadron restarted. Suspect intubation will be needed today and discussed with pt. Her mother is coming in to visit and she may accept intubation with this. Severe hypoxemia. Supplies at bedside for urgent intubation. Active Problems:    DM w/o complication type II ([address] (9/14/2021)    Lantus, SSI per primary      HTN (hypertension) (9/14/2021)    Controlled      Morbid obesity ([address] (9/18/2021)    Adds to complexity of care      Suspected sleep apnea (9/18/2021)    Will need split night PSG after discharge      DVT (deep venous thrombosis) ([facility] [address] (9/23/2021)  On heparin drip currently. Vaginal bleeding (10/6/2021)    GYN started provera. No further vaginal bleeding per RN. Fever:  Quite high. Procal low but will add empiric abx/antifungals. fungitell before zosyn.     Prophy: protonix, heparin infusion    The patient is critically ill with respiratory failure, circulatory failure and requires high complexity decision making for assessment and support including frequent ventilator adjustment , frequent evaluation and titration of therapies , application of advanced monitoring technologies and extensive interpretation of multiple databases. Cumulative time devoted to patient care services by me for day of service -28 min     Renetta Coley MD    Patient's mother Darren Neumann at [phone] updated. More than 50% of the time documented was spent in face-to-face contact with the patient and in the care of the patient on the floor/unit where the patient is located.     Renetta Coley MD

## 2021-10-16 NOTE — PROGRESS NOTES
Patient complained of acute chest pain. O2 sat is 80s on 100% BiPAP. Gave 1mg morphine. Discussed the problems with Dr. Nichole Mejía and Sandeep Sol.

## 2021-10-16 NOTE — PROGRESS NOTES
TRANSFER - IN REPORT:    Verbal report received from [facility] RN(name) on Stella Ignacio  being received from 619(unit) for change in patient condition(respirtory distress)      Report consisted of patients Situation, Background, Assessment and   Recommendations(SBAR). Information from the following report(s) SBAR, Kardex, Intake/Output, MAR, Recent Results, Med Rec Status, Cardiac Rhythm -140 and Alarm Parameters  was reviewed with the receiving nurse. Opportunity for questions and clarification was provided. Assessment completed upon patients arrival to unit and care assumed.

## 2021-10-16 NOTE — PROGRESS NOTES
Code Blue called. CPR started. Epi x 6 given. Shocked x 6. Magnesium, Bicarb and calcium given. Levophed, jamal, vasopressin and Epi drips infusing. ROSC at [address].

## 2021-10-16 NOTE — PROGRESS NOTES
EPAP increased from 14 to 18 per VORB received from Dr. Sherie Nation. RN notified.      Verito Conde RRT

## 2021-10-16 NOTE — PROGRESS NOTES
responded to code blue    called mother Estefani Navarrete who had left the building  mother returned and the father was also present     has spent time with the mother over the past few weeks    one of the members from the mothers Judaism is also present and helpful to the family

## 2021-10-16 NOTE — PROGRESS NOTES
ACP Note    Had family meeting with patient's mother, father and nurse Ronn Peabody in room with [facility]. We discuss the pros/cons of intubation vs trying to maintain any longer with oxygen saturations so low. She is agreeable to a 6 week trial of ventilation. 20 minutes spent in discussion.   Ramakrishna Meredith MD

## 2021-10-16 NOTE — PROGRESS NOTES
[facility] Hospitalist Progress Note     Name:  Robert Diaz  Age:26 y.o. Sex:female   :  1995    MRN:  [id number]     Admit Date:  2021    Reason for Admission:  Acute respiratory failure [facility]) [J96.00]    Hospital Course/Interval history: This is a 23year old morbid obese AAF admitted  for acute respiratory failure with hypoxia related to COVID 19 infection. She was found to have a left leg DVT, CT chest could not visualize pulmonary arteries. She was started on a Heparin drip then transitioned to Eliquis that had to be held due to vaginal bleeding, slight hemoptysis and epistaxis      As per prior documentation:  \"Patient seen and examined at bedside this morning. Overnight, patient refused Eliquis due to bleeding and patient thinking this was causing her worsening shortness of breath and weakness. Patient states that she is no longer having any significant vaginal bleeding since being off Eliquis but did suffer from this as recent as yesterday. Discussed with OB/GYN after abdominal ultrasound completed and patient now started on Provera with OB/GYN recommendations to increase. Discussed with patient. Patient states she still is having significant weakness and fatigue, likely related to acute blood loss anemia. Informed her that she does not need blood at this point but if her hemoglobin were to drop at 7.0 or lower and she was still symptomatic, we could give her a unit of blood to assist.  Patient amenable to Eliquis ministration to prevent worsening of her DVT and PE. Patient denies any chest pain or pressure, lightheadedness, dizziness, palpitations, near-syncope or syncope. She was able to be weaned on air Vo to 50% O2 but still remains on 60 L  Via HFNC. \"    On 10/15, patient desaturated even on the maximal oxygen support with AirVo nonrebreather mask. She was transferred to the ICU. Subjective :  10/16/2021   Patient was seen in the ICU.   She was needing BiPAP but still her oxygen saturations fluctuating between 70- 85%. Spoke with the patient and she would like her mother to visit the patient prior to making any decision about intubation. She does not want to be intubated at this time. She had fever last night. Persistently tachycardic with heart rate 100 to 130/min, tachypneic with respiratory rate of 30 to 40/min. Pt's mother, Izabella Stallings by intensivist.     Sandeep Mcnamara      This is a 25y Female with:    Principal Problem:  10/16/2021      Acute respiratory distress syndrome (ARDS) due to COVID-19 virus   currently needing noninvasive positive pressure ventilation with BiPAP,  Low threshold for intubation if patient agrees. Oxygen saturations still low ranging between 70 to 85% on BiPAP. Patient was on BiPAP. Now off BiPAP and still on high flow oxygen at 60 L/min, weaned to 50%   Patient completed course of dexamethasone but due to worsening respiratory status, restarted on dexamethasone yesterday. Patient completed remdesivir   received Actemra as well  VQ scan with low suspicion for PE, CT chest could not rule out PE-noted tachycardic, requiring high flow  Intensivist on board. Appreciate recommendations. Patient is critically ill and very high risk of decompensation. Low threshold for intubation.     Cardiomegaly with concern for CHF   10/16/2021  Echo within normal limits    Active Problems:    DM w/o complication type II ([address] (9/14/2021)  10/16/2021  Lantus, mealtime insulin, sliding scale insulin      HTN (hypertension) (9/14/2021)  10/16/2021    Fairly well controlled on current regimen  We will titrate medications as indicated       Morbid obesity ([address] (9/18/2021)  10/16/2021    No active issues however this is complicating her care and adding complexity to her case as it is associated with a more prolonged and severe course of COVID-19 with increased morbidity and mortality      Suspected sleep apnea (9/18/2021)  10/16/2021    She was tried on CPAP last night which she kept down but states she was unable to sleep     Thrombocytopenia ([address] (9/22/2021)  Resolved       DVT (deep venous thrombosis) ([facility] (9/23/2021)  10/16/2021   Eliquis resumed October 7, 2021, she denies any further episodes of bleeding  Given recurrent drops in her hemoglobin and has mostly been on hold   she was transfused 1 unit of blood previously with plan for transfusion of an additional unit of blood today   continue Provera per OB/GYN recommendations (do not want to add estrogen due to DVT)  VQ scan previously ordered since CT chest inconclusive - low probability of PE  Given the persistently low hemoglobin levels will consult IR for IVC filter continue to hold anticoagulation for now. If her hemoglobin remains stable would consider reinstating oral anticoagulation         ALLAN (acute kidney injury) ([facility] [address] (9/30/2021)  resolved     AUB:  10/16/2021    Vaginal bleeding has currently resolved per the patient  Patient states history of irregular periods for \"years\", never seen by GYN  Increase Provera per OBGYN(do not want to add estrogen due to DVT)  OBGYN consulted 10/7 - intravaginal US reviewed by OBGYN  **Will need outpatient follow up with GYN, on airvo, COVID +    Anemia-acute blood loss anemia secondary to vaginal bleeding  She is status post 1 unit of packed red blood cells where her hemoglobin reji appropriately  She was also given an additional 2 units of packed red blood cells on October 12, 2021. She has not had any further episodes of bleeding  She has not had any further episodes of bleeding that have been noted by her or the staff. However her hemoglobin continues to drop   Getting IV iron replacement therapy   Hemoglobin is 9.4 this morning. Dispo/Discharge Planning:    Patient is critically ill in the ICU. Care transferred to intensivist service. Diet:  ADULT DIET Regular; 3 carb choices (45 gm/meal);  Low Sodium (2 gm)  ADULT ORAL NUTRITION SUPPLEMENT Dinner, Breakfast, Lunch; Diabetic Supplement  DVT PPx: Heparin drip  Code status: Full Code    Review of Systems: 14 point review of systems is otherwise negative with the exception of the elements mentioned above.     Objective:     Patient Vitals for the past 24 hrs:   Temp Pulse Resp BP SpO2   10/16/21 1130 -- -- -- -- (!) 87 %   10/16/21 1101 -- 98 24 113/73 92 %   10/16/21 1001 -- (!) 101 28 121/83 (!) 79 %   10/16/21 0901 -- (!) 101 (!) 32 111/72 (!) 89 %   10/16/21 0801 -- (!) 105 (!) 33 111/67 (!) 78 %   10/16/21 0740 -- -- (!) 42 -- (!) 87 %   10/16/21 0701 99.5 °F (37.5 °C) (!) 119 (!) 44 124/72 (!) 74 %   10/16/21 0631 -- (!) 121 (!) 39 -- (!) 74 %   10/16/21 0616 -- (!) 120 (!) 36 -- (!) 73 %   10/16/21 0601 -- (!) 121 (!) 34 123/64 (!) 82 %   10/16/21 0546 -- (!) 120 (!) 38 -- (!) 77 %   10/16/21 0531 -- (!) 119 -- 121/79 (!) 75 %   10/16/21 0516 -- (!) 119 (!) 37 -- (!) 77 %   10/16/21 0501 -- (!) 120 (!) 37 (!) 146/81 (!) 77 %   10/16/21 0446 -- (!) 119 (!) 39 -- (!) 80 %   10/16/21 0431 -- (!) 122 (!) 41 137/69 (!) 79 %   10/16/21 0416 -- (!) 121 (!) 44 -- (!) 75 %   10/16/21 0401 -- (!) 120 (!) 46 (!) 151/87 (!) 82 %   10/16/21 0346 -- (!) 120 (!) 42 -- (!) 81 %   10/16/21 0331 -- (!) 114 (!) 37 137/79 (!) 79 %   10/16/21 0316 -- (!) 113 27 -- (!) 83 %   10/16/21 0301 99.4 °F (37.4 °C) (!) 116 28 136/77 (!) 82 %   10/16/21 0246 -- (!) 116 -- -- (!) 89 %   10/16/21 0245 -- -- -- -- (!) 89 %   10/16/21 0231 -- (!) 119 -- (!) 140/82 (!) 85 %   10/16/21 0216 -- (!) 123 (!) 32 -- (!) 71 %   10/16/21 0201 -- (!) 117 30 (!) 160/119 (!) 82 %   10/16/21 0146 -- (!) 118 30 -- (!) 81 %   10/16/21 0131 -- (!) 122 (!) 45 (!) 155/95 (!) 86 %   10/16/21 0116 -- (!) 120 -- -- (!) 83 %   10/16/21 0101 -- (!) 121 -- (!) 155/96 (!) 84 %   10/16/21 0046 -- (!) 115 28 -- 91 %   10/16/21 0031 -- (!) 118 -- (!) 152/92 (!) 84 %   10/16/21 0016 -- (!) 118 (!) 37 -- (!) 84 % 10/16/21 0012 99.7 °F (37.6 °C) (!) 120 (!) 39 (!) 150/87 --   10/16/21 0001 -- (!) 119 (!) 40 (!) 150/87 (!) 83 %   10/15/21 2346 -- (!) 118 -- -- (!) 86 %   10/15/21 2331 -- (!) 120 -- (!) 145/85 (!) 88 %   10/15/21 2316 -- (!) 122 (!) 31 -- (!) 88 %   10/15/21 2301 99.7 °F (37.6 °C) (!) 118 (!) 37 139/86 (!) 88 %   10/15/21 2255 -- -- -- -- (!) 87 %   10/15/21 2254 100.2 °F (37.9 °C) (!) 120 -- (!) 141/86 --   10/15/21 2246 -- (!) 122 28 133/89 (!) 89 %   10/15/21 2244 100.3 °F (37.9 °C) (!) 121 -- 133/89 (!) 88 %   10/15/21 2231 -- (!) 119 (!) 33 134/72 (!) 81 %   10/15/21 2216 -- (!) 121 (!) 44 -- (!) 82 %   10/15/21 2201 -- (!) 123 (!) 46 (!) 143/70 (!) 77 %   10/15/21 2146 -- (!) 120 (!) 42 -- (!) 78 %   10/15/21 2144 99.9 °F (37.7 °C) (!) 120 -- (!) 143/70 (!) 86 %   10/15/21 2131 -- (!) 122 (!) 67 138/69 (!) 84 %   10/15/21 2116 -- (!) 122 (!) 40 -- (!) 84 %   10/15/21 2101 -- (!) 123 (!) 34 (!) 148/86 (!) 83 %   10/15/21 2059 100.3 °F (37.9 °C) (!) 123 -- (!) 148/86 (!) 83 %   10/15/21 2046 -- (!) 120 29 138/85 (!) 89 %   10/15/21 2044 (!) 100.7 °F (38.2 °C) (!) 123 -- 138/85 (!) 82 %   10/15/21 2031 -- (!) 120 24 139/75 (!) 81 %   10/15/21 2016 -- (!) 122 (!) 33 -- (!) 83 %   10/15/21 2001 100.3 °F (37.9 °C) (!) 122 28 138/68 (!) 81 %   10/15/21 1957 -- -- -- -- (!) 82 %   10/15/21 1946 -- (!) 123 29 -- (!) 88 %   10/15/21 1932 -- (!) 124 29 132/78 (!) 85 %   10/15/21 1931 -- (!) 124 21 -- (!) 83 %   10/15/21 1917 -- (!) 126 (!) 56 119/73 (!) 84 %   10/15/21 1916 -- (!) 124 (!) 32 -- (!) 81 %   10/15/21 1901 100.4 °F (38 °C) (!) 126 (!) 40 122/70 (!) 82 %   10/15/21 1847 -- (!) 126 24 124/67 (!) 84 %   10/15/21 1832 -- (!) 130 (!) 33 120/63 (!) 87 %   10/15/21 1819 (!) 102.6 °F (39.2 °C) (!) 130 (!) 37 119/67 (!) 83 %   10/15/21 1817 -- (!) 131 (!) 49 119/67 (!) 83 %   10/15/21 1801 -- (!) 133 (!) 60 118/78 (!) 87 %   10/15/21 1747 -- (!) 134 (!) 47 (!) 102/55 (!) 84 %   10/15/21 1745 -- (!) 134 (!) 37 (!) 108/56 (!) 82 %   10/15/21 1732 -- (!) 134 (!) 42 (!) 108/56 (!) 87 %   10/15/21 1719 (!) 103.6 °F (39.8 °C) (!) 132 (!) 60 125/74 (!) 83 %   10/15/21 1717 -- (!) 131 (!) 56 125/71 (!) 84 %   10/15/21 1647 -- (!) 129 (!) 76 128/72 (!) 84 %   10/15/21 1632 -- (!) 134 28 (!) 128/58 91 %   10/15/21 1617 -- (!) 146 (!) 63 138/64 (!) 89 %   10/15/21 1601 -- (!) 150 (!) 96 (!) 140/73 (!) 87 %   10/15/21 1559 (!) 103.4 °F (39.7 °C) (!) 150 (!) 37 (!) 140/73 (!) 85 %   10/15/21 1453 99.8 °F (37.7 °C) (!) 147 -- 133/81 (!) 86 %     Oxygen Therapy  O2 Sat (%): (!) 87 % (10/16/21 1130)  Pulse via Oximetry: 107 beats per minute (10/16/21 1130)  O2 Device: BIPAP (10/16/21 1130)  Skin Assessment: Clean, dry, & intact (10/16/21 0245)  Skin Protection for O2 Device: Yes (10/15/21 1901)  Orientation: Bilateral (10/02/21 1640)  Location: Cheek (10/15/21 1901)  O2 Flow Rate (L/min): 60 l/min (10/15/21 0722)  O2 Temperature: 87.8 °F (31 °C) (10/15/21 0722)  FIO2 (%): 100 % (10/16/21 1130)    Body mass index is 65.24 kg/m². Physical Exam:   General:          Well nourished. Very ill appearing, tachypneic, increased work of breathing,  on BIPAP, sats 75-85%  Head:               Normocephalic, atraumatic, pale   Eyes:               Sclerae appear normal.  Pupils equally round. ENT:                Nares appear normal, no drainage. Moist oral mucosa  Neck:               No restricted ROM. Trachea midline   CV:                  RRR. No m/r/g. No jugular venous distension. Lungs:             Diminished in the bases No wheezing, rhonchi, or rales. Increased work of breathing, unable to speak in full sentences,   Abdomen: Bowel sounds present. Soft, nontender, nondistended. Extremities:     No cyanosis or clubbing. Bilateral pitting edema   Skin:                No rashes and normal coloration. Warm and dry. Neuro:             Cranial nerves II-XII grossly intact. Sensation intact.   A&Ox3  Psych:            Anxious,      Data Review:  I have reviewed all labs, meds, and studies from the last 24 hours:    Labs:    Recent Results (from the past 24 hour(s))   BLOOD GAS, ARTERIAL POC    Collection Time: 10/15/21 12:13 PM   Result Value Ref Range    Device: High Flow Nasal Cannula      FIO2 (POC) 100 %    pH (POC) 7.40 7.35 - 7.45      pCO2 (POC) 43.4 35 - 45 MMHG    pO2 (POC) 46 (L) 75 - 100 MMHG    HCO3 (POC) 27.0 (H) 22 - 26 MMOL/L    sO2 (POC) 81.6 (L) 95 - 98 %    Base excess (POC) 1.9 mmol/L    Allens test (POC) Positive      Site LEFT RADIAL      Specimen type (POC) ARTERIAL      Performed by [facility] [employer]lando[facility]henaiGordon    PROCALCITONIN    Collection Time: 10/15/21  2:50 PM   Result Value Ref Range    Procalcitonin 0.14 ng/mL   LACTIC ACID    Collection Time: 10/15/21  2:50 PM   Result Value Ref Range    Lactic acid 1.2 0.4 - 2.0 MMOL/L   CBC WITH AUTOMATED DIFF    Collection Time: 10/15/21  2:50 PM   Result Value Ref Range    WBC 13.3 (H) 4.3 - 11.1 K/uL    RBC 4.14 4.05 - 5.2 M/uL    HGB 8.4 (L) 11.7 - 15.4 g/dL    HCT 30.3 (L) 35.8 - 46.3 %    MCV 73.2 (L) 79.6 - 97.8 FL    MCH 20.3 (L) 26.1 - 32.9 PG    MCHC 27.7 (L) 31.4 - 35.0 g/dL    RDW 23.8 (H) 11.9 - 14.6 %    PLATELET 201 299 - 841 K/uL    MPV 10.4 9.4 - 12.3 FL    ABSOLUTE NRBC 0.07 0.0 - 0.2 K/uL    DF AUTOMATED      NEUTROPHILS 76 43 - 78 %    LYMPHOCYTES 12 (L) 13 - 44 %    MONOCYTES 9 4.0 - 12.0 %    EOSINOPHILS 2 0.5 - 7.8 %    BASOPHILS 0 0.0 - 2.0 %    IMMATURE GRANULOCYTES 1 0.0 - 5.0 %    ABS. NEUTROPHILS 10.1 (H) 1.7 - 8.2 K/UL    ABS. LYMPHOCYTES 1.6 0.5 - 4.6 K/UL    ABS. MONOCYTES 1.2 0.1 - 1.3 K/UL    ABS. EOSINOPHILS 0.3 0.0 - 0.8 K/UL    ABS. BASOPHILS 0.0 0.0 - 0.2 K/UL    ABS. IMM.  GRANS. 0.1 0.0 - 0.5 K/UL   NT-PRO BNP    Collection Time: 10/15/21  2:50 PM   Result Value Ref Range    NT pro- (H) 5 - 125 PG/ML   GLUCOSE, POC    Collection Time: 10/15/21  4:57 PM   Result Value Ref Range    Glucose (POC) 142 (H) 65 - 100 mg/dL    Performed by Era    CULTURE, BLOOD    Collection Time: 10/15/21  8:22 PM    Specimen: Blood   Result Value Ref Range    Special Requests: LEFT  HAND        Culture result: NO GROWTH AFTER 10 HOURS     CULTURE, BLOOD    Collection Time: 10/15/21  8:22 PM    Specimen: Blood   Result Value Ref Range    Special Requests: LEFT  ARM        Culture result: NO GROWTH AFTER 10 HOURS     POC VENOUS BLOOD GAS    Collection Time: 10/15/21  9:17 PM   Result Value Ref Range    Device: NASAL CANNULA      FIO2 (POC) 100 %    pH, venous (POC) 7.34 7.32 - 7.42      pCO2, venous (POC) 53.8 (H) 41 - 51 MMHG    pO2, venous (POC) 52 mmHg    HCO3, venous (POC) 29.1 (H) 23 - 28 MMOL/L    sO2, venous (POC) 83.6 65 - 88 %    Base excess, venous (POC) 2.5 mmol/L    Allens test (POC) Positive      Site LEFT RADIAL      Specimen type (POC) VENOUS BLOOD      Performed by Keturah    C REACTIVE PROTEIN, QT    Collection Time: 10/15/21  9:44 PM   Result Value Ref Range    C-Reactive protein 21.5 (H) 0.0 - 0.9 mg/dL   D DIMER    Collection Time: 10/15/21  9:44 PM   Result Value Ref Range    D DIMER 1.98 (H) <0.56 ug/ml(FEU)   PTT    Collection Time: 10/15/21  9:44 PM   Result Value Ref Range    aPTT 167.6 (HH) 24.1 - 35.1 SEC   GLUCOSE, POC    Collection Time: 10/15/21  9:50 PM   Result Value Ref Range    Glucose (POC) 174 (H) 65 - 100 mg/dL    Performed by [facility]    HEPARIN XA UFH    Collection Time: 10/16/21  1:17 AM   Result Value Ref Range    Heparin Xa UFH 0.25 (L) 0.3 - 0.7 IU/mL   METABOLIC PANEL, BASIC    Collection Time: 10/16/21  3:26 AM   Result Value Ref Range    Sodium 140 136 - 145 mmol/L    Potassium 3.9 3.5 - 5.1 mmol/L    Chloride 104 98 - 107 mmol/L    CO2 31 21 - 32 mmol/L    Anion gap 5 (L) 7 - 16 mmol/L    Glucose 177 (H) 65 - 100 mg/dL    BUN 8 6 - 23 MG/DL    Creatinine 0.55 (L) 0.6 - 1.0 MG/DL    GFR est AA >60 >60 ml/min/1.73m2    GFR est non-AA >60 >60 ml/min/1.73m2    Calcium 8.5 8.3 - 10.4 MG/DL   CBC W/O DIFF Collection Time: 10/16/21  5:19 AM   Result Value Ref Range    WBC 12.1 (H) 4.3 - 11.1 K/uL    RBC 4.16 4.05 - 5.2 M/uL    HGB 9.4 (L) 11.7 - 15.4 g/dL    HCT 32.2 (L) 35.8 - 46.3 %    MCV 77.4 (L) 79.6 - 97.8 FL    MCH 22.6 (L) 26.1 - 32.9 PG    MCHC 29.2 (L) 31.4 - 35.0 g/dL    RDW 23.8 (H) 11.9 - 14.6 %    PLATELET 727 974 - 955 K/uL    MPV 9.8 9.4 - 12.3 FL    ABSOLUTE NRBC 0.03 0.0 - 0.2 K/uL   GLUCOSE, POC    Collection Time: 10/16/21 10:25 AM   Result Value Ref Range    Glucose (POC) 183 (H) 65 - 100 mg/dL    Performed by Uche Roque        All Micro Results     Procedure Component Value Units Date/Time    CULTURE, BLOOD [[id number]] Collected: 10/15/21 2022    Order Status: Completed Specimen: Blood Updated: 10/16/21 0708     Special Requests: --        LEFT  HAND       Culture result: NO GROWTH AFTER 10 HOURS       CULTURE, BLOOD [[id number]] Collected: 10/15/21 2022    Order Status: Completed Specimen: Blood Updated: 10/16/21 0708     Special Requests: --        LEFT  ARM       Culture result: NO GROWTH AFTER 10 HOURS       CULTURE, URINE [[id number]] Collected: 10/15/21 1617    Order Status: Completed Specimen: Urine-Pleayo catheter Updated: 10/15/21 1715    CULTURE, RESPIRATORY/SPUTUM/BRONCH Althea Chyle STAIN [[id number]] Collected: 10/08/21 1415    Order Status: Canceled Specimen: Sputum     CULTURE, BLOOD [[id number]] Collected: 09/14/21 0759    Order Status: Completed Specimen: Blood Updated: 09/19/21 0637     Special Requests: --        LEFT  HAND       Culture result: NO GROWTH 5 DAYS       CULTURE, BLOOD [[id number]] Collected: 09/14/21 0757    Order Status: Completed Specimen: Blood Updated: 09/19/21 0637     Special Requests: --        RIGHT  Antecubital       Culture result: NO GROWTH 5 DAYS       CULTURE, RESPIRATORY/SPUTUM/BRONCH Althea Chyle STAIN [[id number]] Collected: 09/14/21 0915    Order Status: Canceled Specimen: Sputum     COVID-19 RAPID TEST [[id number]]  (Abnormal) Collected: 09/14/21 3007 Order Status: Completed Specimen: Nasopharyngeal Updated: 09/14/21 0558     Specimen source NASAL        COVID-19 rapid test Detected        Comment:      The specimen is POSITIVE for SARS-CoV-2, the novel coronavirus associated with COVID-19. This test has been authorized by the FDA under an Emergency Use Authorization (EUA) for use by authorized laboratories. Fact sheet for Healthcare Providers: [employer]  Fact sheet for Patients: LIA.nz       Methodology: Isothermal Nucleic Acid Amplification  RESULTS VERIFIED, PHONED TO AND READ BACK BY   FERDINAND BENOIT AT 0557 ON 9/14/21 ACL               EKG Results     None          Other Studies:  XR CHEST SNGL V    Result Date: 10/15/2021  EXAM: Chest x-ray. INDICATION: Dyspnea. Covid 19. COMPARISON: Earlier study on the same day 10 hours prior. TECHNIQUE: Frontal view chest x-ray. FINDINGS: Diffuse bilateral lung infiltrates are unchanged. The heart is borderline enlarged. No pneumothorax or pleural effusion is seen. A right arm PICC line remains in place. Unchanged diffuse bilateral lung infiltrates. XR CHEST SNGL V    Result Date: 10/15/2021  History: Worsening hypoxia, respiratory failure Exam: portable chest Comparison: 10/11/2021 Findings: Worsening diffuse bilateral pulmonary infiltrates present. There is a right-sided PICC line. No pneumothorax. IMPRESSIONs: Worsening diffuse bilateral pulmonary infiltrates.        Current Meds:   Current Facility-Administered Medications   Medication Dose Route Frequency    dexmedeTOMidine in 0.9 % NaCl (PRECEDEX) 400 mcg/100 mL (4 mcg/mL) infusion soln  0.1-1.5 mcg/kg/hr IntraVENous TITRATE    potassium chloride 40 mEq IVPB  40 mEq IntraVENous ONCE    anidulafungin (ERAXIS) 200 mg in 0.9% sodium chloride 260 mL IVPB  200 mg IntraVENous ONCE    piperacillin-tazobactam (ZOSYN) 3.375 g in 0.9% sodium chloride (MBP/ADV) 100 mL MBP  3.375 g IntraVENous Q8H    vancomycin (VANCOCIN) 2500 mg in  mL infusion  2,500 mg IntraVENous ONCE    vancomycin (VANCOCIN) 1500 mg in  ml infusion  1,500 mg IntraVENous Q8H    0.9% sodium chloride infusion 250 mL  250 mL IntraVENous PRN    morphine injection 1 mg  1 mg IntraVENous Q3H PRN    dexamethasone (DECADRON) 10 mg/mL injection 6 mg  6 mg IntraVENous Q24H    budesonide (PULMICORT) 500 mcg/2 ml nebulizer suspension  500 mcg Nebulization BID RT    famotidine (PF) (PEPCID) 20 mg in 0.9% sodium chloride 10 mL injection  20 mg IntraVENous Q12H    heparin 25,000 units in dextrose 500 mL infusion  18-36 Units/kg/hr (Adjusted) IntraVENous TITRATE    albuterol (PROVENTIL VENTOLIN) nebulizer solution 2.5 mg  2.5 mg Nebulization Q6H RT    [Held by provider] ferric gluconate (FERRLECIT) 250 mg in 0.9% sodium chloride 250 mL IVPB  250 mg IntraVENous DAILY    lip protectant (BLISTEX) ointment 1 Each  1 Each Topical PRN    0.9% sodium chloride infusion 250 mL  250 mL IntraVENous PRN    insulin lispro (HUMALOG) injection 8 Units  8 Units SubCUTAneous TIDAC    0.9% sodium chloride infusion 250 mL  250 mL IntraVENous PRN    medroxyPROGESTERone (PROVERA) tablet 20 mg  20 mg Oral DAILY    [Held by provider] budesonide-formoterol (SYMBICORT) 80-4.5 mcg inhaler  2 Puff Inhalation BID RT    [Held by provider] albuterol (PROVENTIL HFA, VENTOLIN HFA, PROAIR HFA) inhaler 2 Puff  2 Puff Inhalation Q6H RT    insulin glargine (LANTUS) injection 15 Units  15 Units SubCUTAneous DAILY    insulin lispro (HUMALOG) injection   SubCUTAneous AC&HS    NUTRITIONAL SUPPORT ELECTROLYTE PRN ORDERS   Does Not Apply PRN    hydrALAZINE (APRESOLINE) 20 mg/mL injection 10 mg  10 mg IntraVENous Q6H PRN    morphine injection 1 mg  1 mg IntraVENous Q4H PRN    LORazepam (ATIVAN) injection 0.5 mg  0.5 mg IntraVENous Q6H PRN    phenol throat spray (CHLORASEPTIC) 1 Spray  1 Spray Oral PRN    sodium chloride (NS) flush 30 mL  30 mL InterCATHeter Q8H    sodium chloride (NS) flush 30 mL  30 mL InterCATHeter PRN    acetaminophen (TYLENOL) tablet 650 mg  650 mg Oral Q6H PRN    Or    acetaminophen (TYLENOL) suppository 650 mg  650 mg Rectal Q6H PRN    polyethylene glycol (MIRALAX) packet 17 g  17 g Oral DAILY PRN    ondansetron (ZOFRAN ODT) tablet 4 mg  4 mg Oral Q8H PRN    Or    ondansetron (ZOFRAN) injection 4 mg  4 mg IntraVENous Q6H PRN       Problem List:  Hospital Problems as of 10/16/2021 Date Reviewed: 10/6/2021        Codes Class Noted - Resolved POA    Fever ICD-10-CM: R50.9  ICD-9-CM: 780.60  10/16/2021 - Present Unknown        Anemia ICD-10-CM: D64.9  ICD-9-CM: 285.9  10/16/2021 - Present Unknown        Iron deficiency anemia due to chronic blood loss ICD-10-CM: D50.0  ICD-9-CM: 280.0  10/12/2021 - Present Yes        Vaginal bleeding ICD-10-CM: N93.9  ICD-9-CM: 623.8  10/6/2021 - Present Yes        DVT (deep venous thrombosis) ([facility] ICD-10-CM: I82.409  ICD-9-CM: 453.40  9/23/2021 - Present Yes        Morbid obesity ([facility] (Chronic) ICD-10-CM: E66.01  ICD-9-CM: 278.01  9/18/2021 - Present Yes        Suspected sleep apnea ICD-10-CM: R29.818  ICD-9-CM: 781.99  9/18/2021 - Present Yes        * (Principal) Acute respiratory distress syndrome (ARDS) due to COVID-19 virus [facility]) ICD-10-CM: U07.1, J80  ICD-9-CM: 518.82, 079.89  9/14/2021 - Present Yes        DM w/o complication type II (HCC) (Chronic) ICD-10-CM: E11.9  ICD-9-CM: 250.00  9/14/2021 - Present Yes        HTN (hypertension) (Chronic) ICD-10-CM: I10  ICD-9-CM: 401.9  9/14/2021 - Present Yes        RESOLVED: ALLAN (acute kidney injury) ([facility] ICD-10-CM: N17.9  ICD-9-CM: 584.9  9/30/2021 - 10/8/2021 Yes        RESOLVED: Thrombocytopenia ([facility] ICD-10-CM: D69.6  ICD-9-CM: 287.5  9/22/2021 - 10/8/2021 Yes        RESOLVED: Acute respiratory failure ([facility] ICD-10-CM: J96.00  ICD-9-CM: 518.81  9/14/2021 - 9/16/2021 Yes                 Signed By: Erica De Oliveira MD   [facility] Hospitalist Service October 16, 2021  5:15 PM

## 2021-10-16 NOTE — PROGRESS NOTES
CODE BLUE 0634  Pt developed bradycardia followed by PEA. Atropine administered followed by epinephrine. CPR and ACLS measures promptly initiated and the patient was treated for PEA with epi, bicarbonate before found to be in VT/VF for which she was defibrillated 5 times. Magnesium sulfate, Amiodarone 300mg (followed by drip) and Lidocaine 100mg administered. Calcium chloride 1gm also given. At 1705 pulse was palpated. Despite ROSC, the patient's oxygen saturation remained in the 60s. Plan to add nimbex and flolan to assist with severely low oxygen levels.   Gabriela Interiano MD

## 2021-10-16 NOTE — PROGRESS NOTES
[facility] Pharmacokinetic Monitoring Service - Vancomycin     Dea Hernandez is a 23 y.o. female starting on vancomycin therapy for sepsis. Pharmacy consulted by Dr. Mariella Lopez for monitoring and adjustment. Target Concentration: Goal AUC/MARQUES 400-600 mg*hr/L    Additional Antimicrobials: pip/tazo, anidulafungin    Pertinent Laboratory Values: Wt Readings from Last 1 Encounters:   10/16/21 (!) 200.4 kg (441 lb 12.8 oz)     Temp Readings from Last 1 Encounters:   10/16/21 99.5 °F (37.5 °C)     No components found for: PROCAL  Recent Labs     10/16/21  0519 10/16/21  0326 10/15/21  1450 10/15/21  0523 10/14/21  0614 10/13/21  1625 10/13/21  1625   BUN  --  8  --  5* 6   < > 8   CREA  --  0.55*  --  0.57* 0.53*   < > 0.71   WBC 12.1*  --  13.3*  --   --   --  8.9   PCT  --   --  0.14  --   --   --   --    LAC  --   --  1.2  --   --   --   --     < > = values in this interval not displayed. Estimated Creatinine Clearance: 232.5 mL/min (A) (by C-G formula based on SCr of 0.55 mg/dL (L)). No results found for: Ines Sero    MRSA Nasal Swab: N/A. Non-respiratory infection. .    Plan:  Dosing recommendations based on Bayesian software  Start vancomycin 2500 mg IV x 1 followed by 1500 mg IV q8h  Anticipated AUC of 416 and trough concentration of 9.7 at steady state  Renal labs as indicated   Vancomycin concentration ordered for 10/17 @ 0600   Pharmacy will continue to monitor patient and adjust therapy as indicated    Thank you for the consult,  Laura Bender, PHARMD

## 2021-10-16 NOTE — PROCEDURES
Procedure: Endotracheal intubation    Indication: Acute respiratory failure 518.81    Medications:  Fentanyl 100mcg   Etomidate 40mg   Versed 4mg    After assessing the airway, the patient underwent preoxygenation with 100% FiO2 for 5 min. Fentanyl was then given and after 3 min, etomidate and succinylcholine were given sequentially in rapid IV push. The Sellick maneuver was performed throughout the entire sequence. After adequate sedation and paralysis intubation was performed. A Glidescope 4.0 laryngoscope was used to visualize the epiglottis and vocal cords. After positive identification of epiglottis and the vocal cords, a size 7.5 ET tube was placed into the trachea with direct visualization. Confirmation of endotracheal positionin. The ET tube was directly visualized passing through the vocal cords. 2.  CO2 colorimetry was employed immediately to verify tube in airway with appropriate color change indicating detection/lack of CO2.   3.  Water vapor was seen within the ET tube, and auscultation of the abdomen revealed no bubbling sounds. 4.  Auscultation  And inspection of the chest after intubation showed symmetric chest excursion and symmetric air entry bilaterally. 5.  Chest X-ray has been ordered and is pending. The tube was secured at 23cm at the teeth with a tube link. The patient has been placed on a mechanical ventilator. There were no complications.     Uzma Kiran MD

## 2021-10-16 NOTE — PROGRESS NOTES
Reviewed notes for concerns      Preferred name:  Sarah Silva    Her aunt is local    Her mom came to visit from [facility]              Will continue to assess how to best serve this family

## 2021-10-16 NOTE — PROGRESS NOTES
Patient intubated at this time with a 7.5 ETT @ 23 at the teeth, and placed on PRVC. Airway placement verified by ETCO2 yellow color change, Increase in saturation, Bilateral chest movement, bilateral breath sounds, and xray. Per VORB from Dr. Janell Andrews, the following settings were initiated :    PRVC    F 32  PEEP 17  FiO2 100    Vent check performed.      Monda Cabot, RRT 20-Oct-2019 04:27

## 2021-10-16 NOTE — PROGRESS NOTES
Received patient from  on 100% NRB placed on BiPAP at 100%. SpO2 mid 70's. Labored breathing noted. Alert and oriented. Dual skin assessment done with Kathy Carpenter RN, noted dry cracked area to lower back and excoriation from moisture on buttocks. Allevyn placed to sacral area.

## 2021-10-17 PROBLEM — I46.9 CARDIAC ARREST (HCC): Status: ACTIVE | Noted: 2021-01-01

## 2021-10-17 PROBLEM — G93.1 SEVERE ANOXIC-ISCHEMIC ENCEPHALOPATHY (HCC): Status: ACTIVE | Noted: 2021-01-01

## 2021-10-17 PROBLEM — G93.40 ACUTE ENCEPHALOPATHY: Status: ACTIVE | Noted: 2021-01-01

## 2021-10-17 PROBLEM — I67.82 SEVERE ANOXIC-ISCHEMIC ENCEPHALOPATHY (HCC): Status: ACTIVE | Noted: 2021-01-01

## 2021-10-17 NOTE — PROGRESS NOTES
ABG obtained per MD's order   The following values were reported to MD Chen :     PH 6.84   CO2 72.3  PaO2 60.7  cHCO3-  12.4  BE -21.5  cSO2 65.5    YAMIL Pope, RRT

## 2021-10-17 NOTE — PROCEDURES
PROCEDURE NOTE  ARTERIAL LINE PLACEMENT  10/16/21  9:29 PM    Indication: shock in need of hemodynamic monitoring    A time-out was completed verifying correct patient, procedure, site, positioning, and special equipment if applicable. Misbahs test was performed to ensure adequate perfusion. The patients R wrist was prepped and draped in sterile fashion. 1% Lidocaine was used to anesthetize the area. A 20G Arrow arterial line was introduced into the R radial artery. The catheter was threaded over the guide wire and the needle was removed with appropriate pulsatile blood return. The catheter was then sutured in place to the skin and a sterile dressing applied. Perfusion to the extremity distal to the point of catheter insertion was checked and found to be adequate. The patient tolerated the procedure well and there were no complications.     Delvis Addison MD

## 2021-10-17 NOTE — PROGRESS NOTES
Spoke with family, who kindly prayed for Laurie (and me) at bedside this morning. They would like to continue aggressive measures in light of patient's young age. No major changes made in plan, though I've reviewed today's care plan carefully.   Huang Mcguire MD

## 2021-10-17 NOTE — PROGRESS NOTES
Spoke with Dr. Yesy Rock about patient earlier since unfortunately with prolonged arrest for 20 minutes on Vent and 4 pressors. ABG came back at 6.9 and 3 bicarb amps given and drip at 150. K+ at 5.9 and ordered calcium, D50 and insulin. Spoke with mother who is here and gave her update regarding acuity. She had a lot of questions but main issues at this point was current critical condition. She reported patient did get recent transfusions. She is aware it was patient's wish to be on Vent. Also spoke with Dr. David Buenrostro and placed in additional central line given needing further access. Also not making much urine now and high risk for developing ARF but current too unstable for any for HD. May engage renal in AM pending condition. Having fevers and did get tylenol earlier. Will place on cooling blanket. Looked over chart and noted patient was Having chest pain 2 days with respiratory decompensation and prior did have DVT that on F/u US was gone. Last CT was 4 days ago but did not show PE. IR saw patient but not IVC filter since no clot on US and last CT negative. She did have issue with heavy vaginal bleeding and was seeing GYN on provera. Also did get transfusions x 2 per records noted. Mother not her when I checked again at 8 PM to given information regarding Chest pain/Clots/Vaginal bleeding/transusion need/etc.     Time spent with care tonight by me is 50 minutes so far.      Shanti Jackson MD

## 2021-10-17 NOTE — PROGRESS NOTES
VANCO DAILY FOLLOW UP RENAL INSUFFICIENCY PATIENT   [address] [facility] Pharmacokinetic Monitoring Service - Vancomycin    Consulting Provider: Dr. Queenie Cornejo   Indication: sepsis  Target Concentration: Random level ? 15 mg/L  Day of Therapy: 2  Additional Antimicrobials: pip/tazo, anidulafungin    Pertinent Laboratory Values: Wt Readings from Last 1 Encounters:   10/16/21 (!) 200.4 kg (441 lb 12.8 oz)     Temp Readings from Last 1 Encounters:   10/17/21 (!) 100.9 °F (38.3 °C)     Recent Labs     10/17/21  0325 10/17/21  0030 10/16/21  0519 10/16/21  0326 10/15/21  1450 10/15/21  0523   BUN 20 19  --  8  --    < >   CREA 2.98* 2.64*  --  0.55*  --    < >   WBC 39.3*  --  12.1*  --  13.3*  --    PCT  --   --   --   --  0.14  --    LAC  --   --   --   --  1.2  --     < > = values in this interval not displayed. Lab Results   Component Value Date/Time    Vancomycin, random 45.9 10/17/2021 05:34 AM     MRSA Nasal Swab: N/A. Non-respiratory infection. .    Assessment:  Date:  Dose/Freq Admin Times Level/Time:   10/17 1500 mg x 1 [address] @ 6765 = 45.9                             Plan:  Concentration-guided dosing due to renal impairment  Random level is supra-therapeutic   No dose needed at this time  Repeat vancomycin concentrations will be ordered as clinically appropriate  Pharmacy will continue to monitor patient and adjust therapy as indicated    Thank you for the consult,  GALEN FamD

## 2021-10-17 NOTE — PROGRESS NOTES
Bedside shift change report given to [facility] (oncoming nurse) by Raz Mcknight RN (offgoing nurse). Report included the following information SBAR, Procedure Summary, Intake/Output, MAR, Recent Results and Cardiac Rhythm Sinus Tach.

## 2021-10-17 NOTE — PROGRESS NOTES
Reviewed notes   noted the patient may be brain dead   family wants to continue the efforts   will support them fully

## 2021-10-17 NOTE — PROGRESS NOTES
Ventilator check complete; patient has a #7. 5 ET tube secured at the 23 at the Teeth.  Patient is sedated.  Patient is not able to follow commands.  Breath sounds are coarse and rhonchi. Bernadette Massy is midline, Negative for subcutaneous air, and chest excursion is symmetric. Patient is also Negative for cyanosis and is Negative for pitting edema.  All alarms are set and audible.  Resuscitation bag is at the head of the bed.       Ventilator Settings  Mode FIO2 Rate Tidal Volume Pressure PEEP I:E Ratio   PRVC 100 %   36bpm 480 ml     15 cm H20  1:2      Peak airway pressure: 43cm H2O   Minute ventilation: 17.6l/min      YAMIL Alcocer, RRT

## 2021-10-17 NOTE — CONSULTS
Consult    Patient: Misbah Sanders MRN: [id number]  SSN: [id number]    YOB: 1995  Age: 23 y.o. Sex: female        Assessment:     Anoxic ischemic encephalopathy following cardiac arrest, severe    Probable brain death. Full brain death examination cannot be performed because the patient's respiratory and cardiac status are so unfavorable (PCO2 greater than 70 and PO2 60 on 100% FiO2) on 4 pressors with mean arterial pressures approximately 50. Too unstable to transport to nuclear medicine for blood flow study.     COVID-19, history of, with respiratory failure    Diabetes mellitus, type II    Morbid obesity    Hospital Problems  Date Reviewed: 10/6/2021        Codes Class Noted POA    Cardiac arrest [facility]) ICD-10-CM: I46.9  ICD-9-CM: 427.5  10/17/2021 Unknown        Acute encephalopathy ICD-10-CM: G93.40  ICD-9-CM: 348.30  10/17/2021 Unknown        Fever ICD-10-CM: R50.9  ICD-9-CM: 780.60  10/16/2021 Unknown        Anemia ICD-10-CM: D64.9  ICD-9-CM: 285.9  10/16/2021 Unknown        Iron deficiency anemia due to chronic blood loss ICD-10-CM: D50.0  ICD-9-CM: 280.0  10/12/2021 Yes        Vaginal bleeding ICD-10-CM: N93.9  ICD-9-CM: 623.8  10/6/2021 Yes        DVT (deep venous thrombosis) ([facility] ICD-10-CM: I82.409  ICD-9-CM: 453.40  9/23/2021 Yes        Morbid obesity (HCC) (Chronic) ICD-10-CM: E66.01  ICD-9-CM: 278.01  9/18/2021 Yes        Suspected sleep apnea ICD-10-CM: R29.818  ICD-9-CM: 781.99  9/18/2021 Yes        * (Principal) Acute respiratory distress syndrome (ARDS) due to COVID-19 virus [facility]) ICD-10-CM: U07.1, J80  ICD-9-CM: 518.82, 079.89  9/14/2021 Yes        DM w/o complication type II (HCC) (Chronic) ICD-10-CM: E11.9  ICD-9-CM: 250.00  9/14/2021 Yes        HTN (hypertension) (Chronic) ICD-10-CM: I10  ICD-9-CM: 401.9  9/14/2021 Yes              Plan:     Continue supportive measures pending confirmatory testing (EEG)    Subjective:      Misbah Sanders is a 23 y.o. female who is being seen for determination of neurological status following prolonged cardiac arrest on October 16. Patient was originally admitted to [address] [place] [address] on September 14 for acute respiratory failure related to COVID-19 infection. She initially refused intubation but finally family consented to a trial of mechanical ventilation following cardiac arrest..  Patient remains poorly responsive following cardiac arrest.    Hospital course further complicated by pulmonary embolus anticoagulation complicated by vaginal bleeding. Status post IVC interruption    History reviewed. No pertinent past medical history. Diabetes type 2  Morbid obesity  Probable ANJEL with pickwickian syndrome    History reviewed. No pertinent surgical history.    Family History   Problem Relation Age of Onset    No Known Problems Mother     No Known Problems Father     No Known Problems Sister     No Known Problems Brother     No Known Problems Sister      Social History     Tobacco Use    Smoking status: Not on file   Substance Use Topics    Alcohol use: Not on file      Current Facility-Administered Medications   Medication Dose Route Frequency Provider Last Rate Last Admin    fentaNYL citrate (PF) injection 50 mcg  50 mcg IntraVENous Q1H PRN Nita Rowland MD        LORazepam (ATIVAN) injection 2 mg  2 mg IntraVENous Q1H PRN Nita Rowland MD        vancomycin intermittent dosing placeholder   Other Rx Dosing/Monitoring Nita Rowland MD        piperacillin-tazobactam (ZOSYN) 3.375 g in 0.9% sodium chloride (MBP/ADV) 100 mL MBP  3.375 g IntraVENous Q8H Nita Rowland MD 25 mL/hr at 10/17/21 0902 3.375 g at 10/17/21 0902    epoprostenol (FLOLAN) 30,000 ng/mL in diluent for epoprostenol (gly) 50 mL solution  10-50 ng/kg/min (Ideal) Nebulization TITRATE Nita Rowland MD 6.6 mL/hr at 10/17/21 0609 50 ng/kg/min at 10/17/21 4086    0.9% Sodium Chloride for Flolan Infusion  8 mL/hr Nebulization TITRATE Nita Rowland MD 1.4 mL/hr at 10/16/21 1829 1.4 mL/hr at 10/16/21 1829    amiodarone (CORDARONE) 450 mg in dextrose 5% 250 mL infusion  0.5-1 mg/min IntraVENous TITRATE Sim Gonzalez MD 16.7 mL/hr at 10/17/21 0232 0.5 mg/min at 10/17/21 0232    sodium bicarbonate (8.4%) 150 mEq in dextrose 5% 1,000 mL infusion   IntraVENous CONTINUOUS Tommy MD Erwin 150 mL/hr at 10/17/21 0358 New Bag at 10/17/21 0358    cisatracurium (NIMBEX) 100 mg in 0.9% sodium chloride 100 mL infusion  0-10 mcg/kg/min IntraVENous TITRATE Sim Gonzalez MD   Stopped at 10/17/21 0522    white petrolatum-mineral oiL (LACRILUBE S.O.P.) ointment   Both Eyes PRN Sim Gonzalez MD        PHENYLephrine (JULIAN-SYNEPHRINE) 90 mg in 0.9% sodium chloride 250 mL infusion   mcg/min IntraVENous TITRATE Sim Gonzalez MD 50 mL/hr at 10/17/21 0624 300 mcg/min at 10/17/21 0624    vasopressin (VASOSTRICT) 40 Units in 0.9% sodium chloride 40 mL infusion  0-0.1 Units/min IntraVENous TITRATE Sim Gonzalez MD 6 mL/hr at 10/17/21 0629 0.1 Units/min at 10/17/21 0629    EPINEPHrine (ADRENALIN) 16 mg in 0.9% sodium chloride 250 mL infusion  1-10 mcg/min IntraVENous TITRATE Sim Gonzalez MD 9.4 mL/hr at 10/16/21 2017 10 mcg/min at 10/16/21 2017    NOREPINephrine (LEVOPHED) 16,000 mcg in dextrose 5% 250 mL infusion  0.5-30 mcg/min IntraVENous TITRATE Sim Gonzalez MD 28.1 mL/hr at 10/17/21 0530 30 mcg/min at 10/17/21 0530    acetaminophen (TYLENOL) solution 650 mg  650 mg Per NG tube Q6H PRN BeronicaGabriel MD        0.9% sodium chloride infusion 250 mL  250 mL IntraVENous PRN Daniel Luna MD 15 mL/hr at 10/15/21 1647 250 mL at 10/15/21 1647    dexamethasone (DECADRON) 10 mg/mL injection 6 mg  6 mg IntraVENous Q24H Atha Sharyn HO NP   6 mg at 10/16/21 1833    budesonide (PULMICORT) 500 mcg/2 ml nebulizer suspension  500 mcg Nebulization BID RT Athjenna HO NP   500 mcg at 10/16/21 0740    famotidine (PF) (PEPCID) 20 mg in 0.9% sodium chloride 10 mL injection  20 mg IntraVENous Q12H Mimi HO NP   20 mg at 10/17/21 0901    heparin 25,000 units in dextrose 500 mL infusion  18-36 Units/kg/hr (Adjusted) IntraVENous TITRATE Natalie Sousa NP   Stopped at 10/17/21 0541    albuterol (PROVENTIL VENTOLIN) nebulizer solution 2.5 mg  2.5 mg Nebulization Q6H RT Mimi HO NP   2.5 mg at 10/17/21 0200    [Held by provider] ferric gluconate (FERRLECIT) 250 mg in 0.9% sodium chloride 250 mL IVPB  250 mg IntraVENous DAILY Tanja De Los Santos  mL/hr at 10/15/21 0931 250 mg at 10/15/21 0931    lip protectant (BLISTEX) ointment 1 Each  1 Each Topical PRN Aura Santos MD   1 Each at 10/14/21 2228    0.9% sodium chloride infusion 250 mL  250 mL IntraVENous PRN Tanja De Los Santos MD        insulin lispro (HUMALOG) injection 8 Units  8 Units SubCUTAneous Sammy Ornelas MD   8 Units at 10/17/21 3232    0.9% sodium chloride infusion 250 mL  250 mL IntraVENous PRN Stephany Chaney MD        medroxyPROGESTERone (PROVERA) tablet 20 mg  20 mg Oral DAILY Lokesh Cárdenas MD   20 mg at 10/15/21 0931    [Held by provider] budesonide-formoterol (SYMBICORT) 80-4.5 mcg inhaler  2 Puff Inhalation BID RT NEFTALY Granger   2 Puff at 10/15/21 0720    [Held by provider] albuterol (PROVENTIL HFA, VENTOLIN HFA, PROAIR HFA) inhaler 2 Puff  2 Puff Inhalation Q6H RT NEFTALY Perez   2 Puff at 10/15/21 0720    insulin glargine (LANTUS) injection 15 Units  15 Units SubCUTAneous DAILY Dorian Pollack MD   15 Units at 10/17/21 0920    insulin lispro (HUMALOG) injection   SubCUTAneous AC&HS Dorian Pollack MD   6 Units at 10/17/21 0919    NUTRITIONAL SUPPORT ELECTROLYTE PRN ORDERS   Does Not Apply PRN Paradise Schroeder NP        hydrALAZINE (APRESOLINE) 20 mg/mL injection 10 mg  10 mg IntraVENous Q6H PRN Guy Quiet C, NP   10 mg at 09/28/21 0035    LORazepam (ATIVAN) injection 0.5 mg  0.5 mg IntraVENous Q6H PRN Tucker Krsue, Nicole C, NP   0.5 mg at 10/15/21 1332    phenol throat spray (CHLORASEPTIC) 1 Spray  1 Spray Oral PRN Stephanie Sabot C, NP        sodium chloride (NS) flush 30 mL  30 mL InterCATHeter Q8H Kingston, Nicole C, NP   30 mL at 10/17/21 0523    sodium chloride (NS) flush 30 mL  30 mL InterCATHeter PRN Stephanie Sabot C, NP   30 mL at 09/24/21 1416    acetaminophen (TYLENOL) suppository 650 mg  650 mg Rectal Q6H PRN Illona Gum, NP        polyethylene glycol (MIRALAX) packet 17 g  17 g Oral DAILY PRN Illona Gum, NP        ondansetron (ZOFRAN ODT) tablet 4 mg  4 mg Oral Q8H PRN Illona Gum, NP   4 mg at 09/30/21 4473    Or    ondansetron (ZOFRAN) injection 4 mg  4 mg IntraVENous Q6H PRN Illona Gum, NP            Allergies   Allergen Reactions    Shellfish Derived Anaphylaxis       Review of Systems: Review of systems cannot be obtained due to patient's medical condition.   Patient intubated      Objective:     Vitals:    10/17/21 0830 10/17/21 0845 10/17/21 0900 10/17/21 0915   BP:       Pulse: (!) 102 (!) 102 (!) 102 (!) 103   Resp: 18 (!) 35 (!) 36 (!) 37   Temp:       SpO2: (!) 87% (!) 86% (!) 87% (!) 88%   Weight:       Height:            Physical Exam:      No motor response to pain in 4 extremities and over supraorbital ridge    Pupils 10 mm eccentric unreactive to very bright light    Icewater calorics negative    Corneals absent    Gag absent    ABG:    Lab Results   Component Value Date/Time    PHI 6.84 (LL) 10/17/2021 09:24 AM    PCO2I 72.3 (HH) 10/17/2021 09:24 AM    PO2I 61 (L) 10/17/2021 09:24 AM    HCO3I 12.4 (L) 10/17/2021 09:24 AM    FIO2I 100 10/17/2021 09:24 AM           Recent Results (from the past 24 hour(s))   GLUCOSE, POC    Collection Time: 10/16/21 10:25 AM   Result Value Ref Range    Glucose (POC) 183 (H) 65 - 100 mg/dL    Performed by Era    HEPARIN XA UFH    Collection Time: 10/16/21 12:49 PM   Result Value Ref Range    Heparin Xa UFH 0.51 0.3 - 0.7 IU/mL   EKG, 12 LEAD, INITIAL    Collection Time: 10/16/21  5:11 PM   Result Value Ref Range    Ventricular Rate 121 BPM    Atrial Rate 121 BPM    P-R Interval 168 ms    QRS Duration 128 ms    Q-T Interval 324 ms    QTC Calculation (Bezet) 460 ms    Calculated P Axis 55 degrees    Calculated R Axis 143 degrees    Calculated T Axis 16 degrees    Diagnosis       Sinus tachycardia  Right bundle branch block  Abnormal ECG  When compared with ECG of 06-DEC-2019 18:46,  Right bundle branch block is now Present  Confirmed by [facility]  MD (), Mejia Sam ([id number]) on 10/17/2021 7:54:25 AM     BLOOD GAS, ARTERIAL POC    Collection Time: 10/16/21  6:00 PM   Result Value Ref Range    Device: ADULT VENT      FIO2 (POC) 100 %    pH (POC) 6.97 (LL) 7.35 - 7.45      pCO2 (POC) 86.2 (HH) 35 - 45 MMHG    pO2 (POC) 48 (L) 75 - 100 MMHG    HCO3 (POC) 19.7 (L) 22 - 26 MMOL/L    sO2 (POC) 58.0 (L) 95 - 98 %    Base deficit (POC) 12.8 mmol/L    Mode Pressure regulated volume control      Tidal volume 480 ml    PEEP/CPAP (POC) 17 cmH2O    Allens test (POC) NOT APPLICABLE      Site DRAWN FROM ARTERIAL LINE      Specimen type (POC) ARTERIAL      Performed by Mily     Critical value read back MARIA ELENA     Respiratory comment: [id number]    BLOOD GAS, ARTERIAL POC    Collection Time: 10/16/21  7:42 PM   Result Value Ref Range    Device: ADULT VENT      FIO2 (POC) 100 %    pH (POC) 6.90 (LL) 7.35 - 7.45      pCO2 (POC) 85.6 (HH) 35 - 45 MMHG    pO2 (POC) 54 (L) 75 - 100 MMHG    HCO3 (POC) 16.8 (L) 22 - 26 MMOL/L    sO2 (POC) 61.1 (L) 95 - 98 %    Base deficit (POC) 16.8 mmol/L    Mode Pressure regulated volume control      Tidal volume 480 ml    PEEP/CPAP (POC) 15 cmH2O    Mean Airway Pressure 25 cmH2O    PIP (POC) 47      Allens test (POC) NOT APPLICABLE      Site DRAWN FROM ARTERIAL LINE      Specimen type (POC) ARTERIAL      Performed by Keturah     Critical value read back DAR    HEPARIN XA UFH    Collection Time: 10/16/21 7:53 PM   Result Value Ref Range    Heparin Xa UFH <0.10 (L) 0.3 - 0.7 IU/mL   POTASSIUM    Collection Time: 10/16/21  7:53 PM   Result Value Ref Range    Potassium 5.9 (H) 3.5 - 5.1 mmol/L   GLUCOSE, POC    Collection Time: 10/16/21 10:06 PM   Result Value Ref Range    Glucose (POC) 136 (H) 65 - 100 mg/dL    Performed by [facility]    METABOLIC PANEL, COMPREHENSIVE    Collection Time: 10/17/21 12:30 AM   Result Value Ref Range    Sodium 141 136 - 145 mmol/L    Potassium 5.3 (H) 3.5 - 5.1 mmol/L    Chloride 99 98 - 107 mmol/L    CO2 19 (L) 21 - 32 mmol/L    Anion gap 23 (H) 7 - 16 mmol/L    Glucose 181 (H) 65 - 100 mg/dL    BUN 19 6 - 23 MG/DL    Creatinine 2.64 (H) 0.6 - 1.0 MG/DL    GFR est AA 28 (L) >60 ml/min/1.73m2    GFR est non-AA 23 (L) >60 ml/min/1.73m2    Calcium 7.8 (L) 8.3 - 10.4 MG/DL    Bilirubin, total 2.1 (H) 0.2 - 1.1 MG/DL    ALT (SGPT) 6,120 (H) 12 - 65 U/L    AST (SGOT) 9,994 (H) 15 - 37 U/L    Alk.  phosphatase 198 (H) 50 - 136 U/L    Protein, total 6.3 6.3 - 8.2 g/dL    Albumin 2.2 (L) 3.5 - 5.0 g/dL    Globulin 4.1 (H) 2.3 - 3.5 g/dL    A-G Ratio 0.5 (L) 1.2 - 3.5     BLOOD GAS, ARTERIAL POC    Collection Time: 10/17/21  2:44 AM   Result Value Ref Range    Device: ADULT VENT      FIO2 (POC) 100 %    pH (POC) 6.93 (LL) 7.35 - 7.45      pCO2 (POC) 75.6 (HH) 35 - 45 MMHG    pO2 (POC) 55 (L) 75 - 100 MMHG    HCO3 (POC) 15.7 (L) 22 - 26 MMOL/L    sO2 (POC) 63.9 (L) 95 - 98 %    Base deficit (POC) 17.2 mmol/L    Mode Pressure regulated volume control      PEEP/CPAP (POC) 15 cmH2O    Mean Airway Pressure 24 cmH2O    PIP (POC) 45      Allens test (POC) NOT APPLICABLE      Site DRAWN FROM ARTERIAL LINE      Specimen type (POC) ARTERIAL      Performed by Keturah     Critical value read back DAR    CBC WITH AUTOMATED DIFF    Collection Time: 10/17/21  3:25 AM   Result Value Ref Range    WBC 39.3 (H) 4.3 - 11.1 K/uL    RBC 4.20 4.05 - 5.2 M/uL    HGB 9.5 (L) 11.7 - 15.4 g/dL    HCT 37.2 35.8 - 46.3 %    MCV 88.6 79.6 - 97.8 FL    MCH 22.6 (L) 26.1 - 32.9 PG    MCHC 25.5 (L) 31.4 - 35.0 g/dL    RDW 23.0 (H) 11.9 - 14.6 %    PLATELET 78 (L) 550 - 450 K/uL    MPV Unable to calculate. Recommend adding IPF. 9.4 - 12.3 FL    ABSOLUTE NRBC 0.83 (H) 0.0 - 0.2 K/uL    NEUTROPHILS 70 47 - 75 %    BAND NEUTROPHILS 1 0 - 10 %    LYMPHOCYTES 18 16 - 44 %    MONOCYTES 3 3 - 9 %    METAMYELOCYTES 1 %    PROMYELOCYTES 2 %    BLASTS 5 %    ABS. NEUTROPHILS 29.1 (H) 1.7 - 8.2 K/UL    ABS. LYMPHOCYTES 7.1 (H) 0.5 - 4.6 K/UL    ABS.  MONOCYTES 1.2 0.1 - 1.3 K/UL    RBC COMMENTS NORMOCYTIC/NORMOCHROMIC      WBC COMMENTS Result Confirmed By Smear      PLATELET COMMENTS MANY      DF MANUAL     METABOLIC PANEL, BASIC    Collection Time: 10/17/21  3:25 AM   Result Value Ref Range    Sodium 139 136 - 145 mmol/L    Potassium 5.4 (H) 3.5 - 5.1 mmol/L    Chloride 99 98 - 107 mmol/L    CO2 16 (L) 21 - 32 mmol/L    Anion gap 24 (H) 7 - 16 mmol/L    Glucose 201 (H) 65 - 100 mg/dL    BUN 20 6 - 23 MG/DL    Creatinine 2.98 (H) 0.6 - 1.0 MG/DL    GFR est AA 25 (L) >60 ml/min/1.73m2    GFR est non-AA 20 (L) >60 ml/min/1.73m2    Calcium 7.7 (L) 8.3 - 10.4 MG/DL   VANCOMYCIN, RANDOM    Collection Time: 10/17/21  5:34 AM   Result Value Ref Range    Vancomycin, random 45.9 UG/ML   HEPARIN XA UFH    Collection Time: 10/17/21  5:34 AM   Result Value Ref Range    Heparin Xa UFH <0.10 (L) 0.3 - 0.7 IU/mL   GLUCOSE, POC    Collection Time: 10/17/21  9:07 AM   Result Value Ref Range    Glucose (POC) 212 (H) 65 - 100 mg/dL    [facility] by [facility]    BLOOD GAS, ARTERIAL POC    Collection Time: 10/17/21  9:24 AM   Result Value Ref Range    Device: ADULT VENT      FIO2 (POC) 100 %    pH (POC) 6.84 (LL) 7.35 - 7.45      pCO2 (POC) 72.3 (HH) 35 - 45 MMHG    pO2 (POC) 61 (L) 75 - 100 MMHG    HCO3 (POC) 12.4 (L) 22 - 26 MMOL/L    sO2 (POC) 65.5 (L) 95 - 98 %    Base deficit (POC) 21.6 mmol/L    Mode Pressure regulated volume control Tidal volume 480 ml    PEEP/CPAP (POC) 15 cmH2O    Allens test (POC) NOT APPLICABLE      Site DRAWN FROM ARTERIAL LINE      Specimen type (POC) ARTERIAL      Performed by Mily     Critical value read back DR. GOMES        Lab Results   Component Value Date/Time    Triglyceride 229 (H) 09/22/2021 03:40 AM        Lab Results   Component Value Date/Time    Hemoglobin A1c 12.8 (H) 09/14/2021 05:35 AM        CT Results (most recent):  Results from [place] encounter on 09/14/21    CT CHEST PULMONARY EMBOLISM    Narrative  CTA OF THE CHEST - PE STUDY    HISTORY: Shortness of breath    COMPARISON: None    TECHNIQUE: A helical acquisition was performed through the chest utilizing  8.32IE slice thickness during the infusion of 100 cc of Isovue-370. 3-D  post-processed images were created on an independent workstation. Multiplanar  reformats were obtained. The exam was focused on the pulmonary arteries. Dose reduction techniques used: Automated exposure control, adjustment of the  mAs and/or kVp according to patient's size, and iterative reconstruction  techniques. FINDINGS:  *  PULMONARY VESSELS: No evidence of pulmonary embolism. *  PLEURA / PERICARDIUM: Within normal limits. *  AVI / MEDIASTINUM: Within normal limits. *  LUNGS: Bilateral groundglass opacities. *  TRACHEOBRONCHIAL TREE: Within normal limits. *  AORTA: Within normal limits. *  CORONARY ARTERIES: No coronary artery calcification is seen. *  CHEST WALL/AXILLA: Within normal limits. *  VISUALIZED UPPER ABDOMEN: Within normal limits. *  SPINE / BONES: Within normal limits. *  ADDITIONAL COMMENTS: None. Impression  No evidence of pulmonary embolism. Bilateral Covid pneumonia.     Date of Dictation: 10/12/2021 11:51 AM      Results for orders placed or performed during the hospital encounter of 09/14/21   EKG, 12 LEAD, INITIAL   Result Value Ref Range    Ventricular Rate 121 BPM    Atrial Rate 121 BPM    P-R Interval 168 ms    QRS Duration 128 ms    Q-T Interval 324 ms    QTC Calculation (Bezet) 460 ms    Calculated P Axis 55 degrees    Calculated R Axis 143 degrees    Calculated T Axis 16 degrees    Diagnosis       Sinus tachycardia  Right bundle branch block  Abnormal ECG  When compared with ECG of 06-DEC-2019 18:46,  Right bundle branch block is now Present  Confirmed by Fransico Johnson MD (), Mariaelena Russ ([id number]) on 10/17/2021 7:54:25 AM          MRI Results (most recent):  No results found for this or any previous visit. US Results (most recent):  Results from [place] encounter on 09/14/21    US PELV NON OB W TV    Narrative  PELVIC ULTRASOUND. INDICATION:  Abnormal uterine bleeding. TECHNIQUE:  Transabdominal sector images through a urine distended bladder . Color flow and Doppler of the ovaries performed. FINDINGS:  The uterus measures 7.6 cm x 4.7 cm x 3 points cm. The endometrial  echo stripe is normal at 6 mm. Right ovary:  3.1 x 2.3 x 1.8 cm. There is blood flow in the right ovary. Left ovary:  Not definitely identified. There is a large, 10.3 x 8.7 cm complex  hypoechoic mass with internal echoes. No definite blood flow. Impression  Large, 10.3 x 8.7 cm complex hypoechoic mass with internal echoes  in the left pelvis, likely a hemorrhagic cyst. Ovary is not definitely  identified. Differential diagnosis includes: endometrioma, dermoid, less likely  abscess, cystic neoplasm or pregnancy related disease. Pregnancy test  recommended. Most recent CTA  Results from [place] encounter on 09/14/21    CT CHEST PULMONARY EMBOLISM    Narrative  CTA OF THE CHEST - PE STUDY    HISTORY: Shortness of breath    COMPARISON: None    TECHNIQUE: A helical acquisition was performed through the chest utilizing  5.00NT slice thickness during the infusion of 100 cc of Isovue-370. 3-D  post-processed images were created on an independent workstation. Multiplanar  reformats were obtained. The exam was focused on the pulmonary arteries. Dose reduction techniques used: Automated exposure control, adjustment of the  mAs and/or kVp according to patient's size, and iterative reconstruction  techniques. FINDINGS:  *  PULMONARY VESSELS: No evidence of pulmonary embolism. *  PLEURA / PERICARDIUM: Within normal limits. *  AVI / MEDIASTINUM: Within normal limits. *  LUNGS: Bilateral groundglass opacities. *  TRACHEOBRONCHIAL TREE: Within normal limits. *  AORTA: Within normal limits. *  CORONARY ARTERIES: No coronary artery calcification is seen. *  CHEST WALL/AXILLA: Within normal limits. *  VISUALIZED UPPER ABDOMEN: Within normal limits. *  SPINE / BONES: Within normal limits. *  ADDITIONAL COMMENTS: None. Impression  No evidence of pulmonary embolism. Bilateral Covid pneumonia. Date of Dictation: 10/12/2021 11:51 AM      Most recent MRI  No results found for this or any previous visit.           Signed By: Shelley Rice MD     October 17, 2021

## 2021-10-17 NOTE — PROGRESS NOTES
SpO2 70% Dr Irene Campa notified. Mother at bedside. Asking if they have made a decision on if they wanted to have patient intubated. Mother is asking to speak to doctor.

## 2021-10-17 NOTE — PROCEDURES
[address] [place] [address] Neurology   Routine Electroencephalogram Report      DATE: Oct 17, 2021 time Start: 0594 time End: 3037    EEG Number: Not available    Indication: Possible brain death    Medications:   Current Facility-Administered Medications   Medication Dose Route Frequency Provider Last Rate Last Admin    fentaNYL citrate (PF) injection 50 mcg  50 mcg IntraVENous Q1H PRN Irene Sy MD        LORazepam (ATIVAN) injection 2 mg  2 mg IntraVENous Q1H PRN Irene Sy MD        vancomycin intermittent dosing placeholder   Other Rx Dosing/Monitoring Irene Sy MD        piperacillin-tazobactam (ZOSYN) 3.375 g in 0.9% sodium chloride (MBP/ADV) 100 mL MBP  3.375 g IntraVENous Q8H Irene Sy MD 25 mL/hr at 10/17/21 0902 3.375 g at 10/17/21 0902    epoprostenol (FLOLAN) 30,000 ng/mL in diluent for epoprostenol (gly) 50 mL solution  10-50 ng/kg/min (Ideal) Nebulization TITRATE Irene Sy MD 6.6 mL/hr at 10/17/21 0609 50 ng/kg/min at 10/17/21 8065    0.9% Sodium Chloride for Flolan Infusion  8 mL/hr Nebulization TITRATE Irene Sy MD 1.4 mL/hr at 10/16/21 1829 1.4 mL/hr at 10/16/21 1829    amiodarone (CORDARONE) 450 mg in dextrose 5% 250 mL infusion  0.5-1 mg/min IntraVENous TITRATE Irene Sy MD 16.7 mL/hr at 10/17/21 0232 0.5 mg/min at 10/17/21 0232    sodium bicarbonate (8.4%) 150 mEq in dextrose 5% 1,000 mL infusion   IntraVENous CONTINUOUS BeronicaNicolas  mL/hr at 10/17/21 1212 New Bag at 10/17/21 1212    cisatracurium (NIMBEX) 100 mg in 0.9% sodium chloride 100 mL infusion  0-10 mcg/kg/min IntraVENous TITRATE Irene Sy MD   Stopped at 10/17/21 0522    white petrolatum-mineral oiL (LACRILUBE S.O.P.) ointment   Both Eyes PRN Irene Sy MD        PHENYLephrine (JULIAN-SYNEPHRINE) 90 mg in 0.9% sodium chloride 250 mL infusion   mcg/min IntraVENous TITRATE Irene Sy MD 50 mL/hr at 10/17/21 1151 300 mcg/min at 10/17/21 1151    vasopressin (VASOSTRICT) 40 Units in 0.9% sodium chloride 40 mL infusion  0-0.1 Units/min IntraVENous TITRATE Terrance Infante MD 6 mL/hr at 10/17/21 0629 0.1 Units/min at 10/17/21 0629    EPINEPHrine (ADRENALIN) 16 mg in 0.9% sodium chloride 250 mL infusion  1-10 mcg/min IntraVENous TITRATE Terrance Infante MD 9.4 mL/hr at 10/16/21 2017 10 mcg/min at 10/16/21 2017    NOREPINephrine (LEVOPHED) 16,000 mcg in dextrose 5% 250 mL infusion  0.5-30 mcg/min IntraVENous TITRATE Terrance Infante MD 28.1 mL/hr at 10/17/21 0530 30 mcg/min at 10/17/21 0530    acetaminophen (TYLENOL) solution 650 mg  650 mg Per NG tube Q6H PRN Caitlyn Loco MD        0.9% sodium chloride infusion 250 mL  250 mL IntraVENous PRN Montez Merida MD 15 mL/hr at 10/15/21 1647 250 mL at 10/15/21 1647    dexamethasone (DECADRON) 10 mg/mL injection 6 mg  6 mg IntraVENous Q24H Yg HO NP   6 mg at 10/16/21 1833    budesonide (PULMICORT) 500 mcg/2 ml nebulizer suspension  500 mcg Nebulization BID RT Yg HO, NP   500 mcg at 10/16/21 0740    famotidine (PF) (PEPCID) 20 mg in 0.9% sodium chloride 10 mL injection  20 mg IntraVENous Q12H Yg HO NP   20 mg at 10/17/21 0901    heparin 25,000 units in dextrose 500 mL infusion  18-36 Units/kg/hr (Adjusted) IntraVENous TITRATE Emily Stanford NP   Stopped at 10/17/21 0541    albuterol (PROVENTIL VENTOLIN) nebulizer solution 2.5 mg  2.5 mg Nebulization Q6H RT Yg HO NP   2.5 mg at 10/17/21 0200    [Held by provider] ferric gluconate (FERRLECIT) 250 mg in 0.9% sodium chloride 250 mL IVPB  250 mg IntraVENous DAILY Montez Merida  mL/hr at 10/15/21 0931 250 mg at 10/15/21 0931    lip protectant (BLISTEX) ointment 1 Each  1 Each Topical PRN Breanna Motta MD   1 Each at 10/14/21 2228    0.9% sodium chloride infusion 250 mL  250 mL IntraVENous PRN Montez Merida MD        insulin lispro (HUMALOG) injection 8 Units  8 Units SubCUTAneous Nyasia Trent, Yo Diaz MD   8 Units at 10/17/21 1205    0.9% sodium chloride infusion 250 mL  250 mL IntraVENous PRN Lennie Barillas MD        medroxyPROGESTERone (PROVERA) tablet 20 mg  20 mg Oral DAILY Maria Antonia Burrows MD   20 mg at 10/15/21 0931    [Held by provider] budesonide-formoterol (SYMBICORT) 80-4.5 mcg inhaler  2 Puff Inhalation BID RT NEFTALY Benjamin   2 Puff at 10/15/21 0720    [Held by provider] albuterol (PROVENTIL HFA, VENTOLIN HFA, PROAIR HFA) inhaler 2 Puff  2 Puff Inhalation Q6H RT NEFTALY Benjamin   2 Puff at 10/15/21 0720    insulin glargine (LANTUS) injection 15 Units  15 Units SubCUTAneous DAILY Bee Holliday MD   15 Units at 10/17/21 0920    insulin lispro (HUMALOG) injection   SubCUTAneous AC&HS Bee Holliday MD   6 Units at 10/17/21 1205    NUTRITIONAL SUPPORT ELECTROLYTE PRN ORDERS   Does Not Apply PRN Yuval Napier NP        hydrALAZINE (APRESOLINE) 20 mg/mL injection 10 mg  10 mg IntraVENous Q6H PRN Brenda HEATH, NP   10 mg at 09/28/21 0035    LORazepam (ATIVAN) injection 0.5 mg  0.5 mg IntraVENous Q6H PRN Brenda HEATH, NP   0.5 mg at 10/15/21 1332    phenol throat spray (CHLORASEPTIC) 1 Spray  1 Spray Oral PRN Yuval Napier NP        sodium chloride (NS) flush 30 mL  30 mL InterCATHeter Q8H Nicole Kingston, NP   30 mL at 10/17/21 0523    sodium chloride (NS) flush 30 mL  30 mL InterCATHeter PRN Brenda HEATH, NP   30 mL at 09/24/21 1416    acetaminophen (TYLENOL) suppository 650 mg  650 mg Rectal Q6H PRN Ciara Maynard, NP        polyethylene glycol (MIRALAX) packet 17 g  17 g Oral DAILY PRN Ciara Maynard NP        ondansetron (ZOFRAN ODT) tablet 4 mg  4 mg Oral Q8H PRN Ciara Maynard, NP   4 mg at 09/30/21 0850    Or    ondansetron (ZOFRAN) injection 4 mg  4 mg IntraVENous Q6H PRN Ciara Maynard, NP           Technique: The EEG was recorded on a 32 channel [employer] digital EEG machine.  A full electrode headset was applied in accordance with the International 10-20 System of Electrode Placement. All impedances are less than 5000 Ohms. Time locked digital video of the patient was recorded and reviewed as needed for clinical correlation     State of Consciousness: Comatose intubated      Description:  The EEG is completely obscured by high amplitude fast artifact.  This did not improve after administration of neuromuscular blocking agent    Activation Procedures:  Hyperventilation: Not performed  Photic Stimulation: Not performed      Interpretation: Inconclusive EEG due to presence of excessive EMF contamination

## 2021-10-17 NOTE — PROGRESS NOTES
Bedside and Verbal shift change report given to [address] [facility] (oncoming nurse) by Dung Becerra RN (offgoing nurse). Report included the following information SBAR, Kardex, Intake/Output, MAR, Recent Results, Med Rec Status, Cardiac Rhythm SR 70's and Alarm Parameters . Mother called and update given informed her of the HR drop.

## 2021-10-17 NOTE — PROGRESS NOTES
Ibrahima Pérez  Admission Date: 9/14/2021         Daily Progress Note: 10/17/2021    The patient's chart is reviewed and the patient is discussed with the staff. Background: 23yo  female admitted with acute respiratory failure due to Dania Esquivel. She was admitted on 9/14 with a 2 day history of dyspnea. She received remdesivier and decadron. She initially required 6L NC but worsened and required BiPAP 100% and her resting saturation was 91% but dropped to 70% with exertion. Received Actemra 9/16. Patient was moved to tele obs ICU on BiPAP nad on 9/29 was able to transfer to floor on airvo. She was found to have L calf DVT on 9/22 and was on heparin and then transitioned to eliquis. She was having vaginal bleeding and has required multiple blood transfusions. V/Q scan with low probability of PE. Vaginal U/S with findings of cyst, HCG negative. GYN recommended pt resume eliquis and start provera. CT chest could not r/o PE. She was able to wean O2. Given bleeding issues she was evaluated for IVC filter but was not candidate. We had originally signed off but were re-consulted given worsening hypoxia. ABG 7.4/43.4/46/27. She was placed on CPAP and then BiPAP 100% with O2 sat remaining in 70s. She is tachycardic in 130-140s and tachypneic. She has received lasix. CXR with worsening infiltrates. Repeat dopplers 10/13 negative for previously seen DVT. Subjective:     Patient coded yesterday for 20 minutes on Vent. Called by nurse since has pupil change and now dilated and not reactive. Patient also noted with no corneal or gag reflex. Mother and aunt her and gave update that feels she has become brain dead from all recent event and particularly cardiac arrest. Would still like all efforts. Currently still on 4 pressors. Stopped the paralytic and small dose of fentanyl and still no response.        Current Facility-Administered Medications   Medication Dose Route Frequency    piperacillin-tazobactam (ZOSYN) 3.375 g in 0.9% sodium chloride (MBP/ADV) 100 mL MBP  3.375 g IntraVENous Q8H    vancomycin (VANCOCIN) 1500 mg in  ml infusion  1,500 mg IntraVENous Q8H    propofol (DIPRIVAN) 10 mg/mL infusion  0-50 mcg/kg/min IntraVENous TITRATE    fentaNYL in normal saline (pf) 25 mcg/mL infusion  0-200 mcg/hr IntraVENous TITRATE    epoprostenol (FLOLAN) 30,000 ng/mL in diluent for epoprostenol (gly) 50 mL solution  10-50 ng/kg/min (Ideal) Nebulization TITRATE    0.9% Sodium Chloride for Flolan Infusion  8 mL/hr Nebulization TITRATE    amiodarone (CORDARONE) 450 mg in dextrose 5% 250 mL infusion  0.5-1 mg/min IntraVENous TITRATE    sodium bicarbonate (8.4%) 150 mEq in dextrose 5% 1,000 mL infusion   IntraVENous CONTINUOUS    cisatracurium (NIMBEX) 100 mg in 0.9% sodium chloride 100 mL infusion  0-10 mcg/kg/min IntraVENous TITRATE    white petrolatum-mineral oiL (LACRILUBE S.O.P.) ointment   Both Eyes PRN    PHENYLephrine (JULIAN-SYNEPHRINE) 90 mg in 0.9% sodium chloride 250 mL infusion   mcg/min IntraVENous TITRATE    vasopressin (VASOSTRICT) 40 Units in 0.9% sodium chloride 40 mL infusion  0-0.1 Units/min IntraVENous TITRATE    EPINEPHrine (ADRENALIN) 16 mg in 0.9% sodium chloride 250 mL infusion  1-10 mcg/min IntraVENous TITRATE    NOREPINephrine (LEVOPHED) 16,000 mcg in dextrose 5% 250 mL infusion  0.5-30 mcg/min IntraVENous TITRATE    sodium bicarbonate (8.4%) 1 mEq/mL (8.4 %) injection        acetaminophen (TYLENOL) solution 650 mg  650 mg Per NG tube Q6H PRN    0.9% sodium chloride infusion 250 mL  250 mL IntraVENous PRN    morphine injection 1 mg  1 mg IntraVENous Q3H PRN    dexamethasone (DECADRON) 10 mg/mL injection 6 mg  6 mg IntraVENous Q24H    budesonide (PULMICORT) 500 mcg/2 ml nebulizer suspension  500 mcg Nebulization BID RT    famotidine (PF) (PEPCID) 20 mg in 0.9% sodium chloride 10 mL injection  20 mg IntraVENous Q12H    heparin 25,000 units in dextrose 500 mL infusion  18-36 Units/kg/hr (Adjusted) IntraVENous TITRATE    albuterol (PROVENTIL VENTOLIN) nebulizer solution 2.5 mg  2.5 mg Nebulization Q6H RT    [Held by provider] ferric gluconate (FERRLECIT) 250 mg in 0.9% sodium chloride 250 mL IVPB  250 mg IntraVENous DAILY    lip protectant (BLISTEX) ointment 1 Each  1 Each Topical PRN    0.9% sodium chloride infusion 250 mL  250 mL IntraVENous PRN    insulin lispro (HUMALOG) injection 8 Units  8 Units SubCUTAneous TIDAC    0.9% sodium chloride infusion 250 mL  250 mL IntraVENous PRN    medroxyPROGESTERone (PROVERA) tablet 20 mg  20 mg Oral DAILY    [Held by provider] budesonide-formoterol (SYMBICORT) 80-4.5 mcg inhaler  2 Puff Inhalation BID RT    [Held by provider] albuterol (PROVENTIL HFA, VENTOLIN HFA, PROAIR HFA) inhaler 2 Puff  2 Puff Inhalation Q6H RT    insulin glargine (LANTUS) injection 15 Units  15 Units SubCUTAneous DAILY    insulin lispro (HUMALOG) injection   SubCUTAneous AC&HS    NUTRITIONAL SUPPORT ELECTROLYTE PRN ORDERS   Does Not Apply PRN    hydrALAZINE (APRESOLINE) 20 mg/mL injection 10 mg  10 mg IntraVENous Q6H PRN    morphine injection 1 mg  1 mg IntraVENous Q4H PRN    LORazepam (ATIVAN) injection 0.5 mg  0.5 mg IntraVENous Q6H PRN    phenol throat spray (CHLORASEPTIC) 1 Spray  1 Spray Oral PRN    sodium chloride (NS) flush 30 mL  30 mL InterCATHeter Q8H    sodium chloride (NS) flush 30 mL  30 mL InterCATHeter PRN    acetaminophen (TYLENOL) suppository 650 mg  650 mg Rectal Q6H PRN    polyethylene glycol (MIRALAX) packet 17 g  17 g Oral DAILY PRN    ondansetron (ZOFRAN ODT) tablet 4 mg  4 mg Oral Q8H PRN    Or    ondansetron (ZOFRAN) injection 4 mg  4 mg IntraVENous Q6H PRN     Review of Systems  unresponsive  Objective:     Vitals:    10/17/21 0430 10/17/21 0445 10/17/21 0500 10/17/21 0515   BP:       Pulse: (!) 109 (!) 109 (!) 109 (!) 107   Resp: (!) 36 (!) 36 (!) 36 (!) 36   Temp:       SpO2: (!) 84% (!) 85% (!) 86% (!) 87%   Weight:       Height:            Ventilator Settings  Mode FIO2 Rate Tidal Volume Pressure PEEP   PRVC  100 %    480 ml     15 cm H20      Peak airway pressure: 44.8 cm H2O   Minute ventilation: 17.7 l/min        Intake/Output Summary (Last 24 hours) at 10/17/2021 0528  Last data filed at 10/16/2021 2018  Gross per 24 hour   Intake 6953.06 ml   Output 2050 ml   Net 4903.06 ml     Physical Exam:   Constitution:  the patient is well developed and in no acute distress  HEENT:  Sclera clear, pupils equal but dilated to maximum extent. No response to light and no corneal reflex. No gag reflex. ETT noted  Respiratory: diminished, on Vent at  100%  Cardiovascular:  RRR without M,G,R  Gastrointestinal: soft and non-tender; with positive bowel sounds. Musculoskeletal: warm without cyanosis. There is + lower extremity edema. Skin:  no jaundice or rashes, no wounds   Neurologic: no corneal, no gag, pupils dilated and not response to light. No response to pain. Plantar absent. Psychiatric:  Unresponsive.   CXR: 10/16 - ETT noted and chest congestion        LAB:  Recent Labs     10/17/21  0325 10/16/21  0519 10/15/21  1450   WBC 39.3* 12.1* 13.3*   HGB 9.5* 9.4* 8.4*   HCT 37.2 32.2* 30.3*   PLT 78* 188 254   PCT  --   --  0.14   LAC  --   --  1.2     Recent Labs     10/17/21  0325 10/17/21  0030 10/16/21  1953 10/16/21  0326 10/16/21  0326 10/15/21  0523 10/15/21  0523    141  --   --  140   < > 136   K 5.4* 5.3* 5.9*   < > 3.9   < > 3.9   CL 99 99  --   --  104   < > 105   CO2 16* 19*  --   --  31   < > 27   * 181*  --   --  177*   < > 186*   BUN 20 19  --   --  8   < > 5*   CREA 2.98* 2.64*  --   --  0.55*   < > 0.57*   MG  --   --   --   --   --   --  1.9   CA 7.7* 7.8*  --   --  8.5   < > 8.4   PHOS  --   --   --   --   --   --  3.7   ALB  --  2.2*  --   --   --   --   --    AST  --  9,994*  --   --   --   --   --    ALT  --  6,120*  --   --   --   --   --    AP  --  198*  --   --   --   -- --     < > = values in this interval not displayed. Recent Labs     10/15/21  2144 10/15/21  1450   LAC  --  1.2   BNPNT  --  544*   CRP 21.5*  --      Recent Labs     10/17/21  0244 10/16/21  1942 10/16/21  1800   PHI 6.93* 6.90* 6.97*   PCO2I 75.6* 85.6* 86.2*   PO2I 55* 54* 48*   HCO3I 15.7* 16.8* 19.7*     Recent Labs     10/15/21  2022 10/15/21  1617   CULT NO GROWTH AFTER 10 HOURS  NO GROWTH AFTER 10 HOURS NO GROWTH AFTER SHORT PERIOD OF INCUBATION. FURTHER RESULTS TO FOLLOW AFTER OVERNIGHT INCUBATION. Assessment and Plan:  (Medical Decision Making)   Principal Problem:    Acute respiratory distress syndrome (ARDS) due to COVID-19 virus ([facility] (9/14/2021)   --on vent and abg dropping again despite vent support and bicarb    Active Problems:    Cardiac arrest ([facility] (10/17/2021)  --prolonged arrest 20 minutes and now with possible anoxic encephalopathy. Will get Tele neurology and EEG -- mother wants to make sure. --in Shock on 4 pressors and bicarb still. DVT (deep venous thrombosis) ([facility] (9/23/2021)  --was on blood thinner for this and presumed PE. Last US noted no clot any longer in leg and likely propelled upward. Now took nicolasa blood thinners given pupils dilated and worsening mental status. Too unstable to transfer for CT head. Acute encephalopathy (10/17/2021)  --likely from cardiac arrest and also cannot r/o ICH. Stop blood thinners for now. Anemia (10/16/2021)  --has been issues and s/p transfusions recently. Had prior bleeding (vaginal) when on full dose blood thinners      Morbid obesity ([facility] (9/18/2021)  -- likely contributed to overall decline      Vaginal bleeding (10/6/2021)  --seen by GYN this admission on provera      Fever (10/16/2021)   --on ABX but multifactorial        HTN (hypertension) (9/14/2021)  --now in shock. Metabolic acidosis  --from all of the above    ALLAN  In renal failure -- to unstable for HD.  SBP in 40's        Iron deficiency anemia due to chronic blood loss (10/12/2021)  --from GI bleed. Did take to mother and aunt for a while and went over condition and now felt likely had severe anoxic event/ICH from prolonged arrest. Told do not feel she will recover since on 4 pressor/bicarb drip and still acidotic. They still want tests to confirm that likely brain death. Will order EEG since too unstable to transfer for CT of CBF studies. Will engage neurology for additional testing/ confirmation. Did take to family to not do CPR since on continuous drips and even spoke with their brenden. They will let us know. Aware unfortunately will not recover and will pass from this illness. I also did show her the CT from 10/12 which had marked congestion as well as the x-ray from 10/16 and prior studies. They are aware she has not been well for a while. All questions answered. The patient is critically ill with respiratory failure, circulatory failure, shock, liver failure, encepholpathy, etc.  and requires high complexity decision making for assessment and support including frequent ventilator adjustment , frequent evaluation and titration of therapies , application of advanced monitoring technologies and extensive interpretation of multiple databases. Cumulative time devoted to patient care services by me for day of service -40 min    Carlos Null MD    Patient's mother Lira Form at [phone] updated personally today with aunt and Dr. Gaby Peña [facility])      More than 50% of the time documented was spent in face-to-face contact with the patient and in the care of the patient on the floor/unit where the patient is located.     Carlos Null MD

## 2021-10-17 NOTE — PROGRESS NOTES
SpO2 in the 70's with labored breathing. Talked about being put on vent. States she needs to talk to her mom. Called mother on her cell phone. Encourage mother to come to visit with patient this morning.

## 2021-10-17 NOTE — PROCEDURES
Procedure:   US GUIDED CENTRAL LINE PLACEMENT    Indication:    hemodynamic shock  Summary:  Informed consent was obtained. A time-out was performed. Using the Sonosite ultrasound with the 5-10 MHz vascular probe transducer and sterile technique, the R IJ vein was identified and under direct real time visualization was cannulated. The CVC kit guide wire was then inserted and its position within the R IJ vein verified in short and long axis views on vascular probe US. Using Seldinger technique, a quad lumen catheter was then placed in the R IJ  vein, after dilation of entry port without difficulty. There was no significant bleeding during the procedure and good flush and drawback was noted. The CVC was then affixed with supplied 2.0 silk sutures via the proximal and distal limiters, with the insertion point affixed at 15 cm. A biostatic disc was applied to the entry port followed by a transparent dressing. A chest x-ray is ordered to confirm proper placement. There were no immediate complications.     Hetal Alexander MD

## 2021-10-17 NOTE — PROGRESS NOTES
10/17/21 0247   Patient Observations   Pulse (Heart Rate) (!) 112   Resp Rate (!) 36   O2 Sat (%) (!) 81 %   Airway - Endotracheal Tube 10/16/21 Oral   Placement Date/Time: 10/16/21 0326   Number of Attempts: 1  Inserted By: dr. Lorie Toth  Present on Admission/Arrival: No  Location: Oral  Placement Verified: Auscultation;Placement not verified (comment);EtCO2;Chest x-ray  Airway Types: Endotracheal, cuff. .. Insertion Depth (cm) 24 cm   Line [place] Lips   Site Assessment Clean, dry, & intact   Respiratory   Respiratory (WDL) X   Patient on Vent Yes - If patient is on vent, add Doc Flowsheet Ventilator ().    Respiratory Pattern Tachypneic   Chest/Tracheal Assessment Chest expansion, symmetrical   Breath Sounds Bilateral Diminished   Cough Non-productive   Airway Clearance   Suction ET Tube   Suction Device Inline suction catheter   Sputum Method Obtained Endotracheal   Sputum Amount Small   Sputum Color/Odor Tan   Sputum Consistency Thin   Vent Settings   FIO2 (%) 100 %   SpO2/FIO2 Ratio 81   CMV Rate Set 36   Vt Set (ml) 480 ml   PEEP/VENT (cm H2O) 15 cm H20   I:E Ratio 1:2   Insp Time (sec) 0.56 sec   Insp Rise Time (sec) 0.08   Insp Rise Time % 5 %   Pressure Trigger -2   Ventilator Measurements   Resp Rate Observed 36   Vt Exhaled (Machine Breath) (ml) 495 ml   Ve Observed (l/min) 17.7 l/min   PIP Observed (cm H2O) 44.8 cm H2O   MAP (cm H2O) 23.7   I:E Ratio Actual 1:2   Dynamic Compliance (ml/cm H20) 16 ml/cm H20   Safety & Alarms   Backup Mode Checked/Apnea Yes   Pressure Max 55 cm H2O   Pressure Min 2 cm H2O   Ve Min 3   Ve Max 25   RR Min 5   RR Max 56   Ambu Bag Yes   Ambu Mask Yes   Vent Method/Mode   Ventilation Method Conventional   Ventilator Mode PRVC   Ventilator Mode ID 4   $$ Ventilator Subsequent Subsequent Vent Day   Procedures   $$ Subsequent Procedure Aerosol   Delivery Source In-line nebulizer

## 2021-10-18 LAB
BACTERIA SPEC CULT: NORMAL
SERVICE CMNT-IMP: NORMAL

## 2021-10-18 NOTE — PROGRESS NOTES
Responded to [employer] as the on-call . Patient was  upon my arrival to the hospital. I spoke to physician prior to my visit and offered grief support to family at bedside. Family was at peace knowing Ms. Herminio Smith has an eternal home in heaven. I actively listened to family share about Ms. Molina's life. Family is expecting clergy to arrive from [facility]. Family requested 's visit to Ms. Molina's father, Ara Alonzo, in the [facility] ER.  follow-up is planned.      Eva Palomo 69  Board Certified

## 2021-10-18 NOTE — PROGRESS NOTES
Patient went bradycardia to PEA. CPR started and we gave at least 6 rounds of Epi, bicarb x 6-7, Calcium and patient on continuous drips of Epi, levo, jamal, vaso, and bicarb at 150. Family at bedside. After 15+ minutes no ROSC.  Patient pronounced at 8:05 Yue Palomares MD

## 2021-10-18 NOTE — DISCHARGE SUMMARY
Death Summary    Yesy Cancel  Admission date:  9/14/2021  Discharge date:  10/17/21    Admitting Diagnosis:  Acute respiratory failure ([facility] [J96.00]  Discharge Diagnosis:  Cardiac Arrest  Problem List as of 10/17/2021 Date Reviewed: 10/6/2021        Codes Class Noted - Resolved    * (Principal) Acute respiratory distress syndrome (ARDS) due to COVID-19 virus [facility]) ICD-10-CM: U07.1, J80  ICD-9-CM: 518.82, 079.89  9/14/2021 - Present        Cardiac arrest [facility]) ICD-10-CM: I46.9  ICD-9-CM: 427.5  10/17/2021 - Present        MULTIPLE ORGAN FAILURE  --after arrest --> Severe anoxic-ischemic encephalopathy, renal failure, liver failure, Shock, severe metabolic acidosis            Acute encephalopathy ICD-10-CM: G93.40  ICD-9-CM: 348.30  10/17/2021 - Present        DVT (deep venous thrombosis) ([facility] ICD-10-CM: I82.409  ICD-9-CM: 453.40  9/23/2021 - Present        Severe anoxic-ischemic encephalopathy (HCC) ICD-10-CM: G93.1, I67.82  ICD-9-CM: 348.1, 437.1  10/17/2021 - Present        Anemia ICD-10-CM: D64.9  ICD-9-CM: 285.9  10/16/2021 - Present        Vaginal bleeding ICD-10-CM: N93.9  ICD-9-CM: 623.8  10/6/2021 - Present        Morbid obesity ([facility] (Chronic) ICD-10-CM: E66.01  ICD-9-CM: 278.01  9/18/2021 - Present        Fever ICD-10-CM: R50.9  ICD-9-CM: 780.60  10/16/2021 - Present        Iron deficiency anemia due to chronic blood loss ICD-10-CM: D50.0  ICD-9-CM: 280.0  10/12/2021 - Present        Suspected sleep apnea ICD-10-CM: R29.818  ICD-9-CM: 781.99  9/18/2021 - Present        DM w/o complication type II (HCC) (Chronic) ICD-10-CM: E11.9  ICD-9-CM: 250.00  9/14/2021 - Present        HTN (hypertension) (Chronic) ICD-10-CM: I10  ICD-9-CM: 401.9  9/14/2021 - Present                  Consultants:  Neurology  IR  GYN  Palliative Care  Nutrition    Studies/Procedures:  CT chest  Central line  PICC line  Arterial line  Intubation  EEG    Hospital course:  25yo  female admitted with acute respiratory failure due to COVID19. She was admitted on 9/14 with a 2 day history of dyspnea. She received remdesivier and decadron. She initially required 6L NC but worsened and required BiPAP 100% and her resting saturation was 91% but dropped to 70% with exertion. Received Actemra 9/16. Patient was moved to [facility] obs ICU on BiPAP nad on 9/29 was able to transfer to floor on airvo. She was found to have L calf DVT on 9/22 and was on heparin and then transitioned to eliquis. She was having vaginal bleeding and has required multiple blood transfusions. V/Q scan with low probability of PE. Vaginal U/S with findings of cyst, HCG negative. GYN recommended pt resume eliquis and start provera. CT chest could not r/o PE. She was able to wean O2. Given bleeding issues she was evaluated for IVC filter but was not candidate. We had originally signed off but were re-consulted given worsening hypoxia. ABG 7.4/43.4/46/27. She was placed on CPAP and then BiPAP 100% with O2 sat remaining in 70s. She is tachycardic in 130-140s and tachypneic. She has received lasix. CXR with worsening infiltrates. Repeat dopplers 10/13 negative for previously seen DVT. Patient coded on 10/16 for 20 minutes on Vent. Called by nurse since has pupil change and now dilated and not reactive. Patient also noted with no corneal or gag reflex. Mother and aunt her and gave update that feels she has become brain dead from all recent event and particularly cardiac arrest. Would still like all efforts. Currently still on 4 pressors and bicarb drip Stopped the paralytic and small dose of fentanyl and still no response. likely with anoxic ischemic encephalopathy following arrest and probable brain death. Seen by neurology. Patient with repeat arrest tonight on 10/17 and despite further agressive measures outline in code note, not able to revive patient.  Pronounced dead at 8:05 PM    Final:  --Pronounced dead at 8:05 PM  --additional time spent doing documentation, talking to family, reviewing chart, etc was 50 minutes. This does not include any procedure time. Carey Sanchez MD

## 2021-10-21 LAB
1,3 BETA GLUCAN SER-MCNC: 33 PG/ML
BACTERIA SPEC CULT: NORMAL
BACTERIA SPEC CULT: NORMAL
SERVICE CMNT-IMP: NORMAL
SERVICE CMNT-IMP: NORMAL

## 2021-10-25 NOTE — PROGRESS NOTES
Physician Progress Note      Darling Jensen  [facility] #:                  [id number]  :                       1995  ADMIT DATE:       2021 5:26 AM  DISCH DATE:        10/17/2021 8:05 PM  RESPONDING  PROVIDER #:        Cheryl Bergeron MD        QUERY TEXT:    Type of Shock: Please provide further specificity, if known. Clinical indicators include: shock, blood, bleeding, blood transfusions, cardiac arrest, 1 units, 8 units 8 units, hgb, hct, transfusions, abx, blood loss, gi bleed, cpr, organ failure  Options provided:  -- Cardiogenic shock  -- Septic shock  -- Hypovolemic shock  -- Hemorrhagic shock due to trauma  -- Hemorrhagic shock related to surgery  -- Anaphylactic shock  -- Other - I will add my own diagnosis  -- Disagree - Not applicable / Not valid  -- Disagree - Clinically Unable to determine / Unknown        PROVIDER RESPONSE TEXT:    Provider was unable to determine a response for this query.       Electronically signed by:  Cheryl Bergeron MD 10/25/2021 1:51 PM

## 2023-05-22 NOTE — PROGRESS NOTES
Patient is almost on max setting of Bi-level and an oxygenation of 89%. Patient is not stable enough to be changed over to [employer]. No

## 2023-10-14 NOTE — PROGRESS NOTES
Pt is a/o x 4. Airvo 60/75 and tolerating well. Sats ranging between 89-94%. Pt desats with exertion as low as the high 70's. Pt has been up in the chair most of the shift. Denies any chest pain but does states she is feeling \"more exhausted\" today than usual. Pt has brief in place to help with menstrual flow. Pt states it is more than what she is used to. Provider informed and new orders noted. Insulin coverage given for lunch meal. Bed low/locked, call light and bedside table within reach. Will continue to monitor and provide care as needed. Anemia JENNIFER (acute kidney injury)

## 2024-10-24 NOTE — PROGRESS NOTES
TRANSFER - IN REPORT:    Verbal report received from Perla(name) on Andreas Gray  being received from ICU(unit) for routine progression of care      Report consisted of patients Situation, Background, Assessment and   Recommendations(SBAR). Information from the following report(s) SBAR, Kardex, Procedure Summary, Intake/Output, MAR and Recent Results was reviewed with the receiving nurse. Opportunity for questions and clarification was provided. Assessment completed upon patients arrival to unit and care assumed. Yes